# Patient Record
Sex: FEMALE | Race: WHITE | NOT HISPANIC OR LATINO | Employment: OTHER | URBAN - METROPOLITAN AREA
[De-identification: names, ages, dates, MRNs, and addresses within clinical notes are randomized per-mention and may not be internally consistent; named-entity substitution may affect disease eponyms.]

---

## 2020-01-04 ENCOUNTER — HOSPITAL ENCOUNTER (INPATIENT)
Facility: HOSPITAL | Age: 85
LOS: 1 days | Discharge: HOME HEALTH | DRG: 809 | End: 2020-01-06
Attending: INTERNAL MEDICINE | Admitting: FAMILY MEDICINE
Payer: MEDICARE

## 2020-01-04 DIAGNOSIS — D60.8 OTHER ACQUIRED PURE RED CELL APLASIAS: ICD-10-CM

## 2020-01-04 DIAGNOSIS — D64.9 ACUTE ON CHRONIC ANEMIA: ICD-10-CM

## 2020-01-04 DIAGNOSIS — R06.02 SOB (SHORTNESS OF BREATH): ICD-10-CM

## 2020-01-04 DIAGNOSIS — R53.83 FATIGUE, UNSPECIFIED TYPE: ICD-10-CM

## 2020-01-04 DIAGNOSIS — J45.909 ASTHMA, UNSPECIFIED ASTHMA SEVERITY, UNSPECIFIED WHETHER COMPLICATED, UNSPECIFIED WHETHER PERSISTENT: Primary | ICD-10-CM

## 2020-01-04 DIAGNOSIS — N17.9 AKI (ACUTE KIDNEY INJURY): ICD-10-CM

## 2020-01-04 PROBLEM — N18.30 ACUTE RENAL FAILURE SUPERIMPOSED ON STAGE 3 CHRONIC KIDNEY DISEASE: Chronic | Status: ACTIVE | Noted: 2020-01-04

## 2020-01-04 PROBLEM — I10 HTN (HYPERTENSION): Status: ACTIVE | Noted: 2020-01-04

## 2020-01-04 PROBLEM — I50.9 CHF (CONGESTIVE HEART FAILURE): Chronic | Status: ACTIVE | Noted: 2020-01-04

## 2020-01-04 PROBLEM — F03.90 DEMENTIA: Chronic | Status: ACTIVE | Noted: 2020-01-04

## 2020-01-04 PROBLEM — N18.30 ACUTE RENAL FAILURE SUPERIMPOSED ON STAGE 3 CHRONIC KIDNEY DISEASE: Status: ACTIVE | Noted: 2020-01-04

## 2020-01-04 LAB
ABO + RH BLD: NORMAL
ANION GAP SERPL CALC-SCNC: 11 MMOL/L (ref 8–16)
BASOPHILS # BLD AUTO: 0.01 K/UL (ref 0–0.2)
BASOPHILS NFR BLD: 0.2 % (ref 0–1.9)
BILIRUB UR QL STRIP: NEGATIVE
BLD GP AB SCN CELLS X3 SERPL QL: NORMAL
BLD PROD TYP BPU: NORMAL
BLOOD UNIT EXPIRATION DATE: NORMAL
BLOOD UNIT TYPE CODE: 5100
BLOOD UNIT TYPE: NORMAL
BNP SERPL-MCNC: 762 PG/ML (ref 0–99)
BUN SERPL-MCNC: 26 MG/DL (ref 8–23)
CALCIUM SERPL-MCNC: 9.2 MG/DL (ref 8.7–10.5)
CHLORIDE SERPL-SCNC: 104 MMOL/L (ref 95–110)
CLARITY UR: CLEAR
CO2 SERPL-SCNC: 28 MMOL/L (ref 23–29)
CODING SYSTEM: NORMAL
COLOR UR: YELLOW
CREAT SERPL-MCNC: 1.5 MG/DL (ref 0.5–1.4)
DIFFERENTIAL METHOD: ABNORMAL
DISPENSE STATUS: NORMAL
EOSINOPHIL # BLD AUTO: 0.1 K/UL (ref 0–0.5)
EOSINOPHIL NFR BLD: 1.2 % (ref 0–8)
ERYTHROCYTE [DISTWIDTH] IN BLOOD BY AUTOMATED COUNT: 15 % (ref 11.5–14.5)
EST. GFR  (AFRICAN AMERICAN): 36 ML/MIN/1.73 M^2
EST. GFR  (NON AFRICAN AMERICAN): 31 ML/MIN/1.73 M^2
FOLATE SERPL-MCNC: 14.3 NG/ML (ref 4–24)
GLUCOSE SERPL-MCNC: 108 MG/DL (ref 70–110)
GLUCOSE UR QL STRIP: NEGATIVE
HCT VFR BLD AUTO: 21.1 % (ref 37–48.5)
HCT VFR BLD AUTO: 22.6 % (ref 37–48.5)
HGB BLD-MCNC: 6.6 G/DL (ref 12–16)
HGB UR QL STRIP: NEGATIVE
IMM GRANULOCYTES # BLD AUTO: 0.03 K/UL (ref 0–0.04)
IMM GRANULOCYTES NFR BLD AUTO: 0.7 % (ref 0–0.5)
INFLUENZA A, MOLECULAR: NEGATIVE
INFLUENZA B, MOLECULAR: NEGATIVE
IRON SERPL-MCNC: 139 UG/DL (ref 30–160)
KETONES UR QL STRIP: NEGATIVE
LACTATE SERPL-SCNC: 1.1 MMOL/L (ref 0.5–2.2)
LEUKOCYTE ESTERASE UR QL STRIP: NEGATIVE
LYMPHOCYTES # BLD AUTO: 1.3 K/UL (ref 1–4.8)
LYMPHOCYTES NFR BLD: 32.8 % (ref 18–48)
MCH RBC QN AUTO: 39.1 PG (ref 27–31)
MCHC RBC AUTO-ENTMCNC: 31.3 G/DL (ref 32–36)
MCV RBC AUTO: 125 FL (ref 82–98)
MONOCYTES # BLD AUTO: 0.3 K/UL (ref 0.3–1)
MONOCYTES NFR BLD: 7.1 % (ref 4–15)
NEUTROPHILS # BLD AUTO: 2.4 K/UL (ref 1.8–7.7)
NEUTROPHILS NFR BLD: 58 % (ref 38–73)
NITRITE UR QL STRIP: NEGATIVE
NRBC BLD-RTO: 0 /100 WBC
NUM UNITS TRANS PACKED RBC: NORMAL
PH UR STRIP: 8 [PH] (ref 5–8)
PLATELET # BLD AUTO: 338 K/UL (ref 150–350)
PMV BLD AUTO: 11.6 FL (ref 9.2–12.9)
POTASSIUM SERPL-SCNC: 4 MMOL/L (ref 3.5–5.1)
PROT UR QL STRIP: NEGATIVE
RBC # BLD AUTO: 1.69 M/UL (ref 4–5.4)
RETICS/RBC NFR AUTO: 2.3 % (ref 0.5–2.5)
SATURATED IRON: 50 % (ref 20–50)
SODIUM SERPL-SCNC: 143 MMOL/L (ref 136–145)
SP GR UR STRIP: 1.01 (ref 1–1.03)
SPECIMEN SOURCE: NORMAL
T4 FREE SERPL-MCNC: 0.96 NG/DL (ref 0.71–1.51)
TOTAL IRON BINDING CAPACITY: 278 UG/DL (ref 250–450)
TRANSFERRIN SERPL-MCNC: 188 MG/DL (ref 200–375)
TROPONIN I SERPL DL<=0.01 NG/ML-MCNC: 0.04 NG/ML (ref 0–0.03)
TROPONIN I SERPL DL<=0.01 NG/ML-MCNC: 0.04 NG/ML (ref 0–0.03)
TROPONIN I SERPL DL<=0.01 NG/ML-MCNC: 0.05 NG/ML (ref 0–0.03)
TSH SERPL DL<=0.005 MIU/L-ACNC: 9.58 UIU/ML (ref 0.4–4)
URN SPEC COLLECT METH UR: NORMAL
UROBILINOGEN UR STRIP-ACNC: NEGATIVE EU/DL
VIT B12 SERPL-MCNC: 1224 PG/ML (ref 210–950)
WBC # BLD AUTO: 4.06 K/UL (ref 3.9–12.7)

## 2020-01-04 PROCEDURE — 85045 AUTOMATED RETICULOCYTE COUNT: CPT

## 2020-01-04 PROCEDURE — 81003 URINALYSIS AUTO W/O SCOPE: CPT

## 2020-01-04 PROCEDURE — 83605 ASSAY OF LACTIC ACID: CPT

## 2020-01-04 PROCEDURE — 87502 INFLUENZA DNA AMP PROBE: CPT

## 2020-01-04 PROCEDURE — G0378 HOSPITAL OBSERVATION PER HR: HCPCS

## 2020-01-04 PROCEDURE — P9016 RBC LEUKOCYTES REDUCED: HCPCS

## 2020-01-04 PROCEDURE — 85014 HEMATOCRIT: CPT

## 2020-01-04 PROCEDURE — 85025 COMPLETE CBC W/AUTO DIFF WBC: CPT

## 2020-01-04 PROCEDURE — 99291 CRITICAL CARE FIRST HOUR: CPT | Mod: 25

## 2020-01-04 PROCEDURE — 84484 ASSAY OF TROPONIN QUANT: CPT | Mod: 91

## 2020-01-04 PROCEDURE — 93010 EKG 12-LEAD: ICD-10-PCS | Mod: ,,, | Performed by: INTERNAL MEDICINE

## 2020-01-04 PROCEDURE — 80048 BASIC METABOLIC PNL TOTAL CA: CPT

## 2020-01-04 PROCEDURE — 93010 ELECTROCARDIOGRAM REPORT: CPT | Mod: ,,, | Performed by: INTERNAL MEDICINE

## 2020-01-04 PROCEDURE — 82607 VITAMIN B-12: CPT

## 2020-01-04 PROCEDURE — 86920 COMPATIBILITY TEST SPIN: CPT

## 2020-01-04 PROCEDURE — 36430 TRANSFUSION BLD/BLD COMPNT: CPT

## 2020-01-04 PROCEDURE — 82746 ASSAY OF FOLIC ACID SERUM: CPT

## 2020-01-04 PROCEDURE — 84443 ASSAY THYROID STIM HORMONE: CPT

## 2020-01-04 PROCEDURE — 25000242 PHARM REV CODE 250 ALT 637 W/ HCPCS: Performed by: INTERNAL MEDICINE

## 2020-01-04 PROCEDURE — 86850 RBC ANTIBODY SCREEN: CPT

## 2020-01-04 PROCEDURE — 83880 ASSAY OF NATRIURETIC PEPTIDE: CPT

## 2020-01-04 PROCEDURE — 94640 AIRWAY INHALATION TREATMENT: CPT

## 2020-01-04 PROCEDURE — 93005 ELECTROCARDIOGRAM TRACING: CPT

## 2020-01-04 PROCEDURE — 36415 COLL VENOUS BLD VENIPUNCTURE: CPT

## 2020-01-04 PROCEDURE — 83540 ASSAY OF IRON: CPT

## 2020-01-04 PROCEDURE — 84439 ASSAY OF FREE THYROXINE: CPT

## 2020-01-04 PROCEDURE — 94761 N-INVAS EAR/PLS OXIMETRY MLT: CPT

## 2020-01-04 RX ORDER — HYDROCODONE BITARTRATE AND ACETAMINOPHEN 500; 5 MG/1; MG/1
TABLET ORAL ONCE
Status: DISCONTINUED | OUTPATIENT
Start: 2020-01-04 | End: 2020-01-06 | Stop reason: HOSPADM

## 2020-01-04 RX ORDER — HYDRALAZINE HYDROCHLORIDE 25 MG/1
25 TABLET, FILM COATED ORAL EVERY 8 HOURS PRN
Status: DISCONTINUED | OUTPATIENT
Start: 2020-01-04 | End: 2020-01-06 | Stop reason: HOSPADM

## 2020-01-04 RX ORDER — LEVOTHYROXINE SODIUM 50 UG/1
50 TABLET ORAL
Status: DISCONTINUED | OUTPATIENT
Start: 2020-01-05 | End: 2020-01-06 | Stop reason: HOSPADM

## 2020-01-04 RX ORDER — FUROSEMIDE 20 MG/1
20 TABLET ORAL 2 TIMES DAILY
Status: DISCONTINUED | OUTPATIENT
Start: 2020-01-05 | End: 2020-01-06 | Stop reason: HOSPADM

## 2020-01-04 RX ORDER — HEPARIN SODIUM 5000 [USP'U]/ML
5000 INJECTION, SOLUTION INTRAVENOUS; SUBCUTANEOUS EVERY 8 HOURS
Status: DISCONTINUED | OUTPATIENT
Start: 2020-01-04 | End: 2020-01-04

## 2020-01-04 RX ORDER — PANTOPRAZOLE SODIUM 40 MG/1
40 TABLET, DELAYED RELEASE ORAL DAILY
Status: DISCONTINUED | OUTPATIENT
Start: 2020-01-05 | End: 2020-01-06 | Stop reason: HOSPADM

## 2020-01-04 RX ORDER — ONDANSETRON 2 MG/ML
4 INJECTION INTRAMUSCULAR; INTRAVENOUS EVERY 8 HOURS PRN
Status: DISCONTINUED | OUTPATIENT
Start: 2020-01-04 | End: 2020-01-06 | Stop reason: HOSPADM

## 2020-01-04 RX ORDER — METOPROLOL TARTRATE 1 MG/ML
5 INJECTION, SOLUTION INTRAVENOUS EVERY 4 HOURS PRN
Status: DISCONTINUED | OUTPATIENT
Start: 2020-01-04 | End: 2020-01-06 | Stop reason: HOSPADM

## 2020-01-04 RX ORDER — SODIUM CHLORIDE 0.9 % (FLUSH) 0.9 %
10 SYRINGE (ML) INJECTION
Status: DISCONTINUED | OUTPATIENT
Start: 2020-01-04 | End: 2020-01-06 | Stop reason: HOSPADM

## 2020-01-04 RX ORDER — BISACODYL 10 MG
10 SUPPOSITORY, RECTAL RECTAL DAILY PRN
Status: DISCONTINUED | OUTPATIENT
Start: 2020-01-04 | End: 2020-01-06 | Stop reason: HOSPADM

## 2020-01-04 RX ORDER — HYDROCODONE BITARTRATE AND ACETAMINOPHEN 500; 5 MG/1; MG/1
TABLET ORAL ONCE
Status: DISCONTINUED | OUTPATIENT
Start: 2020-01-04 | End: 2020-01-04

## 2020-01-04 RX ORDER — IPRATROPIUM BROMIDE AND ALBUTEROL SULFATE 2.5; .5 MG/3ML; MG/3ML
3 SOLUTION RESPIRATORY (INHALATION)
Status: COMPLETED | OUTPATIENT
Start: 2020-01-04 | End: 2020-01-04

## 2020-01-04 RX ORDER — IPRATROPIUM BROMIDE AND ALBUTEROL SULFATE 2.5; .5 MG/3ML; MG/3ML
3 SOLUTION RESPIRATORY (INHALATION) EVERY 4 HOURS PRN
Status: DISCONTINUED | OUTPATIENT
Start: 2020-01-04 | End: 2020-01-06 | Stop reason: HOSPADM

## 2020-01-04 RX ORDER — AMLODIPINE BESYLATE 10 MG/1
10 TABLET ORAL DAILY
Status: DISCONTINUED | OUTPATIENT
Start: 2020-01-05 | End: 2020-01-06 | Stop reason: HOSPADM

## 2020-01-04 RX ORDER — ACETAMINOPHEN 325 MG/1
650 TABLET ORAL EVERY 4 HOURS PRN
Status: DISCONTINUED | OUTPATIENT
Start: 2020-01-04 | End: 2020-01-06 | Stop reason: HOSPADM

## 2020-01-04 RX ADMIN — IPRATROPIUM BROMIDE AND ALBUTEROL SULFATE 3 ML: .5; 3 SOLUTION RESPIRATORY (INHALATION) at 12:01

## 2020-01-04 NOTE — ED PROVIDER NOTES
SCRIBE #1 NOTE: I, Kaley Mathew, am scribing for, and in the presence of, Minh Gann MD. I have scribed the entire note.       History     Chief Complaint   Patient presents with    Fatigue     pt complaining of weakness and exertional dyspnea.  [facility]     Review of patient's allergies indicates:   Allergen Reactions    Aspirin     Levaquin [levofloxacin]     Merthiolate (thimerosal)     Oxycodone     Polymyxin [bacitracin-polymyxin b]     Valium [diazepam]     Zanaflex [tizanidine]          History of Present Illness     HPI    1/4/2020, 11:36 AM  History obtained from the patient      History of Present Illness: Love Davey is a 86 y.o. female patient with a PMHx of anemia, arthritis, anxiety, chronic back pain, HTN, and CVA who presents to the Emergency Department for evaluation of fatigue which onset gradually over the last few days. Pt was recently in the hospital for pneumonia. Pt was recently discontinued from benzodiazepines last week at [facility]. Symptoms are constant and moderate in severity. No mitigating or exacerbating factors reported. Associated sxs include SOB, wheezing, and exertional dyspnea. Patient denies any fever/ chills, cough, CP, palpations, numbness, HA, dizziness, rash, wound, abdominal pain, n/v/d, back/ neck pain, sore throat, congestion, dysuria, hematuria, localized weakness, easily bruising, BLE edema, and all other sxs at this time. Prior Tx includes prior tx for pneumonia. No further complaints or concerns at this time.     Arrival mode: [facility] (from [facility])    PCP: Son Guan MD        Past Medical History:  Past Medical History:   Diagnosis Date    Anemia     Anxiety     Arthritis     Back pain     CHF (congestive heart failure)     Dizziness     Hypertension     Insomnia     Stroke     Use of cane as ambulatory aid        Past Surgical History:  Past Surgical History:   Procedure  Laterality Date    APPENDECTOMY      arthritis      BLADDER REPAIR      BUNIONECTOMY      CATARACT EXTRACTION      HYSTERECTOMY      metal implant Bilateral     placed in the bladder.         Family History:  Family History   Problem Relation Age of Onset    Hypertension Unknown     Cancer Mother     Heart disease Father        Social History:  Social History     Tobacco Use    Smoking status: Never Smoker   Substance and Sexual Activity    Alcohol use: No    Drug use: No    Sexual activity: Unknown        Review of Systems     Review of Systems   Constitutional: Positive for fatigue. Negative for chills and fever.   HENT: Negative for congestion and sore throat.    Respiratory: Positive for shortness of breath and wheezing. Negative for cough.         + Exertional dyspnea   Cardiovascular: Negative for chest pain, palpitations and leg swelling (BLE).   Gastrointestinal: Negative for abdominal pain, diarrhea, nausea and vomiting.   Genitourinary: Negative for dysuria and hematuria.   Musculoskeletal: Negative for back pain and neck pain.   Skin: Negative for rash and wound.   Neurological: Negative for dizziness, weakness, numbness and headaches.   Hematological: Does not bruise/bleed easily.   All other systems reviewed and are negative.     Physical Exam     Initial Vitals [01/04/20 1130]   BP Pulse Resp Temp SpO2   (!) 154/60 62 18 98.5 °F (36.9 °C) 96 %      MAP       --          Physical Exam  Nursing Notes and Vital Signs Reviewed.  Constitutional: Patient is in no acute distress. Well-developed and well-nourished.  Head: Atraumatic. Normocephalic.  Eyes: PERRL. EOM intact. Conjunctivae are not pale. No scleral icterus.  ENT: Mucous membranes are moist. Oropharynx is clear and symmetric.    Neck: Supple. Full ROM. No lymphadenopathy.  Cardiovascular: Regular rate. Regular rhythm. No murmurs, rubs, or gallops. Distal pulses are 2+ and symmetric. AICD in left chest wall.   Pulmonary/Chest: No  respiratory distress. No rales. Bilateral wheezes.  Abdominal: Soft and non-distended.  There is no tenderness.  No rebound, guarding, or rigidity. Good bowel sounds.  Genitourinary: No CVA tenderness  Musculoskeletal: Moves all extremities. No obvious deformities. No edema. No calf tenderness.  Skin: Warm and dry.  Neurological:  Alert, awake, and appropriate.  Normal speech.  No acute focal neurological deficits are appreciated.  Psychiatric: Normal affect. Good eye contact. Appropriate in content. Does not appear anxious. No SI. No HI.      ED Course   Critical Care  Date/Time: 1/4/2020 2:19 PM  Performed by: Minh Gann MD  Authorized by: Minh Gann MD   Direct patient critical care time: 15 minutes  Additional history critical care time: 5 minutes  Ordering / reviewing critical care time: 5 minutes  Consulting other physicians critical care time: 5 minutes  Consult with family critical care time: 5 minutes  Total critical care time (exclusive of procedural time) : 35 minutes  Critical care time was exclusive of separately billable procedures and treating other patients and teaching time.  Critical care was necessary to treat or prevent imminent or life-threatening deterioration of the following conditions: Severe anemia requiring blood transfusion.  Critical care was time spent personally by me on the following activities: development of treatment plan with patient or surrogate, discussions with consultants, interpretation of cardiac output measurements, evaluation of patient's response to treatment, examination of patient, obtaining history from patient or surrogate, ordering and review of radiographic studies, ordering and review of laboratory studies, re-evaluation of patient's condition and review of old charts.        ED Vital Signs:  Vitals:    01/04/20 1348 01/04/20 1400 01/04/20 1452 01/04/20 1701   BP: (!) 154/65 (!) 170/70 (!) 181/77    Pulse: 66 (!) 54 (!) 59 62   Resp: (!) 23 14  18    Temp:   98.1 °F (36.7 °C)    TempSrc:   Oral    SpO2: 98% 97% 97%    Weight:       Height:        01/04/20 1759 01/04/20 1930 01/04/20 2018 01/04/20 2028   BP: (!) 140/61 (!) 122/58  139/63   Pulse: (!) 59 64  64   Resp:  18  18   Temp:  98.2 °F (36.8 °C)  99 °F (37.2 °C)   TempSrc:  Oral  Oral   SpO2:  98% 98% 96%   Weight:       Height:        01/04/20 2045 01/04/20 2100 01/04/20 2307 01/04/20 2355   BP: 129/61 132/60 (!) 150/65 (!) 141/65   Pulse: 60 61 67 61   Resp: 18 18 18 18   Temp: 98.4 °F (36.9 °C) 98.4 °F (36.9 °C) 97.7 °F (36.5 °C) 98.4 °F (36.9 °C)   TempSrc: Oral Oral Oral Oral   SpO2: 96% 96% 97% 95%   Weight:       Height:        01/05/20 0045 01/05/20 0359 01/05/20 0432   BP: (!) 150/67 (!) 145/64    Pulse: 60 64    Resp: 16 18    Temp: 98.4 °F (36.9 °C) 98.7 °F (37.1 °C)    TempSrc: Oral Oral    SpO2: 95% 98%    Weight:   73.6 kg (162 lb 4.1 oz)   Height:          Abnormal Lab Results:  Labs Reviewed   CBC W/ AUTO DIFFERENTIAL - Abnormal; Notable for the following components:       Result Value    RBC 1.69 (*)     Hemoglobin 6.6 (*)     Hematocrit 21.1 (*)     Mean Corpuscular Volume 125 (*)     Mean Corpuscular Hemoglobin 39.1 (*)     Mean Corpuscular Hemoglobin Conc 31.3 (*)     RDW 15.0 (*)     Immature Granulocytes 0.7 (*)     All other components within normal limits   B-TYPE NATRIURETIC PEPTIDE - Abnormal; Notable for the following components:     (*)     All other components within normal limits   BASIC METABOLIC PANEL - Abnormal; Notable for the following components:    BUN, Bld 26 (*)     Creatinine 1.5 (*)     eGFR if  36 (*)     eGFR if non  31 (*)     All other components within normal limits   TROPONIN I - Abnormal; Notable for the following components:    Troponin I 0.042 (*)     All other components within normal limits   INFLUENZA A & B BY MOLECULAR   LACTIC ACID, PLASMA   URINALYSIS, REFLEX TO URINE CULTURE    Narrative:     Preferred  Collection Type->Urine, Clean Catch   TYPE & SCREEN        All Lab Results:  Results for orders placed or performed during the hospital encounter of 01/04/20   Influenza A & B by Molecular   Result Value Ref Range    Influenza A, Molecular Negative Negative    Influenza B, Molecular Negative Negative    Flu A & B Source Nasal swab    CBC auto differential   Result Value Ref Range    WBC 4.06 3.90 - 12.70 K/uL    RBC 1.69 (L) 4.00 - 5.40 M/uL    Hemoglobin 6.6 (L) 12.0 - 16.0 g/dL    Hematocrit 21.1 (L) 37.0 - 48.5 %    Mean Corpuscular Volume 125 (H) 82 - 98 fL    Mean Corpuscular Hemoglobin 39.1 (H) 27.0 - 31.0 pg    Mean Corpuscular Hemoglobin Conc 31.3 (L) 32.0 - 36.0 g/dL    RDW 15.0 (H) 11.5 - 14.5 %    Platelets 338 150 - 350 K/uL    MPV 11.6 9.2 - 12.9 fL    Immature Granulocytes 0.7 (H) 0.0 - 0.5 %    Gran # (ANC) 2.4 1.8 - 7.7 K/uL    Immature Grans (Abs) 0.03 0.00 - 0.04 K/uL    Lymph # 1.3 1.0 - 4.8 K/uL    Mono # 0.3 0.3 - 1.0 K/uL    Eos # 0.1 0.0 - 0.5 K/uL    Baso # 0.01 0.00 - 0.20 K/uL    nRBC 0 0 /100 WBC    Gran% 58.0 38.0 - 73.0 %    Lymph% 32.8 18.0 - 48.0 %    Mono% 7.1 4.0 - 15.0 %    Eosinophil% 1.2 0.0 - 8.0 %    Basophil% 0.2 0.0 - 1.9 %    Differential Method Automated    Brain natriuretic peptide   Result Value Ref Range     (H) 0 - 99 pg/mL   Basic metabolic panel   Result Value Ref Range    Sodium 143 136 - 145 mmol/L    Potassium 4.0 3.5 - 5.1 mmol/L    Chloride 104 95 - 110 mmol/L    CO2 28 23 - 29 mmol/L    Glucose 108 70 - 110 mg/dL    BUN, Bld 26 (H) 8 - 23 mg/dL    Creatinine 1.5 (H) 0.5 - 1.4 mg/dL    Calcium 9.2 8.7 - 10.5 mg/dL    Anion Gap 11 8 - 16 mmol/L    eGFR if African American 36 (A) >60 mL/min/1.73 m^2    eGFR if non African American 31 (A) >60 mL/min/1.73 m^2   Lactic acid, plasma   Result Value Ref Range    Lactate (Lactic Acid) 1.1 0.5 - 2.2 mmol/L   Troponin I   Result Value Ref Range    Troponin I 0.042 (H) 0.000 - 0.026 ng/mL   Urinalysis, Reflex to Urine  Culture Urine, Clean Catch   Result Value Ref Range    Specimen UA Urine, Clean Catch     Color, UA Yellow Yellow, Straw, Bridgette    Appearance, UA Clear Clear    pH, UA 8.0 5.0 - 8.0    Specific Gravity, UA 1.010 1.005 - 1.030    Protein, UA Negative Negative    Glucose, UA Negative Negative    Ketones, UA Negative Negative    Bilirubin (UA) Negative Negative    Occult Blood UA Negative Negative    Nitrite, UA Negative Negative    Urobilinogen, UA Negative <2.0 EU/dL    Leukocytes, UA Negative Negative   TSH   Result Value Ref Range    TSH 9.576 (H) 0.400 - 4.000 uIU/mL   Folate   Result Value Ref Range    Folate 14.3 4.0 - 24.0 ng/mL   Vitamin B12   Result Value Ref Range    Vitamin B-12 1224 (H) 210 - 950 pg/mL   Troponin I   Result Value Ref Range    Troponin I 0.049 (H) 0.000 - 0.026 ng/mL   Troponin I   Result Value Ref Range    Troponin I 0.044 (H) 0.000 - 0.026 ng/mL   Hematocrit   Result Value Ref Range    Hematocrit 22.6 (L) 37.0 - 48.5 %   Reticulocytes   Result Value Ref Range    Retic 2.3 0.5 - 2.5 %   Iron and TIBC   Result Value Ref Range    Iron 139 30 - 160 ug/dL    Transferrin 188 (L) 200 - 375 mg/dL    TIBC 278 250 - 450 ug/dL    Saturated Iron 50 20 - 50 %   T4, free   Result Value Ref Range    Free T4 0.96 0.71 - 1.51 ng/dL   Basic metabolic panel   Result Value Ref Range    Sodium 144 136 - 145 mmol/L    Potassium 3.7 3.5 - 5.1 mmol/L    Chloride 107 95 - 110 mmol/L    CO2 25 23 - 29 mmol/L    Glucose 88 70 - 110 mg/dL    BUN, Bld 20 8 - 23 mg/dL    Creatinine 1.2 0.5 - 1.4 mg/dL    Calcium 8.8 8.7 - 10.5 mg/dL    Anion Gap 12 8 - 16 mmol/L    eGFR if African American 47 (A) >60 mL/min/1.73 m^2    eGFR if non African American 41 (A) >60 mL/min/1.73 m^2   Hemoglobin   Result Value Ref Range    Hemoglobin 7.4 (L) 12.0 - 16.0 g/dL   Hematocrit   Result Value Ref Range    Hematocrit 23.3 (L) 37.0 - 48.5 %   Type & Screen   Result Value Ref Range    Group & Rh O POS     Indirect Paula NEG    Prepare  RBC 1 Unit   Result Value Ref Range    UNIT NUMBER [id number]     Product Code F1362X72     DISPENSE STATUS TRANSFUSED     CODING SYSTEM AIRM984     Unit Blood Type Code 5100     Unit Blood Type O POS     Unit Expiration [id number]          Imaging Results:  Imaging Results          X-Ray Chest 1 View (Final result)  Result time 01/04/20 12:20:12    Final result by Ludin Zepeda MD (01/04/20 12:20:12)                 Impression:      No acute findings.      Electronically signed by: Ludin Zepeda MD  Date:    01/04/2020  Time:    12:20             Narrative:    EXAMINATION:  XR CHEST 1 VIEW    CLINICAL HISTORY:  Shortness of breath    TECHNIQUE:  AP view of the chest was performed.    COMPARISON:  10/24/2016    FINDINGS:  The cardiac and mediastinal silhouettes appear within normal limits. Aortic atherosclerosis and tortuosity.  Interval placement of a left-sided AICD.  The lungs are clear bilaterally.  No acute osseous findings demonstrated.                               1:31 PM: Per ED physician, pt's bedside US results: Normal heart function. Middle part of aorta, left ventricle, and left atrium appear normal. No collapse with IVC. No fluid noted in lungs. No CHF. LV at 35-40 %. .    The EKG was ordered, reviewed, and independently interpreted by the ED provider.  Interpretation time: 12:26  Rate: 58 BPM  Rhythm: sinus bradycardia with fusion complexes  Interpretation: LAD. Incomplete LBBB. LVH with repolarization abnormality. Abnormal ECG. No STEMI.           The Emergency Provider reviewed the vital signs and test results, which are outlined above.     ED Discussion     1:50 PM: Re-evaluated pt. I have discussed test results, shared treatment plan, and the need for admission with patient and family at bedside. Pt and family express understanding at this time and agree with all information. All questions answered. Pt and family have no further questions or concerns at this time. Pt is ready for admit.    1:50  PM: Discussed case with Madyson Hardy NP, (Hospital Medicine). Madyson Hardy NP, agrees with current care and management of pt and accepts admission.   Admitting Service: Hospital Medicine  Admitting Physician: Dr. Hopkins  Admit to: Observation/ Tele    2:59 PM: Discussed pt's case with pt's daughter who requests evaluation for nursing home placement.          Medical Decision Making:   Clinical Tests:   Lab Tests: Ordered and Reviewed  Radiological Study: Ordered and Reviewed  Medical Tests: Ordered and Reviewed           ED Medication(s):  Medications   0.9%  NaCl infusion (for blood administration) ( Intravenous Hold 1/4/20 1645)   sodium chloride 0.9% flush 10 mL (has no administration in time range)   amLODIPine tablet 10 mg (has no administration in time range)   pantoprazole EC tablet 40 mg (has no administration in time range)   levothyroxine tablet 50 mcg (50 mcg Oral Given 1/5/20 0504)   acetaminophen tablet 650 mg (has no administration in time range)   bisacodyl suppository 10 mg (has no administration in time range)   ondansetron injection 4 mg (has no administration in time range)   albuterol-ipratropium 2.5 mg-0.5 mg/3 mL nebulizer solution 3 mL (has no administration in time range)   furosemide tablet 20 mg (has no administration in time range)   hydrALAZINE tablet 25 mg (has no administration in time range)   metoprolol injection 5 mg (has no administration in time range)   polyethylene glycol packet 17 g (17 g Oral Given 1/5/20 0504)   albuterol-ipratropium 2.5 mg-0.5 mg/3 mL nebulizer solution 3 mL (3 mLs Nebulization Given 1/4/20 1235)       Current Discharge Medication List                  Scribe Attestation:   Scribe #1: I performed the above scribed service and the documentation accurately describes the services I performed. I attest to the accuracy of the note.     Attending:   Physician Attestation Statement for Scribe #1: I, Minh Gann MD, personally performed the services described in  this documentation, as scribed by Kaley Mathew, in my presence, and it is both accurate and complete.           Clinical Impression       ICD-10-CM ICD-9-CM   1. Asthma, unspecified asthma severity, unspecified whether complicated, unspecified whether persistent J45.909 493.90   2. SOB (shortness of breath) R06.02 786.05   3. Fatigue, unspecified type R53.83 780.79   4. TANIA (acute kidney injury) N17.9 584.9   5. 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 Acute on chronic anemia D64.9 285.9       Disposition:   Disposition: Placed in Observation  Condition: Hever Gann MD  01/05/20 0719

## 2020-01-04 NOTE — ASSESSMENT & PLAN NOTE
- Transfuse 1U RBC then monitor serial H/H   - NPO at midnight in the event H/H does not rebound and would require GI evaluation. If H/H stable we can feed her tomorrow.  - Check FOBT, iron sat, TIBC, and B12  - She denies aspirin and NSAID use however recently suffered a CVA and was rx Plavix. Her home regimen is unclear at this time.  - She is followed by Dr. Hauser for chronic anemia- recommend close outpatient follow up in light of acute on chronic anemia  - Anticipate appropriate response to transfusion and d/c tomorrow. If she does not respond as expected we can ask our hematology service to weigh in.

## 2020-01-04 NOTE — ASSESSMENT & PLAN NOTE
- Aricept is listed as home medication  - She was recently at [facility] for unclear reasons  - She is experiencing paranoid behavior and thoughts however it is unclear whether her claims are substantiated. CM assisting with d/c planning.  - At risk for delirium.

## 2020-01-04 NOTE — ASSESSMENT & PLAN NOTE
- Baseline Cr 1.1  - Mildly elevated to 1.5 today likely prerenal and related to significant anemia  - In light of CHF history I will hold off on IVF and repeat labs in AM following transfusion.  - Hold lasix today but resume tomorrow due to CHF

## 2020-01-04 NOTE — NURSING
Per patient, does not wish to share information about stay with daughter and spouse. Daughter has attempted several times to contact patient and request information. Informed daughter that patient has requested to not share information. Daughter understanding, states they are a nurse and understand. Patient's daughter wishes to have consult with  and have them contact daughter to discuss having the patient placed into nursing home. Will contact provider.    4635 1/4/20  Contacted JOSEPHINE Martini, regarding patient and daughter's request. At this time, no consult will be placed.

## 2020-01-04 NOTE — ED NOTES
Received call from Daughter of patient for information, daughter advised due to privacy information can not be given to anyone other than patient nor over the phone.

## 2020-01-04 NOTE — ASSESSMENT & PLAN NOTE
- ECHO 12/9/19 reveals EF 35%, AICD in place  - Resume lasix starting tomorrow given the volume she will receive today with transfusion

## 2020-01-04 NOTE — SUBJECTIVE & OBJECTIVE
Past Medical History:   Diagnosis Date    Anemia     Anxiety     Arthritis     Back pain     CHF (congestive heart failure)     Dizziness     Hypertension     Insomnia     Stroke     Use of cane as ambulatory aid        Past Surgical History:   Procedure Laterality Date    APPENDECTOMY      arthritis      BLADDER REPAIR      BUNIONECTOMY      CATARACT EXTRACTION      HYSTERECTOMY      metal implant Bilateral     placed in the bladder.       Review of patient's allergies indicates:   Allergen Reactions    Aspirin     Levaquin [levofloxacin]     Merthiolate (thimerosal)     Oxycodone     Polymyxin [bacitracin-polymyxin b]     Valium [diazepam]     Zanaflex [tizanidine]        No current facility-administered medications on file prior to encounter.      Current Outpatient Medications on File Prior to Encounter   Medication Sig    albuterol (PROAIR HFA) 90 mcg/actuation inhaler Inhale 2 puffs into the lungs every 6 (six) hours as needed for Wheezing.    amlodipine (NORVASC) 10 MG tablet Take 10 mg by mouth once daily.    aripiprazole (ABILIFY) 10 MG Tab Take 5 mg by mouth once daily.    atorvastatin (LIPITOR) 20 MG tablet Take 20 mg by mouth once daily.    butalbital-acetaminophen-caffeine -40 mg (FIORICET, ESGIC) -40 mg per tablet Take 1 tablet by mouth every 4 (four) hours as needed for Pain.    carvedilol (COREG) 6.25 MG tablet Take 6.25 mg by mouth 2 (two) times daily with meals.    donepezil (ARICEPT) 5 MG tablet Take 5 mg by mouth every evening.    duloxetine (CYMBALTA) 60 MG capsule Take 60 mg by mouth once daily.    esomeprazole (NEXIUM) 40 MG capsule Take 40 mg by mouth before breakfast.    ferrous fumarate-iron polysaccharide complex (TANDEM) 162-115.2 (106) mg Cap Take 1 capsule by mouth daily with breakfast.    fluticasone (FLONASE) 50 mcg/actuation nasal spray 1 spray by Each Nare route once daily.    furosemide (LASIX) 20 MG tablet Take 20 mg by mouth 2 (two)  times daily.    gabapentin (NEURONTIN) 300 MG capsule Take 300 mg by mouth 3 (three) times daily.    glucosamine-chondroitin 500-400 mg tablet Take 1 tablet by mouth 3 (three) times daily.    hydrOXYzine (ATARAX) 25 MG tablet Take 25 mg by mouth 3 (three) times daily as needed for Itching.    levothyroxine (SYNTHROID) 50 MCG tablet Take 50 mcg by mouth once daily.    lisinopril (PRINIVIL,ZESTRIL) 5 MG tablet Take 5 mg by mouth once daily.    lorazepam (ATIVAN) 0.5 MG tablet Take 0.5 mg by mouth every 6 (six) hours as needed for Anxiety.    mirabegron 50 mg Tb24 Take by mouth.    multivitamin capsule Take 1 capsule by mouth once daily.    polyethylene glycol (GLYCOLAX) 17 gram PwPk Take by mouth.    potassium chloride SA (K-DUR,KLOR-CON) 20 MEQ tablet Take 20 mEq by mouth 2 (two) times daily.    solifenacin (VESICARE) 10 MG tablet Take 5 mg by mouth once daily.    venlafaxine (EFFEXOR-XR) 150 MG Cp24 Take 75 mg by mouth once daily.     Family History     Problem Relation (Age of Onset)    Cancer Mother    Heart disease Father    Hypertension         Tobacco Use    Smoking status: Never Smoker    Smokeless tobacco: Never Used   Substance and Sexual Activity    Alcohol use: No    Drug use: No    Sexual activity: Not on file     Review of Systems   Constitutional: Positive for unexpected weight change (7# in one month). Negative for activity change, appetite change, diaphoresis, fatigue and fever.   HENT: Negative for dental problem, nosebleeds, sore throat and trouble swallowing.    Respiratory: Positive for shortness of breath (with exertion). Negative for cough, chest tightness and wheezing.    Cardiovascular: Negative for chest pain, palpitations and leg swelling.   Gastrointestinal: Positive for blood in stool and diarrhea (laxative induced). Negative for abdominal distention, abdominal pain, anal bleeding, constipation, nausea and vomiting.   Genitourinary: Negative for difficulty urinating,  dysuria and hematuria.   Musculoskeletal: Negative for back pain, gait problem and joint swelling.   Skin: Positive for pallor and wound (distal LLE). Negative for rash.   Neurological: Positive for weakness and light-headedness. Negative for dizziness, syncope, numbness and headaches.   Psychiatric/Behavioral: Positive for dysphoric mood. Negative for agitation, confusion and sleep disturbance. The patient is not nervous/anxious and is not hyperactive.      Objective:     Vital Signs (Most Recent):  Temp: 98.1 °F (36.7 °C) (01/04/20 1452)  Pulse: (!) 59 (01/04/20 1452)  Resp: 18 (01/04/20 1452)  BP: (!) 181/77 (01/04/20 1452)  SpO2: 97 % (01/04/20 1452) Vital Signs (24h Range):  Temp:  [98.1 °F (36.7 °C)-98.5 °F (36.9 °C)] 98.1 °F (36.7 °C)  Pulse:  [54-66] 59  Resp:  [14-24] 18  SpO2:  [96 %-100 %] 97 %  BP: (146-181)/(60-77) 181/77     Weight: 69.3 kg (152 lb 12.8 oz)  Body mass index is 28.87 kg/m².    Physical Exam   Constitutional: She is oriented to person, place, and time. She appears well-developed and well-nourished. No distress.   HENT:   Head: Normocephalic and atraumatic.   Eyes: No scleral icterus.   PERRL, EOMI   Neck: Normal range of motion.   Cardiovascular: Normal rate, regular rhythm and normal heart sounds.   No murmur heard.  Pulmonary/Chest: Effort normal and breath sounds normal. No respiratory distress. She has no wheezes. She has no rales.   Abdominal: Soft. Bowel sounds are normal. She exhibits no distension. There is no tenderness. There is no guarding.   Musculoskeletal: Normal range of motion. She exhibits no edema or deformity.   Neurological: She is alert and oriented to person, place, and time. No cranial nerve deficit or sensory deficit. She exhibits normal muscle tone.   Skin: Skin is warm and dry. Capillary refill takes less than 2 seconds. No rash noted. She is not diaphoretic. There is pallor.   Psychiatric: Her speech is normal and behavior is normal. Her mood appears anxious.  Her affect is not inappropriate. Thought content is paranoid. Cognition and memory are normal. She expresses inappropriate judgment. She exhibits normal recent memory and normal remote memory. She is attentive.           Significant Labs:   BMP:   Recent Labs   Lab 01/04/20  1202         K 4.0      CO2 28   BUN 26*   CREATININE 1.5*   CALCIUM 9.2     CBC:   Recent Labs   Lab 01/04/20  1202   WBC 4.06   HGB 6.6*   HCT 21.1*        Coagulation: No results for input(s): PT, INR, APTT in the last 48 hours.  Troponin:   Recent Labs   Lab 01/04/20  1202   TROPONINI 0.042*     TSH: No results for input(s): TSH in the last 4320 hours.  All pertinent labs within the past 24 hours have been reviewed.    Significant Imaging: I have reviewed all pertinent imaging results/findings within the past 24 hours.

## 2020-01-04 NOTE — ED NOTES
Patient resting comfortably in bed at this time. Patient requesting to have something to eat, advised will converse with physicians and let her know as soon as I get an answer.
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
## 2020-01-04 NOTE — ED NOTES
Received t/c from pt's daughter, Kristy Willson @ [phone].  Physician and primary nurse were notified.

## 2020-01-05 PROBLEM — N17.9 AKI (ACUTE KIDNEY INJURY): Status: ACTIVE | Noted: 2020-01-05

## 2020-01-05 PROBLEM — N17.9 ACUTE RENAL FAILURE SUPERIMPOSED ON STAGE 3 CHRONIC KIDNEY DISEASE: Status: RESOLVED | Noted: 2020-01-04 | Resolved: 2020-01-05

## 2020-01-05 PROBLEM — N18.30 ACUTE RENAL FAILURE SUPERIMPOSED ON STAGE 3 CHRONIC KIDNEY DISEASE: Status: RESOLVED | Noted: 2020-01-04 | Resolved: 2020-01-05

## 2020-01-05 LAB
ANION GAP SERPL CALC-SCNC: 12 MMOL/L (ref 8–16)
BLD PROD TYP BPU: NORMAL
BLOOD UNIT EXPIRATION DATE: NORMAL
BLOOD UNIT TYPE CODE: 5100
BLOOD UNIT TYPE: NORMAL
BUN SERPL-MCNC: 20 MG/DL (ref 8–23)
CALCIUM SERPL-MCNC: 8.8 MG/DL (ref 8.7–10.5)
CHLORIDE SERPL-SCNC: 107 MMOL/L (ref 95–110)
CO2 SERPL-SCNC: 25 MMOL/L (ref 23–29)
CODING SYSTEM: NORMAL
CREAT SERPL-MCNC: 1.2 MG/DL (ref 0.5–1.4)
DISPENSE STATUS: NORMAL
EST. GFR  (AFRICAN AMERICAN): 47 ML/MIN/1.73 M^2
EST. GFR  (NON AFRICAN AMERICAN): 41 ML/MIN/1.73 M^2
GLUCOSE SERPL-MCNC: 88 MG/DL (ref 70–110)
HCT VFR BLD AUTO: 23.3 % (ref 37–48.5)
HCT VFR BLD AUTO: 29.3 % (ref 37–48.5)
HGB BLD-MCNC: 7.4 G/DL (ref 12–16)
HGB BLD-MCNC: 7.9 G/DL (ref 12–16)
HGB BLD-MCNC: 9.6 G/DL (ref 12–16)
NUM UNITS TRANS PACKED RBC: NORMAL
OB PNL STL: NEGATIVE
POTASSIUM SERPL-SCNC: 3.7 MMOL/L (ref 3.5–5.1)
SODIUM SERPL-SCNC: 144 MMOL/L (ref 136–145)

## 2020-01-05 PROCEDURE — P9016 RBC LEUKOCYTES REDUCED: HCPCS

## 2020-01-05 PROCEDURE — 25000003 PHARM REV CODE 250: Performed by: PHYSICIAN ASSISTANT

## 2020-01-05 PROCEDURE — 85014 HEMATOCRIT: CPT

## 2020-01-05 PROCEDURE — 94761 N-INVAS EAR/PLS OXIMETRY MLT: CPT

## 2020-01-05 PROCEDURE — 80048 BASIC METABOLIC PNL TOTAL CA: CPT

## 2020-01-05 PROCEDURE — 85018 HEMOGLOBIN: CPT | Mod: 91

## 2020-01-05 PROCEDURE — 36430 TRANSFUSION BLD/BLD COMPNT: CPT

## 2020-01-05 PROCEDURE — 36415 COLL VENOUS BLD VENIPUNCTURE: CPT

## 2020-01-05 PROCEDURE — 25000003 PHARM REV CODE 250: Performed by: NURSE PRACTITIONER

## 2020-01-05 PROCEDURE — 85018 HEMOGLOBIN: CPT

## 2020-01-05 PROCEDURE — 82272 OCCULT BLD FECES 1-3 TESTS: CPT

## 2020-01-05 PROCEDURE — 21400001 HC TELEMETRY ROOM

## 2020-01-05 RX ORDER — POLYETHYLENE GLYCOL 3350 17 G/17G
17 POWDER, FOR SOLUTION ORAL DAILY
Status: DISCONTINUED | OUTPATIENT
Start: 2020-01-05 | End: 2020-01-06 | Stop reason: HOSPADM

## 2020-01-05 RX ADMIN — LEVOTHYROXINE SODIUM 50 MCG: 50 TABLET ORAL at 05:01

## 2020-01-05 RX ADMIN — FUROSEMIDE 20 MG: 20 TABLET ORAL at 07:01

## 2020-01-05 RX ADMIN — AMLODIPINE BESYLATE 10 MG: 10 TABLET ORAL at 08:01

## 2020-01-05 RX ADMIN — PANTOPRAZOLE SODIUM 40 MG: 40 TABLET, DELAYED RELEASE ORAL at 08:01

## 2020-01-05 RX ADMIN — POLYETHYLENE GLYCOL (3350) 17 G: 17 POWDER, FOR SOLUTION ORAL at 05:01

## 2020-01-05 RX ADMIN — FUROSEMIDE 20 MG: 20 TABLET ORAL at 08:01

## 2020-01-05 NOTE — PLAN OF CARE
Patient arrived to Telemetry this afternoon. Plan of care at this time involves transfusing 1 unit RBC to patient. Patient at this time in NAD and no complaints. Patient dizzy upon standing. Instructed patient to call for assistance. No other signigicant changes at this time.

## 2020-01-05 NOTE — PROGRESS NOTES
Met with pt regarding d/c plan.  The pt is a resident of [facility] assisted living, but is refusing to return to the facility.  The pt instead wants to be picked up by her grandson Terrell Dorsey.  GIDEON called pt's daughter Kristy at [phone] and her spouse Louis at [phone].  The pt's spouse states that pt has exhibited erratic behavior lately, was recently hospitalized at [facility], and is not competent to make her own decisions.  GIDEON explained that medically she is stable and that the attending physician evaluation states she is competent to make her own decisions, and we will have to respect pt wishes to discharge to a place other than [facility].  The pt plans to go to a hotel temporarily when picked up by her grandson.  She reports that credit card she will use to pay for hotel and will stay there until she finds new long term housing.  GIDEON did inform the pt's spouse of her intention.

## 2020-01-05 NOTE — HOSPITAL COURSE
Patient was admitted for symptomatic anemia. She reports being diagnosed with anemia recently and is currently being followed by heme/onc at [facility]. She was transfused 2 units. FOBT was negative and no signs of bleeding noted. On day of discharge there was concern on where patient was being discharged to. At the time, she resided at [facility] however she does not wish to return to that facility. Patient was AAOx3 and capable of making her own decisions. She was not homicidal or suicidal. She reports having a follow up with her PCP coming up and states that she will follow up with her oncologist for her anemia. Case management was consulted for discharge planning who also spoke with patient. She refuses to go back to [facility] and states she will get her grandson to pick her up and take her to a hotel until she can make further arrangements. Again patient was in her right mind and did not appear dangerous to self or others. Patient was discharged home.     Patient's grandson came to pick patient up and argument ensued. Disposition was unclear as patient has no money of her own and she refuses to go back to [facility]. Will keep for monitoring of H/H s/p 2 units prbc. Plan to discharge in am.     Case management and I again spoke with patient. Decision was made to discharge patient back to [facility] with home health.

## 2020-01-05 NOTE — PLAN OF CARE
Patient AAOx4. VSS..   Patient remained afebrile throughout the shift.  Heart rate closely monitored   Patient SB-SR on monitor.  PRBC x1. H&H Q 8 hrs  Patient remained free of falls this shift.  Plan of care reviewed.  Patient verbalized understanding.  Patient moving/turning independently  Frequent weight shifting encouraged.  Bed low, siderails up x2, wheels locked, call light in reach.  Patient instructed to call for assistance.  Hourly rounding completed.  Will continue to monitor.

## 2020-01-05 NOTE — SUBJECTIVE & OBJECTIVE
Interval History: Denies any issues overnight. States she does not want to go back to current assisted living facility. She does not know what meds she is suppose to be taking.     Review of Systems  Objective:     Vital Signs (Most Recent):  Temp: 98.2 °F (36.8 °C) (01/05/20 1523)  Pulse: 66 (01/05/20 1523)  Resp: 20 (01/05/20 1523)  BP: (!) 154/68 (01/05/20 1523)  SpO2: (!) 94 % (01/05/20 1523) Vital Signs (24h Range):  Temp:  [97.7 °F (36.5 °C)-99 °F (37.2 °C)] 98.2 °F (36.8 °C)  Pulse:  [58-67] 66  Resp:  [16-20] 20  SpO2:  [94 %-98 %] 94 %  BP: (122-177)/(58-72) 154/68     Weight: 73.6 kg (162 lb 4.1 oz)  Body mass index is 30.66 kg/m².    Intake/Output Summary (Last 24 hours) at 1/5/2020 1550  Last data filed at 1/5/2020 1215  Gross per 24 hour   Intake 1284.67 ml   Output --   Net 1284.67 ml      Physical Exam    Significant Labs:   Recent Lab Results       01/05/20  1504   01/05/20  0840   01/05/20  0806   01/05/20  0549   01/05/20  0153        Anion Gap       12       BUN, Bld       20       Calcium       8.8       Chloride       107       CO2       25       Creatinine       1.2       eGFR if        47       eGFR if non        41  Comment:  Calculation used to obtain the estimated glomerular filtration  rate (eGFR) is the CKD-EPI equation.          Glucose       88       Hematocrit 29.3       23.3     Hemoglobin 9.6   7.9   7.4     Occult Blood   Negative           Potassium       3.7       Sodium       144       Troponin I                                01/04/20  2100        Anion Gap       BUN, Bld       Calcium       Chloride       CO2       Creatinine       eGFR if        eGFR if non African American       Glucose       Hematocrit       Hemoglobin       Occult Blood       Potassium       Sodium       Troponin I 0.044  Comment:  The reference interval for Troponin I represents the 99th percentile   cutoff   for our facility and is consistent with 3rd  generation assay   performance.           All pertinent labs within the past 24 hours have been reviewed.    Significant Imaging: I have reviewed all pertinent imaging results/findings within the past 24 hours.
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
## 2020-01-06 VITALS
TEMPERATURE: 99 F | WEIGHT: 154.31 LBS | RESPIRATION RATE: 18 BRPM | SYSTOLIC BLOOD PRESSURE: 143 MMHG | DIASTOLIC BLOOD PRESSURE: 65 MMHG | HEIGHT: 61 IN | HEART RATE: 66 BPM | OXYGEN SATURATION: 94 % | BODY MASS INDEX: 29.13 KG/M2

## 2020-01-06 PROBLEM — N17.9 AKI (ACUTE KIDNEY INJURY): Status: RESOLVED | Noted: 2020-01-05 | Resolved: 2020-01-06

## 2020-01-06 LAB
ANION GAP SERPL CALC-SCNC: 7 MMOL/L (ref 8–16)
BUN SERPL-MCNC: 17 MG/DL (ref 8–23)
CALCIUM SERPL-MCNC: 8.9 MG/DL (ref 8.7–10.5)
CHLORIDE SERPL-SCNC: 109 MMOL/L (ref 95–110)
CO2 SERPL-SCNC: 29 MMOL/L (ref 23–29)
CREAT SERPL-MCNC: 1.1 MG/DL (ref 0.5–1.4)
EST. GFR  (AFRICAN AMERICAN): 53 ML/MIN/1.73 M^2
EST. GFR  (NON AFRICAN AMERICAN): 46 ML/MIN/1.73 M^2
GLUCOSE SERPL-MCNC: 109 MG/DL (ref 70–110)
HCT VFR BLD AUTO: 29.7 % (ref 37–48.5)
HCT VFR BLD AUTO: 31.5 % (ref 37–48.5)
HGB BLD-MCNC: 10 G/DL (ref 12–16)
HGB BLD-MCNC: 9.8 G/DL (ref 12–16)
POTASSIUM SERPL-SCNC: 4.2 MMOL/L (ref 3.5–5.1)
SODIUM SERPL-SCNC: 145 MMOL/L (ref 136–145)

## 2020-01-06 PROCEDURE — 85014 HEMATOCRIT: CPT | Mod: 91

## 2020-01-06 PROCEDURE — 80048 BASIC METABOLIC PNL TOTAL CA: CPT

## 2020-01-06 PROCEDURE — 25000003 PHARM REV CODE 250: Performed by: PHYSICIAN ASSISTANT

## 2020-01-06 PROCEDURE — 85014 HEMATOCRIT: CPT

## 2020-01-06 PROCEDURE — 36415 COLL VENOUS BLD VENIPUNCTURE: CPT

## 2020-01-06 PROCEDURE — 85018 HEMOGLOBIN: CPT

## 2020-01-06 PROCEDURE — 25000003 PHARM REV CODE 250: Performed by: NURSE PRACTITIONER

## 2020-01-06 PROCEDURE — 85018 HEMOGLOBIN: CPT | Mod: 91

## 2020-01-06 RX ORDER — CARVEDILOL 6.25 MG/1
6.25 TABLET ORAL 2 TIMES DAILY WITH MEALS
Status: DISCONTINUED | OUTPATIENT
Start: 2020-01-06 | End: 2020-01-06 | Stop reason: HOSPADM

## 2020-01-06 RX ADMIN — ACETAMINOPHEN 650 MG: 325 TABLET ORAL at 09:01

## 2020-01-06 RX ADMIN — HYDRALAZINE HYDROCHLORIDE 25 MG: 25 TABLET, FILM COATED ORAL at 04:01

## 2020-01-06 RX ADMIN — PANTOPRAZOLE SODIUM 40 MG: 40 TABLET, DELAYED RELEASE ORAL at 09:01

## 2020-01-06 RX ADMIN — FUROSEMIDE 20 MG: 20 TABLET ORAL at 09:01

## 2020-01-06 RX ADMIN — LEVOTHYROXINE SODIUM 50 MCG: 50 TABLET ORAL at 05:01

## 2020-01-06 RX ADMIN — AMLODIPINE BESYLATE 10 MG: 10 TABLET ORAL at 09:01

## 2020-01-06 RX ADMIN — POLYETHYLENE GLYCOL (3350) 17 G: 17 POWDER, FOR SOLUTION ORAL at 09:01

## 2020-01-06 NOTE — ASSESSMENT & PLAN NOTE
- Resume amlodipine  - Hold ACEI in light of TANIA and BB in light of bradycardia    1/5:  Controlled
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
## 2020-01-06 NOTE — ASSESSMENT & PLAN NOTE
- Aricept is listed as home medication  - She was recently at [facility] for unclear reasons  - She is experiencing paranoid behavior and thoughts however it is unclear whether her claims are substantiated. CM assisting with d/c planning.  - At risk for delirium.    1/5:  Cont home meds
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
## 2020-01-06 NOTE — ASSESSMENT & PLAN NOTE
- Transfuse 1U RBC then monitor serial H/H   - NPO at midnight in the event H/H does not rebound and would require GI evaluation. If H/H stable we can feed her tomorrow.  - Check FOBT, iron sat, TIBC, and B12  - She denies aspirin and NSAID use however recently suffered a CVA and was rx Plavix. Her home regimen is unclear at this time.  - She is followed by Dr. Hauser for chronic anemia- recommend close outpatient follow up in light of acute on chronic anemia  - Anticipate appropriate response to transfusion and d/c tomorrow. If she does not respond as expected we can ask our hematology service to weigh in.    1/5:  H/h still < 8 after 1 unit pRBC, will transfuse 2nd unit  Repeat h/h good, continue to monitor h/h  Iron studies, folate, and b12 ok  retic count inappropriately normal  She reports being followed by heme/onc at [facility]  States she has been told she will need transfusions every 8 weeks  Tolerate prbc  Attempted to discharge today however issues with current disposition  Patient is in her right mind and able to make decisions  AAOx3, not suicidal or homicidal   She does not want to go back to [facility]   Was ok with being discharged by son however argument happened whenever son arrived  Patient had nowhere to go   Will need to discuss with case management and family in the am

## 2020-01-06 NOTE — PLAN OF CARE
Patient AAOx4. VSS..   Patient remained afebrile throughout the shift.  Heart rate closely monitored   Patient SR on monitor.  CM to assist with discharge today  Patient remained free of falls this shift.  Plan of care reviewed.  Patient verbalized understanding.  Patient moving/turning independently  Frequent weight shifting encouraged.  Bed low, siderails up x2, wheels locked, call light in reach.  Patient instructed to call for assistance.  Hourly rounding completed.  Will continue to monitor.

## 2020-01-06 NOTE — NURSING
"KOLBY Purdy and KOLBY Lawson went to pt room to discuss discharge. Pt stated that she was not going back to her assisted living facility but was going to check into a hotel. Pts grandson was in room stating that pt has no money to check into a hotel and she couldn't stay with him. Pt continued to refuse being transported to assisted living. Grandson became angry, telling pt to "figure it out" and left the hospital. Pt said that she had no where to go and no one to call. Gurwinder and MARBELLA spoke with Dr Wylie who stated to allow pt to stay tonight and Medical Management will assess tomorrow.  "

## 2020-01-06 NOTE — PLAN OF CARE
"Obtained copy of physician order to [facility] requesting the facility dispense her medications. Met with patient . We discussed her returning to [facility] with [facility]. She agreed, we also discussed the facility dispensing her medications. She then refused to leave and return to [facility]. I explained that wherever she goes or whatever facility she would go to would be the same. That her pcp ordered the facility to dispense her medications. She stated that she didn't believe it. I allowed her to read the order from her pcp's office. She said "I'll go back" She indicated that she will discuss this with her pcp at her next appointment. Secure message sent to provider requesting discharge orders if appropriate.         01/06/20 5387   Post-Acute Status   Post-Acute Authorization Home Health/Hospice   Home Health/Hospice Status Set-up Complete   Discharge Delays (!) Other           Contacted Laverne at [facility],@ [phone] informed her that patient is discharged and ready for . She stated they will be on their way.Update to Marcello, primary nurse.  "

## 2020-01-06 NOTE — PLAN OF CARE
Met with patient . Patient is a resident at [facility]. Patient will return to [place] with [facility].   Updated white board with 's name and number. Transitional Care Folder, Discharge Planning Begins on Admission pamphlet, [facility] Bedside Delivery pamphlet, Advance Directive information given to patient along with the contact information given.Instructed patient or family to call with any questions or concerns.          D/C Plan: [facility] with [facility]    PCP: Dr Son Guan    Discharge transportation: [place]    [facility]: pending    Pharmacy Bedside Delivery: no meds to be dispensed by facility.         01/06/20 0950   Discharge Assessment   Assessment Type Discharge Planning Assessment   Confirmed/corrected address and phone number on facesheet? Yes   Assessment information obtained from? Patient;Medical Record   Expected Length of Stay (days)   (tbd)   Communicated expected length of stay with patient/caregiver yes   Prior to hospitilization cognitive status: Unable to Assess   Current cognitive status: Alert/Oriented   Current Functional Status: Assistive Equipment;Needs Assistance   Facility Arrived From: [facility] With facility resident   Able to Return to Prior Arrangements yes   Is patient able to care for self after discharge? Unable to determine at this time (comments)   Who are your caregiver(s) and their phone number(s)? Kristy Willson ( daughter ) [phone]   Patient's perception of discharge disposition assisted living   Readmission Within the Last 30 Days no previous admission in last 30 days   Patient currently being followed by outpatient case management? Yes   If yes, name of outpatient case management following: other (comments)  ([facility] Mgt Team)   Patient currently receives any other outside agency services? No   Equipment Currently Used at Home wheelchair;walker, rolling;shower chair   Do you have any  problems affording any of your prescribed medications? No   Is the patient taking medications as prescribed?   (tbd)   Does the patient have transportation home? Yes   Transportation Anticipated other (see comments)  (facility)   Does the patient receive services at the [facility]? No   Discharge Plan A Assisted Living;Home Health   DME Needed Upon Discharge  none   Patient/Family in Agreement with Plan yes

## 2020-01-06 NOTE — NURSING
Pt reports feeling dizzy and feels like she is going to pass out in bed. VSS. Dr Anna notified. Orthostatic blood pressure checks done. Will monitor.

## 2020-01-06 NOTE — PLAN OF CARE
01/06/20 1025   Medicare Message   Important Message from Medicare regarding Discharge Appeal Rights Given to patient/caregiver;Other (comments);Explained to patient/caregiver;Signed/date by patient/caregiver   Date IMM was signed 01/06/20   Time IMM was signed 1024

## 2020-01-06 NOTE — SUBJECTIVE & OBJECTIVE
Interval History: Denies any issues overnight. States she is adamant she will not go back to [facility]. Reports having been admitted to [facility] recently and she does not like the environment over there.     Review of Systems   Constitutional: Negative for fatigue and fever.   Respiratory: Negative for shortness of breath.    Cardiovascular: Negative for chest pain.   Gastrointestinal: Negative for nausea and vomiting.   Skin: Negative for rash.   Psychiatric/Behavioral: Negative for confusion, sleep disturbance and suicidal ideas. The patient is not nervous/anxious.      Objective:     Vital Signs (Most Recent):  Temp: 98.2 °F (36.8 °C) (01/05/20 1523)  Pulse: 66 (01/05/20 1523)  Resp: 20 (01/05/20 1523)  BP: (!) 154/68 (01/05/20 1523)  SpO2: (!) 94 % (01/05/20 1523) Vital Signs (24h Range):  Temp:  [97.7 °F (36.5 °C)-99 °F (37.2 °C)] 98.2 °F (36.8 °C)  Pulse:  [58-67] 66  Resp:  [16-20] 20  SpO2:  [94 %-98 %] 94 %  BP: (122-177)/(58-72) 154/68     Weight: 73.6 kg (162 lb 4.1 oz)  Body mass index is 30.66 kg/m².    Intake/Output Summary (Last 24 hours) at 1/5/2020 1830  Last data filed at 1/5/2020 1215  Gross per 24 hour   Intake 1284.67 ml   Output --   Net 1284.67 ml      Physical Exam   Constitutional: She is oriented to person, place, and time. She appears well-developed and well-nourished. No distress.   HENT:   Head: Normocephalic and atraumatic.   Cardiovascular: Normal rate, regular rhythm and normal heart sounds. Exam reveals no gallop and no friction rub.   No murmur heard.  Pulmonary/Chest: Effort normal and breath sounds normal. No stridor. No respiratory distress. She has no wheezes. She has no rales.   Abdominal: Soft. Bowel sounds are normal. She exhibits no distension and no mass. There is no tenderness. There is no guarding.   Musculoskeletal: Normal range of motion. She exhibits no edema.   Neurological: She is alert and oriented to person, place, and time.   Skin: She is not  diaphoretic.   Psychiatric: She has a normal mood and affect. Her behavior is normal. Judgment and thought content normal.       Significant Labs:   Recent Lab Results       01/05/20  1504   01/05/20  0840   01/05/20  0806   01/05/20  0549   01/05/20  0153        Anion Gap       12       BUN, Bld       20       Calcium       8.8       Chloride       107       CO2       25       Creatinine       1.2       eGFR if        47       eGFR if non        41  Comment:  Calculation used to obtain the estimated glomerular filtration  rate (eGFR) is the CKD-EPI equation.          Glucose       88       Hematocrit 29.3       23.3     Hemoglobin 9.6   7.9   7.4     Occult Blood   Negative           Potassium       3.7       Sodium       144       Troponin I                                01/04/20  2100        Anion Gap       BUN, Bld       Calcium       Chloride       CO2       Creatinine       eGFR if        eGFR if non African American       Glucose       Hematocrit       Hemoglobin       Occult Blood       Potassium       Sodium       Troponin I 0.044  Comment:  The reference interval for Troponin I represents the 99th percentile   cutoff   for our facility and is consistent with 3rd generation assay   performance.           All pertinent labs within the past 24 hours have been reviewed.    Significant Imaging: I have reviewed all pertinent imaging results/findings within the past 24 hours.
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
## 2020-01-06 NOTE — NURSING
Patient is complaining of anxiety and states that they feel like that are having a panic attack. Dr. Hopkins notified

## 2020-01-06 NOTE — PROGRESS NOTES
During initial assessment patient with complaints of dizziness and headache. Vitals stable, patient on O2 for comfort as patient stated that they felt short of breath. Dr. Hopkins notified. Will give tylenol and follow up.

## 2020-01-06 NOTE — NURSING
Report given to DON and Nurse receiving patient at [place]. Transportation en route. Heart monitor removed from patient and  PIV removed intact.

## 2020-01-07 ENCOUNTER — HOSPITAL ENCOUNTER (EMERGENCY)
Facility: HOSPITAL | Age: 85
Discharge: HOME | End: 2020-01-07
Attending: EMERGENCY MEDICINE
Payer: MEDICARE

## 2020-01-07 VITALS
BODY MASS INDEX: 26.41 KG/M2 | DIASTOLIC BLOOD PRESSURE: 75 MMHG | TEMPERATURE: 98 F | SYSTOLIC BLOOD PRESSURE: 151 MMHG | HEART RATE: 68 BPM | RESPIRATION RATE: 18 BRPM | OXYGEN SATURATION: 98 % | HEIGHT: 61 IN | WEIGHT: 139.88 LBS

## 2020-01-07 DIAGNOSIS — F41.1 ANXIETY REACTION: Primary | ICD-10-CM

## 2020-01-07 DIAGNOSIS — R51.9 NONINTRACTABLE HEADACHE, UNSPECIFIED CHRONICITY PATTERN, UNSPECIFIED HEADACHE TYPE: ICD-10-CM

## 2020-01-07 LAB
ALBUMIN SERPL BCP-MCNC: 3.5 G/DL (ref 3.5–5.2)
ALP SERPL-CCNC: 62 U/L (ref 55–135)
ALT SERPL W/O P-5'-P-CCNC: 14 U/L (ref 10–44)
ANION GAP SERPL CALC-SCNC: 11 MMOL/L (ref 8–16)
AST SERPL-CCNC: 17 U/L (ref 10–40)
BASOPHILS # BLD AUTO: 0.02 K/UL (ref 0–0.2)
BASOPHILS NFR BLD: 0.4 % (ref 0–1.9)
BILIRUB SERPL-MCNC: 0.5 MG/DL (ref 0.1–1)
BUN SERPL-MCNC: 13 MG/DL (ref 8–23)
CALCIUM SERPL-MCNC: 9 MG/DL (ref 8.7–10.5)
CHLORIDE SERPL-SCNC: 108 MMOL/L (ref 95–110)
CO2 SERPL-SCNC: 26 MMOL/L (ref 23–29)
CREAT SERPL-MCNC: 1 MG/DL (ref 0.5–1.4)
DIFFERENTIAL METHOD: ABNORMAL
EOSINOPHIL # BLD AUTO: 0.1 K/UL (ref 0–0.5)
EOSINOPHIL NFR BLD: 2.4 % (ref 0–8)
ERYTHROCYTE [DISTWIDTH] IN BLOOD BY AUTOMATED COUNT: 21.7 % (ref 11.5–14.5)
EST. GFR  (AFRICAN AMERICAN): 59 ML/MIN/1.73 M^2
EST. GFR  (NON AFRICAN AMERICAN): 51 ML/MIN/1.73 M^2
GLUCOSE SERPL-MCNC: 103 MG/DL (ref 70–110)
HCT VFR BLD AUTO: 32.4 % (ref 37–48.5)
HGB BLD-MCNC: 10.4 G/DL (ref 12–16)
IMM GRANULOCYTES # BLD AUTO: 0.02 K/UL (ref 0–0.04)
IMM GRANULOCYTES NFR BLD AUTO: 0.4 % (ref 0–0.5)
INR PPP: 1 (ref 0.8–1.2)
LYMPHOCYTES # BLD AUTO: 1.4 K/UL (ref 1–4.8)
LYMPHOCYTES NFR BLD: 30.2 % (ref 18–48)
MCH RBC QN AUTO: 35.3 PG (ref 27–31)
MCHC RBC AUTO-ENTMCNC: 32.1 G/DL (ref 32–36)
MCV RBC AUTO: 110 FL (ref 82–98)
MONOCYTES # BLD AUTO: 0.3 K/UL (ref 0.3–1)
MONOCYTES NFR BLD: 6.6 % (ref 4–15)
NEUTROPHILS # BLD AUTO: 2.7 K/UL (ref 1.8–7.7)
NEUTROPHILS NFR BLD: 60 % (ref 38–73)
NRBC BLD-RTO: 0 /100 WBC
PLATELET # BLD AUTO: 303 K/UL (ref 150–350)
PMV BLD AUTO: 11.7 FL (ref 9.2–12.9)
POTASSIUM SERPL-SCNC: 3.4 MMOL/L (ref 3.5–5.1)
PROT SERPL-MCNC: 6.2 G/DL (ref 6–8.4)
PROTHROMBIN TIME: 10.4 SEC (ref 9–12.5)
RBC # BLD AUTO: 2.95 M/UL (ref 4–5.4)
SODIUM SERPL-SCNC: 145 MMOL/L (ref 136–145)
WBC # BLD AUTO: 4.53 K/UL (ref 3.9–12.7)

## 2020-01-07 PROCEDURE — 99283 EMERGENCY DEPT VISIT LOW MDM: CPT

## 2020-01-07 PROCEDURE — 25000003 PHARM REV CODE 250: Performed by: EMERGENCY MEDICINE

## 2020-01-07 PROCEDURE — 85610 PROTHROMBIN TIME: CPT

## 2020-01-07 PROCEDURE — 85025 COMPLETE CBC W/AUTO DIFF WBC: CPT

## 2020-01-07 PROCEDURE — 80053 COMPREHEN METABOLIC PANEL: CPT

## 2020-01-07 RX ORDER — ACETAMINOPHEN 500 MG
1000 TABLET ORAL
Status: COMPLETED | OUTPATIENT
Start: 2020-01-07 | End: 2020-01-07

## 2020-01-07 RX ADMIN — ACETAMINOPHEN 1000 MG: 500 TABLET ORAL at 07:01

## 2020-01-07 NOTE — ED PROVIDER NOTES
SCRIBE #1 NOTE: I, Nikolay Angeles, am scribing for, and in the presence of, Lee Arevalo Jr., MD. I have scribed the entire note.      History      Chief Complaint   Patient presents with    Headache     pt brought in by EMS for HA and shortness of breath that started today       Review of patient's allergies indicates:   Allergen Reactions    Aspirin     Levaquin [levofloxacin]     Merthiolate (thimerosal)     Oxycodone     Polymyxin [bacitracin-polymyxin b]     Valium [diazepam]     Zanaflex [tizanidine]         HPI   HPI    1/7/2020, 7:36 AM   History obtained from the patient      History of Present Illness: Love Davey is a 86 y.o. female patient with a PMHx of stroke, CHF, who presents to the Emergency Department for posterior headache, onset just PTA. Pt presented to the ED 2 days ago, was admitted for a blood transfusion, and discharged yesterday. Symptoms are constant and moderate in severity. No mitigating or exacerbating factors reported. No associated sxs reported. Patient denies any fever, chills, n/v/d, SOB, CP, weakness, numbness, dizziness, and all other sxs at this time. No prior Tx reported. Pt also states that she has been having family problems, as well as problems with the assisted living facility she lives in. Pt states that she has not been able to eat or take her home medication while at her assisted living facility. No further complaints or concerns at this time.     Arrival mode: EMS    PCP: Son Guan MD       Past Medical History:  Past Medical History:   Diagnosis Date    Anemia     Anxiety     Arthritis     Back pain     CHF (congestive heart failure)     Dizziness     Hypertension     Insomnia     Stroke     Use of cane as ambulatory aid        Past Surgical History:  Past Surgical History:   Procedure Laterality Date    APPENDECTOMY      arthritis      BLADDER REPAIR      BUNIONECTOMY      CATARACT EXTRACTION      HYSTERECTOMY      metal implant Bilateral      placed in the bladder.         Family History:  Family History   Problem Relation Age of Onset    Hypertension Unknown     Cancer Mother     Heart disease Father        Social History:  Social History     Tobacco Use    Smoking status: Never Smoker    Smokeless tobacco: Never Used   Substance and Sexual Activity    Alcohol use: No    Drug use: No    Sexual activity: Not on file       ROS   Review of Systems   Constitutional: Negative for chills, diaphoresis, fatigue and fever.   HENT: Negative for sore throat.    Respiratory: Negative for shortness of breath.    Cardiovascular: Negative for chest pain.   Gastrointestinal: Negative for diarrhea, nausea and vomiting.   Genitourinary: Negative for dysuria.   Musculoskeletal: Negative for back pain.   Skin: Negative for rash and wound.   Neurological: Positive for headaches (posterior). Negative for dizziness, weakness, light-headedness and numbness.   Hematological: Does not bruise/bleed easily.   All other systems reviewed and are negative.    Physical Exam      Initial Vitals [01/07/20 0714]   BP Pulse Resp Temp SpO2   (!) 163/72 72 20 97.7 °F (36.5 °C) 99 %      MAP       --          Physical Exam  Nursing Notes and Vital Signs Reviewed.  Constitutional: Patient is in no acute distress. Well-developed and well-nourished.  Head: Atraumatic. Normocephalic. Headache to base of skull.  Eyes: PERRL. EOM intact. Conjunctivae are not pale. No scleral icterus.  ENT: Mucous membranes are moist. Oropharynx is clear and symmetric.    Neck: Supple. Full ROM. No lymphadenopathy. Mild cervical paraspinal tenderness.  Cardiovascular: Regular rate. Regular rhythm. No murmurs, rubs, or gallops. Distal pulses are 2+ and symmetric.  Pulmonary/Chest: No respiratory distress. Clear to auscultation bilaterally. No wheezing or rales.  Abdominal: Soft and non-distended.  There is no tenderness.  No rebound, guarding, or rigidity.  Musculoskeletal: Moves all extremities. No  "obvious deformities. No edema.  Skin: Warm and dry.  Neurological:  Alert, awake, and appropriate.  Normal speech.  No new acute focal neurological deficits are appreciated.  Psychiatric: Anxious. Good eye contact. Frustrated with assisted living facility and family, reiterates issues previous mentioned during last ED visit 2 days ago. Pt becomes extremely agitated when speaking about her  or daughter.    ED Course    Procedures  ED Vital Signs:  Vitals:    01/07/20 0714 01/07/20 1000 01/07/20 1045   BP: (!) 163/72 (!) 155/73 (!) 151/75   Pulse: 72 68 68   Resp: 20 18 18   Temp: 97.7 °F (36.5 °C)  98.3 °F (36.8 °C)   TempSrc: Oral  Oral   SpO2: 99% 98% 98%   Weight: 63.5 kg (139 lb 14.4 oz)     Height: 5' 1" (1.549 m)         Abnormal Lab Results:  Labs Reviewed   CBC W/ AUTO DIFFERENTIAL - Abnormal; Notable for the following components:       Result Value    RBC 2.95 (*)     Hemoglobin 10.4 (*)     Hematocrit 32.4 (*)     Mean Corpuscular Volume 110 (*)     Mean Corpuscular Hemoglobin 35.3 (*)     RDW 21.7 (*)     All other components within normal limits   COMPREHENSIVE METABOLIC PANEL - Abnormal; Notable for the following components:    Potassium 3.4 (*)     eGFR if  59 (*)     eGFR if non  51 (*)     All other components within normal limits   PROTIME-INR        All Lab Results:  Results for orders placed or performed during the hospital encounter of 01/07/20   CBC auto differential   Result Value Ref Range    WBC 4.53 3.90 - 12.70 K/uL    RBC 2.95 (L) 4.00 - 5.40 M/uL    Hemoglobin 10.4 (L) 12.0 - 16.0 g/dL    Hematocrit 32.4 (L) 37.0 - 48.5 %    Mean Corpuscular Volume 110 (H) 82 - 98 fL    Mean Corpuscular Hemoglobin 35.3 (H) 27.0 - 31.0 pg    Mean Corpuscular Hemoglobin Conc 32.1 32.0 - 36.0 g/dL    RDW 21.7 (H) 11.5 - 14.5 %    Platelets 303 150 - 350 K/uL    MPV 11.7 9.2 - 12.9 fL    Immature Granulocytes 0.4 0.0 - 0.5 %    Gran # (ANC) 2.7 1.8 - 7.7 K/uL    Immature " Grans (Abs) 0.02 0.00 - 0.04 K/uL    Lymph # 1.4 1.0 - 4.8 K/uL    Mono # 0.3 0.3 - 1.0 K/uL    Eos # 0.1 0.0 - 0.5 K/uL    Baso # 0.02 0.00 - 0.20 K/uL    nRBC 0 0 /100 WBC    Gran% 60.0 38.0 - 73.0 %    Lymph% 30.2 18.0 - 48.0 %    Mono% 6.6 4.0 - 15.0 %    Eosinophil% 2.4 0.0 - 8.0 %    Basophil% 0.4 0.0 - 1.9 %    Differential Method Automated    Comprehensive metabolic panel   Result Value Ref Range    Sodium 145 136 - 145 mmol/L    Potassium 3.4 (L) 3.5 - 5.1 mmol/L    Chloride 108 95 - 110 mmol/L    CO2 26 23 - 29 mmol/L    Glucose 103 70 - 110 mg/dL    BUN, Bld 13 8 - 23 mg/dL    Creatinine 1.0 0.5 - 1.4 mg/dL    Calcium 9.0 8.7 - 10.5 mg/dL    Total Protein 6.2 6.0 - 8.4 g/dL    Albumin 3.5 3.5 - 5.2 g/dL    Total Bilirubin 0.5 0.1 - 1.0 mg/dL    Alkaline Phosphatase 62 55 - 135 U/L    AST 17 10 - 40 U/L    ALT 14 10 - 44 U/L    Anion Gap 11 8 - 16 mmol/L    eGFR if African American 59 (A) >60 mL/min/1.73 m^2    eGFR if non African American 51 (A) >60 mL/min/1.73 m^2   Protime-INR   Result Value Ref Range    Prothrombin Time 10.4 9.0 - 12.5 sec    INR 1.0 0.8 - 1.2     Imaging Results:  Imaging Results    None        The EKG was ordered, reviewed, and independently interpreted by the ED provider.  Interpretation time: 07:10  Rate: 70 BPM  Rhythm: normal sinus rhythm  Interpretation: Left axis deviation. Incomplete RBBB. LVH with repolarization abnormality. No STEMI.           The Emergency Provider reviewed the vital signs and test results, which are outlined above.    ED Discussion     10:02 AM: Reassessed pt at this time. Pt states that her headache has improved at this time after tylenol. Discussed with pt all pertinent ED information and results. Discussed pt dx and plan of tx. Gave pt all f/u and return to the ED instructions. Pt states that she would like to speak to a . All questions and concerns were addressed at this time. Pt expresses understanding of information and instructions, and  is comfortable with plan to discharge. Pt is stable for discharge.    Patient's headache is either consistent with previous headache and/or lacks features concerning for emergent or life threatening condition.  I do not suspect SAH, meningitis, increased IC pressure, infectious, toxic, vascular, CNS, or other EMC.  I have discussed this at length with patient and/or family/caretaker.    I discussed with patient and/or family/caretaker that evaluation in the ED does not suggest any emergent or life threatening medical conditions requiring immediate intervention beyond what was provided in the ED, and I believe patient is safe for discharge.  Regardless, an unremarkable evaluation in the ED does not preclude the development or presence of a serious of life threatening condition. As such, patient was instructed to return immediately for any worsening or change in current symptoms.    Regarding ANXIETY, I discussed signs and symptoms of anxiety with patient including: tachycardia, dysrhythmias, tachypnea, diaphoresis, trembling, dizziness, diarrhea, dry mouth, and difficulty swallowing.  Patient was encouraged to eat a well-balanced, healthy diet; get plenty of rest; exercise daily; limit caffeine and alcohol intake; participate in meditation; talk with family and/or friends about things that may be considered stressful; and keep a diary of feelings and stress triggers.  Patient was instructed to take medications as prescribed and follow up with primary care provider for long term management. I recommended that the patient return to the emergency department if they: feel lightheaded or too dizzy to stand up; develop feelings that they want to hurt themselves or someone else; or develop chest pain, tightness, or heaviness that radiates to the shoulders, arms, jaw, neck, or back.     Patient's headache is consistent with previous headaches and lacks features concerning for emergent or life threatening condition.  I do not  suspect SAH, meningitis, increased IC pressure, infectious, toxic, vascular, CNS, or other EMC.  I have discussed this at length with patient.  Regarding treatment, advised patient to take nonsteroidal antiinflammatory medications, acetaminophen, or any medications prescribed as instructed.  To prevent headaches, patient advised to avoid muscle tension (do not stay in one position for long periods of time), avoid eye strain (make sure there is adequate lighting for reading and routine tasks), eat healthy foods, exercise, and do not smoke or drink excessive alcohol.  Patient also advised to avoid overuse of over-the-counter or prescription medications. Patient was instructed to contact primary healthcare provider if: headaches continue to get worse; occur often enough that they affect daily work or normal activities; frequent medication use is needed to manage headaches; headaches that worsen and cause vomiting; or there are any questions or concerns about the condition or care. Advised patient to return to the emergency department or call 911 if they develop a sudden headache that presents suddenly and much worse than usual headaches; have difficulty seeing, speaking, or moving; become confused or have seizure activity; or develop a fever and stiff neck with the headache.       ED Medication(s):  Medications   acetaminophen tablet 1,000 mg (1,000 mg Oral Given 1/7/20 0754)       Follow-up Information     Sno Guan MD. Schedule an appointment as soon as possible for a visit in 1 week.    Specialty:  Internal Medicine  Contact information:  [address] [facility] [address] [phone]             [facility] - .    Specialty:  Emergency Medicine  Why:  As needed, If symptoms worsen  Contact information:  [address] [facility] [address] [phone]                Discharge Medication List as of 1/7/2020 12:46 PM            Medical Decision Making    Medical  Decision Making:   Clinical Tests:   Lab Tests: Ordered and Reviewed  Medical Tests: Ordered and Reviewed           Scribe Attestation:   Scribe #1: I performed the above scribed service and the documentation accurately describes the services I performed. I attest to the accuracy of the note.    Attending:   Physician Attestation Statement for Scribe #1: I, Lee Arevalo Jr., MD, personally performed the services described in this documentation, as scribed by Nikolay Angeles, in my presence, and it is both accurate and complete.          Clinical Impression       ICD-10-CM ICD-9-CM   1. Anxiety reaction F41.1 300.00   2. Nonintractable headache, unspecified chronicity pattern, unspecified headache type R51 784.0       Disposition:   Disposition: Discharged  Condition: Stable         Lee Arevalo Jr., MD  01/10/20 0616

## 2020-01-07 NOTE — DISCHARGE INSTRUCTIONS
Regarding ANXIETY, I discussed signs and symptoms of anxiety with patient including: tachycardia, dysrhythmias, tachypnea, diaphoresis, trembling, dizziness, diarrhea, dry mouth, and difficulty swallowing.  Patient was encouraged to eat a well-balanced, healthy diet; get plenty of rest; exercise daily; limit caffeine and alcohol intake; participate in meditation; talk with family and/or friends about things that may be considered stressful; and keep a diary of feelings and stress triggers.  Patient was instructed to take medications as prescribed and follow up with primary care provider for long term management. I recommended that the patient return to the emergency department if they: feel lightheaded or too dizzy to stand up; develop feelings that they want to hurt themselves or someone else; or develop chest pain, tightness, or heaviness that radiates to the shoulders, arms, jaw, neck, or back.     Patient's headache is consistent with previous headaches and lacks features concerning for emergent or life threatening condition.  I do not suspect SAH, meningitis, increased IC pressure, infectious, toxic, vascular, CNS, or other EMC.  I have discussed this at length with patient.  Regarding treatment, advised patient to take nonsteroidal antiinflammatory medications, acetaminophen, or any medications prescribed as instructed.  To prevent headaches, patient advised to avoid muscle tension (do not stay in one position for long periods of time), avoid eye strain (make sure there is adequate lighting for reading and routine tasks), eat healthy foods, exercise, and do not smoke or drink excessive alcohol.  Patient also advised to avoid overuse of over-the-counter or prescription medications. Patient was instructed to contact primary healthcare provider if: headaches continue to get worse; occur often enough that they affect daily work or normal activities; frequent medication use is needed to manage headaches; headaches  that worsen and cause vomiting; or there are any questions or concerns about the condition or care. Advised patient to return to the emergency department or call 911 if they develop a sudden headache that presents suddenly and much worse than usual headaches; have difficulty seeing, speaking, or moving; become confused or have seizure activity; or develop a fever and stiff neck with the headache.

## 2020-01-07 NOTE — ED NOTES
"Discussing discharge with patient and transportation home. Pt reports, "I'm not going back to my assisted living facility. I want to speak to a  about a new assisted living facility or a nursing home."   "

## 2020-01-07 NOTE — PLAN OF CARE
01/07/20 1537   Final Note   Assessment Type Final Discharge Note   Anticipated Discharge Disposition Home-Health   Right Care Referral Info   Post Acute Recommendation Home-care   Facility Name [facility]

## 2020-01-09 NOTE — PHYSICIAN QUERY
"PT Name: Love Davey  MR #: [id number]    Physician Query Form - Heart  Condition Clarification     CDS/: Neyda Hardin RN CCDS             Contact information:[email or web address]  This form is a permanent document in the medical record.     Query Date: January 9, 2020    By submitting this query, we are merely seeking further clarification of documentation. Please utilize your independent clinical judgment when addressing the question(s) below.    The medical record contains the following   Indicators     Supporting Clinical Findings Location in Medical Record   x BNP PQO=992 Lab 1/4   x EF EF=35% H&P-DS (per echo 12/19)   x Radiology findings  Interval placement of a left-sided AICD.  The lungs are clear bilaterally.  No acute osseous findings demonstrated CXR 1/4    Echo Results      "Ascites" documented     x "SOB" or "CHEW" documented Pt presented to the emergency department complaining of shortness of breath and weakness.  chronic SOB that requires PRN oxygen therapy.   H&P-DS    "Hypoxia" documented     x Heart Failure documented Ms. Davey is a 85yo with a history of chronic idiopathic anemia, HTN, CHF s/p AICD, dementia, and CKD III   CHF (congestive heart failure)   - ECHO 12/9/19 reveals EF 35%, AICD in place   - Resume lasix starting tomorrow given the volume she will receive today with &transfusion    H&P-DS   x "Edema" documented She exhibits no edema.   H&P, PN 1/5   x Diuretics/Meds Lasix 20 mg PO BID MAR 1/5-1/6  And home medication dose    Treatment:      Other:      Heart failure (HF) can be acute, chronic or both. It is generally further specificed as systolic, diastolic, or combined. Lastly, it is important to identify an underlying etiology if known or suspected.     Common clues to acute exacerbation:  Rapidly progressive symptoms (w/in 2 weeks of presentation), using IV diuretics to treat, using supplemental O2, pulmonary edema on Xray, MI w/in 4 weeks, and/or BNP >500    Systolic " Heart Failure: is defined as chart documentation of a left ventricular ejection fraction (LVEF) less than 40%     Diastolic Heart Failure: is defined as a left ventricular ejection fraction (LVEF) greater than 40%   +      Evidence of diastolic dysfunction on echocardiography OR    Right heart catheterization wedge pressure above 12 mm Hg OR    Left heart catheterization left ventricular end diastolic pressure 18 mm Hg or above.    References: *American Heart Association    The clinical guidelines noted below are only system guidelines, and do not replace the providers clinical judgment.     Please further specify the type and acuity of CHF.  Thank you.  [x   ] Chronic Systolic Heart Failure - Pre-existing systolic HF diagnosis.  EF < 40%  without  acute HF symptoms documented    [   ] Chronic Combined Systolic and Diastolic Heart Failure   [   ] Other (please specify):   [  ] Clinically Undetermined                           Please document in your progress notes daily for the duration of treatment until resolved and include in your discharge summary.

## 2020-01-14 ENCOUNTER — TELEPHONE (OUTPATIENT)
Dept: HOME HEALTH SERVICES | Facility: HOSPITAL | Age: 85
End: 2020-01-14

## 2020-01-15 ENCOUNTER — EXTERNAL HOME HEALTH (OUTPATIENT)
Dept: HOME HEALTH SERVICES | Facility: HOSPITAL | Age: 85
End: 2020-01-15
Payer: MEDICARE

## 2020-02-18 ENCOUNTER — HOSPITAL ENCOUNTER (EMERGENCY)
Facility: HOSPITAL | Age: 85
Discharge: HOME | End: 2020-02-18
Attending: EMERGENCY MEDICINE
Payer: MEDICARE

## 2020-02-18 VITALS
HEART RATE: 57 BPM | TEMPERATURE: 98 F | WEIGHT: 143.69 LBS | DIASTOLIC BLOOD PRESSURE: 74 MMHG | SYSTOLIC BLOOD PRESSURE: 178 MMHG | RESPIRATION RATE: 17 BRPM | OXYGEN SATURATION: 95 % | BODY MASS INDEX: 27.15 KG/M2

## 2020-02-18 DIAGNOSIS — D64.9 ANEMIA, UNSPECIFIED TYPE: ICD-10-CM

## 2020-02-18 DIAGNOSIS — J18.9 PNEUMONIA OF LEFT LOWER LOBE DUE TO INFECTIOUS ORGANISM: Primary | ICD-10-CM

## 2020-02-18 DIAGNOSIS — R05.9 COUGH: ICD-10-CM

## 2020-02-18 DIAGNOSIS — R06.02 SOB (SHORTNESS OF BREATH): ICD-10-CM

## 2020-02-18 LAB
ALBUMIN SERPL BCP-MCNC: 3.6 G/DL (ref 3.5–5.2)
ALP SERPL-CCNC: 69 U/L (ref 55–135)
ALT SERPL W/O P-5'-P-CCNC: 10 U/L (ref 10–44)
ANION GAP SERPL CALC-SCNC: 8 MMOL/L (ref 8–16)
AST SERPL-CCNC: 13 U/L (ref 10–40)
BASOPHILS # BLD AUTO: 0.01 K/UL (ref 0–0.2)
BASOPHILS NFR BLD: 0.2 % (ref 0–1.9)
BILIRUB SERPL-MCNC: 0.3 MG/DL (ref 0.1–1)
BILIRUB UR QL STRIP: NEGATIVE
BNP SERPL-MCNC: 859 PG/ML (ref 0–99)
BUN SERPL-MCNC: 12 MG/DL (ref 8–23)
CALCIUM SERPL-MCNC: 9 MG/DL (ref 8.7–10.5)
CHLORIDE SERPL-SCNC: 106 MMOL/L (ref 95–110)
CK SERPL-CCNC: 34 U/L (ref 20–180)
CLARITY UR: CLEAR
CO2 SERPL-SCNC: 28 MMOL/L (ref 23–29)
COLOR UR: YELLOW
CREAT SERPL-MCNC: 1.1 MG/DL (ref 0.5–1.4)
DIFFERENTIAL METHOD: ABNORMAL
EOSINOPHIL # BLD AUTO: 0 K/UL (ref 0–0.5)
EOSINOPHIL NFR BLD: 0.7 % (ref 0–8)
ERYTHROCYTE [DISTWIDTH] IN BLOOD BY AUTOMATED COUNT: 21.1 % (ref 11.5–14.5)
EST. GFR  (AFRICAN AMERICAN): 53 ML/MIN/1.73 M^2
EST. GFR  (NON AFRICAN AMERICAN): 46 ML/MIN/1.73 M^2
GLUCOSE SERPL-MCNC: 104 MG/DL (ref 70–110)
GLUCOSE UR QL STRIP: NEGATIVE
HCT VFR BLD AUTO: 25 % (ref 37–48.5)
HGB BLD-MCNC: 7.8 G/DL (ref 12–16)
HGB UR QL STRIP: NEGATIVE
IMM GRANULOCYTES # BLD AUTO: 0.03 K/UL (ref 0–0.04)
IMM GRANULOCYTES NFR BLD AUTO: 0.7 % (ref 0–0.5)
KETONES UR QL STRIP: NEGATIVE
LEUKOCYTE ESTERASE UR QL STRIP: NEGATIVE
LYMPHOCYTES # BLD AUTO: 1.5 K/UL (ref 1–4.8)
LYMPHOCYTES NFR BLD: 34 % (ref 18–48)
MCH RBC QN AUTO: 36.6 PG (ref 27–31)
MCHC RBC AUTO-ENTMCNC: 31.2 G/DL (ref 32–36)
MCV RBC AUTO: 117 FL (ref 82–98)
MONOCYTES # BLD AUTO: 0.3 K/UL (ref 0.3–1)
MONOCYTES NFR BLD: 6 % (ref 4–15)
NEUTROPHILS # BLD AUTO: 2.5 K/UL (ref 1.8–7.7)
NEUTROPHILS NFR BLD: 58.4 % (ref 38–73)
NITRITE UR QL STRIP: NEGATIVE
NRBC BLD-RTO: 0 /100 WBC
PH UR STRIP: >=9 [PH] (ref 5–8)
PLATELET # BLD AUTO: 269 K/UL (ref 150–350)
PMV BLD AUTO: 12.1 FL (ref 9.2–12.9)
POTASSIUM SERPL-SCNC: 4 MMOL/L (ref 3.5–5.1)
PROT SERPL-MCNC: 5.9 G/DL (ref 6–8.4)
PROT UR QL STRIP: ABNORMAL
RBC # BLD AUTO: 2.13 M/UL (ref 4–5.4)
SODIUM SERPL-SCNC: 142 MMOL/L (ref 136–145)
SP GR UR STRIP: 1.01 (ref 1–1.03)
TROPONIN I SERPL DL<=0.01 NG/ML-MCNC: 0.03 NG/ML (ref 0–0.03)
URN SPEC COLLECT METH UR: ABNORMAL
UROBILINOGEN UR STRIP-ACNC: NEGATIVE EU/DL
WBC # BLD AUTO: 4.32 K/UL (ref 3.9–12.7)

## 2020-02-18 PROCEDURE — 82550 ASSAY OF CK (CPK): CPT

## 2020-02-18 PROCEDURE — 84484 ASSAY OF TROPONIN QUANT: CPT

## 2020-02-18 PROCEDURE — 93010 EKG 12-LEAD: ICD-10-PCS | Mod: ,,, | Performed by: INTERNAL MEDICINE

## 2020-02-18 PROCEDURE — 93010 ELECTROCARDIOGRAM REPORT: CPT | Mod: ,,, | Performed by: INTERNAL MEDICINE

## 2020-02-18 PROCEDURE — 99285 EMERGENCY DEPT VISIT HI MDM: CPT | Mod: 25

## 2020-02-18 PROCEDURE — 93005 ELECTROCARDIOGRAM TRACING: CPT

## 2020-02-18 PROCEDURE — 80053 COMPREHEN METABOLIC PANEL: CPT

## 2020-02-18 PROCEDURE — 83880 ASSAY OF NATRIURETIC PEPTIDE: CPT

## 2020-02-18 PROCEDURE — 81003 URINALYSIS AUTO W/O SCOPE: CPT

## 2020-02-18 RX ORDER — AMOXICILLIN AND CLAVULANATE POTASSIUM 875; 125 MG/1; MG/1
1 TABLET, FILM COATED ORAL 2 TIMES DAILY
Qty: 14 TABLET | Refills: 0 | Status: SHIPPED | OUTPATIENT
Start: 2020-02-18 | End: 2020-02-25

## 2020-02-18 RX ORDER — AZITHROMYCIN 250 MG/1
250 TABLET, FILM COATED ORAL DAILY
Qty: 6 TABLET | Refills: 0 | Status: SHIPPED | OUTPATIENT
Start: 2020-02-18 | End: 2020-02-23

## 2020-02-18 NOTE — ED PROVIDER NOTES
SCRIBE #1 NOTE: I, Janes Chavez, am scribing for, and in the presence of, Maciej Issa MD. I have scribed the entire note.       History     Chief Complaint   Patient presents with    Shortness of Breath     pt brought in by EMS for shortness of breath, nebulizer given in route     Review of patient's allergies indicates:   Allergen Reactions    Aspirin     Levaquin [levofloxacin]     Merthiolate (thimerosal)     Oxycodone     Polymyxin [bacitracin-polymyxin b]     Tramadol Itching    Valium [diazepam]     Zanaflex [tizanidine]          History of Present Illness     HPI    2/18/2020, 10:03 AM  History obtained from the patient      History of Present Illness: Love Davey is a 86 y.o. female patient with a h/o anemia and CHF who presents to the Emergency Department for evaluation of SOB which onset yesterday. Symptoms are constant and moderate in severity. No mitigating or exacerbating factors reported. Associated sxs include urinary frequency. Patient denies any fever, diaphoresis, CP, n/v, leg swelling, and all other sxs at this time. Prior Tx includes breathing treatment en route. No further complaints or concerns at this time.       Arrival mode:   EMS    PCP: Son Guan MD      Past Medical History:  Past Medical History:   Diagnosis Date    Anemia     Anxiety     Arthritis     Back pain     CHF (congestive heart failure)     Dizziness     Hypertension     Insomnia     Stroke     Use of cane as ambulatory aid        Past Surgical History:  Past Surgical History:   Procedure Laterality Date    APPENDECTOMY      arthritis      BLADDER REPAIR      BUNIONECTOMY      CATARACT EXTRACTION      HYSTERECTOMY      metal implant Bilateral     placed in the bladder.         Family History:  Family History   Problem Relation Age of Onset    Hypertension Unknown     Cancer Mother     Heart disease Father        Social History:   Social History     Tobacco Use    Smoking status:  Never Smoker    Smokeless tobacco: Never Used   Substance and Sexual Activity    Alcohol use: No    Drug use: No    Sexual activity: unknown        Review of Systems     Review of Systems   Constitutional: Negative for diaphoresis and fever.   HENT: Negative for sore throat.    Respiratory: Positive for shortness of breath.    Cardiovascular: Negative for chest pain and leg swelling.   Gastrointestinal: Negative for nausea and vomiting.   Genitourinary: Positive for frequency. Negative for dysuria.   Musculoskeletal: Negative for back pain.   Skin: Negative for rash.   Neurological: Negative for weakness.   Hematological: Does not bruise/bleed easily.   All other systems reviewed and are negative.       Physical Exam     Initial Vitals [02/18/20 1001]   BP Pulse Resp Temp SpO2   (!) 171/78 60 20 98.3 °F (36.8 °C) 100 %      MAP       --          Physical Exam  Nursing Notes and Vital Signs Reviewed.  Constitutional: Well-developed and well-nourished. NAD  Head: Atraumatic. Normocephalic.  Eyes: PERRL. EOM intact. Conjunctivae are not pale. No scleral icterus.  ENT: Mucous membranes are moist. Oropharynx is clear and symmetric.    Neck: Supple. Full ROM. No lymphadenopathy.  Cardiovascular: Regular rate. Regular rhythm. No murmurs, rubs, or gallops. Distal pulses are 2+ and symmetric.  Pulmonary/Chest: No respiratory distress. Decreased breath sounds bilaterally. No wheezing or rales.  Abdominal: Soft and non-distended.  There is no tenderness.  No rebound, guarding, or rigidity. Good bowel sounds.  Genitourinary: No CVA tenderness  Musculoskeletal: Moves all extremities. No obvious deformities. No calf tenderness.  Skin: Warm and dry.  Neurological:  Alert, awake, and appropriate.  Normal speech.  No acute focal neurological deficits are appreciated.  Psychiatric: Normal affect. Good eye contact. Appropriate in content.     ED Course   Procedures  ED Vital Signs:  Vitals:    02/18/20 1001 02/18/20 1032 02/18/20  1047 02/18/20 1154   BP: (!) 171/78 (!) 175/74  (S) (!) 155/70   Pulse: 60 (!) 56  (S) (!) 51   Resp: 20 19 18   Temp: 98.3 °F (36.8 °C)      TempSrc: Oral      SpO2: 100% 98%  97%   Weight:   65.2 kg (143 lb 11.2 oz)     02/18/20 1156 02/18/20 1158   BP: (S) (!) 163/70 (S) (!) 157/70   Pulse: (S) (!) 56 (S) 60   Resp:     Temp:     TempSrc:     SpO2:     Weight:         Abnormal Lab Results:  Labs Reviewed   CBC W/ AUTO DIFFERENTIAL - Abnormal; Notable for the following components:       Result Value    RBC 2.13 (*)     Hemoglobin 7.8 (*)     Hematocrit 25.0 (*)     Mean Corpuscular Volume 117 (*)     Mean Corpuscular Hemoglobin 36.6 (*)     Mean Corpuscular Hemoglobin Conc 31.2 (*)     RDW 21.1 (*)     Immature Granulocytes 0.7 (*)     All other components within normal limits   COMPREHENSIVE METABOLIC PANEL - Abnormal; Notable for the following components:    Total Protein 5.9 (*)     eGFR if  53 (*)     eGFR if non  46 (*)     All other components within normal limits   URINALYSIS, REFLEX TO URINE CULTURE - Abnormal; Notable for the following components:    Protein, UA Trace (*)     All other components within normal limits    Narrative:     Preferred Collection Type->Urine, Clean Catch   B-TYPE NATRIURETIC PEPTIDE - Abnormal; Notable for the following components:     (*)     All other components within normal limits   TROPONIN I - Abnormal; Notable for the following components:    Troponin I 0.031 (*)     All other components within normal limits   CK        All Lab Results:  Results for orders placed or performed during the hospital encounter of 02/18/20   CBC auto differential   Result Value Ref Range    WBC 4.32 3.90 - 12.70 K/uL    RBC 2.13 (L) 4.00 - 5.40 M/uL    Hemoglobin 7.8 (L) 12.0 - 16.0 g/dL    Hematocrit 25.0 (L) 37.0 - 48.5 %    Mean Corpuscular Volume 117 (H) 82 - 98 fL    Mean Corpuscular Hemoglobin 36.6 (H) 27.0 - 31.0 pg    Mean Corpuscular Hemoglobin  Conc 31.2 (L) 32.0 - 36.0 g/dL    RDW 21.1 (H) 11.5 - 14.5 %    Platelets 269 150 - 350 K/uL    MPV 12.1 9.2 - 12.9 fL    Immature Granulocytes 0.7 (H) 0.0 - 0.5 %    Gran # (ANC) 2.5 1.8 - 7.7 K/uL    Immature Grans (Abs) 0.03 0.00 - 0.04 K/uL    Lymph # 1.5 1.0 - 4.8 K/uL    Mono # 0.3 0.3 - 1.0 K/uL    Eos # 0.0 0.0 - 0.5 K/uL    Baso # 0.01 0.00 - 0.20 K/uL    nRBC 0 0 /100 WBC    Gran% 58.4 38.0 - 73.0 %    Lymph% 34.0 18.0 - 48.0 %    Mono% 6.0 4.0 - 15.0 %    Eosinophil% 0.7 0.0 - 8.0 %    Basophil% 0.2 0.0 - 1.9 %    Differential Method AUTOMATED    Comprehensive metabolic panel   Result Value Ref Range    Sodium 142 136 - 145 mmol/L    Potassium 4.0 3.5 - 5.1 mmol/L    Chloride 106 95 - 110 mmol/L    CO2 28 23 - 29 mmol/L    Glucose 104 70 - 110 mg/dL    BUN, Bld 12 8 - 23 mg/dL    Creatinine 1.1 0.5 - 1.4 mg/dL    Calcium 9.0 8.7 - 10.5 mg/dL    Total Protein 5.9 (L) 6.0 - 8.4 g/dL    Albumin 3.6 3.5 - 5.2 g/dL    Total Bilirubin 0.3 0.1 - 1.0 mg/dL    Alkaline Phosphatase 69 55 - 135 U/L    AST 13 10 - 40 U/L    ALT 10 10 - 44 U/L    Anion Gap 8 8 - 16 mmol/L    eGFR if African American 53 (A) >60 mL/min/1.73 m^2    eGFR if non African American 46 (A) >60 mL/min/1.73 m^2   Urinalysis, Reflex to Urine Culture Urine, Clean Catch   Result Value Ref Range    Specimen UA Urine, Clean Catch     Color, UA Yellow Yellow, Straw, Bridgette    Appearance, UA Clear Clear    pH, UA >=9.0 5.0 - 8.0    Specific Gravity, UA 1.010 1.005 - 1.030    Protein, UA Trace (A) Negative    Glucose, UA Negative Negative    Ketones, UA Negative Negative    Bilirubin (UA) Negative Negative    Occult Blood UA Negative Negative    Nitrite, UA Negative Negative    Urobilinogen, UA Negative <2.0 EU/dL    Leukocytes, UA Negative Negative   Brain natriuretic peptide   Result Value Ref Range     (H) 0 - 99 pg/mL   CK   Result Value Ref Range    CPK 34 20 - 180 U/L   Troponin I   Result Value Ref Range    Troponin I 0.031 (H) 0.000 -  0.026 ng/mL         Imaging Results          X-Ray Chest AP Portable (Final result)  Result time 02/18/20 10:25:04    Final result by Xiomara Krause MD (Timothy) (02/18/20 10:25:04)                 Impression:      Increased density along the left heart border suggesting a new focal infiltrate.  Follow-up recommended.      Electronically signed by: Xiomara Krause MD  Date:    02/18/2020  Time:    10:25             Narrative:    EXAMINATION:  XR CHEST AP PORTABLE    CLINICAL HISTORY:  Shortness of breath, sob;    COMPARISON:  Comparison 01/04/2020.    FINDINGS:  Heart size is normal.  AICD lead is present.  Increased density along the left heart border appears new and could represent a focal infiltrate.  The right lung is clear.                                 The EKG was ordered, reviewed, and independently interpreted by the ED provider.  Interpretation time: 1020  Rate: 54 BPM  Rhythm: sinus bradycardia  Interpretation: Left axis deviation. ST and T wave abnormality. No STEMI.      The Emergency Provider reviewed the vital signs and test results, which are outlined above.     ED Discussion   Patient aware of anemia. Patient states she will increase iron supplement from QD to BID and will follow up with PCP this week.    12:23 PM: Reassessed pt at this time.  Pt states her condition has improved at this time. Discussed with pt all pertinent ED information and results. Discussed pt dx and plan of tx. Gave pt all f/u and return to the ED instructions. All questions and concerns were addressed at this time. Pt expresses understanding of information and instructions, and is comfortable with plan to discharge. Pt is stable for discharge.    I discussed with patient and/or family/caretaker that evaluation in the ED does not suggest any emergent or life threatening medical conditions requiring immediate intervention beyond what was provided in the ED, and I believe patient is safe for discharge.  Regardless, an  unremarkable evaluation in the ED does not preclude the development or presence of a serious of life threatening condition. As such, patient was instructed to return immediately for any worsening or change in current symptoms.    I counseled the patient on the risks of taking antibiotics, including taking all doses as prescribed. I explained the risk of antibiotic resistance in the future and susceptibility to C. diff infection, severe allergic reactions, and yeast infections.      [facility]     ED Course as of Feb 18 1232   Tue Feb 18, 2020   1226 Troponin I(!): 0.031 [NF]   1226 Chronically elevated.     Troponin I(!): 0.031 [NF]   1226 Hemoglobin(!): 7.8 [NF]   1226 Sees Dr. Sebastian, and will increase her iron to BID and see them this week.   Hemoglobin(!): 7.8 [NF]      ED Course User Index  [NF] Maciej Issa MD     Medical Decision Making:   Clinical Tests:   Lab Tests: Ordered and Reviewed  Radiological Study: Reviewed and Ordered  Medical Tests: Reviewed and Ordered           ED Medication(s):  Medications - No data to display    New Prescriptions    AMOXICILLIN-CLAVULANATE 875-125MG (AUGMENTIN) 875-125 MG PER TABLET    Take 1 tablet by mouth 2 (two) times daily. for 7 days    AZITHROMYCIN (Z-SORAYA) 250 MG TABLET    Take 1 tablet (250 mg total) by mouth once daily. for 5 days       Follow-up Information     Son Guan MD.    Specialty:  Internal Medicine  Contact information:  [address] [facility] [address] [phone]                       Scribe Attestation:   Scribe #1: I performed the above scribed service and the documentation accurately describes the services I performed. I attest to the accuracy of the note.     Attending:   Physician Attestation Statement for Scribe #1: I, Maciej Issa MD, personally performed the services described in this documentation, as scribed by Janes Chavez, in my presence, and it is both accurate and complete.           Clinical Impression        ICD-10-CM ICD-9-CM   1. Pneumonia of left lower lobe due to infectious organism J18.1 486   2. SOB (shortness of breath) R06.02 786.05   3. Cough R05 786.2   4. Anemia, unspecified type D64.9 285.9       Disposition:   Disposition: Discharged  Condition: Stable         Maciej Issa MD  02/18/20 1231

## 2020-02-28 ENCOUNTER — TELEPHONE (OUTPATIENT)
Dept: HOME HEALTH SERVICES | Facility: HOSPITAL | Age: 85
End: 2020-02-28

## 2020-03-06 ENCOUNTER — HOSPITAL ENCOUNTER (EMERGENCY)
Facility: HOSPITAL | Age: 85
End: 2020-03-06
Attending: EMERGENCY MEDICINE
Payer: MEDICARE

## 2020-03-06 VITALS
TEMPERATURE: 98 F | DIASTOLIC BLOOD PRESSURE: 74 MMHG | HEART RATE: 64 BPM | BODY MASS INDEX: 28.87 KG/M2 | HEIGHT: 62 IN | RESPIRATION RATE: 19 BRPM | WEIGHT: 156.88 LBS | SYSTOLIC BLOOD PRESSURE: 166 MMHG | OXYGEN SATURATION: 100 %

## 2020-03-06 DIAGNOSIS — R06.02 SOB (SHORTNESS OF BREATH): ICD-10-CM

## 2020-03-06 DIAGNOSIS — I50.9 CONGESTIVE HEART FAILURE, UNSPECIFIED HF CHRONICITY, UNSPECIFIED HEART FAILURE TYPE: Primary | ICD-10-CM

## 2020-03-06 DIAGNOSIS — I10 ESSENTIAL HYPERTENSION: ICD-10-CM

## 2020-03-06 LAB
ALBUMIN SERPL BCP-MCNC: 3.9 G/DL (ref 3.5–5.2)
ALP SERPL-CCNC: 80 U/L (ref 55–135)
ALT SERPL W/O P-5'-P-CCNC: 10 U/L (ref 10–44)
ANION GAP SERPL CALC-SCNC: 10 MMOL/L (ref 8–16)
AST SERPL-CCNC: 16 U/L (ref 10–40)
BASOPHILS # BLD AUTO: 0.03 K/UL (ref 0–0.2)
BASOPHILS NFR BLD: 0.6 % (ref 0–1.9)
BILIRUB SERPL-MCNC: 0.3 MG/DL (ref 0.1–1)
BNP SERPL-MCNC: 1077 PG/ML (ref 0–99)
BUN SERPL-MCNC: 15 MG/DL (ref 8–23)
CALCIUM SERPL-MCNC: 8.6 MG/DL (ref 8.7–10.5)
CHLORIDE SERPL-SCNC: 107 MMOL/L (ref 95–110)
CO2 SERPL-SCNC: 25 MMOL/L (ref 23–29)
CREAT SERPL-MCNC: 0.9 MG/DL (ref 0.5–1.4)
DIFFERENTIAL METHOD: ABNORMAL
EOSINOPHIL # BLD AUTO: 0.1 K/UL (ref 0–0.5)
EOSINOPHIL NFR BLD: 1.2 % (ref 0–8)
ERYTHROCYTE [DISTWIDTH] IN BLOOD BY AUTOMATED COUNT: 20.8 % (ref 11.5–14.5)
EST. GFR  (AFRICAN AMERICAN): >60 ML/MIN/1.73 M^2
EST. GFR  (NON AFRICAN AMERICAN): 58 ML/MIN/1.73 M^2
GLUCOSE SERPL-MCNC: 121 MG/DL (ref 70–110)
HCT VFR BLD AUTO: 24.8 % (ref 37–48.5)
HGB BLD-MCNC: 7.8 G/DL (ref 12–16)
IMM GRANULOCYTES # BLD AUTO: 0.02 K/UL (ref 0–0.04)
IMM GRANULOCYTES NFR BLD AUTO: 0.4 % (ref 0–0.5)
INFLUENZA A, MOLECULAR: NEGATIVE
INFLUENZA B, MOLECULAR: NEGATIVE
LYMPHOCYTES # BLD AUTO: 1.5 K/UL (ref 1–4.8)
LYMPHOCYTES NFR BLD: 29.6 % (ref 18–48)
MCH RBC QN AUTO: 37.5 PG (ref 27–31)
MCHC RBC AUTO-ENTMCNC: 31.5 G/DL (ref 32–36)
MCV RBC AUTO: 119 FL (ref 82–98)
MONOCYTES # BLD AUTO: 0.4 K/UL (ref 0.3–1)
MONOCYTES NFR BLD: 7.2 % (ref 4–15)
NEUTROPHILS # BLD AUTO: 3.2 K/UL (ref 1.8–7.7)
NEUTROPHILS NFR BLD: 61 % (ref 38–73)
NRBC BLD-RTO: 0 /100 WBC
PLATELET # BLD AUTO: 323 K/UL (ref 150–350)
PMV BLD AUTO: 11.6 FL (ref 9.2–12.9)
POTASSIUM SERPL-SCNC: 3.9 MMOL/L (ref 3.5–5.1)
PROT SERPL-MCNC: 6.6 G/DL (ref 6–8.4)
RBC # BLD AUTO: 2.08 M/UL (ref 4–5.4)
SODIUM SERPL-SCNC: 142 MMOL/L (ref 136–145)
SPECIMEN SOURCE: NORMAL
TROPONIN I SERPL DL<=0.01 NG/ML-MCNC: 0.03 NG/ML (ref 0–0.03)
WBC # BLD AUTO: 5.17 K/UL (ref 3.9–12.7)

## 2020-03-06 PROCEDURE — 84484 ASSAY OF TROPONIN QUANT: CPT

## 2020-03-06 PROCEDURE — 85025 COMPLETE CBC W/AUTO DIFF WBC: CPT

## 2020-03-06 PROCEDURE — 96374 THER/PROPH/DIAG INJ IV PUSH: CPT

## 2020-03-06 PROCEDURE — 87502 INFLUENZA DNA AMP PROBE: CPT

## 2020-03-06 PROCEDURE — 93005 ELECTROCARDIOGRAM TRACING: CPT

## 2020-03-06 PROCEDURE — 80053 COMPREHEN METABOLIC PANEL: CPT

## 2020-03-06 PROCEDURE — 63600175 PHARM REV CODE 636 W HCPCS: Performed by: EMERGENCY MEDICINE

## 2020-03-06 PROCEDURE — 99284 EMERGENCY DEPT VISIT MOD MDM: CPT | Mod: 25

## 2020-03-06 PROCEDURE — 83880 ASSAY OF NATRIURETIC PEPTIDE: CPT

## 2020-03-06 PROCEDURE — 93010 ELECTROCARDIOGRAM REPORT: CPT | Mod: ,,, | Performed by: INTERNAL MEDICINE

## 2020-03-06 PROCEDURE — 93010 EKG 12-LEAD: ICD-10-PCS | Mod: ,,, | Performed by: INTERNAL MEDICINE

## 2020-03-06 RX ORDER — FUROSEMIDE 40 MG/1
1 TABLET ORAL 2 TIMES DAILY
COMMUNITY
Start: 2020-01-24 | End: 2020-11-20 | Stop reason: SDUPTHER

## 2020-03-06 RX ORDER — BUSPIRONE HYDROCHLORIDE 15 MG/1
1 TABLET ORAL 2 TIMES DAILY
COMMUNITY
Start: 2020-02-06

## 2020-03-06 RX ORDER — DONEPEZIL HYDROCHLORIDE 10 MG/1
1 TABLET, FILM COATED ORAL NIGHTLY
Status: ON HOLD | COMMUNITY
Start: 2020-02-11 | End: 2021-10-15 | Stop reason: CLARIF

## 2020-03-06 RX ORDER — CYCLOSPORINE 0.5 MG/ML
1 EMULSION OPHTHALMIC
Status: ON HOLD | COMMUNITY
Start: 2018-03-08 | End: 2021-10-16 | Stop reason: HOSPADM

## 2020-03-06 RX ORDER — GLUCOSAMINE/CHONDRO SU A 500-400 MG
1 TABLET ORAL 2 TIMES DAILY
Status: ON HOLD | COMMUNITY
End: 2021-10-16 | Stop reason: HOSPADM

## 2020-03-06 RX ORDER — CLOPIDOGREL BISULFATE 75 MG/1
1 TABLET ORAL DAILY
COMMUNITY
Start: 2020-01-05

## 2020-03-06 RX ORDER — FERROUS SULFATE 324(65)MG
65 TABLET, DELAYED RELEASE (ENTERIC COATED) ORAL DAILY
COMMUNITY

## 2020-03-06 RX ORDER — TRAMADOL HYDROCHLORIDE 50 MG/1
1 TABLET ORAL 3 TIMES DAILY PRN
Status: ON HOLD | COMMUNITY
Start: 2020-02-12 | End: 2021-10-16 | Stop reason: HOSPADM

## 2020-03-06 RX ORDER — FUROSEMIDE 10 MG/ML
40 INJECTION INTRAMUSCULAR; INTRAVENOUS
Status: COMPLETED | OUTPATIENT
Start: 2020-03-06 | End: 2020-03-06

## 2020-03-06 RX ORDER — NYSTATIN AND TRIAMCINOLONE ACETONIDE 100000; 1 [USP'U]/G; MG/G
OINTMENT TOPICAL 2 TIMES DAILY PRN
Status: ON HOLD | COMMUNITY
Start: 2020-02-19 | End: 2021-10-16 | Stop reason: HOSPADM

## 2020-03-06 RX ORDER — MIRTAZAPINE 7.5 MG/1
TABLET, FILM COATED ORAL
COMMUNITY
Start: 2020-01-02 | End: 2021-06-26

## 2020-03-06 RX ORDER — CYANOCOBALAMIN (VITAMIN B-12) 500 MCG
1 TABLET ORAL NIGHTLY
COMMUNITY
End: 2021-06-26

## 2020-03-06 RX ORDER — ASPIRIN 81 MG/1
81 TABLET ORAL DAILY
COMMUNITY

## 2020-03-06 RX ORDER — DULOXETIN HYDROCHLORIDE 30 MG/1
1 CAPSULE, DELAYED RELEASE ORAL DAILY
Status: ON HOLD | COMMUNITY
Start: 2020-02-15 | End: 2021-10-16 | Stop reason: SDUPTHER

## 2020-03-06 RX ORDER — LANOLIN ALCOHOL/MO/W.PET/CERES
1 CREAM (GRAM) TOPICAL DAILY
Status: ON HOLD | COMMUNITY
Start: 2019-12-06 | End: 2020-12-18 | Stop reason: HOSPADM

## 2020-03-06 RX ORDER — LOSARTAN POTASSIUM 25 MG/1
1 TABLET ORAL DAILY
Status: ON HOLD | COMMUNITY
Start: 2020-02-19 | End: 2021-03-28 | Stop reason: HOSPADM

## 2020-03-06 RX ORDER — AMIODARONE HYDROCHLORIDE 200 MG/1
1 TABLET ORAL DAILY
Status: ON HOLD | COMMUNITY
Start: 2020-02-05 | End: 2021-08-19 | Stop reason: HOSPADM

## 2020-03-06 RX ADMIN — FUROSEMIDE 40 MG: 10 INJECTION, SOLUTION INTRAMUSCULAR; INTRAVENOUS at 02:03

## 2020-03-06 NOTE — ED NOTES
Received report from ROSA Moreno. Pt in NAD,VSS, RR equal and unlabored. Pt awaiting transportation. Pt's bed is low, locked, and call light in reach. SR up x 2. Will continue to monitor pt.

## 2020-03-06 NOTE — ED NOTES
Pt reports SOB that is exacerbated by lying down. Pt recently dx with pneumonia. Pt wears O2 prn at home.    Patient moved to ED room 8, patient assisted onto stretcher and changed into a gown. Patient placed on cardiac monitor, continuous pulse oximetry and automatic blood pressure cuff. Bed placed in low locked position, side rails up x 2, call light is within reach of patient or family, orientation to room and explanation of wait provided to family and patient, alarms set and turned on for monitor and pulse ox, awaiting MD evaluation and orders, will continue to monitor.    Patient identifies self as Love Davey      LOC: The patient is awake, alert and aware of environment with an appropriate affect, the patient is oriented x 3 and speaking appropriately.  APPEARANCE: Patient resting comfortably and in no acute distress, patient is clean and well groomed, patient's clothing is properly fastened.  SKIN: The skin is warm and dry, color consistent with ethnicity, patient has normal skin turgor and moist mucus membranes, skin intact, no breakdown EX: generalized bruising  MUSCULOSKELETAL: Patient moving all extremities well, no obvious swelling or deformities noted.  RESPIRATORY: Airway is open and patent, respirations are spontaneous, patient has a normal effort and rate, no accessory muscle use noted.  CARDIAC: Patient is bradycardic on monitor, no peripheral edema noted, capillary refill < 3 seconds.  ABDOMEN: Soft and non tender to palpation, no distention noted.  NEUROLOGIC: PERRL, eyes open spontaneously, behavior appropriate to situation, follows commands, facial expression symmetrical, bilateral hand grasp equal and even, purposeful motor response noted, normal sensation in all extremities when touched with a finger.

## 2020-03-06 NOTE — ED NOTES
Spoke with Brigid at [facility] who states they will not have transportation available until 8am.

## 2020-03-06 NOTE — ED PROVIDER NOTES
SCRIBE #1 NOTE: I, Clarisse Paredes, am scribing for, and in the presence of, Mame May MD. I have scribed the entire note.      History      Chief Complaint   Patient presents with    Shortness of Breath     worsening over the past few days; unable to lie flat; using extra pillows; recently dx pneumonia; hx chf       Review of patient's allergies indicates:   Allergen Reactions    Bacitracin-polymyxin b Itching and Swelling    Merthiolate (thimerosal) Rash and Swelling    Aspirin     Cefdinir     Diazepam      Hallucinations    Levofloxacin Itching    Metronidazole     Oxycodone Itching    Tizanidine      Hallucinations    Tramadol Itching        HPI   HPI    3/6/2020, 1:21 AM   History obtained from the patient      History of Present Illness: Love Davey is a 86 y.o. female patient with a PMHx of Cardiomyopathy, CHF, HTN, and Stroke who presents to the Emergency Department for evaluation of Shortness of Breath. Symptoms are constant and moderate in severity. No mitigating or exacerbating factors reported. Associated sxs include chest tightness. Patient notes that she is currently on 2L of O2 at home. Patient denies any fever, N/V, cough, wheezing, leg swelling, palpitations, chest pain, and all other sxs at this time. No further complaints or concerns at this time.         Arrival mode: Personal vehicle     PCP: Son Guan MD       Past Medical History:  Past Medical History:   Diagnosis Date    Anemia     Anxiety     Arthritis     Back pain     Cardiomyopathy     CHF (congestive heart failure)     Depression     Dizziness     Hypertension     Insomnia     Stroke     Use of cane as ambulatory aid        Past Surgical History:  Past Surgical History:   Procedure Laterality Date    APPENDECTOMY      arthritis      BLADDER REPAIR      BUNIONECTOMY      CARDIAC DEFIBRILLATOR PLACEMENT      CATARACT EXTRACTION      HYSTERECTOMY      metal implant Bilateral     placed in the bladder.          Family History:  Family History   Problem Relation Age of Onset    Hypertension Unknown     Cancer Mother     Heart disease Father        Social History:  Social History     Tobacco Use    Smoking status: Never Smoker    Smokeless tobacco: Never Used   Substance and Sexual Activity    Alcohol use: No    Drug use: No    Sexual activity: Not Currently       ROS   Review of Systems   Constitutional: Negative for fever.   HENT: Negative for sore throat.    Respiratory: Positive for chest tightness and shortness of breath. Negative for cough and wheezing.    Cardiovascular: Negative for chest pain, palpitations and leg swelling.   Gastrointestinal: Negative for nausea.   Genitourinary: Negative for dysuria.   Musculoskeletal: Negative for back pain.   Skin: Negative for rash.   Neurological: Negative for weakness.   Hematological: Does not bruise/bleed easily.   All other systems reviewed and are negative.    Physical Exam      Initial Vitals [03/06/20 0108]   BP Pulse Resp Temp SpO2   (!) 178/91 60 18 98.5 °F (36.9 °C) 99 %      MAP       --          Physical Exam  Nursing Notes and Vital Signs Reviewed.  Constitutional: Patient is in no acute distress. Well-developed and well-nourished.  Head: Atraumatic. Normocephalic.  Eyes: PERRL. EOM intact. Conjunctivae are not pale. No scleral icterus.  ENT: Mucous membranes are moist.     Neck: Supple. Full ROM. No lymphadenopathy.  Cardiovascular: Regular rate. Regular rhythm. No murmurs, rubs, or gallops. Distal pulses are 2+ and symmetric.  Pulmonary/Chest: No respiratory distress. Clear to auscultation bilaterally. No wheezing or rales.  Abdominal: Soft and non-distended.  There is no tenderness.  No rebound, guarding, or rigidity. Good bowel sounds.  Genitourinary: No CVA tenderness  Musculoskeletal: Moves all extremities. No obvious deformities. No edema. No calf tenderness.  Skin: Warm and dry.  Neurological:  Alert, awake, and appropriate.  Normal speech.  " No acute focal neurological deficits are appreciated.  Psychiatric: Normal affect. Good eye contact. Appropriate in content.    ED Course    Procedures  ED Vital Signs:  Vitals:    03/06/20 0108 03/06/20 0128 03/06/20 0144 03/06/20 0220   BP: (!) 178/91      Pulse: 60 (!) 58  (!) 56   Resp: 18   20   Temp: 98.5 °F (36.9 °C)      TempSrc: Oral      SpO2: 99%   100%   Weight:   71.2 kg (156 lb 14.4 oz)    Height: 5' 1.75" (1.568 m)       03/06/20 0300 03/06/20 0320 03/06/20 0509   BP: (!) 171/74  (!) 168/72   Pulse: (!) 58 (!) 57 64   Resp: 20 18 18   Temp: 98.3 °F (36.8 °C)  98.2 °F (36.8 °C)   TempSrc: Oral  Oral   SpO2: 100% 100% 100%   Weight:      Height:          Abnormal Lab Results:  Labs Reviewed   CBC W/ AUTO DIFFERENTIAL - Abnormal; Notable for the following components:       Result Value    RBC 2.08 (*)     Hemoglobin 7.8 (*)     Hematocrit 24.8 (*)     Mean Corpuscular Volume 119 (*)     Mean Corpuscular Hemoglobin 37.5 (*)     Mean Corpuscular Hemoglobin Conc 31.5 (*)     RDW 20.8 (*)     All other components within normal limits   COMPREHENSIVE METABOLIC PANEL - Abnormal; Notable for the following components:    Glucose 121 (*)     Calcium 8.6 (*)     eGFR if non  58 (*)     All other components within normal limits   B-TYPE NATRIURETIC PEPTIDE - Abnormal; Notable for the following components:    BNP 1,077 (*)     All other components within normal limits   INFLUENZA A & B BY MOLECULAR   TROPONIN I        All Lab Results:  Results for orders placed or performed during the hospital encounter of 03/06/20   Influenza A & B by Molecular   Result Value Ref Range    Influenza A, Molecular Negative Negative    Influenza B, Molecular Negative Negative    Flu A & B Source Nasal swab    CBC auto differential   Result Value Ref Range    WBC 5.17 3.90 - 12.70 K/uL    RBC 2.08 (L) 4.00 - 5.40 M/uL    Hemoglobin 7.8 (L) 12.0 - 16.0 g/dL    Hematocrit 24.8 (L) 37.0 - 48.5 %    Mean Corpuscular Volume " 119 (H) 82 - 98 fL    Mean Corpuscular Hemoglobin 37.5 (H) 27.0 - 31.0 pg    Mean Corpuscular Hemoglobin Conc 31.5 (L) 32.0 - 36.0 g/dL    RDW 20.8 (H) 11.5 - 14.5 %    Platelets 323 150 - 350 K/uL    MPV 11.6 9.2 - 12.9 fL    Immature Granulocytes 0.4 0.0 - 0.5 %    Gran # (ANC) 3.2 1.8 - 7.7 K/uL    Immature Grans (Abs) 0.02 0.00 - 0.04 K/uL    Lymph # 1.5 1.0 - 4.8 K/uL    Mono # 0.4 0.3 - 1.0 K/uL    Eos # 0.1 0.0 - 0.5 K/uL    Baso # 0.03 0.00 - 0.20 K/uL    nRBC 0 0 /100 WBC    Gran% 61.0 38.0 - 73.0 %    Lymph% 29.6 18.0 - 48.0 %    Mono% 7.2 4.0 - 15.0 %    Eosinophil% 1.2 0.0 - 8.0 %    Basophil% 0.6 0.0 - 1.9 %    Differential Method Automated    Comprehensive metabolic panel   Result Value Ref Range    Sodium 142 136 - 145 mmol/L    Potassium 3.9 3.5 - 5.1 mmol/L    Chloride 107 95 - 110 mmol/L    CO2 25 23 - 29 mmol/L    Glucose 121 (H) 70 - 110 mg/dL    BUN, Bld 15 8 - 23 mg/dL    Creatinine 0.9 0.5 - 1.4 mg/dL    Calcium 8.6 (L) 8.7 - 10.5 mg/dL    Total Protein 6.6 6.0 - 8.4 g/dL    Albumin 3.9 3.5 - 5.2 g/dL    Total Bilirubin 0.3 0.1 - 1.0 mg/dL    Alkaline Phosphatase 80 55 - 135 U/L    AST 16 10 - 40 U/L    ALT 10 10 - 44 U/L    Anion Gap 10 8 - 16 mmol/L    eGFR if African American >60 >60 mL/min/1.73 m^2    eGFR if non African American 58 (A) >60 mL/min/1.73 m^2   Brain natriuretic peptide   Result Value Ref Range    BNP 1,077 (H) 0 - 99 pg/mL   Troponin I   Result Value Ref Range    Troponin I 0.026 0.000 - 0.026 ng/mL         Imaging Results:  Imaging Results          X-Ray Chest AP Portable (In process)                5:12 AM: Per ED physician, pt's CXR results: Pulmonary congestion.         The EKG was ordered, reviewed, and independently interpreted by the ED provider.  Interpretation time: 1:19  Rate: 59 BPM  Rhythm: Sinus bradycardia with 1st degree AV block  Interpretation: Left axis deviation. Left ventricular hypertrophy with QRS widening and repolarization abnormality. No  STEMI.      The Emergency Provider reviewed the vital signs and test results, which are outlined above.    ED Discussion     4:57 AM: Reassessed pt at this time. Discussed with pt all pertinent ED information and results. Discussed pt dx and plan of tx. Gave pt all f/u and return to the ED instructions. All questions and concerns were addressed at this time. Pt expresses understanding of information and instructions, and is comfortable with plan to discharge. Pt is stable for discharge.    I discussed with patient and/or family/caretaker that evaluation in the ED does not suggest any emergent or life threatening medical conditions requiring immediate intervention beyond what was provided in the ED, and I believe patient is safe for discharge.  Regardless, an unremarkable evaluation in the ED does not preclude the development or presence of a serious of life threatening condition. As such, patient was instructed to return immediately for any worsening or change in current symptoms.           ED Medication(s):  Medications   furosemide injection 40 mg (40 mg Intravenous Given 3/6/20 0242)     Current Discharge Medication List          Follow-up Information     Son Guan MD In 1 day.    Specialty:  Internal Medicine  Contact information:  [address] [facility] [address] [phone]             [facility] - .    Specialty:  Emergency Medicine  Why:  As needed, If symptoms worsen  Contact information:  [address] [facility] [address] [phone]                   Medical Decision Making              Scribe Attestation:   Scribe #1: I performed the above scribed service and the documentation accurately describes the services I performed. I attest to the accuracy of the note.    Attending:   Physician Attestation Statement for Scribe #1: I, Mame May MD, personally performed the services described in this documentation, as scribed by Clarisse Paredes, in my presence,  and it is both accurate and complete.          Clinical Impression       ICD-10-CM ICD-9-CM   1. Congestive heart failure, unspecified HF chronicity, unspecified heart failure type I50.9 428.0   2. SOB (shortness of breath) R06.02 786.05   3. Essential hypertension I10 401.9       Disposition:   Disposition: Discharged  Condition: Stable         Mame May MD  03/06/20 0555

## 2020-03-06 NOTE — ED NOTES
Spoke with Brigid at [facility] ([phone]) who states she will call her manager to ask about pt transportation back to facility. Awaiting call back.

## 2020-03-06 NOTE — ED NOTES
Pt report received from Yvonne LEE. Pt care assumed at this time. Pt sleeping in bed, respirations even and unlabored. Will continue to monitor.

## 2020-03-10 ENCOUNTER — EXTERNAL HOME HEALTH (OUTPATIENT)
Dept: HOME HEALTH SERVICES | Facility: HOSPITAL | Age: 85
End: 2020-03-10
Payer: MEDICARE

## 2020-03-11 ENCOUNTER — HOSPITAL ENCOUNTER (OUTPATIENT)
Facility: HOSPITAL | Age: 85
Discharge: HOME | End: 2020-03-12
Attending: EMERGENCY MEDICINE | Admitting: INTERNAL MEDICINE
Payer: MEDICARE

## 2020-03-11 DIAGNOSIS — R06.02 SOB (SHORTNESS OF BREATH): ICD-10-CM

## 2020-03-11 DIAGNOSIS — R07.9 CHEST PAIN: ICD-10-CM

## 2020-03-11 PROBLEM — F33.9 EPISODE OF RECURRENT MAJOR DEPRESSIVE DISORDER: Status: ACTIVE | Noted: 2020-03-11

## 2020-03-11 LAB
ALBUMIN SERPL BCP-MCNC: 4 G/DL (ref 3.5–5.2)
ALP SERPL-CCNC: 78 U/L (ref 55–135)
ALT SERPL W/O P-5'-P-CCNC: 9 U/L (ref 10–44)
ANION GAP SERPL CALC-SCNC: 11 MMOL/L (ref 8–16)
AORTIC ROOT ANNULUS: 3.83 CM
ASCENDING AORTA: 2.87 CM
AST SERPL-CCNC: 17 U/L (ref 10–40)
AV INDEX (PROSTH): 0.37
AV MEAN GRADIENT: 11 MMHG
AV PEAK GRADIENT: 18 MMHG
AV VALVE AREA: 1.19 CM2
AV VELOCITY RATIO: 0.38
BASOPHILS # BLD AUTO: 0.03 K/UL (ref 0–0.2)
BASOPHILS NFR BLD: 0.6 % (ref 0–1.9)
BILIRUB SERPL-MCNC: 0.4 MG/DL (ref 0.1–1)
BNP SERPL-MCNC: 767 PG/ML (ref 0–99)
BSA FOR ECHO PROCEDURE: 1.75 M2
BUN SERPL-MCNC: 16 MG/DL (ref 8–23)
CALCIUM SERPL-MCNC: 9.2 MG/DL (ref 8.7–10.5)
CHLORIDE SERPL-SCNC: 104 MMOL/L (ref 95–110)
CO2 SERPL-SCNC: 26 MMOL/L (ref 23–29)
CREAT SERPL-MCNC: 1.1 MG/DL (ref 0.5–1.4)
CV ECHO LV RWT: 0.47 CM
DIFFERENTIAL METHOD: ABNORMAL
DOP CALC AO PEAK VEL: 2.11 M/S
DOP CALC AO VTI: 53.18 CM
DOP CALC LVOT AREA: 3.2 CM2
DOP CALC LVOT DIAMETER: 2.02 CM
DOP CALC LVOT PEAK VEL: 0.8 M/S
DOP CALC LVOT STROKE VOLUME: 63.52 CM3
DOP CALCLVOT PEAK VEL VTI: 19.83 CM
E WAVE DECELERATION TIME: 247.7 MSEC
E/A RATIO: 1.21
ECHO LV POSTERIOR WALL: 1.26 CM (ref 0.6–1.1)
EOSINOPHIL # BLD AUTO: 0.1 K/UL (ref 0–0.5)
EOSINOPHIL NFR BLD: 1.5 % (ref 0–8)
ERYTHROCYTE [DISTWIDTH] IN BLOOD BY AUTOMATED COUNT: 20.5 % (ref 11.5–14.5)
EST. GFR  (AFRICAN AMERICAN): 53 ML/MIN/1.73 M^2
EST. GFR  (NON AFRICAN AMERICAN): 46 ML/MIN/1.73 M^2
FRACTIONAL SHORTENING: 14 % (ref 28–44)
GLUCOSE SERPL-MCNC: 103 MG/DL (ref 70–110)
HCT VFR BLD AUTO: 24.9 % (ref 37–48.5)
HGB BLD-MCNC: 7.8 G/DL (ref 12–16)
IMM GRANULOCYTES # BLD AUTO: 0.02 K/UL (ref 0–0.04)
IMM GRANULOCYTES NFR BLD AUTO: 0.4 % (ref 0–0.5)
INTERVENTRICULAR SEPTUM: 1.1 CM (ref 0.6–1.1)
IVRT: 93.43 MSEC
LA MAJOR: 4.4 CM
LA MINOR: 3.21 CM
LA WIDTH: 2.33 CM
LACTATE SERPL-SCNC: 0.9 MMOL/L (ref 0.5–2.2)
LEFT ATRIUM SIZE: 3.62 CM
LEFT ATRIUM VOLUME INDEX: 15.6 ML/M2
LEFT ATRIUM VOLUME: 26.61 CM3
LEFT INTERNAL DIMENSION IN SYSTOLE: 4.63 CM (ref 2.1–4)
LEFT VENTRICLE DIASTOLIC VOLUME INDEX: 82.04 ML/M2
LEFT VENTRICLE DIASTOLIC VOLUME: 139.83 ML
LEFT VENTRICLE MASS INDEX: 151 G/M2
LEFT VENTRICLE SYSTOLIC VOLUME INDEX: 58.1 ML/M2
LEFT VENTRICLE SYSTOLIC VOLUME: 99.04 ML
LEFT VENTRICULAR INTERNAL DIMENSION IN DIASTOLE: 5.38 CM (ref 3.5–6)
LEFT VENTRICULAR MASS: 256.83 G
LYMPHOCYTES # BLD AUTO: 1.9 K/UL (ref 1–4.8)
LYMPHOCYTES NFR BLD: 35.6 % (ref 18–48)
MCH RBC QN AUTO: 37.3 PG (ref 27–31)
MCHC RBC AUTO-ENTMCNC: 31.3 G/DL (ref 32–36)
MCV RBC AUTO: 119 FL (ref 82–98)
MONOCYTES # BLD AUTO: 0.4 K/UL (ref 0.3–1)
MONOCYTES NFR BLD: 8 % (ref 4–15)
MV PEAK A VEL: 0.62 M/S
MV PEAK E VEL: 0.75 M/S
NEUTROPHILS # BLD AUTO: 2.8 K/UL (ref 1.8–7.7)
NEUTROPHILS NFR BLD: 53.9 % (ref 38–73)
NRBC BLD-RTO: 0 /100 WBC
PISA TR MAX VEL: 3.08 M/S
PLATELET # BLD AUTO: 354 K/UL (ref 150–350)
PMV BLD AUTO: 11.8 FL (ref 9.2–12.9)
POTASSIUM SERPL-SCNC: 3.8 MMOL/L (ref 3.5–5.1)
PROT SERPL-MCNC: 6.6 G/DL (ref 6–8.4)
RA MAJOR: 3.99 CM
RA PRESSURE: 3 MMHG
RA WIDTH: 2.28 CM
RBC # BLD AUTO: 2.09 M/UL (ref 4–5.4)
SINUS: 3.55 CM
SODIUM SERPL-SCNC: 141 MMOL/L (ref 136–145)
STJ: 3.06 CM
TR MAX PG: 38 MMHG
TRICUSPID ANNULAR PLANE SYSTOLIC EXCURSION: 2.34 CM
TROPONIN I SERPL DL<=0.01 NG/ML-MCNC: 0.02 NG/ML (ref 0–0.03)
TROPONIN I SERPL DL<=0.01 NG/ML-MCNC: 0.03 NG/ML (ref 0–0.03)
TROPONIN I SERPL DL<=0.01 NG/ML-MCNC: 0.03 NG/ML (ref 0–0.03)
TV REST PULMONARY ARTERY PRESSURE: 41 MMHG
WBC # BLD AUTO: 5.25 K/UL (ref 3.9–12.7)

## 2020-03-11 PROCEDURE — 96376 TX/PRO/DX INJ SAME DRUG ADON: CPT

## 2020-03-11 PROCEDURE — 25000003 PHARM REV CODE 250: Performed by: EMERGENCY MEDICINE

## 2020-03-11 PROCEDURE — 63600175 PHARM REV CODE 636 W HCPCS: Performed by: NURSE PRACTITIONER

## 2020-03-11 PROCEDURE — G0378 HOSPITAL OBSERVATION PER HR: HCPCS

## 2020-03-11 PROCEDURE — 84484 ASSAY OF TROPONIN QUANT: CPT

## 2020-03-11 PROCEDURE — 92507 TX SP LANG VOICE COMM INDIV: CPT

## 2020-03-11 PROCEDURE — 83880 ASSAY OF NATRIURETIC PEPTIDE: CPT

## 2020-03-11 PROCEDURE — 96372 THER/PROPH/DIAG INJ SC/IM: CPT | Mod: 59

## 2020-03-11 PROCEDURE — 85025 COMPLETE CBC W/AUTO DIFF WBC: CPT

## 2020-03-11 PROCEDURE — 93010 EKG 12-LEAD: ICD-10-PCS | Mod: ,,, | Performed by: INTERNAL MEDICINE

## 2020-03-11 PROCEDURE — 63600175 PHARM REV CODE 636 W HCPCS: Performed by: EMERGENCY MEDICINE

## 2020-03-11 PROCEDURE — 96374 THER/PROPH/DIAG INJ IV PUSH: CPT | Mod: 59

## 2020-03-11 PROCEDURE — 83605 ASSAY OF LACTIC ACID: CPT

## 2020-03-11 PROCEDURE — 25000242 PHARM REV CODE 250 ALT 637 W/ HCPCS: Performed by: NURSE PRACTITIONER

## 2020-03-11 PROCEDURE — 93010 ELECTROCARDIOGRAM REPORT: CPT | Mod: ,,, | Performed by: INTERNAL MEDICINE

## 2020-03-11 PROCEDURE — 93005 ELECTROCARDIOGRAM TRACING: CPT

## 2020-03-11 PROCEDURE — 80053 COMPREHEN METABOLIC PANEL: CPT

## 2020-03-11 PROCEDURE — 99285 EMERGENCY DEPT VISIT HI MDM: CPT | Mod: 25

## 2020-03-11 PROCEDURE — 25000003 PHARM REV CODE 250: Performed by: NURSE PRACTITIONER

## 2020-03-11 PROCEDURE — 84484 ASSAY OF TROPONIN QUANT: CPT | Mod: 91

## 2020-03-11 PROCEDURE — 36415 COLL VENOUS BLD VENIPUNCTURE: CPT

## 2020-03-11 RX ORDER — LANOLIN ALCOHOL/MO/W.PET/CERES
1000 CREAM (GRAM) TOPICAL DAILY
Status: DISCONTINUED | OUTPATIENT
Start: 2020-03-11 | End: 2020-03-11 | Stop reason: CLARIF

## 2020-03-11 RX ORDER — LOSARTAN POTASSIUM 25 MG/1
25 TABLET ORAL DAILY
Status: DISCONTINUED | OUTPATIENT
Start: 2020-03-11 | End: 2020-03-12 | Stop reason: HOSPADM

## 2020-03-11 RX ORDER — ASPIRIN 81 MG/1
81 TABLET ORAL DAILY
Status: DISCONTINUED | OUTPATIENT
Start: 2020-03-11 | End: 2020-03-12 | Stop reason: HOSPADM

## 2020-03-11 RX ORDER — DONEPEZIL HYDROCHLORIDE 5 MG/1
10 TABLET, FILM COATED ORAL NIGHTLY
Status: DISCONTINUED | OUTPATIENT
Start: 2020-03-11 | End: 2020-03-12 | Stop reason: HOSPADM

## 2020-03-11 RX ORDER — MELATONIN 1 MG/ML
1 LIQUID (ML) ORAL NIGHTLY
Status: DISCONTINUED | OUTPATIENT
Start: 2020-03-11 | End: 2020-03-12 | Stop reason: HOSPADM

## 2020-03-11 RX ORDER — ARIPIPRAZOLE 5 MG/1
5 TABLET ORAL DAILY
Status: DISCONTINUED | OUTPATIENT
Start: 2020-03-11 | End: 2020-03-12 | Stop reason: HOSPADM

## 2020-03-11 RX ORDER — ALBUTEROL SULFATE 0.83 MG/ML
2.5 SOLUTION RESPIRATORY (INHALATION) EVERY 4 HOURS PRN
Status: DISCONTINUED | OUTPATIENT
Start: 2020-03-11 | End: 2020-03-12 | Stop reason: HOSPADM

## 2020-03-11 RX ORDER — AMLODIPINE BESYLATE 10 MG/1
10 TABLET ORAL DAILY
Status: DISCONTINUED | OUTPATIENT
Start: 2020-03-11 | End: 2020-03-12 | Stop reason: HOSPADM

## 2020-03-11 RX ORDER — BUSPIRONE HYDROCHLORIDE 5 MG/1
15 TABLET ORAL 2 TIMES DAILY
Status: DISCONTINUED | OUTPATIENT
Start: 2020-03-11 | End: 2020-03-12 | Stop reason: HOSPADM

## 2020-03-11 RX ORDER — DULOXETIN HYDROCHLORIDE 30 MG/1
30 CAPSULE, DELAYED RELEASE ORAL DAILY
Status: DISCONTINUED | OUTPATIENT
Start: 2020-03-11 | End: 2020-03-12 | Stop reason: HOSPADM

## 2020-03-11 RX ORDER — FUROSEMIDE 10 MG/ML
40 INJECTION INTRAMUSCULAR; INTRAVENOUS
Status: COMPLETED | OUTPATIENT
Start: 2020-03-11 | End: 2020-03-11

## 2020-03-11 RX ORDER — FLUTICASONE PROPIONATE 50 MCG
1 SPRAY, SUSPENSION (ML) NASAL DAILY
Status: DISCONTINUED | OUTPATIENT
Start: 2020-03-11 | End: 2020-03-12 | Stop reason: HOSPADM

## 2020-03-11 RX ORDER — CARVEDILOL 6.25 MG/1
6.25 TABLET ORAL 2 TIMES DAILY WITH MEALS
Status: DISCONTINUED | OUTPATIENT
Start: 2020-03-11 | End: 2020-03-12 | Stop reason: HOSPADM

## 2020-03-11 RX ORDER — ACETAMINOPHEN 500 MG
500 TABLET ORAL
Status: COMPLETED | OUTPATIENT
Start: 2020-03-11 | End: 2020-03-11

## 2020-03-11 RX ORDER — HEPARIN SODIUM 5000 [USP'U]/ML
5000 INJECTION, SOLUTION INTRAVENOUS; SUBCUTANEOUS EVERY 8 HOURS
Status: DISCONTINUED | OUTPATIENT
Start: 2020-03-11 | End: 2020-03-12 | Stop reason: HOSPADM

## 2020-03-11 RX ORDER — NAPROXEN SODIUM 220 MG/1
81 TABLET, FILM COATED ORAL
Status: COMPLETED | OUTPATIENT
Start: 2020-03-11 | End: 2020-03-11

## 2020-03-11 RX ORDER — CLOPIDOGREL BISULFATE 75 MG/1
75 TABLET ORAL DAILY
Status: DISCONTINUED | OUTPATIENT
Start: 2020-03-11 | End: 2020-03-12 | Stop reason: HOSPADM

## 2020-03-11 RX ORDER — ACETAMINOPHEN 325 MG/1
650 TABLET ORAL EVERY 4 HOURS PRN
Status: DISCONTINUED | OUTPATIENT
Start: 2020-03-11 | End: 2020-03-12 | Stop reason: HOSPADM

## 2020-03-11 RX ORDER — ATORVASTATIN CALCIUM 10 MG/1
20 TABLET, FILM COATED ORAL DAILY
Status: DISCONTINUED | OUTPATIENT
Start: 2020-03-11 | End: 2020-03-12 | Stop reason: HOSPADM

## 2020-03-11 RX ORDER — FUROSEMIDE 10 MG/ML
40 INJECTION INTRAMUSCULAR; INTRAVENOUS 2 TIMES DAILY
Status: DISCONTINUED | OUTPATIENT
Start: 2020-03-11 | End: 2020-03-12 | Stop reason: HOSPADM

## 2020-03-11 RX ORDER — HYDRALAZINE HYDROCHLORIDE 20 MG/ML
10 INJECTION INTRAMUSCULAR; INTRAVENOUS EVERY 6 HOURS PRN
Status: DISCONTINUED | OUTPATIENT
Start: 2020-03-11 | End: 2020-03-12 | Stop reason: HOSPADM

## 2020-03-11 RX ORDER — SODIUM CHLORIDE 0.9 % (FLUSH) 0.9 %
10 SYRINGE (ML) INJECTION
Status: DISCONTINUED | OUTPATIENT
Start: 2020-03-11 | End: 2020-03-12 | Stop reason: HOSPADM

## 2020-03-11 RX ORDER — AMIODARONE HYDROCHLORIDE 200 MG/1
200 TABLET ORAL DAILY
Status: DISCONTINUED | OUTPATIENT
Start: 2020-03-11 | End: 2020-03-12 | Stop reason: HOSPADM

## 2020-03-11 RX ADMIN — ASPIRIN 81 MG: 81 TABLET, COATED ORAL at 11:03

## 2020-03-11 RX ADMIN — ALUMINUM HYDROXIDE, MAGNESIUM HYDROXIDE, AND SIMETHICONE: 200; 200; 20 SUSPENSION ORAL at 04:03

## 2020-03-11 RX ADMIN — FUROSEMIDE 40 MG: 10 INJECTION, SOLUTION INTRAMUSCULAR; INTRAVENOUS at 04:03

## 2020-03-11 RX ADMIN — CARVEDILOL 6.25 MG: 6.25 TABLET, FILM COATED ORAL at 06:03

## 2020-03-11 RX ADMIN — ATORVASTATIN CALCIUM 20 MG: 10 TABLET, FILM COATED ORAL at 11:03

## 2020-03-11 RX ADMIN — ACETAMINOPHEN 500 MG: 500 TABLET ORAL at 08:03

## 2020-03-11 RX ADMIN — LOSARTAN POTASSIUM 25 MG: 25 TABLET ORAL at 11:03

## 2020-03-11 RX ADMIN — FLUTICASONE PROPIONATE 50 MCG: 50 SPRAY, METERED NASAL at 11:03

## 2020-03-11 RX ADMIN — AMIODARONE HYDROCHLORIDE 200 MG: 200 TABLET ORAL at 11:03

## 2020-03-11 RX ADMIN — ARIPIPRAZOLE 5 MG: 5 TABLET ORAL at 11:03

## 2020-03-11 RX ADMIN — AMLODIPINE BESYLATE 10 MG: 10 TABLET ORAL at 11:03

## 2020-03-11 RX ADMIN — DONEPEZIL HYDROCHLORIDE 10 MG: 5 TABLET, FILM COATED ORAL at 09:03

## 2020-03-11 RX ADMIN — FUROSEMIDE 40 MG: 10 INJECTION, SOLUTION INTRAMUSCULAR; INTRAVENOUS at 12:03

## 2020-03-11 RX ADMIN — THERA TABS 1 TABLET: TAB at 11:03

## 2020-03-11 RX ADMIN — HEPARIN SODIUM 5000 UNITS: 5000 INJECTION INTRAVENOUS; SUBCUTANEOUS at 01:03

## 2020-03-11 RX ADMIN — FUROSEMIDE 40 MG: 10 INJECTION, SOLUTION INTRAMUSCULAR; INTRAVENOUS at 06:03

## 2020-03-11 RX ADMIN — CLOPIDOGREL BISULFATE 75 MG: 75 TABLET ORAL at 11:03

## 2020-03-11 RX ADMIN — CARVEDILOL 6.25 MG: 6.25 TABLET, FILM COATED ORAL at 11:03

## 2020-03-11 RX ADMIN — NITROGLYCERIN 1 INCH: 20 OINTMENT TOPICAL at 04:03

## 2020-03-11 RX ADMIN — BUSPIRONE HYDROCHLORIDE 15 MG: 5 TABLET ORAL at 11:03

## 2020-03-11 RX ADMIN — DULOXETINE 30 MG: 30 CAPSULE, DELAYED RELEASE ORAL at 11:03

## 2020-03-11 RX ADMIN — BUSPIRONE HYDROCHLORIDE 15 MG: 5 TABLET ORAL at 10:03

## 2020-03-11 RX ADMIN — HEPARIN SODIUM 5000 UNITS: 5000 INJECTION INTRAVENOUS; SUBCUTANEOUS at 09:03

## 2020-03-11 RX ADMIN — ASPIRIN 81 MG: 81 TABLET, CHEWABLE ORAL at 08:03

## 2020-03-11 RX ADMIN — Medication 1 MG: at 10:03

## 2020-03-11 NOTE — ASSESSMENT & PLAN NOTE
--hgb 7.8   --pt denies bleeding  --baseline hgb approx 10.0  --monitor closely and transfuse if drops further

## 2020-03-11 NOTE — HPI
87 y/o female with PMHx of anemia, anxiety, CHF, cardiomyopathy, and HTN who presented to the ED with c/o SOB that onset gradually over the past 2-3 days and worsened last night. Associated symptoms include L sided chest pain\, nausea, L arm pain, and headache. Pain is described as tightness, possible indigestion, that is episodic, that does not radiate, that is currently resolved. Patient uses home oxygen and reports it did not help her SOB. Symptoms are episodic and moderate in severity. No exacerbating or mitigating factors reported.   She is a full code and her SDM is her daughter, Kristy  She will be kept on OBS for chest pain under the care of Hospital Medicine.

## 2020-03-11 NOTE — ASSESSMENT & PLAN NOTE
--hgb 7.8   --pt denies bleeding  --baseline hgb approx 10.0  --monitor closely and transfuse if drops further  --Review of past records from 11/2019 reveals elevated retic count, B12, and ferritin with normal TIBC and iron sat.   --Bone marrow biopsy from 08/2019 was negative for any obvious myelodysplastic syndrome or malignancy.

## 2020-03-11 NOTE — PLAN OF CARE
GI Cocktail given , pt was having episode of spitting up mucus after medications given.  Speech evaluation placed  NP notified of symptoms  She ambulates without difficulty  Remains free from falls  IV lasix given   BP elevated she takes oral medications scheduled   NP aware of readings  Continue on tele monitoring   No other concerns voiced at this time

## 2020-03-11 NOTE — SUBJECTIVE & OBJECTIVE
Past Medical History:   Diagnosis Date    Anemia     Anxiety     Arthritis     Back pain     Cardiomyopathy     CHF (congestive heart failure)     Depression     Dizziness     Hypertension     Insomnia     Stroke     Use of cane as ambulatory aid        Past Surgical History:   Procedure Laterality Date    APPENDECTOMY      arthritis      BLADDER REPAIR      BUNIONECTOMY      CARDIAC DEFIBRILLATOR PLACEMENT      CATARACT EXTRACTION      HYSTERECTOMY      metal implant Bilateral     placed in the bladder.       Review of patient's allergies indicates:   Allergen Reactions    Bacitracin-polymyxin b Itching and Swelling    Merthiolate (thimerosal) Rash and Swelling    Cefdinir     Diazepam      Hallucinations    Levofloxacin Itching    Metronidazole     Oxycodone Itching    Tizanidine      Hallucinations    Tramadol Itching       No current facility-administered medications on file prior to encounter.      Current Outpatient Medications on File Prior to Encounter   Medication Sig    amiodarone (PACERONE) 200 MG Tab Take 1 tablet by mouth once daily.    amlodipine (NORVASC) 10 MG tablet Take 10 mg by mouth once daily.    aripiprazole (ABILIFY) 10 MG Tab Take 5 mg by mouth once daily.    aspirin (ECOTRIN) 81 MG EC tablet Take 81 mg by mouth once daily.    atorvastatin (LIPITOR) 20 MG tablet Take 20 mg by mouth once daily.    busPIRone (BUSPAR) 15 MG tablet Take 1 tablet by mouth 2 (two) times daily.    carvedilol (COREG) 6.25 MG tablet Take 6.25 mg by mouth 2 (two) times daily with meals.    clopidogreL (PLAVIX) 75 mg tablet Take 1 tablet by mouth once daily.    donepezil (ARICEPT) 10 MG tablet Take 1 tablet by mouth every evening.    DULoxetine (CYMBALTA) 30 MG capsule Take 1 capsule by mouth once daily.    ferrous sulfate 324 mg (65 mg iron) TbEC Take 65 mg by mouth once daily.    furosemide (LASIX) 40 MG tablet Take 1 tablet by mouth 2 (two) times daily.    levothyroxine  (SYNTHROID) 50 MCG tablet Take 50 mcg by mouth once daily.    losartan (COZAAR) 25 MG tablet Take 1 tablet by mouth once daily.    venlafaxine (EFFEXOR-XR) 150 MG Cp24 Take 75 mg by mouth once daily.    albuterol (PROAIR HFA) 90 mcg/actuation inhaler Inhale 2 puffs into the lungs every 6 (six) hours as needed for Wheezing.    cyanocobalamin (VITAMIN B-12) 1000 MCG tablet Take 1 tablet by mouth once daily.    cycloSPORINE (RESTASIS) 0.05 % ophthalmic emulsion Apply 1 drop to eye.    esomeprazole (NEXIUM) 40 MG capsule Take 40 mg by mouth before breakfast.    fluticasone (FLONASE) 50 mcg/actuation nasal spray 1 spray by Each Nare route once daily.    glucosamine-chondroitin 500-400 mg tablet Take 1 tablet by mouth 2 (two) times daily.    Lactobacillus rhamnosus GG (CULTURELLE) 10 billion cell capsule Take 1 capsule by mouth once daily.    melatonin 1 mg Tab Take 1 mg by mouth every evening.    mirabegron 50 mg Tb24 Take by mouth.    mirtazapine (REMERON) 7.5 MG Tab     multivitamin capsule Take 1 capsule by mouth once daily.    nystatin-triamcinolone (MYCOLOG) ointment 2 (two) times daily as needed.    polyethylene glycol (GLYCOLAX) 17 gram PwPk Take by mouth.    potassium chloride SA (K-DUR,KLOR-CON) 20 MEQ tablet Take 20 mEq by mouth 2 (two) times daily.    solifenacin (VESICARE) 10 MG tablet Take 5 mg by mouth once daily.    traMADol (ULTRAM) 50 mg tablet Take 1 tablet by mouth 3 (three) times daily as needed.     Family History     Problem Relation (Age of Onset)    Cancer Mother    Heart disease Father    Hypertension         Tobacco Use    Smoking status: Never Smoker    Smokeless tobacco: Never Used   Substance and Sexual Activity    Alcohol use: No    Drug use: No    Sexual activity: Not Currently     Review of Systems   Constitutional: Negative for activity change, appetite change, chills, fatigue and fever.   HENT: Negative for congestion, dental problem, ear pain, hearing loss, mouth  sores, sinus pressure, sore throat, tinnitus and trouble swallowing.    Eyes: Negative for pain, discharge, redness and visual disturbance.   Respiratory: Positive for shortness of breath. Negative for apnea, cough, choking, chest tightness and wheezing.    Cardiovascular: Positive for chest pain. Negative for palpitations and leg swelling.   Gastrointestinal: Positive for nausea. Negative for abdominal distention, abdominal pain, anal bleeding, blood in stool, constipation, diarrhea, rectal pain and vomiting.   Endocrine: Negative for cold intolerance, heat intolerance, polydipsia and polyuria.   Genitourinary: Negative for difficulty urinating, dysuria, flank pain, frequency, hematuria and urgency.   Musculoskeletal: Negative for arthralgias, back pain, gait problem, joint swelling, myalgias, neck pain and neck stiffness.   Skin: Negative for color change, rash and wound.   Allergic/Immunologic: Negative.    Neurological: Positive for headaches. Negative for dizziness, tremors, seizures, syncope, speech difficulty and light-headedness.   Hematological: Negative for adenopathy. Bruises/bleeds easily.   Psychiatric/Behavioral: Negative for agitation, behavioral problems, confusion and sleep disturbance. The patient is not nervous/anxious.    All other systems reviewed and are negative.    Objective:     Vital Signs (Most Recent):  Temp: 98.9 °F (37.2 °C) (03/11/20 0500)  Pulse: (!) 56 (03/11/20 0839)  Resp: 19 (03/11/20 0839)  BP: (!) 166/75 (03/11/20 0833)  SpO2: 97 % (03/11/20 0839) Vital Signs (24h Range):  Temp:  [98.9 °F (37.2 °C)] 98.9 °F (37.2 °C)  Pulse:  [56-70] 56  Resp:  [17-20] 19  SpO2:  [96 %-100 %] 97 %  BP: (147-200)/(65-84) 166/75     Weight: 71.2 kg (157 lb)  Body mass index is 29.66 kg/m².    Physical Exam   Constitutional: She is oriented to person, place, and time. She appears well-developed and well-nourished. No distress.   HENT:   Head: Normocephalic and atraumatic.   Nose: Nose normal.    Mouth/Throat: Oropharynx is clear and moist.   Eyes: Pupils are equal, round, and reactive to light. EOM are normal. Right eye exhibits no discharge. Left eye exhibits no discharge.   Neck: Normal range of motion. Neck supple. No JVD present. No tracheal deviation present. No thyromegaly present.   Cardiovascular: Regular rhythm and intact distal pulses. Bradycardia present. Exam reveals no gallop and no friction rub.   Murmur ( 2/6 heart murmur heard at 2nd intercostal space) heard.  Pulmonary/Chest: Effort normal. No respiratory distress. She has decreased breath sounds. She has no wheezes. She has no rales. She exhibits no tenderness.   Abdominal: Soft. Bowel sounds are normal. She exhibits no distension and no mass. There is no tenderness.   Genitourinary:   Genitourinary Comments: Pur wick to suction   Musculoskeletal: Normal range of motion. She exhibits edema (1+ to BLE). She exhibits no tenderness or deformity.   Neurological: She is alert and oriented to person, place, and time. No cranial nerve deficit. She exhibits normal muscle tone. Coordination normal.   Skin: Skin is warm and dry. Capillary refill takes less than 2 seconds. No rash noted. She is not diaphoretic. No erythema. No pallor.   Psychiatric: She has a normal mood and affect. Her behavior is normal.   Nursing note and vitals reviewed.        CRANIAL NERVES     CN III, IV, VI   Pupils are equal, round, and reactive to light.  Extraocular motions are normal.        Significant Labs:   Recent Lab Results       03/11/20  0853   03/11/20  0459   03/11/20  0449        Albumin     4.0     Alkaline Phosphatase     78     ALT     9     Anion Gap     11     AST     17     Baso #     0.03     Basophil%     0.6     BILIRUBIN TOTAL     0.4  Comment:  For infants and newborns, interpretation of results should be based  on gestational age, weight and in agreement with clinical  observations.  Premature Infant recommended reference ranges:  Up to 24  hours.............<8.0 mg/dL  Up to 48 hours............<12.0 mg/dL  3-5 days..................<15.0 mg/dL  6-29 days.................<15.0 mg/dL       BNP     767  Comment:  Values of less than 100 pg/ml are consistent with non-CHF populations.     BUN, Bld     16     Calcium     9.2     Chloride     104     CO2     26     Creatinine     1.1     Differential Method     Automated     eGFR if      53     eGFR if non      46  Comment:  Calculation used to obtain the estimated glomerular filtration  rate (eGFR) is the CKD-EPI equation.        Eos #     0.1     Eosinophil%     1.5     Glucose     103     Gran # (ANC)     2.8     Gran%     53.9     Hematocrit     24.9     Hemoglobin     7.8     Immature Grans (Abs)     0.02  Comment:  Mild elevation in immature granulocytes is non specific and   can be seen in a variety of conditions including stress response,   acute inflammation, trauma and pregnancy. Correlation with other   laboratory and clinical findings is essential.       Immature Granulocytes     0.4     Lactate, Meliton   0.9  Comment:  Falsely low lactic acid results can be found in samples   containing >=13.0 mg/dL total bilirubin and/or >=3.5 mg/dL   direct bilirubin.         Lymph #     1.9     Lymph%     35.6     MCH     37.3     MCHC     31.3     MCV     119     Mono #     0.4     Mono%     8.0     MPV     11.8     nRBC     0     Platelets     354     Potassium     3.8     PROTEIN TOTAL     6.6     RBC     2.09     RDW     20.5     Sodium     141     Troponin I 0.022  Comment:  The reference interval for Troponin I represents the 99th percentile   cutoff   for our facility and is consistent with 3rd generation assay   performance.     0.026  Comment:  The reference interval for Troponin I represents the 99th percentile   cutoff   for our facility and is consistent with 3rd generation assay   performance.       WBC     5.25         All pertinent labs within the past 24 hours  have been reviewed.    Significant Imaging: I have reviewed all pertinent imaging results/findings within the past 24 hours.

## 2020-03-11 NOTE — ASSESSMENT & PLAN NOTE
--IV furosemide  --strict I&O  --cardiac diet  --2d echo pending  --monitor renal function and potassium

## 2020-03-11 NOTE — NURSING
Patient threw up after taking medications. She has noticed for the past two days she has difficulty swallowing her medications. She denies having symptoms of nausea. NP and MD notified.

## 2020-03-11 NOTE — H&P
[facility]  History & Physical    Patient Name: Love Davey  MRN: [id number]  Admission Date: 3/11/2020  Attending Physician: Isaias Yu MD   Primary Care Provider: Son Guan MD         Patient information was obtained from patient, past medical records and ER records.     Subjective:     Principal Problem:Chest pain    Chief Complaint:   Chief Complaint   Patient presents with    Shortness of Breath     shortness of breath and left chest pain x 30 minutes        HPI: 87 y/o female with PMHx of anemia, anxiety, CHF, cardiomyopathy, and HTN who presented to the ED with c/o SOB that onset gradually over the past 2-3 days and worsened last night. Associated symptoms include L sided chest pain\, nausea, L arm pain, and headache. Pain is described as tightness, possible indigestion, that is episodic, that does not radiate, that is currently resolved. Patient uses home oxygen and reports it did not help her SOB. Symptoms are episodic and moderate in severity. No exacerbating or mitigating factors reported.   She is a full code and her SDM is her daughter, Kristy  She will be kept on OBS for chest pain under the care of [facility].      Past Medical History:   Diagnosis Date    Anemia     Anxiety     Arthritis     Back pain     Cardiomyopathy     CHF (congestive heart failure)     Depression     Dizziness     Hypertension     Insomnia     Stroke     Use of cane as ambulatory aid        Past Surgical History:   Procedure Laterality Date    APPENDECTOMY      arthritis      BLADDER REPAIR      BUNIONECTOMY      CARDIAC DEFIBRILLATOR PLACEMENT      CATARACT EXTRACTION      HYSTERECTOMY      metal implant Bilateral     placed in the bladder.       Review of patient's allergies indicates:   Allergen Reactions    Bacitracin-polymyxin b Itching and Swelling    Merthiolate (thimerosal) Rash and Swelling    Cefdinir     Diazepam      Hallucinations     Levofloxacin Itching    Metronidazole     Oxycodone Itching    Tizanidine      Hallucinations    Tramadol Itching       No current facility-administered medications on file prior to encounter.      Current Outpatient Medications on File Prior to Encounter   Medication Sig    amiodarone (PACERONE) 200 MG Tab Take 1 tablet by mouth once daily.    amlodipine (NORVASC) 10 MG tablet Take 10 mg by mouth once daily.    aripiprazole (ABILIFY) 10 MG Tab Take 5 mg by mouth once daily.    aspirin (ECOTRIN) 81 MG EC tablet Take 81 mg by mouth once daily.    atorvastatin (LIPITOR) 20 MG tablet Take 20 mg by mouth once daily.    busPIRone (BUSPAR) 15 MG tablet Take 1 tablet by mouth 2 (two) times daily.    carvedilol (COREG) 6.25 MG tablet Take 6.25 mg by mouth 2 (two) times daily with meals.    clopidogreL (PLAVIX) 75 mg tablet Take 1 tablet by mouth once daily.    donepezil (ARICEPT) 10 MG tablet Take 1 tablet by mouth every evening.    DULoxetine (CYMBALTA) 30 MG capsule Take 1 capsule by mouth once daily.    ferrous sulfate 324 mg (65 mg iron) TbEC Take 65 mg by mouth once daily.    furosemide (LASIX) 40 MG tablet Take 1 tablet by mouth 2 (two) times daily.    levothyroxine (SYNTHROID) 50 MCG tablet Take 50 mcg by mouth once daily.    losartan (COZAAR) 25 MG tablet Take 1 tablet by mouth once daily.    venlafaxine (EFFEXOR-XR) 150 MG Cp24 Take 75 mg by mouth once daily.    albuterol (PROAIR HFA) 90 mcg/actuation inhaler Inhale 2 puffs into the lungs every 6 (six) hours as needed for Wheezing.    cyanocobalamin (VITAMIN B-12) 1000 MCG tablet Take 1 tablet by mouth once daily.    cycloSPORINE (RESTASIS) 0.05 % ophthalmic emulsion Apply 1 drop to eye.    esomeprazole (NEXIUM) 40 MG capsule Take 40 mg by mouth before breakfast.    fluticasone (FLONASE) 50 mcg/actuation nasal spray 1 spray by Each Nare route once daily.    glucosamine-chondroitin 500-400 mg tablet Take 1 tablet by mouth 2 (two) times  daily.    Lactobacillus rhamnosus GG (CULTURELLE) 10 billion cell capsule Take 1 capsule by mouth once daily.    melatonin 1 mg Tab Take 1 mg by mouth every evening.    mirabegron 50 mg Tb24 Take by mouth.    mirtazapine (REMERON) 7.5 MG Tab     multivitamin capsule Take 1 capsule by mouth once daily.    nystatin-triamcinolone (MYCOLOG) ointment 2 (two) times daily as needed.    polyethylene glycol (GLYCOLAX) 17 gram PwPk Take by mouth.    potassium chloride SA (K-DUR,KLOR-CON) 20 MEQ tablet Take 20 mEq by mouth 2 (two) times daily.    solifenacin (VESICARE) 10 MG tablet Take 5 mg by mouth once daily.    traMADol (ULTRAM) 50 mg tablet Take 1 tablet by mouth 3 (three) times daily as needed.     Family History     Problem Relation (Age of Onset)    Cancer Mother    Heart disease Father    Hypertension         Tobacco Use    Smoking status: Never Smoker    Smokeless tobacco: Never Used   Substance and Sexual Activity    Alcohol use: No    Drug use: No    Sexual activity: Not Currently     Review of Systems   Constitutional: Negative for activity change, appetite change, chills, fatigue and fever.   HENT: Negative for congestion, dental problem, ear pain, hearing loss, mouth sores, sinus pressure, sore throat, tinnitus and trouble swallowing.    Eyes: Negative for pain, discharge, redness and visual disturbance.   Respiratory: Positive for shortness of breath. Negative for apnea, cough, choking, chest tightness and wheezing.    Cardiovascular: Positive for chest pain. Negative for palpitations and leg swelling.   Gastrointestinal: Positive for nausea. Negative for abdominal distention, abdominal pain, anal bleeding, blood in stool, constipation, diarrhea, rectal pain and vomiting.   Endocrine: Negative for cold intolerance, heat intolerance, polydipsia and polyuria.   Genitourinary: Negative for difficulty urinating, dysuria, flank pain, frequency, hematuria and urgency.   Musculoskeletal: Negative for  arthralgias, back pain, gait problem, joint swelling, myalgias, neck pain and neck stiffness.   Skin: Negative for color change, rash and wound.   Allergic/Immunologic: Negative.    Neurological: Positive for headaches. Negative for dizziness, tremors, seizures, syncope, speech difficulty and light-headedness.   Hematological: Negative for adenopathy. Bruises/bleeds easily.   Psychiatric/Behavioral: Negative for agitation, behavioral problems, confusion and sleep disturbance. The patient is not nervous/anxious.    All other systems reviewed and are negative.    Objective:     Vital Signs (Most Recent):  Temp: 98.9 °F (37.2 °C) (03/11/20 0500)  Pulse: (!) 56 (03/11/20 0839)  Resp: 19 (03/11/20 0839)  BP: (!) 166/75 (03/11/20 0833)  SpO2: 97 % (03/11/20 0839) Vital Signs (24h Range):  Temp:  [98.9 °F (37.2 °C)] 98.9 °F (37.2 °C)  Pulse:  [56-70] 56  Resp:  [17-20] 19  SpO2:  [96 %-100 %] 97 %  BP: (147-200)/(65-84) 166/75     Weight: 71.2 kg (157 lb)  Body mass index is 29.66 kg/m².    Physical Exam   Constitutional: She is oriented to person, place, and time. She appears well-developed and well-nourished. No distress.   HENT:   Head: Normocephalic and atraumatic.   Nose: Nose normal.   Mouth/Throat: Oropharynx is clear and moist.   Eyes: Pupils are equal, round, and reactive to light. EOM are normal. Right eye exhibits no discharge. Left eye exhibits no discharge.   Neck: Normal range of motion. Neck supple. No JVD present. No tracheal deviation present. No thyromegaly present.   Cardiovascular: Regular rhythm and intact distal pulses. Bradycardia present. Exam reveals no gallop and no friction rub.   Murmur ( 2/6 heart murmur heard at 2nd intercostal space) heard.  Pulmonary/Chest: Effort normal. No respiratory distress. She has decreased breath sounds. She has no wheezes. She has no rales. She exhibits no tenderness.   Abdominal: Soft. Bowel sounds are normal. She exhibits no distension and no mass. There is no  tenderness.   Genitourinary:   Genitourinary Comments: Pur wick to suction   Musculoskeletal: Normal range of motion. She exhibits edema (1+ to BLE). She exhibits no tenderness or deformity.   Neurological: She is alert and oriented to person, place, and time. No cranial nerve deficit. She exhibits normal muscle tone. Coordination normal.   Skin: Skin is warm and dry. Capillary refill takes less than 2 seconds. No rash noted. She is not diaphoretic. No erythema. No pallor.   Psychiatric: She has a normal mood and affect. Her behavior is normal.   Nursing note and vitals reviewed.        CRANIAL NERVES     CN III, IV, VI   Pupils are equal, round, and reactive to light.  Extraocular motions are normal.        Significant Labs:   Recent Lab Results       03/11/20  0853   03/11/20  0459   03/11/20  0449        Albumin     4.0     Alkaline Phosphatase     78     ALT     9     Anion Gap     11     AST     17     Baso #     0.03     Basophil%     0.6     BILIRUBIN TOTAL     0.4  Comment:  For infants and newborns, interpretation of results should be based  on gestational age, weight and in agreement with clinical  observations.  Premature Infant recommended reference ranges:  Up to 24 hours.............<8.0 mg/dL  Up to 48 hours............<12.0 mg/dL  3-5 days..................<15.0 mg/dL  6-29 days.................<15.0 mg/dL       BNP     767  Comment:  Values of less than 100 pg/ml are consistent with non-CHF populations.     BUN, Bld     16     Calcium     9.2     Chloride     104     CO2     26     Creatinine     1.1     Differential Method     Automated     eGFR if      53     eGFR if non      46  Comment:  Calculation used to obtain the estimated glomerular filtration  rate (eGFR) is the CKD-EPI equation.        Eos #     0.1     Eosinophil%     1.5     Glucose     103     Gran # (ANC)     2.8     Gran%     53.9     Hematocrit     24.9     Hemoglobin     7.8     Immature Grans (Abs)  "    0.02  Comment:  Mild elevation in immature granulocytes is non specific and   can be seen in a variety of conditions including stress response,   acute inflammation, trauma and pregnancy. Correlation with other   laboratory and clinical findings is essential.       Immature Granulocytes     0.4     Lactate, Meliton   0.9  Comment:  Falsely low lactic acid results can be found in samples   containing >=13.0 mg/dL total bilirubin and/or >=3.5 mg/dL   direct bilirubin.         Lymph #     1.9     Lymph%     35.6     MCH     37.3     MCHC     31.3     MCV     119     Mono #     0.4     Mono%     8.0     MPV     11.8     nRBC     0     Platelets     354     Potassium     3.8     PROTEIN TOTAL     6.6     RBC     2.09     RDW     20.5     Sodium     141     Troponin I 0.022  Comment:  The reference interval for Troponin I represents the 99th percentile   cutoff   for our facility and is consistent with 3rd generation assay   performance.     0.026  Comment:  The reference interval for Troponin I represents the 99th percentile   cutoff   for our facility and is consistent with 3rd generation assay   performance.       WBC     5.25         All pertinent labs within the past 24 hours have been reviewed.    Significant Imaging: I have reviewed all pertinent imaging results/findings within the past 24 hours.    Assessment/Plan:     * Chest pain  --resolved at present  --initial troponin 0.022  --trend troponin  --PRN nitroglycerine  --2d echo pending    Episode of recurrent major depressive disorder  --pt reports her PCP tried to take her off medication and her depression returned "full force"  --continue burpirone, duloxetine      Nonintractable headache  --PRN analgesia      Dementia  --continue donepezil      CHF (congestive heart failure)  --IV furosemide  --strict I&O  --cardiac diet  --2d echo pending  --monitor renal function and potassium    HTN (hypertension)  --continue amlodipine, carvedilol, losartan  --cardiac " diet      Acute on chronic anemia  --hgb 7.8   --pt denies bleeding  --baseline hgb approx 10.0  --monitor closely and transfuse if drops further  --Review of past records from 11/2019 reveals elevated retic count, B12, and ferritin with normal TIBC and iron sat.   --Bone marrow biopsy from 08/2019 was negative for any obvious myelodysplastic syndrome or malignancy.       Asthma  --PRN albuterol via nebulizer      VTE Risk Mitigation (From admission, onward)         Ordered     heparin (porcine) injection 5,000 Units  Every 8 hours      03/11/20 1041     IP VTE HIGH RISK PATIENT  Once      03/11/20 1041                   YONATAN Mahan  Department of Hospital Medicine   [facility]

## 2020-03-11 NOTE — ASSESSMENT & PLAN NOTE
"--pt reports her PCP tried to take her off medication and her depression returned "full force"  --continue burpirone, duloxetine    "

## 2020-03-11 NOTE — ED PROVIDER NOTES
SCRIBE #1 NOTE: IYojana, am scribing for, and in the presence of, Mame May MD. I have scribed the HPI, ROS, and PEx.     SCRIBE #2 NOTE: IKaley, am scribing for, and in the presence of,  Karen Velasquez DO. I have scribed the remaining portions of the note not scribed by Scribe #1.      History     Chief Complaint   Patient presents with    Shortness of Breath     shortness of breath and left chest pain x 30 minutes     Review of patient's allergies indicates:   Allergen Reactions    Bacitracin-polymyxin b Itching and Swelling    Merthiolate (thimerosal) Rash and Swelling    Cefdinir     Diazepam      Hallucinations    Levofloxacin Itching    Metronidazole     Oxycodone Itching    Tizanidine      Hallucinations    Tramadol Itching         History of Present Illness     HPI    3/11/2020, 4:46 AM  History obtained from the patient      History of Present Illness: Love Davey is a 86 y.o. female patient with a PMHx of anemia, anxiety, CHF, cardiomyopathy, HTN who presents to the Emergency Department for evaluation of SOB which onset gradually an hour PTA. Symptoms are constant and moderate in severity. No mitigating or exacerbating factors reported. Associated sxs include L sided CP, cough, and HA. Patient denies any fever, chills, diaphoresis, palpitations, extremity weakness/numbness, leg swelling, dizziness, N/V, and all other sxs at this time. No further complaints or concerns at this time.         Arrival mode: Ambulance services    PCP: Son Guan MD        Past Medical History:  Past Medical History:   Diagnosis Date    Anemia     Anxiety     Arthritis     Back pain     Cardiomyopathy     CHF (congestive heart failure)     Depression     Dizziness     Hypertension     Insomnia     Stroke     Use of cane as ambulatory aid        Past Surgical History:  Past Surgical History:   Procedure Laterality Date    APPENDECTOMY      arthritis      BLADDER REPAIR       BUNIONECTOMY      CARDIAC DEFIBRILLATOR PLACEMENT      CATARACT EXTRACTION      HYSTERECTOMY      metal implant Bilateral     placed in the bladder.         Family History:  Family History   Problem Relation Age of Onset    Hypertension Unknown     Cancer Mother     Heart disease Father        Social History:  Social History     Tobacco Use    Smoking status: Never Smoker    Smokeless tobacco: Never Used   Substance and Sexual Activity    Alcohol use: No    Drug use: No    Sexual activity: Not Currently        Review of Systems     Review of Systems   Constitutional: Negative for chills, diaphoresis and fever.   HENT: Negative for sore throat.    Respiratory: Positive for cough and shortness of breath.    Cardiovascular: Positive for chest pain (L sided). Negative for palpitations and leg swelling.   Gastrointestinal: Negative for nausea and vomiting.   Genitourinary: Negative for dysuria.   Musculoskeletal: Negative for back pain.   Skin: Negative for rash.   Neurological: Positive for headaches. Negative for dizziness, weakness and numbness.   Hematological: Does not bruise/bleed easily.   All other systems reviewed and are negative.       Physical Exam     Initial Vitals   BP Pulse Resp Temp SpO2   03/11/20 0430 03/11/20 0430 03/11/20 0430 03/11/20 0500 03/11/20 0430   (!) 170/68 70 20 98.9 °F (37.2 °C) 96 %      MAP       --                 Physical Exam  Nursing Notes and Vital Signs Reviewed.  Constitutional: Patient is in no acute distress. Well-developed and well-nourished.  Head: Atraumatic. Normocephalic.  Eyes: PERRL. EOM intact. Conjunctivae are not pale. No scleral icterus.  ENT: Mucous membranes are moist. Oropharynx is clear and symmetric.    Neck: Supple. Full ROM. No lymphadenopathy.  Cardiovascular: Bradycardic. Regular rhythm. 2/6 heart murmur heard at 2nd intercostal space. No rubs or gallops. Distal pulses are 2+ and symmetric.  Pulmonary/Chest: No respiratory distress. Clear to  "auscultation bilaterally. No wheezing or rales.  Abdominal: Soft and non-distended.  There is no tenderness.  No rebound, guarding, or rigidity. Good bowel sounds.  Genitourinary: No CVA tenderness  Musculoskeletal: Moves all extremities. No obvious deformities. No edema. No calf tenderness.  Skin: Warm and dry.  Neurological:  Alert, awake, and appropriate.  Normal speech.  No acute focal neurological deficits are appreciated.  Psychiatric: Normal affect. Good eye contact. Appropriate in content.     ED Course   Procedures  ED Vital Signs:  Vitals:    03/11/20 0430 03/11/20 0432 03/11/20 0445 03/11/20 0500   BP: (!) 170/68  (!) 200/84 (!) 178/76   Pulse: 70 (!) 57 (!) 57 (!) 57   Resp: 20 18 17   Temp:    98.9 °F (37.2 °C)   TempSrc:    Oral   SpO2: 96%  100% 100%   Weight: 71.2 kg (157 lb)      Height: 5' 1" (1.549 m)       03/11/20 0530 03/11/20 0600 03/11/20 0833 03/11/20 0838   BP: (!) 169/72 (!) 147/65 (!) 166/75    Pulse: (!) 57 (!) 56 (!) 58 (!) 57   Resp: 20 18 17    Temp:       TempSrc:       SpO2: 100% 99%  98%   Weight:       Height:        03/11/20 0839 03/11/20 0946 03/11/20 1040 03/11/20 1150   BP:  (!) 160/69 (!) 163/70    Pulse: (!) 56 (!) 58 (!) 56 (!) 59   Resp: 19 18 18    Temp:  98.7 °F (37.1 °C) 98.4 °F (36.9 °C)    TempSrc:  Oral Oral    SpO2: 97% 99% 97%    Weight:  71.2 kg (157 lb)     Height:  5' 1" (1.549 m)      03/11/20 1345 03/11/20 1557 03/11/20 1612   BP:   (!) 168/73   Pulse: (!) 56 (!) 59 (!) 58   Resp:   18   Temp:   98.5 °F (36.9 °C)   TempSrc:   Oral   SpO2:   (!) 93%   Weight:      Height:          Abnormal Lab Results:  Labs Reviewed   CBC W/ AUTO DIFFERENTIAL - Abnormal; Notable for the following components:       Result Value    RBC 2.09 (*)     Hemoglobin 7.8 (*)     Hematocrit 24.9 (*)     Mean Corpuscular Volume 119 (*)     Mean Corpuscular Hemoglobin 37.3 (*)     Mean Corpuscular Hemoglobin Conc 31.3 (*)     RDW 20.5 (*)     Platelets 354 (*)     All other components " within normal limits   COMPREHENSIVE METABOLIC PANEL - Abnormal; Notable for the following components:    ALT 9 (*)     eGFR if  53 (*)     eGFR if non  46 (*)     All other components within normal limits   B-TYPE NATRIURETIC PEPTIDE - Abnormal; Notable for the following components:     (*)     All other components within normal limits   TROPONIN I   LACTIC ACID, PLASMA   TROPONIN I        All Lab Results:  Results for orders placed or performed during the hospital encounter of 03/11/20   CBC auto differential   Result Value Ref Range    WBC 5.25 3.90 - 12.70 K/uL    RBC 2.09 (L) 4.00 - 5.40 M/uL    Hemoglobin 7.8 (L) 12.0 - 16.0 g/dL    Hematocrit 24.9 (L) 37.0 - 48.5 %    Mean Corpuscular Volume 119 (H) 82 - 98 fL    Mean Corpuscular Hemoglobin 37.3 (H) 27.0 - 31.0 pg    Mean Corpuscular Hemoglobin Conc 31.3 (L) 32.0 - 36.0 g/dL    RDW 20.5 (H) 11.5 - 14.5 %    Platelets 354 (H) 150 - 350 K/uL    MPV 11.8 9.2 - 12.9 fL    Immature Granulocytes 0.4 0.0 - 0.5 %    Gran # (ANC) 2.8 1.8 - 7.7 K/uL    Immature Grans (Abs) 0.02 0.00 - 0.04 K/uL    Lymph # 1.9 1.0 - 4.8 K/uL    Mono # 0.4 0.3 - 1.0 K/uL    Eos # 0.1 0.0 - 0.5 K/uL    Baso # 0.03 0.00 - 0.20 K/uL    nRBC 0 0 /100 WBC    Gran% 53.9 38.0 - 73.0 %    Lymph% 35.6 18.0 - 48.0 %    Mono% 8.0 4.0 - 15.0 %    Eosinophil% 1.5 0.0 - 8.0 %    Basophil% 0.6 0.0 - 1.9 %    Differential Method Automated    Comprehensive metabolic panel   Result Value Ref Range    Sodium 141 136 - 145 mmol/L    Potassium 3.8 3.5 - 5.1 mmol/L    Chloride 104 95 - 110 mmol/L    CO2 26 23 - 29 mmol/L    Glucose 103 70 - 110 mg/dL    BUN, Bld 16 8 - 23 mg/dL    Creatinine 1.1 0.5 - 1.4 mg/dL    Calcium 9.2 8.7 - 10.5 mg/dL    Total Protein 6.6 6.0 - 8.4 g/dL    Albumin 4.0 3.5 - 5.2 g/dL    Total Bilirubin 0.4 0.1 - 1.0 mg/dL    Alkaline Phosphatase 78 55 - 135 U/L    AST 17 10 - 40 U/L    ALT 9 (L) 10 - 44 U/L    Anion Gap 11 8 - 16 mmol/L    eGFR if   53 (A) >60 mL/min/1.73 m^2    eGFR if non African American 46 (A) >60 mL/min/1.73 m^2   Troponin I   Result Value Ref Range    Troponin I 0.026 0.000 - 0.026 ng/mL   Brain natriuretic peptide   Result Value Ref Range     (H) 0 - 99 pg/mL   Lactic acid, plasma   Result Value Ref Range    Lactate (Lactic Acid) 0.9 0.5 - 2.2 mmol/L   Troponin I   Result Value Ref Range    Troponin I 0.022 0.000 - 0.026 ng/mL   Troponin I   Result Value Ref Range    Troponin I 0.030 (H) 0.000 - 0.026 ng/mL   Echo Color Flow Doppler? Yes   Result Value Ref Range    Ascending aorta 2.87 cm    STJ 3.06 cm    AV mean gradient 11 mmHg    Ao peak jason 2.11 m/s    Ao VTI 53.18 cm    IVRT 93.43 msec    IVS 1.10 0.6 - 1.1 cm    LA size 3.62 cm    Left Atrium Major Axis 4.40 cm    Left Atrium Minor Axis 3.21 cm    LVIDD 5.38 3.5 - 6.0 cm    LVIDS 4.63 (A) 2.1 - 4.0 cm    LVOT diameter 2.02 cm    LVOT peak VTI 19.83 cm    PW 1.26 (A) 0.6 - 1.1 cm    MV Peak A Jason 0.62 m/s    E wave decelartion time 247.70 msec    MV Peak E Jason 0.75 m/s    RA Major Axis 3.99 cm    Sinus 3.55 cm    TAPSE 2.34 cm    TR Max Jason 3.08 m/s    Ao root annulus 3.83 cm    LV Diastolic Volume 139.83 mL    LV Systolic Volume 99.04 mL    LVOT peak jason 0.80 m/s    LA WIDTH 2.33 cm    RA Width 2.28 cm    FS 14 %    LA volume 26.61 cm3    LV mass 256.83 g    Left Ventricle Relative Wall Thickness 0.47 cm    AV valve area 1.19 cm2    AV Velocity Ratio 0.38     AV index (prosthetic) 0.37     E/A ratio 1.21     LVOT area 3.2 cm2    LVOT stroke volume 63.52 cm3    AV peak gradient 18 mmHg    LV Systolic Volume Index 58.1 mL/m2    LV Diastolic Volume Index 82.04 mL/m2    LA Volume Index 15.6 mL/m2    LV Mass Index 151 g/m2    Triscuspid Valve Regurgitation Peak Gradient 38 mmHg    BSA 1.75 m2    Right Atrial Pressure (from IVC) 3 mmHg    TV rest pulmonary artery pressure 41 mmHg       Imaging Results:  Imaging Results          X-Ray Chest AP Portable (Final  result)  Result time 03/11/20 06:44:32    Final result by Delgado Akers MD (03/11/20 06:44:32)                 Impression:      No acute process seen.      Electronically signed by: Delgado Akers MD  Date:    03/11/2020  Time:    06:44             Narrative:    EXAMINATION:  XR CHEST AP PORTABLE    CLINICAL HISTORY:  Chest pain, unspecified    FINDINGS:  Single view of the chest.  Comparison 03/06/2020.    Tortuosity of the descending aorta can be seen with chronic hypertension.  Aorta demonstrates atherosclerotic disease.  Left-sided pacing wires demonstrates similar configuration.  Electrical device overlies the right hemithorax.    Cardiac silhouette is enlarged but stable.  The lungs demonstrate no evidence of active disease.  Emphysematous changes are seen within the lung apices.  No evidence of pleural effusion or pneumothorax.  Bones demonstrate mild degenerative changes.                                 The EKG was ordered, reviewed, and independently interpreted by the ED provider.  Interpretation time: 0436  Rate: 58 BPM  Rhythm: sinus bradycardia with 1st degree AV block  Interpretation: LAD. Incomplete RBBB. ST and T wave abnormality, consider lateral ischemia. No STEMI.         The Emergency Provider reviewed the vital signs and test results, which are outlined above.     ED Discussion     6:00 AM: Dr. May transfers care of pt to Dr. Velasquez, pending lab results.    8:05 AM: Re-evaluated pt. Pt is resting comfortably and is in no acute distress.  Pt states that she has been increasingly more SOB. Pt is on 2L home oxygen PRN, but reports she had to increase it to 3L and used it all day yesterday. Additionally, pt reports that has new chest tightness for the last 2-3 days.  D/w pt all pertinent results. D/w pt any concerns expressed at this time. Answered all questions. Pt expresses understanding at this time.    On reevaluation PEx:  Nursing Notes and Vital Signs Reviewed.   Constitutional:  Patient is in no acute distress. Well-developed and well-nourished.  Head: Atraumatic. Normocephalic.  Eyes: PERRL. EOM intact. Conjunctivae are not pale. No scleral icterus.  ENT: Mucous membranes are moist. Oropharynx is clear and symmetric.    Neck: Supple. Full ROM. No lymphadenopathy. No JVD  Cardiovascular: Bradycardic. Regular rhythm. No rubs or gallops. Distal pulses are 2+ and symmetric.  Pulmonary/Chest: No respiratory distress. Clear to auscultation bilaterally. No wheezing or rales.  Abdominal: Soft and non-distended.  There is no tenderness.  No rebound, guarding, or rigidity. Good bowel sounds.  Genitourinary: No CVA tenderness  Musculoskeletal: Moves all extremities. No obvious deformities. No edema. No calf tenderness.  Skin: Warm and dry.  Neurological:  Alert, awake, and appropriate.  Normal speech.  No acute focal neurological deficits are appreciated.  Psychiatric: Normal affect. Good eye contact. Appropriate in content.    8:37 AM: Re-evaluated pt. I have discussed test results, shared treatment plan, and the need for admission with patient and family at bedside. Pt and family express understanding at this time and agree with all information. All questions answered. Pt and family have no further questions or concerns at this time. Pt is ready for admit.    8:37 AM: Discussed case with Yesi Cole NP, (Hospital Medicine). Yesi Cole NP, agrees with current care and management of pt and accepts admission.   Admitting Service: Hospital Medicine  Admitting Physician: Dr. Hopkins  Admit to: Observation/Tele         Medical Decision Making:   Clinical Tests:   Lab Tests: Ordered and Reviewed  Radiological Study: Ordered and Reviewed  Medical Tests: Ordered and Reviewed           ED Medication(s):  Medications   amiodarone tablet 200 mg (200 mg Oral Given 3/11/20 1121)   amLODIPine tablet 10 mg (10 mg Oral Given 3/11/20 1121)   ARIPiprazole tablet 5 mg (5 mg Oral Given 3/11/20 1132)    aspirin EC tablet 81 mg (81 mg Oral Given 3/11/20 1121)   atorvastatin tablet 20 mg (20 mg Oral Given 3/11/20 1121)   carvediloL tablet 6.25 mg (6.25 mg Oral Given 3/11/20 1121)   clopidogreL tablet 75 mg (75 mg Oral Given 3/11/20 1121)   donepeziL tablet 10 mg (has no administration in time range)   DULoxetine DR capsule 30 mg (30 mg Oral Given 3/11/20 1121)   fluticasone propionate 50 mcg/actuation nasal spray 50 mcg (50 mcg Each Nostril Given 3/11/20 1121)   losartan tablet 25 mg (25 mg Oral Given 3/11/20 1120)   melatonin oral solution 1 mg (has no administration in time range)   multivitamin tablet (1 tablet Oral Given 3/11/20 1121)   busPIRone tablet 15 mg (15 mg Oral Given 3/11/20 1132)   sodium chloride 0.9% flush 10 mL (has no administration in time range)   acetaminophen tablet 650 mg (has no administration in time range)   albuterol nebulizer solution 2.5 mg (has no administration in time range)   furosemide injection 40 mg (40 mg Intravenous Given 3/11/20 1203)   heparin (porcine) injection 5,000 Units (5,000 Units Subcutaneous Given 3/11/20 1311)   hydrALAZINE injection 10 mg (has no administration in time range)   nitroGLYCERIN 2% TD oint ointment 1 inch (1 inch Transdermal Given 3/11/20 0456)   furosemide injection 40 mg (40 mg Intravenous Given 3/11/20 0456)   acetaminophen tablet 500 mg (500 mg Oral Given 3/11/20 0805)   aspirin chewable tablet 81 mg (81 mg Oral Given 3/11/20 0849)   GI cocktail (mylanta 30 mL, lidocaine 2 % viscous 10 mL, dicyclomine 10 mL) 50 mL ( Oral Given 3/11/20 1603)       Current Discharge Medication List                  Scribe Attestation:   Scribe #1: I performed the above scribed service and the documentation accurately describes the services I performed. I attest to the accuracy of the note.     Attending:   Physician Attestation Statement for Scribe #1: I, Mame May MD, personally performed the services described in this documentation, as scribed by Yojana Zapien,  in my presence, and it is both accurate and complete.       Scribe Attestation:   Scribe #2: I performed the above scribed service and the documentation accurately describes the services I performed. I attest to the accuracy of the note.    Attending Attestation:           Physician Attestation for Scribe:    Physician Attestation Statement for Scribe #2: I, Karen Velasquez DO, reviewed documentation, as scribed by Kaley Mathew in my presence, and it is both accurate and complete. I also acknowledge and confirm the content of the note done by Scribe #1.           Clinical Impression       ICD-10-CM ICD-9-CM   1. Chest pain R07.9 786.50   2. SOB (shortness of breath) R06.02 786.05       Disposition:   Disposition: Placed in Observation  Condition: Fair         Karen Velasquez DO  03/11/20 1719

## 2020-03-11 NOTE — PLAN OF CARE
SW met with patient at bedside to assess for discharge planning.  Patient was alert and oriented.  Patient reported utilizing an inhaler, a nebulizer, oxygen (3L - as needed), a walker, and  services with [facility] before coming to the hospital.  Patient identified her help at home as her spouse and her support she receives from staff at [facility].  Patient stated that she manages her own health.  Patient reported that on the last occasion when she was hospitalized at [facility], her daughter had her admitted to a behavioral facility because of an accidental overdose.  She stated that she did not realize it at the time, but she must have taken her medication twice, which caused the overdose.  Patient stated that she needed a portable oxygen machine, but stated that she previously spoke with her doctor and told that they would have to order it.   No additional needs were identified upon discharge.  SW provided a transitional care folder, information on advanced directives, information on pharmacy bedside delivery, and discharge planning begins on admission with contact information for any needs/questions.     D/C Plan:  Home  PCP:  Dr. Karen Velasquez  Preferred Pharmacy:    [id number] [place], [place] - [address] AT [facility]  [address] [facility] [address]  Phone: [phone] Fax: [phone]    Discharge transportation:  [facility]:  Pending  Pharmacy Bedside Delivery:  Yes       03/11/20 1006   Discharge Assessment   Assessment Type Discharge Planning Assessment   Confirmed/corrected address and phone number on facesheet? Yes   Assessment information obtained from? Patient   Expected Length of Stay (days)   (TBD)   Communicated expected length of stay with patient/caregiver yes   Prior to hospitilization cognitive status: Alert/Oriented   Prior to hospitalization functional status: Independent;Assistive Equipment   Current  cognitive status: Alert/Oriented   Current Functional Status: Independent;Assistive Equipment   Facility Arrived From: Home   Lives With spouse   Able to Return to Prior Arrangements yes   Is patient able to care for self after discharge? Yes   Who are your caregiver(s) and their phone number(s)? Terrell Funez (grandchild) [phone]   Patient's perception of discharge disposition home or selfcare   Readmission Within the Last 30 Days no previous admission in last 30 days   Patient currently being followed by outpatient case management? No   Patient currently receives any other outside agency services? Yes   Name and contact number of agency or person providing outside services [facility] with [facility]   Is it the patient/care giver preference to resume care with the current outside agency? Yes   Equipment Currently Used at Home walker, rolling   Do you have any problems affording any of your prescribed medications? No   Is the patient taking medications as prescribed? no   Does the patient have transportation home? Yes   Transportation Anticipated family or friend will provide   Dialysis Name and Scheduled days N/A   Does the patient receive services at the [facility]? No   Discharge Plan A Home with family;Home Health   DME Needed Upon Discharge  none   Patient/Family in Agreement with Plan yes

## 2020-03-11 NOTE — ASSESSMENT & PLAN NOTE
--resolved at present  --initial troponin 0.022  --trend troponin  --PRN nitroglycerine  --2d echo pending

## 2020-03-12 VITALS
DIASTOLIC BLOOD PRESSURE: 58 MMHG | OXYGEN SATURATION: 97 % | TEMPERATURE: 98 F | SYSTOLIC BLOOD PRESSURE: 125 MMHG | HEART RATE: 61 BPM | BODY MASS INDEX: 28.56 KG/M2 | WEIGHT: 151.25 LBS | RESPIRATION RATE: 20 BRPM | HEIGHT: 61 IN

## 2020-03-12 PROBLEM — R51.9 NONINTRACTABLE HEADACHE: Status: RESOLVED | Noted: 2020-01-07 | Resolved: 2020-03-12

## 2020-03-12 PROBLEM — I47.29 PVT (PAROXYSMAL VENTRICULAR TACHYCARDIA): Status: RESOLVED | Noted: 2020-03-12 | Resolved: 2020-03-12

## 2020-03-12 PROBLEM — R07.9 CHEST PAIN: Status: RESOLVED | Noted: 2020-03-11 | Resolved: 2020-03-12

## 2020-03-12 PROBLEM — I50.43 ACUTE ON CHRONIC COMBINED SYSTOLIC AND DIASTOLIC CONGESTIVE HEART FAILURE: Status: RESOLVED | Noted: 2020-01-04 | Resolved: 2020-03-12

## 2020-03-12 PROBLEM — E03.9 THYROID ACTIVITY DECREASED: Status: ACTIVE | Noted: 2020-03-12

## 2020-03-12 PROBLEM — I50.43 ACUTE ON CHRONIC COMBINED SYSTOLIC AND DIASTOLIC CONGESTIVE HEART FAILURE: Status: ACTIVE | Noted: 2020-01-04

## 2020-03-12 PROBLEM — I47.29 PVT (PAROXYSMAL VENTRICULAR TACHYCARDIA): Status: ACTIVE | Noted: 2020-03-12

## 2020-03-12 LAB
ANION GAP SERPL CALC-SCNC: 14 MMOL/L (ref 8–16)
BASOPHILS # BLD AUTO: 0.02 K/UL (ref 0–0.2)
BASOPHILS NFR BLD: 0.3 % (ref 0–1.9)
BUN SERPL-MCNC: 16 MG/DL (ref 8–23)
CALCIUM SERPL-MCNC: 9.3 MG/DL (ref 8.7–10.5)
CHLORIDE SERPL-SCNC: 104 MMOL/L (ref 95–110)
CO2 SERPL-SCNC: 28 MMOL/L (ref 23–29)
CREAT SERPL-MCNC: 1.2 MG/DL (ref 0.5–1.4)
DIFFERENTIAL METHOD: ABNORMAL
EOSINOPHIL # BLD AUTO: 0 K/UL (ref 0–0.5)
EOSINOPHIL NFR BLD: 0.4 % (ref 0–8)
ERYTHROCYTE [DISTWIDTH] IN BLOOD BY AUTOMATED COUNT: 20.4 % (ref 11.5–14.5)
EST. GFR  (AFRICAN AMERICAN): 47 ML/MIN/1.73 M^2
EST. GFR  (NON AFRICAN AMERICAN): 41 ML/MIN/1.73 M^2
GLUCOSE SERPL-MCNC: 94 MG/DL (ref 70–110)
HCT VFR BLD AUTO: 24.8 % (ref 37–48.5)
HGB BLD-MCNC: 7.7 G/DL (ref 12–16)
IMM GRANULOCYTES # BLD AUTO: 0.04 K/UL (ref 0–0.04)
IMM GRANULOCYTES NFR BLD AUTO: 0.6 % (ref 0–0.5)
LYMPHOCYTES # BLD AUTO: 1.4 K/UL (ref 1–4.8)
LYMPHOCYTES NFR BLD: 20.3 % (ref 18–48)
MAGNESIUM SERPL-MCNC: 2.6 MG/DL (ref 1.6–2.6)
MCH RBC QN AUTO: 37.2 PG (ref 27–31)
MCHC RBC AUTO-ENTMCNC: 31 G/DL (ref 32–36)
MCV RBC AUTO: 120 FL (ref 82–98)
MONOCYTES # BLD AUTO: 0.4 K/UL (ref 0.3–1)
MONOCYTES NFR BLD: 5.6 % (ref 4–15)
NEUTROPHILS # BLD AUTO: 5.1 K/UL (ref 1.8–7.7)
NEUTROPHILS NFR BLD: 72.8 % (ref 38–73)
NRBC BLD-RTO: 0 /100 WBC
PHOSPHATE SERPL-MCNC: 4.3 MG/DL (ref 2.7–4.5)
PLATELET # BLD AUTO: 342 K/UL (ref 150–350)
PMV BLD AUTO: 11.6 FL (ref 9.2–12.9)
POTASSIUM SERPL-SCNC: 3.2 MMOL/L (ref 3.5–5.1)
RBC # BLD AUTO: 2.07 M/UL (ref 4–5.4)
SODIUM SERPL-SCNC: 146 MMOL/L (ref 136–145)
T4 FREE SERPL-MCNC: 0.7 NG/DL (ref 0.71–1.51)
TROPONIN I SERPL DL<=0.01 NG/ML-MCNC: 0.04 NG/ML (ref 0–0.03)
TSH SERPL DL<=0.005 MIU/L-ACNC: 12.79 UIU/ML (ref 0.4–4)
WBC # BLD AUTO: 6.95 K/UL (ref 3.9–12.7)

## 2020-03-12 PROCEDURE — 80048 BASIC METABOLIC PNL TOTAL CA: CPT

## 2020-03-12 PROCEDURE — 85025 COMPLETE CBC W/AUTO DIFF WBC: CPT

## 2020-03-12 PROCEDURE — 84439 ASSAY OF FREE THYROXINE: CPT

## 2020-03-12 PROCEDURE — 99204 PR OFFICE/OUTPT VISIT, NEW, LEVL IV, 45-59 MIN: ICD-10-PCS | Mod: ,,, | Performed by: INTERNAL MEDICINE

## 2020-03-12 PROCEDURE — 84100 ASSAY OF PHOSPHORUS: CPT

## 2020-03-12 PROCEDURE — 36415 COLL VENOUS BLD VENIPUNCTURE: CPT

## 2020-03-12 PROCEDURE — 25000003 PHARM REV CODE 250: Performed by: FAMILY MEDICINE

## 2020-03-12 PROCEDURE — 96376 TX/PRO/DX INJ SAME DRUG ADON: CPT

## 2020-03-12 PROCEDURE — 84484 ASSAY OF TROPONIN QUANT: CPT

## 2020-03-12 PROCEDURE — 25000003 PHARM REV CODE 250: Performed by: NURSE PRACTITIONER

## 2020-03-12 PROCEDURE — 92507 TX SP LANG VOICE COMM INDIV: CPT

## 2020-03-12 PROCEDURE — 63600175 PHARM REV CODE 636 W HCPCS: Performed by: NURSE PRACTITIONER

## 2020-03-12 PROCEDURE — 83735 ASSAY OF MAGNESIUM: CPT

## 2020-03-12 PROCEDURE — 96372 THER/PROPH/DIAG INJ SC/IM: CPT

## 2020-03-12 PROCEDURE — G0378 HOSPITAL OBSERVATION PER HR: HCPCS

## 2020-03-12 PROCEDURE — 84443 ASSAY THYROID STIM HORMONE: CPT

## 2020-03-12 PROCEDURE — 99204 OFFICE O/P NEW MOD 45 MIN: CPT | Mod: ,,, | Performed by: INTERNAL MEDICINE

## 2020-03-12 RX ORDER — LEVOTHYROXINE SODIUM 50 UG/1
50 TABLET ORAL
Status: DISCONTINUED | OUTPATIENT
Start: 2020-03-12 | End: 2020-03-12 | Stop reason: HOSPADM

## 2020-03-12 RX ORDER — LEVOTHYROXINE SODIUM 75 UG/1
75 TABLET ORAL
Qty: 30 TABLET | Refills: 11 | Status: ON HOLD | OUTPATIENT
Start: 2020-03-12 | End: 2021-07-02 | Stop reason: HOSPADM

## 2020-03-12 RX ORDER — FAMOTIDINE 20 MG/1
20 TABLET, FILM COATED ORAL 2 TIMES DAILY PRN
Qty: 60 TABLET | Refills: 1 | Status: ON HOLD | OUTPATIENT
Start: 2020-03-12 | End: 2020-12-18 | Stop reason: HOSPADM

## 2020-03-12 RX ORDER — FAMOTIDINE 20 MG/1
20 TABLET, FILM COATED ORAL 2 TIMES DAILY PRN
Status: DISCONTINUED | OUTPATIENT
Start: 2020-03-12 | End: 2020-03-12 | Stop reason: HOSPADM

## 2020-03-12 RX ADMIN — DULOXETINE 30 MG: 30 CAPSULE, DELAYED RELEASE ORAL at 09:03

## 2020-03-12 RX ADMIN — CARVEDILOL 6.25 MG: 6.25 TABLET, FILM COATED ORAL at 09:03

## 2020-03-12 RX ADMIN — AMIODARONE HYDROCHLORIDE 200 MG: 200 TABLET ORAL at 09:03

## 2020-03-12 RX ADMIN — ASPIRIN 81 MG: 81 TABLET, COATED ORAL at 09:03

## 2020-03-12 RX ADMIN — AMLODIPINE BESYLATE 10 MG: 10 TABLET ORAL at 09:03

## 2020-03-12 RX ADMIN — ATORVASTATIN CALCIUM 20 MG: 10 TABLET, FILM COATED ORAL at 09:03

## 2020-03-12 RX ADMIN — FUROSEMIDE 40 MG: 10 INJECTION, SOLUTION INTRAMUSCULAR; INTRAVENOUS at 09:03

## 2020-03-12 RX ADMIN — FLUTICASONE PROPIONATE 50 MCG: 50 SPRAY, METERED NASAL at 09:03

## 2020-03-12 RX ADMIN — LOSARTAN POTASSIUM 25 MG: 25 TABLET ORAL at 09:03

## 2020-03-12 RX ADMIN — THERA TABS 1 TABLET: TAB at 09:03

## 2020-03-12 RX ADMIN — ARIPIPRAZOLE 5 MG: 5 TABLET ORAL at 09:03

## 2020-03-12 RX ADMIN — CLOPIDOGREL BISULFATE 75 MG: 75 TABLET ORAL at 09:03

## 2020-03-12 RX ADMIN — HEPARIN SODIUM 5000 UNITS: 5000 INJECTION INTRAVENOUS; SUBCUTANEOUS at 03:03

## 2020-03-12 RX ADMIN — BUSPIRONE HYDROCHLORIDE 15 MG: 5 TABLET ORAL at 09:03

## 2020-03-12 RX ADMIN — HEPARIN SODIUM 5000 UNITS: 5000 INJECTION INTRAVENOUS; SUBCUTANEOUS at 05:03

## 2020-03-12 RX ADMIN — LEVOTHYROXINE SODIUM 50 MCG: 50 TABLET ORAL at 03:03

## 2020-03-12 NOTE — NURSING
Patient awake, alert, and oriented x4.   Patient has no current c/o pain.   Patient voiced no concerns at this time.   Discharge instructions reviewed with patient verbal understanding given at this time.   Follow up instructions and appointments reviewed with patient.   Patients IV discontinued without difficulty, along with Tele monitor.

## 2020-03-12 NOTE — PT/OT/SLP PROGRESS
Speech Language Pathology      Love Davey  MRN: [id number]    Patient was at bedside awake, alert, and oriented.  She stated that the GI cocktail that was given yesterday after our initial visit was very helpful.  She remained NPO last night and this morning until after her cardiology consult.  Pt stated that she took her medications and drank water without difficulty.  Per pt, her swallowing difficulty only occurs when she is having reflux and she describes it as a globus sensation.  She denies any choking or feeling as if things are going the wrong way. ST educated pt on GERD and the possibility of aspiration of gastric content during an attack like she had yesterday.  Pt verbalized understanding and stated that she may need to start taking medication daily for her reflux symptoms.  At this time ST does not believe pt is experiencing an oral or pharyngeal dysphagia and does not require further ST.    Anel Bynum CCC-SLP  9:50-10:10

## 2020-03-12 NOTE — HPI
Love Davey is a 86 year old female who presented to [facility] due to shortness of breath for the past 3-4 days with associated left chest wall discomfort. Her current medical conditions include Chronic systolic CHF, HFrEF of 30%, s/p ICD, CMPY, HTN, HLP, Anemia, Anxiety. She was placed in observation under the care of hospital medicine on 3/11. Cardiology consulted to assist with medical management today on 3/12. Chart reviewed, patient seen and examined. Patient feels much better today after IV diuresis yesterday. NO chest pain or anginal equivalents. Denies shortness of breath, CHEW or palpitations. Denies any signs of active bleeding issues. Echo reviewed in detail, EF 30%, Grade I DD. Further recs to follow.

## 2020-03-12 NOTE — SUBJECTIVE & OBJECTIVE
Past Medical History:   Diagnosis Date    Anemia     Anxiety     Arthritis     Back pain     Cardiomyopathy     CHF (congestive heart failure)     Depression     Dizziness     Hypertension     Insomnia     Stroke     Use of cane as ambulatory aid        Past Surgical History:   Procedure Laterality Date    APPENDECTOMY      arthritis      BLADDER REPAIR      BUNIONECTOMY      CARDIAC DEFIBRILLATOR PLACEMENT      CATARACT EXTRACTION      HYSTERECTOMY      metal implant Bilateral     placed in the bladder.       Review of patient's allergies indicates:   Allergen Reactions    Bacitracin-polymyxin b Itching and Swelling    Merthiolate (thimerosal) Rash and Swelling    Cefdinir     Diazepam      Hallucinations    Levofloxacin Itching    Metronidazole     Oxycodone Itching    Tizanidine      Hallucinations    Tramadol Itching       No current facility-administered medications on file prior to encounter.      Current Outpatient Medications on File Prior to Encounter   Medication Sig    amiodarone (PACERONE) 200 MG Tab Take 1 tablet by mouth once daily.    amlodipine (NORVASC) 10 MG tablet Take 10 mg by mouth once daily.    aripiprazole (ABILIFY) 10 MG Tab Take 5 mg by mouth once daily.    aspirin (ECOTRIN) 81 MG EC tablet Take 81 mg by mouth once daily.    atorvastatin (LIPITOR) 20 MG tablet Take 20 mg by mouth once daily.    busPIRone (BUSPAR) 15 MG tablet Take 1 tablet by mouth 2 (two) times daily.    carvedilol (COREG) 6.25 MG tablet Take 6.25 mg by mouth 2 (two) times daily with meals.    clopidogreL (PLAVIX) 75 mg tablet Take 1 tablet by mouth once daily.    donepezil (ARICEPT) 10 MG tablet Take 1 tablet by mouth every evening.    DULoxetine (CYMBALTA) 30 MG capsule Take 1 capsule by mouth once daily.    ferrous sulfate 324 mg (65 mg iron) TbEC Take 65 mg by mouth once daily.    furosemide (LASIX) 40 MG tablet Take 1 tablet by mouth 2 (two) times daily.    levothyroxine  (SYNTHROID) 50 MCG tablet Take 50 mcg by mouth once daily.    losartan (COZAAR) 25 MG tablet Take 1 tablet by mouth once daily.    venlafaxine (EFFEXOR-XR) 150 MG Cp24 Take 75 mg by mouth once daily.    albuterol (PROAIR HFA) 90 mcg/actuation inhaler Inhale 2 puffs into the lungs every 6 (six) hours as needed for Wheezing.    cyanocobalamin (VITAMIN B-12) 1000 MCG tablet Take 1 tablet by mouth once daily.    cycloSPORINE (RESTASIS) 0.05 % ophthalmic emulsion Apply 1 drop to eye.    esomeprazole (NEXIUM) 40 MG capsule Take 40 mg by mouth before breakfast.    fluticasone (FLONASE) 50 mcg/actuation nasal spray 1 spray by Each Nare route once daily.    glucosamine-chondroitin 500-400 mg tablet Take 1 tablet by mouth 2 (two) times daily.    Lactobacillus rhamnosus GG (CULTURELLE) 10 billion cell capsule Take 1 capsule by mouth once daily.    melatonin 1 mg Tab Take 1 mg by mouth every evening.    mirabegron 50 mg Tb24 Take by mouth.    mirtazapine (REMERON) 7.5 MG Tab     multivitamin capsule Take 1 capsule by mouth once daily.    nystatin-triamcinolone (MYCOLOG) ointment 2 (two) times daily as needed.    polyethylene glycol (GLYCOLAX) 17 gram PwPk Take by mouth.    potassium chloride SA (K-DUR,KLOR-CON) 20 MEQ tablet Take 20 mEq by mouth 2 (two) times daily.    solifenacin (VESICARE) 10 MG tablet Take 5 mg by mouth once daily.    traMADol (ULTRAM) 50 mg tablet Take 1 tablet by mouth 3 (three) times daily as needed.     Family History     Problem Relation (Age of Onset)    Cancer Mother    Heart disease Father    Hypertension         Tobacco Use    Smoking status: Never Smoker    Smokeless tobacco: Never Used   Substance and Sexual Activity    Alcohol use: No    Drug use: No    Sexual activity: Not Currently     Review of Systems   Constitution: Positive for malaise/fatigue.   HENT: Negative for hearing loss and hoarse voice.    Eyes: Negative for blurred vision and visual disturbance.    Cardiovascular: Positive for chest pain and dyspnea on exertion. Negative for claudication, irregular heartbeat, leg swelling, near-syncope, orthopnea, palpitations, paroxysmal nocturnal dyspnea and syncope.   Respiratory: Positive for cough and shortness of breath. Negative for hemoptysis, sleep disturbances due to breathing, snoring and wheezing.    Endocrine: Negative for cold intolerance and heat intolerance.   Hematologic/Lymphatic: Bruises/bleeds easily (on ASA and Plavix).   Skin: Negative for color change, dry skin and nail changes.   Musculoskeletal: Positive for arthritis and back pain. Negative for joint pain and myalgias.   Gastrointestinal: Positive for nausea. Negative for bloating, abdominal pain, constipation and vomiting.   Genitourinary: Negative for dysuria, flank pain, hematuria and hesitancy.   Neurological: Negative for headaches, light-headedness, loss of balance, numbness, paresthesias and weakness.   Psychiatric/Behavioral: Negative for altered mental status.   Allergic/Immunologic: Negative for environmental allergies.     Objective:     Vital Signs (Most Recent):  Temp: 98.9 °F (37.2 °C) (03/12/20 0723)  Pulse: 61 (03/12/20 0723)  Resp: 18 (03/12/20 0723)  BP: 139/60 (03/12/20 0723)  SpO2: (!) 93 % (03/12/20 0723) Vital Signs (24h Range):  Temp:  [97.9 °F (36.6 °C)-98.9 °F (37.2 °C)] 98.9 °F (37.2 °C)  Pulse:  [52-63] 61  Resp:  [17-18] 18  SpO2:  [93 %-94 %] 93 %  BP: (135-168)/(60-73) 139/60     Weight: 68.6 kg (151 lb 3.8 oz)  Body mass index is 28.58 kg/m².    SpO2: (!) 93 %  O2 Device (Oxygen Therapy): room air      Intake/Output Summary (Last 24 hours) at 3/12/2020 1114  Last data filed at 3/12/2020 0400  Gross per 24 hour   Intake 290 ml   Output 550 ml   Net -260 ml       Lines/Drains/Airways     Peripheral Intravenous Line                 Peripheral IV - Single Lumen 03/11/20 0447 22 G Right Antecubital 1 day                Physical Exam   Constitutional: She is oriented to  person, place, and time. She appears well-developed and well-nourished. No distress.   HENT:   Head: Normocephalic and atraumatic.   Eyes: Pupils are equal, round, and reactive to light.   Neck: Normal range of motion and full passive range of motion without pain. Neck supple. No JVD present.   Cardiovascular: Normal rate, regular rhythm, S1 normal, S2 normal and intact distal pulses. PMI is not displaced. Exam reveals no distant heart sounds.   No murmur heard.  Pulses:       Radial pulses are 2+ on the right side, and 2+ on the left side.        Dorsalis pedis pulses are 2+ on the right side, and 2+ on the left side.   CHEST PAIN FREE   Pulmonary/Chest: Effort normal and breath sounds normal. No accessory muscle usage. No respiratory distress. She has no decreased breath sounds. She has no wheezes. She has no rales.   SOME SLIGHT SOB NOTED ON EXAM, IMPROVING   Abdominal: Soft. Bowel sounds are normal. She exhibits no distension. There is no tenderness.   +nausea on exam   Musculoskeletal: Normal range of motion. She exhibits no edema.        Right ankle: She exhibits no swelling.        Left ankle: She exhibits no swelling.   Neurological: She is alert and oriented to person, place, and time.   Skin: Skin is warm and dry. She is not diaphoretic. No cyanosis. Nails show no clubbing.   Psychiatric: She has a normal mood and affect. Her speech is normal and behavior is normal. Judgment and thought content normal. Cognition and memory are normal.   Nursing note and vitals reviewed.      Significant Labs:   CMP   Recent Labs   Lab 03/11/20 0449 03/12/20  0525    146*   K 3.8 3.2*    104   CO2 26 28    94   BUN 16 16   CREATININE 1.1 1.2   CALCIUM 9.2 9.3   PROT 6.6  --    ALBUMIN 4.0  --    BILITOT 0.4  --    ALKPHOS 78  --    AST 17  --    ALT 9*  --    ANIONGAP 11 14   ESTGFRAFRICA 53* 47*   EGFRNONAA 46* 41*   , CBC   Recent Labs   Lab 03/11/20 0449 03/12/20  0525   WBC 5.25 6.95   HGB 7.8* 7.7*    HCT 24.9* 24.8*   * 342   , Troponin   Recent Labs   Lab 03/11/20  0853 03/11/20  1133 03/12/20  0828   TROPONINI 0.022 0.030* 0.040*    and All pertinent lab results from the last 24 hours have been reviewed.    Significant Imaging: Echocardiogram:   2D echo with color flow doppler: No results found for this or any previous visit. and Transthoracic echo (TTE) complete (Cupid Only):   Results for orders placed or performed during the hospital encounter of 03/11/20   Echo Color Flow Doppler? Yes   Result Value Ref Range    Ascending aorta 2.87 cm    STJ 3.06 cm    AV mean gradient 11 mmHg    Ao peak jason 2.11 m/s    Ao VTI 53.18 cm    IVRT 93.43 msec    IVS 1.10 0.6 - 1.1 cm    LA size 3.62 cm    Left Atrium Major Axis 4.40 cm    Left Atrium Minor Axis 3.21 cm    LVIDD 5.38 3.5 - 6.0 cm    LVIDS 4.63 (A) 2.1 - 4.0 cm    LVOT diameter 2.02 cm    LVOT peak VTI 19.83 cm    PW 1.26 (A) 0.6 - 1.1 cm    MV Peak A Jason 0.62 m/s    E wave decelartion time 247.70 msec    MV Peak E Jason 0.75 m/s    RA Major Axis 3.99 cm    Sinus 3.55 cm    TAPSE 2.34 cm    TR Max Jason 3.08 m/s    Ao root annulus 3.83 cm    LV Diastolic Volume 139.83 mL    LV Systolic Volume 99.04 mL    LVOT peak jason 0.80 m/s    LA WIDTH 2.33 cm    RA Width 2.28 cm    FS 14 %    LA volume 26.61 cm3    LV mass 256.83 g    Left Ventricle Relative Wall Thickness 0.47 cm    AV valve area 1.19 cm2    AV Velocity Ratio 0.38     AV index (prosthetic) 0.37     E/A ratio 1.21     LVOT area 3.2 cm2    LVOT stroke volume 63.52 cm3    AV peak gradient 18 mmHg    LV Systolic Volume Index 58.1 mL/m2    LV Diastolic Volume Index 82.04 mL/m2    LA Volume Index 15.6 mL/m2    LV Mass Index 151 g/m2    Triscuspid Valve Regurgitation Peak Gradient 38 mmHg    BSA 1.75 m2    Right Atrial Pressure (from IVC) 3 mmHg    TV rest pulmonary artery pressure 41 mmHg    Narrative    · Concentric left ventricular hypertrophy.  · Mild left ventricular enlargement.  · Moderately decreased  left ventricular systolic function. The estimated   ejection fraction is 30%.  · Grade I (mild) left ventricular diastolic dysfunction consistent with   impaired relaxation.  · Severe global hypokinetic wall motion.  · Normal right ventricular systolic function.  · Mild aortic regurgitation.  · Mild mitral regurgitation.  · Mild tricuspid regurgitation.  · Normal central venous pressure (3 mmHg).  · The estimated PA systolic pressure is 41 mmHg.  · Pulmonary hypertension present.       and X-Ray: CXR: X-Ray Chest 1 View (CXR): No results found for this visit on 03/11/20. and X-Ray Chest PA and Lateral (CXR): No results found for this visit on 03/11/20.

## 2020-03-12 NOTE — DISCHARGE SUMMARY
[facility] Hospital Medicine  Discharge Summary      Patient Name: Love Davey  MRN: [id number]  Admission Date: 3/11/2020  Hospital Length of Stay: 0 days  Discharge Date and Time: 3/12/2020  4:22 PM  Attending Physician: No att. providers found   Discharging Provider: Stevie Hopkins MD  Primary Care Provider: Son Guan MD      HPI:   85 y/o female with PMHx of anemia, anxiety, CHF, cardiomyopathy, and HTN who presented to the ED with c/o SOB that onset gradually over the past 2-3 days and worsened last night. Associated symptoms include L sided chest pain\, nausea, L arm pain, and headache. Pain is described as tightness, possible indigestion, that is episodic, that does not radiate, that is currently resolved. Patient uses home oxygen and reports it did not help her SOB. Symptoms are episodic and moderate in severity. No exacerbating or mitigating factors reported.   She is a full code and her SDM is her daughter, Kristy  She will be kept on OBS for chest pain under the care of Hospital Medicine.      * No surgery found *      Hospital Course:   Patient was admitted for chest pain.  Troponins were mildly elevated.  Patient reports chest pain resolving after receiving medication for reflux.  Cardiology consult on case and did not feel that pain was cardiac in nature. Patient was stable on day of discharge.  She requested to be discharged home with Neurontin and Xanax however neither medications were listed on her med rec.  Per review of , patient had not had xanax since 11/2019.  Informed patient that she will have to follow with PCP for refills of these medications.  Of note patient was anemic during stay with hemoglobin of 7.8.  No active signs of bleeding noted.  Anemia was macrocytic in nature TSH of obtained showed hypothyroidism.  Patient was continued on Synthroid 75 mcg on discharge.  Patient was DC home with instructions to follow-up with PCP.     Consults:   Consults (From admission,  onward)        Status Ordering Provider     Inpatient consult to Cardiology  Once     Provider:  Mark Holt MD    Completed LEILANI LOWE     Inpatient consult to Hospitalist  Once     Provider:  Yesi Cole NP    Acknowledged KELLI CESAR          Acute on chronic anemia  --hgb 7.8   --pt denies bleeding  --baseline hgb approx 10.0  --monitor closely and transfuse if drops further  --Review of past records from 11/2019 reveals elevated retic count, B12, and ferritin with normal TIBC and iron sat.   --Bone marrow biopsy from 08/2019 was negative for any obvious myelodysplastic syndrome or malignancy.       Final Active Diagnoses:    Diagnosis Date Noted POA    Thyroid activity decreased [E03.9] 03/12/2020 Yes    Episode of recurrent major depressive disorder [F33.9] 03/11/2020 Yes    HTN (hypertension) [I10] 01/04/2020 Yes    Dementia [F03.90] 01/04/2020 Yes     Chronic    Asthma [J45.909] 01/04/2020 Yes    Acute on chronic anemia [D64.9] 01/04/2020 Yes      Problems Resolved During this Admission:    Diagnosis Date Noted Date Resolved POA    PRINCIPAL PROBLEM:  Chest pain [R07.9] 03/11/2020 03/12/2020 Yes    PVT (paroxysmal ventricular tachycardia) [I47.2] 03/12/2020 03/12/2020 No    Nonintractable headache [R51] 01/07/2020 03/12/2020 Yes    Acute on chronic combined systolic and diastolic congestive heart failure [I50.43] 01/04/2020 03/12/2020 Yes       Discharged Condition: good    Disposition: Home or Self Care    Follow Up:  Follow-up Information     Son Guan MD In 1 week.    Specialty:  Internal Medicine  Contact information:  [address] [facility] [address] [phone]                 Patient Instructions:   No discharge procedures on file.    Significant Diagnostic Studies:   Results for orders placed or performed during the hospital encounter of 03/11/20   CBC auto differential   Result Value Ref Range    WBC 5.25 3.90 - 12.70 K/uL    RBC 2.09 (L) 4.00 - 5.40 M/uL     Hemoglobin 7.8 (L) 12.0 - 16.0 g/dL    Hematocrit 24.9 (L) 37.0 - 48.5 %    Mean Corpuscular Volume 119 (H) 82 - 98 fL    Mean Corpuscular Hemoglobin 37.3 (H) 27.0 - 31.0 pg    Mean Corpuscular Hemoglobin Conc 31.3 (L) 32.0 - 36.0 g/dL    RDW 20.5 (H) 11.5 - 14.5 %    Platelets 354 (H) 150 - 350 K/uL    MPV 11.8 9.2 - 12.9 fL    Immature Granulocytes 0.4 0.0 - 0.5 %    Gran # (ANC) 2.8 1.8 - 7.7 K/uL    Immature Grans (Abs) 0.02 0.00 - 0.04 K/uL    Lymph # 1.9 1.0 - 4.8 K/uL    Mono # 0.4 0.3 - 1.0 K/uL    Eos # 0.1 0.0 - 0.5 K/uL    Baso # 0.03 0.00 - 0.20 K/uL    nRBC 0 0 /100 WBC    Gran% 53.9 38.0 - 73.0 %    Lymph% 35.6 18.0 - 48.0 %    Mono% 8.0 4.0 - 15.0 %    Eosinophil% 1.5 0.0 - 8.0 %    Basophil% 0.6 0.0 - 1.9 %    Differential Method Automated    Comprehensive metabolic panel   Result Value Ref Range    Sodium 141 136 - 145 mmol/L    Potassium 3.8 3.5 - 5.1 mmol/L    Chloride 104 95 - 110 mmol/L    CO2 26 23 - 29 mmol/L    Glucose 103 70 - 110 mg/dL    BUN, Bld 16 8 - 23 mg/dL    Creatinine 1.1 0.5 - 1.4 mg/dL    Calcium 9.2 8.7 - 10.5 mg/dL    Total Protein 6.6 6.0 - 8.4 g/dL    Albumin 4.0 3.5 - 5.2 g/dL    Total Bilirubin 0.4 0.1 - 1.0 mg/dL    Alkaline Phosphatase 78 55 - 135 U/L    AST 17 10 - 40 U/L    ALT 9 (L) 10 - 44 U/L    Anion Gap 11 8 - 16 mmol/L    eGFR if African American 53 (A) >60 mL/min/1.73 m^2    eGFR if non African American 46 (A) >60 mL/min/1.73 m^2   Troponin I   Result Value Ref Range    Troponin I 0.026 0.000 - 0.026 ng/mL   Brain natriuretic peptide   Result Value Ref Range     (H) 0 - 99 pg/mL   Lactic acid, plasma   Result Value Ref Range    Lactate (Lactic Acid) 0.9 0.5 - 2.2 mmol/L   Troponin I   Result Value Ref Range    Troponin I 0.022 0.000 - 0.026 ng/mL   Troponin I   Result Value Ref Range    Troponin I 0.030 (H) 0.000 - 0.026 ng/mL   CBC auto differential   Result Value Ref Range    WBC 6.95 3.90 - 12.70 K/uL    RBC 2.07 (L) 4.00 - 5.40 M/uL    Hemoglobin 7.7  (L) 12.0 - 16.0 g/dL    Hematocrit 24.8 (L) 37.0 - 48.5 %    Mean Corpuscular Volume 120 (H) 82 - 98 fL    Mean Corpuscular Hemoglobin 37.2 (H) 27.0 - 31.0 pg    Mean Corpuscular Hemoglobin Conc 31.0 (L) 32.0 - 36.0 g/dL    RDW 20.4 (H) 11.5 - 14.5 %    Platelets 342 150 - 350 K/uL    MPV 11.6 9.2 - 12.9 fL    Immature Granulocytes 0.6 (H) 0.0 - 0.5 %    Gran # (ANC) 5.1 1.8 - 7.7 K/uL    Immature Grans (Abs) 0.04 0.00 - 0.04 K/uL    Lymph # 1.4 1.0 - 4.8 K/uL    Mono # 0.4 0.3 - 1.0 K/uL    Eos # 0.0 0.0 - 0.5 K/uL    Baso # 0.02 0.00 - 0.20 K/uL    nRBC 0 0 /100 WBC    Gran% 72.8 38.0 - 73.0 %    Lymph% 20.3 18.0 - 48.0 %    Mono% 5.6 4.0 - 15.0 %    Eosinophil% 0.4 0.0 - 8.0 %    Basophil% 0.3 0.0 - 1.9 %    Differential Method Automated    Basic metabolic panel   Result Value Ref Range    Sodium 146 (H) 136 - 145 mmol/L    Potassium 3.2 (L) 3.5 - 5.1 mmol/L    Chloride 104 95 - 110 mmol/L    CO2 28 23 - 29 mmol/L    Glucose 94 70 - 110 mg/dL    BUN, Bld 16 8 - 23 mg/dL    Creatinine 1.2 0.5 - 1.4 mg/dL    Calcium 9.3 8.7 - 10.5 mg/dL    Anion Gap 14 8 - 16 mmol/L    eGFR if African American 47 (A) >60 mL/min/1.73 m^2    eGFR if non African American 41 (A) >60 mL/min/1.73 m^2   Phosphorus   Result Value Ref Range    Phosphorus 4.3 2.7 - 4.5 mg/dL   Magnesium   Result Value Ref Range    Magnesium 2.6 1.6 - 2.6 mg/dL   Troponin I   Result Value Ref Range    Troponin I 0.040 (H) 0.000 - 0.026 ng/mL   TSH   Result Value Ref Range    TSH 12.793 (H) 0.400 - 4.000 uIU/mL   T4, free   Result Value Ref Range    Free T4 0.70 (L) 0.71 - 1.51 ng/dL   Echo Color Flow Doppler? Yes   Result Value Ref Range    Ascending aorta 2.87 cm    STJ 3.06 cm    AV mean gradient 11 mmHg    Ao peak ulises 2.11 m/s    Ao VTI 53.18 cm    IVRT 93.43 msec    IVS 1.10 0.6 - 1.1 cm    LA size 3.62 cm    Left Atrium Major Axis 4.40 cm    Left Atrium Minor Axis 3.21 cm    LVIDD 5.38 3.5 - 6.0 cm    LVIDS 4.63 (A) 2.1 - 4.0 cm    LVOT diameter 2.02 cm     LVOT peak VTI 19.83 cm    PW 1.26 (A) 0.6 - 1.1 cm    MV Peak A Jason 0.62 m/s    E wave decelartion time 247.70 msec    MV Peak E Jason 0.75 m/s    RA Major Axis 3.99 cm    Sinus 3.55 cm    TAPSE 2.34 cm    TR Max Jason 3.08 m/s    Ao root annulus 3.83 cm    LV Diastolic Volume 139.83 mL    LV Systolic Volume 99.04 mL    LVOT peak jason 0.80 m/s    LA WIDTH 2.33 cm    RA Width 2.28 cm    FS 14 %    LA volume 26.61 cm3    LV mass 256.83 g    Left Ventricle Relative Wall Thickness 0.47 cm    AV valve area 1.19 cm2    AV Velocity Ratio 0.38     AV index (prosthetic) 0.37     E/A ratio 1.21     LVOT area 3.2 cm2    LVOT stroke volume 63.52 cm3    AV peak gradient 18 mmHg    LV Systolic Volume Index 58.1 mL/m2    LV Diastolic Volume Index 82.04 mL/m2    LA Volume Index 15.6 mL/m2    LV Mass Index 151 g/m2    Triscuspid Valve Regurgitation Peak Gradient 38 mmHg    BSA 1.75 m2    Right Atrial Pressure (from IVC) 3 mmHg    TV rest pulmonary artery pressure 41 mmHg    Echo Color Flow Doppler? Yes  · Concentric left ventricular hypertrophy.  · Mild left ventricular enlargement.  · Moderately decreased left ventricular systolic function. The estimated   ejection fraction is 30%.  · Grade I (mild) left ventricular diastolic dysfunction consistent with   impaired relaxation.  · Severe global hypokinetic wall motion.  · Normal right ventricular systolic function.  · Mild aortic regurgitation.  · Mild mitral regurgitation.  · Mild tricuspid regurgitation.  · Normal central venous pressure (3 mmHg).  · The estimated PA systolic pressure is 41 mmHg.  · Pulmonary hypertension present.     X-Ray Chest AP Portable  Narrative: EXAMINATION:  XR CHEST AP PORTABLE    CLINICAL HISTORY:  Chest pain, unspecified    FINDINGS:  Single view of the chest.  Comparison 03/06/2020.    Tortuosity of the descending aorta can be seen with chronic hypertension.  Aorta demonstrates atherosclerotic disease.  Left-sided pacing wires demonstrates similar  configuration.  Electrical device overlies the right hemithorax.    Cardiac silhouette is enlarged but stable.  The lungs demonstrate no evidence of active disease.  Emphysematous changes are seen within the lung apices.  No evidence of pleural effusion or pneumothorax.  Bones demonstrate mild degenerative changes.  Impression: No acute process seen.    Electronically signed by: Delgado Akers MD  Date:    03/11/2020  Time:    06:44        Pending Diagnostic Studies:     None         Medications:  Reconciled Home Medications:      Medication List      START taking these medications    famotidine 20 MG tablet  Commonly known as:  PEPCID  Take 1 tablet (20 mg total) by mouth 2 (two) times daily as needed (heartburn).        CHANGE how you take these medications    levothyroxine 75 MCG tablet  Commonly known as:  SYNTHROID  Take 1 tablet (75 mcg total) by mouth before breakfast.  What changed:    · medication strength  · how much to take  · when to take this        CONTINUE taking these medications    amiodarone 200 MG Tab  Commonly known as:  PACERONE  Take 1 tablet by mouth once daily.     amLODIPine 10 MG tablet  Commonly known as:  NORVASC  Take 10 mg by mouth once daily.     ARIPiprazole 10 MG Tab  Commonly known as:  ABILIFY  Take 5 mg by mouth once daily.     aspirin 81 MG EC tablet  Commonly known as:  ECOTRIN  Take 81 mg by mouth once daily.     atorvastatin 20 MG tablet  Commonly known as:  LIPITOR  Take 20 mg by mouth once daily.     busPIRone 15 MG tablet  Commonly known as:  BUSPAR  Take 1 tablet by mouth 2 (two) times daily.     carvediloL 6.25 MG tablet  Commonly known as:  COREG  Take 6.25 mg by mouth 2 (two) times daily with meals.     clopidogreL 75 mg tablet  Commonly known as:  PLAVIX  Take 1 tablet by mouth once daily.     cyanocobalamin 1000 MCG tablet  Commonly known as:  VITAMIN B-12  Take 1 tablet by mouth once daily.     donepeziL 10 MG tablet  Commonly known as:  ARICEPT  Take 1 tablet by  mouth every evening.     DULoxetine 30 MG capsule  Commonly known as:  CYMBALTA  Take 1 capsule by mouth once daily.     esomeprazole 40 MG capsule  Commonly known as:  NEXIUM  Take 40 mg by mouth before breakfast.     ferrous sulfate 324 mg (65 mg iron) Tbec  Take 65 mg by mouth once daily.     fluticasone propionate 50 mcg/actuation nasal spray  Commonly known as:  FLONASE  1 spray by Each Nare route once daily.     furosemide 40 MG tablet  Commonly known as:  LASIX  Take 1 tablet by mouth 2 (two) times daily.     glucosamine-chondroitin 500-400 mg tablet  Take 1 tablet by mouth 2 (two) times daily.     Lactobacillus rhamnosus GG 10 billion cell capsule  Commonly known as:  CULTURELLE  Take 1 capsule by mouth once daily.     losartan 25 MG tablet  Commonly known as:  COZAAR  Take 1 tablet by mouth once daily.     melatonin 1 mg Tab  Take 1 mg by mouth every evening.     mirabegron 50 mg Tb24  Commonly known as:  MYRBETRIQ  Take by mouth.     mirtazapine 7.5 MG Tab  Commonly known as:  REMERON     multivitamin capsule  Take 1 capsule by mouth once daily.     nystatin-triamcinolone ointment  Commonly known as:  MYCOLOG  2 (two) times daily as needed.     polyethylene glycol 17 gram Pwpk  Commonly known as:  GLYCOLAX  Take by mouth.     potassium chloride SA 20 MEQ tablet  Commonly known as:  K-DUR,KLOR-CON  Take 20 mEq by mouth 2 (two) times daily.     PROAIR HFA 90 mcg/actuation inhaler  Generic drug:  albuterol  Inhale 2 puffs into the lungs every 6 (six) hours as needed for Wheezing.     RESTASIS 0.05 % ophthalmic emulsion  Generic drug:  cycloSPORINE  Apply 1 drop to eye.     solifenacin 10 MG tablet  Commonly known as:  VESICARE  Take 5 mg by mouth once daily.     traMADoL 50 mg tablet  Commonly known as:  ULTRAM  Take 1 tablet by mouth 3 (three) times daily as needed.     venlafaxine 150 MG Cp24  Commonly known as:  EFFEXOR-XR  Take 75 mg by mouth once daily.            Indwelling Lines/Drains at time of  discharge:   Lines/Drains/Airways     None                 Time spent on the discharge of patient: 40 minutes  Patient was seen and examined on the date of discharge and determined to be suitable for discharge.         Stevie Hopkins MD  Department of Hospital Medicine  [facility]

## 2020-03-12 NOTE — HOSPITAL COURSE
Patient was admitted for chest pain.  Troponins were mildly elevated.  Patient reports chest pain resolving after receiving medication for reflux.  Cardiology consult on case and did not feel that pain was cardiac in nature. Patient was stable on day of discharge.  She requested to be discharged home with Neurontin and Xanax however neither medications were listed on her med rec.  Per review of , patient had not had xanax since 11/2019.  Informed patient that she will have to follow with PCP for refills of these medications.  Of note patient was anemic during stay with hemoglobin of 7.8.  No active signs of bleeding noted.  Anemia was macrocytic in nature TSH of obtained showed hypothyroidism.  Patient was continued on Synthroid 75 mcg on discharge.  Patient was DC home with instructions to follow-up with PCP.

## 2020-03-12 NOTE — NURSING
Bedside swallow study performed. Patient tolerates 1 or 2 pills at a time with a few sips of water. No evidence of swallowing difficulty noted at this time. Educated to take her time and tuck her chin when needed.

## 2020-03-12 NOTE — PLAN OF CARE
Patient AAOX 4. VSS  Patient remained afebrile throughout shift.  Patient remained free of falls this shift  Patient NSR of pain this shift  Plan of care reviewed.  Patient verbalized understanding.  Patient moving/turning independently  Frequent weight shifting encouraged.  Patient NSR on monitor  Bed low, side rails up x2, wheels locked, call light in reach.  Bed alarm maintained for safety.  Patient instructed to call for assistance.  Hourly rounding completed.  Will continue to monitor.

## 2020-03-12 NOTE — ASSESSMENT & PLAN NOTE
-ECHO revealed EF 30%, DD  -  -IV lasix 40 mg BID  -Continue Coreg, ARB, IV lasix  -Dash diet, 2 gm sodium restriction  -1500 ml fluid restriction  -NO recent ICD firing per patient  -Followed by Dr Meneses in OP Cardiology clinic

## 2020-03-12 NOTE — CONSULTS
[facility] -   Cardiology  Consult Note    Patient Name: Love Davey  MRN: [id number]  Admission Date: 3/11/2020  Hospital Length of Stay: 0 days  Code Status: Full Code   Attending Provider: Stevie Hopkins MD   Consulting Provider: YONATAN Blanca  Primary Care Physician: Son Guan MD  Principal Problem:Chest pain    Patient information was obtained from patient, past medical records and ER records.     Inpatient consult to Cardiology  Consult performed by: YONATAN Dang  Consult ordered by: Stevie Hopkins MD        Subjective:     Chief Complaint:  Shortness of breath     HPI:   Love Davey is a 86 year old female who presented to [facility] due to shortness of breath for the past 3-4 days with associated left chest wall discomfort. Her current medical conditions include Chronic systolic CHF, HFrEF of 30%, s/p ICD, CMPY, HTN, HLP, Anemia, Anxiety. She was placed in observation under the care of hospital medicine on 3/11. Cardiology consulted to assist with medical management today on 3/12. Chart reviewed, patient seen and examined. Patient feels much better today after IV diuresis yesterday. NO chest pain or anginal equivalents. Denies shortness of breath, CHEW or palpitations. Denies any signs of active bleeding issues. Echo reviewed in detail, EF 30%, Grade I DD. Further recs to follow.     Past Medical History:   Diagnosis Date    Anemia     Anxiety     Arthritis     Back pain     Cardiomyopathy     CHF (congestive heart failure)     Depression     Dizziness     Hypertension     Insomnia     Stroke     Use of cane as ambulatory aid        Past Surgical History:   Procedure Laterality Date    APPENDECTOMY      arthritis      BLADDER REPAIR      BUNIONECTOMY      CARDIAC DEFIBRILLATOR PLACEMENT      CATARACT EXTRACTION      HYSTERECTOMY      metal implant Bilateral     placed in the bladder.       Review of patient's allergies indicates:   Allergen Reactions     Bacitracin-polymyxin b Itching and Swelling    Merthiolate (thimerosal) Rash and Swelling    Cefdinir     Diazepam      Hallucinations    Levofloxacin Itching    Metronidazole     Oxycodone Itching    Tizanidine      Hallucinations    Tramadol Itching       No current facility-administered medications on file prior to encounter.      Current Outpatient Medications on File Prior to Encounter   Medication Sig    amiodarone (PACERONE) 200 MG Tab Take 1 tablet by mouth once daily.    amlodipine (NORVASC) 10 MG tablet Take 10 mg by mouth once daily.    aripiprazole (ABILIFY) 10 MG Tab Take 5 mg by mouth once daily.    aspirin (ECOTRIN) 81 MG EC tablet Take 81 mg by mouth once daily.    atorvastatin (LIPITOR) 20 MG tablet Take 20 mg by mouth once daily.    busPIRone (BUSPAR) 15 MG tablet Take 1 tablet by mouth 2 (two) times daily.    carvedilol (COREG) 6.25 MG tablet Take 6.25 mg by mouth 2 (two) times daily with meals.    clopidogreL (PLAVIX) 75 mg tablet Take 1 tablet by mouth once daily.    donepezil (ARICEPT) 10 MG tablet Take 1 tablet by mouth every evening.    DULoxetine (CYMBALTA) 30 MG capsule Take 1 capsule by mouth once daily.    ferrous sulfate 324 mg (65 mg iron) TbEC Take 65 mg by mouth once daily.    furosemide (LASIX) 40 MG tablet Take 1 tablet by mouth 2 (two) times daily.    levothyroxine (SYNTHROID) 50 MCG tablet Take 50 mcg by mouth once daily.    losartan (COZAAR) 25 MG tablet Take 1 tablet by mouth once daily.    venlafaxine (EFFEXOR-XR) 150 MG Cp24 Take 75 mg by mouth once daily.    albuterol (PROAIR HFA) 90 mcg/actuation inhaler Inhale 2 puffs into the lungs every 6 (six) hours as needed for Wheezing.    cyanocobalamin (VITAMIN B-12) 1000 MCG tablet Take 1 tablet by mouth once daily.    cycloSPORINE (RESTASIS) 0.05 % ophthalmic emulsion Apply 1 drop to eye.    esomeprazole (NEXIUM) 40 MG capsule Take 40 mg by mouth before breakfast.    fluticasone (FLONASE) 50  mcg/actuation nasal spray 1 spray by Each Nare route once daily.    glucosamine-chondroitin 500-400 mg tablet Take 1 tablet by mouth 2 (two) times daily.    Lactobacillus rhamnosus GG (CULTURELLE) 10 billion cell capsule Take 1 capsule by mouth once daily.    melatonin 1 mg Tab Take 1 mg by mouth every evening.    mirabegron 50 mg Tb24 Take by mouth.    mirtazapine (REMERON) 7.5 MG Tab     multivitamin capsule Take 1 capsule by mouth once daily.    nystatin-triamcinolone (MYCOLOG) ointment 2 (two) times daily as needed.    polyethylene glycol (GLYCOLAX) 17 gram PwPk Take by mouth.    potassium chloride SA (K-DUR,KLOR-CON) 20 MEQ tablet Take 20 mEq by mouth 2 (two) times daily.    solifenacin (VESICARE) 10 MG tablet Take 5 mg by mouth once daily.    traMADol (ULTRAM) 50 mg tablet Take 1 tablet by mouth 3 (three) times daily as needed.     Family History     Problem Relation (Age of Onset)    Cancer Mother    Heart disease Father    Hypertension         Tobacco Use    Smoking status: Never Smoker    Smokeless tobacco: Never Used   Substance and Sexual Activity    Alcohol use: No    Drug use: No    Sexual activity: Not Currently     Review of Systems   Constitution: Positive for malaise/fatigue.   HENT: Negative for hearing loss and hoarse voice.    Eyes: Negative for blurred vision and visual disturbance.   Cardiovascular: Positive for chest pain and dyspnea on exertion. Negative for claudication, irregular heartbeat, leg swelling, near-syncope, orthopnea, palpitations, paroxysmal nocturnal dyspnea and syncope.   Respiratory: Positive for cough and shortness of breath. Negative for hemoptysis, sleep disturbances due to breathing, snoring and wheezing.    Endocrine: Negative for cold intolerance and heat intolerance.   Hematologic/Lymphatic: Bruises/bleeds easily (on ASA and Plavix).   Skin: Negative for color change, dry skin and nail changes.   Musculoskeletal: Positive for arthritis and back pain.  Negative for joint pain and myalgias.   Gastrointestinal: Positive for nausea. Negative for bloating, abdominal pain, constipation and vomiting.   Genitourinary: Negative for dysuria, flank pain, hematuria and hesitancy.   Neurological: Negative for headaches, light-headedness, loss of balance, numbness, paresthesias and weakness.   Psychiatric/Behavioral: Negative for altered mental status.   Allergic/Immunologic: Negative for environmental allergies.     Objective:     Vital Signs (Most Recent):  Temp: 98.9 °F (37.2 °C) (03/12/20 0723)  Pulse: 61 (03/12/20 0723)  Resp: 18 (03/12/20 0723)  BP: 139/60 (03/12/20 0723)  SpO2: (!) 93 % (03/12/20 0723) Vital Signs (24h Range):  Temp:  [97.9 °F (36.6 °C)-98.9 °F (37.2 °C)] 98.9 °F (37.2 °C)  Pulse:  [52-63] 61  Resp:  [17-18] 18  SpO2:  [93 %-94 %] 93 %  BP: (135-168)/(60-73) 139/60     Weight: 68.6 kg (151 lb 3.8 oz)  Body mass index is 28.58 kg/m².    SpO2: (!) 93 %  O2 Device (Oxygen Therapy): room air      Intake/Output Summary (Last 24 hours) at 3/12/2020 1114  Last data filed at 3/12/2020 0400  Gross per 24 hour   Intake 290 ml   Output 550 ml   Net -260 ml       Lines/Drains/Airways     Peripheral Intravenous Line                 Peripheral IV - Single Lumen 03/11/20 0447 22 G Right Antecubital 1 day                Physical Exam   Constitutional: She is oriented to person, place, and time. She appears well-developed and well-nourished. No distress.   HENT:   Head: Normocephalic and atraumatic.   Eyes: Pupils are equal, round, and reactive to light.   Neck: Normal range of motion and full passive range of motion without pain. Neck supple. No JVD present.   Cardiovascular: Normal rate, regular rhythm, S1 normal, S2 normal and intact distal pulses. PMI is not displaced. Exam reveals no distant heart sounds.   No murmur heard.  Pulses:       Radial pulses are 2+ on the right side, and 2+ on the left side.        Dorsalis pedis pulses are 2+ on the right side, and 2+  on the left side.   CHEST PAIN FREE   Pulmonary/Chest: Effort normal and breath sounds normal. No accessory muscle usage. No respiratory distress. She has no decreased breath sounds. She has no wheezes. She has no rales.   SOME SLIGHT SOB NOTED ON EXAM, IMPROVING   Abdominal: Soft. Bowel sounds are normal. She exhibits no distension. There is no tenderness.   +nausea on exam   Musculoskeletal: Normal range of motion. She exhibits no edema.        Right ankle: She exhibits no swelling.        Left ankle: She exhibits no swelling.   Neurological: She is alert and oriented to person, place, and time.   Skin: Skin is warm and dry. She is not diaphoretic. No cyanosis. Nails show no clubbing.   Psychiatric: She has a normal mood and affect. Her speech is normal and behavior is normal. Judgment and thought content normal. Cognition and memory are normal.   Nursing note and vitals reviewed.      Significant Labs:   CMP   Recent Labs   Lab 03/11/20  0449 03/12/20  0525    146*   K 3.8 3.2*    104   CO2 26 28    94   BUN 16 16   CREATININE 1.1 1.2   CALCIUM 9.2 9.3   PROT 6.6  --    ALBUMIN 4.0  --    BILITOT 0.4  --    ALKPHOS 78  --    AST 17  --    ALT 9*  --    ANIONGAP 11 14   ESTGFRAFRICA 53* 47*   EGFRNONAA 46* 41*   , CBC   Recent Labs   Lab 03/11/20  0449 03/12/20  0525   WBC 5.25 6.95   HGB 7.8* 7.7*   HCT 24.9* 24.8*   * 342   , Troponin   Recent Labs   Lab 03/11/20  0853 03/11/20  1133 03/12/20  0828   TROPONINI 0.022 0.030* 0.040*    and All pertinent lab results from the last 24 hours have been reviewed.    Significant Imaging: Echocardiogram:   2D echo with color flow doppler: No results found for this or any previous visit. and Transthoracic echo (TTE) complete (Cupid Only):   Results for orders placed or performed during the hospital encounter of 03/11/20   Echo Color Flow Doppler? Yes   Result Value Ref Range    Ascending aorta 2.87 cm    STJ 3.06 cm    AV mean gradient 11 mmHg     Ao peak jason 2.11 m/s    Ao VTI 53.18 cm    IVRT 93.43 msec    IVS 1.10 0.6 - 1.1 cm    LA size 3.62 cm    Left Atrium Major Axis 4.40 cm    Left Atrium Minor Axis 3.21 cm    LVIDD 5.38 3.5 - 6.0 cm    LVIDS 4.63 (A) 2.1 - 4.0 cm    LVOT diameter 2.02 cm    LVOT peak VTI 19.83 cm    PW 1.26 (A) 0.6 - 1.1 cm    MV Peak A Jason 0.62 m/s    E wave decelartion time 247.70 msec    MV Peak E Jason 0.75 m/s    RA Major Axis 3.99 cm    Sinus 3.55 cm    TAPSE 2.34 cm    TR Max Jason 3.08 m/s    Ao root annulus 3.83 cm    LV Diastolic Volume 139.83 mL    LV Systolic Volume 99.04 mL    LVOT peak jason 0.80 m/s    LA WIDTH 2.33 cm    RA Width 2.28 cm    FS 14 %    LA volume 26.61 cm3    LV mass 256.83 g    Left Ventricle Relative Wall Thickness 0.47 cm    AV valve area 1.19 cm2    AV Velocity Ratio 0.38     AV index (prosthetic) 0.37     E/A ratio 1.21     LVOT area 3.2 cm2    LVOT stroke volume 63.52 cm3    AV peak gradient 18 mmHg    LV Systolic Volume Index 58.1 mL/m2    LV Diastolic Volume Index 82.04 mL/m2    LA Volume Index 15.6 mL/m2    LV Mass Index 151 g/m2    Triscuspid Valve Regurgitation Peak Gradient 38 mmHg    BSA 1.75 m2    Right Atrial Pressure (from IVC) 3 mmHg    TV rest pulmonary artery pressure 41 mmHg    Narrative    · Concentric left ventricular hypertrophy.  · Mild left ventricular enlargement.  · Moderately decreased left ventricular systolic function. The estimated   ejection fraction is 30%.  · Grade I (mild) left ventricular diastolic dysfunction consistent with   impaired relaxation.  · Severe global hypokinetic wall motion.  · Normal right ventricular systolic function.  · Mild aortic regurgitation.  · Mild mitral regurgitation.  · Mild tricuspid regurgitation.  · Normal central venous pressure (3 mmHg).  · The estimated PA systolic pressure is 41 mmHg.  · Pulmonary hypertension present.       and X-Ray: CXR: X-Ray Chest 1 View (CXR): No results found for this visit on 03/11/20. and X-Ray Chest PA and  Lateral (CXR): No results found for this visit on 03/11/20.    Assessment and Plan:     * Chest pain  Improved after IV diuresis  Continue current medical mgmt    PVT (paroxysmal ventricular tachycardia)  Continue Amiodarone daily  No recent ICD firing  OP Cards follow up with Dr Meneses    Dementia  MGMT PER PRIMARY TEAM    Acute on chronic combined systolic and diastolic congestive heart failure  -ECHO revealed EF 30%, DD  -  -IV lasix 40 mg BID  -Continue Coreg, ARB, IV lasix  -Dash diet, 2 gm sodium restriction  -1500 ml fluid restriction  -NO recent ICD firing per patient  -Followed by Dr Meneses in OP Cardiology clinic      HTN (hypertension)  On medical therapy  Continue BB, Norvasc, ARB    Acute on chronic anemia  Monitor closely transfuse as indicated  Mgmt per primary team        VTE Risk Mitigation (From admission, onward)         Ordered     heparin (porcine) injection 5,000 Units  Every 8 hours      03/11/20 1041     IP VTE HIGH RISK PATIENT  Once      03/11/20 1041                Thank you for your consult. I will follow-up with patient. Please contact us if you have any additional questions.    RAMAN Blanca-C  Cardiology   [facility] [place]

## 2020-05-14 ENCOUNTER — EXTERNAL HOME HEALTH (OUTPATIENT)
Dept: HOME HEALTH SERVICES | Facility: HOSPITAL | Age: 85
End: 2020-05-14
Payer: MEDICARE

## 2020-06-10 ENCOUNTER — TELEPHONE (OUTPATIENT)
Dept: PSYCHIATRY | Facility: CLINIC | Age: 85
End: 2020-06-10

## 2020-06-10 NOTE — TELEPHONE ENCOUNTER
----- Message from Ole Rodriguez sent at 6/10/2020 10:38 AM CDT -----  Contact: Pt   Pt called in regards to scheduling a NP appointment. Pt can be reached at [phone].

## 2020-07-10 ENCOUNTER — EXTERNAL HOME HEALTH (OUTPATIENT)
Dept: HOME HEALTH SERVICES | Facility: HOSPITAL | Age: 85
End: 2020-07-10
Payer: MEDICARE

## 2020-07-10 NOTE — PROGRESS NOTES
60 Day Recert Order # [id number]  New Cert Period: 07/06 to 09/03/2020 with [facility] violeta Mayo - Dr. Stevie Hopkins

## 2020-07-27 ENCOUNTER — HOSPITAL ENCOUNTER (EMERGENCY)
Facility: HOSPITAL | Age: 85
Discharge: HOME | End: 2020-07-27
Attending: EMERGENCY MEDICINE
Payer: MEDICARE

## 2020-07-27 VITALS
WEIGHT: 136.44 LBS | OXYGEN SATURATION: 99 % | HEIGHT: 61 IN | SYSTOLIC BLOOD PRESSURE: 116 MMHG | HEART RATE: 67 BPM | RESPIRATION RATE: 22 BRPM | BODY MASS INDEX: 25.76 KG/M2 | TEMPERATURE: 98 F | DIASTOLIC BLOOD PRESSURE: 54 MMHG

## 2020-07-27 DIAGNOSIS — R47.81 SLURRED SPEECH: ICD-10-CM

## 2020-07-27 DIAGNOSIS — N30.00 ACUTE CYSTITIS WITHOUT HEMATURIA: ICD-10-CM

## 2020-07-27 DIAGNOSIS — D64.9 ANEMIA, UNSPECIFIED TYPE: Primary | ICD-10-CM

## 2020-07-27 LAB
ALBUMIN SERPL BCP-MCNC: 3.4 G/DL (ref 3.5–5.2)
ALP SERPL-CCNC: 74 U/L (ref 55–135)
ALT SERPL W/O P-5'-P-CCNC: 19 U/L (ref 10–44)
ANION GAP SERPL CALC-SCNC: 10 MMOL/L (ref 8–16)
APTT BLDCRRT: 21.1 SEC (ref 21–32)
AST SERPL-CCNC: 22 U/L (ref 10–40)
BACTERIA #/AREA URNS HPF: ABNORMAL /HPF
BASOPHILS # BLD AUTO: 0.02 K/UL (ref 0–0.2)
BASOPHILS NFR BLD: 0.2 % (ref 0–1.9)
BILIRUB SERPL-MCNC: 0.2 MG/DL (ref 0.1–1)
BILIRUB UR QL STRIP: NEGATIVE
BUN SERPL-MCNC: 13 MG/DL (ref 8–23)
CALCIUM SERPL-MCNC: 9.1 MG/DL (ref 8.7–10.5)
CHLORIDE SERPL-SCNC: 106 MMOL/L (ref 95–110)
CLARITY UR: CLEAR
CO2 SERPL-SCNC: 22 MMOL/L (ref 23–29)
COLOR UR: YELLOW
CREAT SERPL-MCNC: 1.2 MG/DL (ref 0.5–1.4)
DIFFERENTIAL METHOD: ABNORMAL
EOSINOPHIL # BLD AUTO: 0.1 K/UL (ref 0–0.5)
EOSINOPHIL NFR BLD: 1.1 % (ref 0–8)
ERYTHROCYTE [DISTWIDTH] IN BLOOD BY AUTOMATED COUNT: 19.8 % (ref 11.5–14.5)
EST. GFR  (AFRICAN AMERICAN): 47 ML/MIN/1.73 M^2
EST. GFR  (NON AFRICAN AMERICAN): 41 ML/MIN/1.73 M^2
GLUCOSE SERPL-MCNC: 98 MG/DL (ref 70–110)
GLUCOSE UR QL STRIP: NEGATIVE
HCT VFR BLD AUTO: 30.5 % (ref 37–48.5)
HGB BLD-MCNC: 10 G/DL (ref 12–16)
HGB UR QL STRIP: ABNORMAL
HYALINE CASTS #/AREA URNS LPF: 5 /LPF
IMM GRANULOCYTES # BLD AUTO: 0.05 K/UL (ref 0–0.04)
IMM GRANULOCYTES NFR BLD AUTO: 0.6 % (ref 0–0.5)
INR PPP: 1.1 (ref 0.8–1.2)
KETONES UR QL STRIP: ABNORMAL
LEUKOCYTE ESTERASE UR QL STRIP: ABNORMAL
LYMPHOCYTES # BLD AUTO: 1.9 K/UL (ref 1–4.8)
LYMPHOCYTES NFR BLD: 20.4 % (ref 18–48)
MCH RBC QN AUTO: 36.5 PG (ref 27–31)
MCHC RBC AUTO-ENTMCNC: 32.8 G/DL (ref 32–36)
MCV RBC AUTO: 111 FL (ref 82–98)
MICROSCOPIC COMMENT: ABNORMAL
MONOCYTES # BLD AUTO: 0.5 K/UL (ref 0.3–1)
MONOCYTES NFR BLD: 6 % (ref 4–15)
NEUTROPHILS # BLD AUTO: 6.5 K/UL (ref 1.8–7.7)
NEUTROPHILS NFR BLD: 71.7 % (ref 38–73)
NITRITE UR QL STRIP: POSITIVE
NRBC BLD-RTO: 0 /100 WBC
PH UR STRIP: 7 [PH] (ref 5–8)
PLATELET # BLD AUTO: 364 K/UL (ref 150–350)
PMV BLD AUTO: 12.2 FL (ref 9.2–12.9)
POCT GLUCOSE: 120 MG/DL (ref 70–110)
POCT GLUCOSE: 99 MG/DL (ref 70–110)
POTASSIUM SERPL-SCNC: 4.9 MMOL/L (ref 3.5–5.1)
PROT SERPL-MCNC: 6.2 G/DL (ref 6–8.4)
PROT UR QL STRIP: ABNORMAL
PROTHROMBIN TIME: 11.5 SEC (ref 9–12.5)
RBC # BLD AUTO: 2.74 M/UL (ref 4–5.4)
RBC #/AREA URNS HPF: 5 /HPF (ref 0–4)
SARS-COV-2 RDRP RESP QL NAA+PROBE: NEGATIVE
SODIUM SERPL-SCNC: 138 MMOL/L (ref 136–145)
SP GR UR STRIP: 1.01 (ref 1–1.03)
SQUAMOUS #/AREA URNS HPF: 10 /HPF
TROPONIN I SERPL DL<=0.01 NG/ML-MCNC: 0.03 NG/ML (ref 0–0.03)
TROPONIN I SERPL DL<=0.01 NG/ML-MCNC: 0.04 NG/ML (ref 0–0.03)
URN SPEC COLLECT METH UR: ABNORMAL
UROBILINOGEN UR STRIP-ACNC: NEGATIVE EU/DL
WBC # BLD AUTO: 9.05 K/UL (ref 3.9–12.7)
WBC #/AREA URNS HPF: >100 /HPF (ref 0–5)

## 2020-07-27 PROCEDURE — 87088 URINE BACTERIA CULTURE: CPT

## 2020-07-27 PROCEDURE — 82962 GLUCOSE BLOOD TEST: CPT | Mod: 91

## 2020-07-27 PROCEDURE — 84484 ASSAY OF TROPONIN QUANT: CPT

## 2020-07-27 PROCEDURE — 93005 ELECTROCARDIOGRAM TRACING: CPT

## 2020-07-27 PROCEDURE — P9612 CATHETERIZE FOR URINE SPEC: HCPCS

## 2020-07-27 PROCEDURE — 93010 EKG 12-LEAD: ICD-10-PCS | Mod: ,,, | Performed by: INTERNAL MEDICINE

## 2020-07-27 PROCEDURE — 84484 ASSAY OF TROPONIN QUANT: CPT | Mod: 91

## 2020-07-27 PROCEDURE — 85610 PROTHROMBIN TIME: CPT

## 2020-07-27 PROCEDURE — 85730 THROMBOPLASTIN TIME PARTIAL: CPT

## 2020-07-27 PROCEDURE — 99285 EMERGENCY DEPT VISIT HI MDM: CPT | Mod: 25

## 2020-07-27 PROCEDURE — U0002 COVID-19 LAB TEST NON-CDC: HCPCS

## 2020-07-27 PROCEDURE — 25000003 PHARM REV CODE 250: Performed by: EMERGENCY MEDICINE

## 2020-07-27 PROCEDURE — 93010 ELECTROCARDIOGRAM REPORT: CPT | Mod: ,,, | Performed by: INTERNAL MEDICINE

## 2020-07-27 PROCEDURE — 81000 URINALYSIS NONAUTO W/SCOPE: CPT

## 2020-07-27 PROCEDURE — 87186 SC STD MICRODIL/AGAR DIL: CPT

## 2020-07-27 PROCEDURE — 36415 COLL VENOUS BLD VENIPUNCTURE: CPT

## 2020-07-27 PROCEDURE — 87086 URINE CULTURE/COLONY COUNT: CPT

## 2020-07-27 PROCEDURE — 80053 COMPREHEN METABOLIC PANEL: CPT

## 2020-07-27 PROCEDURE — 96360 HYDRATION IV INFUSION INIT: CPT

## 2020-07-27 PROCEDURE — 85025 COMPLETE CBC W/AUTO DIFF WBC: CPT

## 2020-07-27 PROCEDURE — 87077 CULTURE AEROBIC IDENTIFY: CPT

## 2020-07-27 RX ORDER — SULFAMETHOXAZOLE AND TRIMETHOPRIM 400; 80 MG/1; MG/1
1 TABLET ORAL 2 TIMES DAILY
Qty: 14 TABLET | Refills: 0 | Status: SHIPPED | OUTPATIENT
Start: 2020-07-27 | End: 2020-08-03

## 2020-07-27 RX ORDER — SULFAMETHOXAZOLE AND TRIMETHOPRIM 800; 160 MG/1; MG/1
1 TABLET ORAL
Status: COMPLETED | OUTPATIENT
Start: 2020-07-27 | End: 2020-07-27

## 2020-07-27 RX ADMIN — SODIUM CHLORIDE 250 ML: 9 INJECTION, SOLUTION INTRAVENOUS at 05:07

## 2020-07-27 RX ADMIN — SULFAMETHOXAZOLE AND TRIMETHOPRIM 1 TABLET: 800; 160 TABLET ORAL at 08:07

## 2020-07-27 NOTE — ED PROVIDER NOTES
SCRIBE #1 NOTE: I, Deepthi Rene, am scribing for, and in the presence of, David Madrigal Jr., MD. I have scribed the HPI, ROS, and PEx.     SCRIBE #2 NOTE: I, Froylan Pickard, am scribing for, and in the presence of,  Karen Velasquez DO. I have scribed the remaining portions of the note not scribed by Scribe #1.      History     Chief Complaint   Patient presents with    Slurred Speech     x 2 days and weakness x several weeks. no weakness     Review of patient's allergies indicates:   Allergen Reactions    Bacitracin-polymyxin b Itching and Swelling    Merthiolate (thimerosal) Rash and Swelling    Cefdinir     Diazepam      Hallucinations    Levofloxacin Itching    Metronidazole     Oxycodone Itching    Tizanidine      Hallucinations    Tramadol Itching         History of Present Illness     HPI    7/27/2020, 2:23 PM  History obtained from the patient      History of Present Illness: Love Davey is a 86 y.o. female patient with a h/o anemia, anxiety, cardiomyopathy, CHF, depression, HTN, insomnia, stroke, who presents to the Emergency Department for evaluation of slurred speech which onset 2 days ago. Pt states that her sxs have gotten better today. Symptoms are constant and moderate in severity. No mitigating or exacerbating factors reported. Associated sxs include generalized weakness. Patient denies any numbness, HA, n/v, dizziness, and all other sxs at this time. Prior Tx includes Aspirin. No further complaints or concerns at this time.       Arrival mode: EMS    PCP: Son Guan MD      Past Medical History:  Past Medical History:   Diagnosis Date    Anemia     Anxiety     Arthritis     Back pain     Cardiomyopathy     CHF (congestive heart failure)     Depression     Dizziness     Hypertension     Insomnia     Stroke     Use of cane as ambulatory aid        Past Surgical History:  Past Surgical History:   Procedure Laterality Date    APPENDECTOMY      arthritis       BLADDER REPAIR      BUNIONECTOMY      CARDIAC DEFIBRILLATOR PLACEMENT      CATARACT EXTRACTION      HYSTERECTOMY      metal implant Bilateral     placed in the bladder.         Family History:  Family History   Problem Relation Age of Onset    Hypertension Unknown     Cancer Mother     Heart disease Father        Social History:   Social History     Tobacco Use    Smoking status: Never Smoker    Smokeless tobacco: Never Used   Substance and Sexual Activity    Alcohol use: No    Drug use: No    Sexual activity: Not Currently        Review of Systems     Review of Systems   Constitutional: Negative for fever.   HENT: Negative for sore throat.    Respiratory: Negative for shortness of breath.    Cardiovascular: Negative for chest pain.   Gastrointestinal: Negative for nausea and vomiting.   Genitourinary: Negative for dysuria.   Musculoskeletal: Negative for back pain.   Skin: Negative for rash.   Neurological: Positive for weakness (generalized). Negative for dizziness, numbness and headaches.        (+) slurred speech   Hematological: Does not bruise/bleed easily.   All other systems reviewed and are negative.       Physical Exam     Initial Vitals [07/27/20 1414]   BP Pulse Resp Temp SpO2   (!) 111/57 110 18 97.7 °F (36.5 °C) 98 %      MAP       --          Physical Exam  Nursing Notes and Vital Signs Reviewed.  Constitutional: Elderly.   Head: Atraumatic. Normocephalic.  Eyes: EOM intact. No scleral icterus.  ENT: Mucous membranes are moist. Oropharynx is clear and symmetric.    Neck: Supple. Full ROM. No lymphadenopathy.  Cardiovascular: Regular rate. Regular rhythm. No murmurs, rubs, or gallops. Distal pulses are 2+ and symmetric.  Pulmonary/Chest: No respiratory distress. Clear to auscultation bilaterally. No wheezing or rales.  Abdominal: Soft and non-distended.  There is no tenderness.  No rebound, guarding, or rigidity. Good bowel sounds.  Genitourinary: No CVA tenderness  Musculoskeletal: Moves  all extremities. No obvious deformities. No calf tenderness. 4/5  strength on L side.  Skin: Warm and dry.  Neurological:  Alert, awake, and appropriate. Slurred speech. Left tongue deviation. Mild L arm and leg weakness. NIH scale 0  Psychiatric: Normal affect. Good eye contact. Appropriate in content.     ED Course   Procedures  ED Vital Signs:  Vitals:    07/27/20 1518 07/27/20 1546 07/27/20 1617 07/27/20 1620   BP: (!) 116/57 (!) 142/65 115/81    Pulse: 76   67   Resp: 18      Temp:       TempSrc:       SpO2: 99%  99% 100%   Weight:       Height:        07/27/20 1736 07/27/20 1746 07/27/20 1807 07/27/20 1833   BP: (!) 154/69 (!) 154/77  138/63   Pulse:  64 64    Resp:   (!) 22    Temp:       TempSrc:       SpO2: 99% 99% 99% 100%   Weight:       Height:        07/27/20 1839 07/27/20 1853 07/27/20 1932 07/27/20 1946   BP:   127/60 130/67   Pulse: 67 62 63 63   Resp:       Temp:       TempSrc:       SpO2: 99% 100% 100% 100%   Weight:       Height:        07/27/20 2047 07/27/20 2049 07/27/20 2112   BP:  (!) 116/54    Pulse: 65  67   Resp:      Temp:      TempSrc:      SpO2: 100% 100% 99%   Weight:      Height:          Abnormal Lab Results:  Labs Reviewed   CBC W/ AUTO DIFFERENTIAL - Abnormal; Notable for the following components:       Result Value    RBC 2.74 (*)     Hemoglobin 10.0 (*)     Hematocrit 30.5 (*)     Mean Corpuscular Volume 111 (*)     Mean Corpuscular Hemoglobin 36.5 (*)     RDW 19.8 (*)     Platelets 364 (*)     Immature Granulocytes 0.6 (*)     Immature Grans (Abs) 0.05 (*)     All other components within normal limits   COMPREHENSIVE METABOLIC PANEL - Abnormal; Notable for the following components:    CO2 22 (*)     Albumin 3.4 (*)     eGFR if  47 (*)     eGFR if non  41 (*)     All other components within normal limits   TROPONIN I - Abnormal; Notable for the following components:    Troponin I 0.042 (*)     All other components within normal limits    URINALYSIS, REFLEX TO URINE CULTURE - Abnormal; Notable for the following components:    Protein, UA Trace (*)     Ketones, UA Trace (*)     Occult Blood UA Trace (*)     Nitrite, UA Positive (*)     Leukocytes, UA 3+ (*)     All other components within normal limits    Narrative:     Specimen Source->Urine   TROPONIN I - Abnormal; Notable for the following components:    Troponin I 0.033 (*)     All other components within normal limits   URINALYSIS MICROSCOPIC - Abnormal; Notable for the following components:    RBC, UA 5 (*)     WBC, UA >100 (*)     Bacteria Moderate (*)     Hyaline Casts, UA 5 (*)     All other components within normal limits    Narrative:     Specimen Source->Urine   POCT GLUCOSE - Abnormal; Notable for the following components:    POCT Glucose 120 (*)     All other components within normal limits   CULTURE, URINE   APTT   PROTIME-INR   SARS-COV-2 RNA AMPLIFICATION, QUAL   POCT GLUCOSE        All Lab Results:  Results for orders placed or performed during the hospital encounter of 07/27/20   APTT   Result Value Ref Range    aPTT 21.1 21.0 - 32.0 sec   Protime-INR   Result Value Ref Range    Prothrombin Time 11.5 9.0 - 12.5 sec    INR 1.1 0.8 - 1.2   CBC auto differential   Result Value Ref Range    WBC 9.05 3.90 - 12.70 K/uL    RBC 2.74 (L) 4.00 - 5.40 M/uL    Hemoglobin 10.0 (L) 12.0 - 16.0 g/dL    Hematocrit 30.5 (L) 37.0 - 48.5 %    Mean Corpuscular Volume 111 (H) 82 - 98 fL    Mean Corpuscular Hemoglobin 36.5 (H) 27.0 - 31.0 pg    Mean Corpuscular Hemoglobin Conc 32.8 32.0 - 36.0 g/dL    RDW 19.8 (H) 11.5 - 14.5 %    Platelets 364 (H) 150 - 350 K/uL    MPV 12.2 9.2 - 12.9 fL    Immature Granulocytes 0.6 (H) 0.0 - 0.5 %    Gran # (ANC) 6.5 1.8 - 7.7 K/uL    Immature Grans (Abs) 0.05 (H) 0.00 - 0.04 K/uL    Lymph # 1.9 1.0 - 4.8 K/uL    Mono # 0.5 0.3 - 1.0 K/uL    Eos # 0.1 0.0 - 0.5 K/uL    Baso # 0.02 0.00 - 0.20 K/uL    nRBC 0 0 /100 WBC    Gran% 71.7 38.0 - 73.0 %    Lymph% 20.4 18.0 -  48.0 %    Mono% 6.0 4.0 - 15.0 %    Eosinophil% 1.1 0.0 - 8.0 %    Basophil% 0.2 0.0 - 1.9 %    Differential Method Automated    Comprehensive metabolic panel   Result Value Ref Range    Sodium 138 136 - 145 mmol/L    Potassium 4.9 3.5 - 5.1 mmol/L    Chloride 106 95 - 110 mmol/L    CO2 22 (L) 23 - 29 mmol/L    Glucose 98 70 - 110 mg/dL    BUN, Bld 13 8 - 23 mg/dL    Creatinine 1.2 0.5 - 1.4 mg/dL    Calcium 9.1 8.7 - 10.5 mg/dL    Total Protein 6.2 6.0 - 8.4 g/dL    Albumin 3.4 (L) 3.5 - 5.2 g/dL    Total Bilirubin 0.2 0.1 - 1.0 mg/dL    Alkaline Phosphatase 74 55 - 135 U/L    AST 22 10 - 40 U/L    ALT 19 10 - 44 U/L    Anion Gap 10 8 - 16 mmol/L    eGFR if African American 47 (A) >60 mL/min/1.73 m^2    eGFR if non African American 41 (A) >60 mL/min/1.73 m^2   Troponin I   Result Value Ref Range    Troponin I 0.042 (H) 0.000 - 0.026 ng/mL   Urinalysis, Reflex to Urine Culture Urine, Clean Catch    Specimen: Urine   Result Value Ref Range    Specimen UA Urine, Catheterized     Color, UA Yellow Yellow, Straw, Bridgette    Appearance, UA Clear Clear    pH, UA 7.0 5.0 - 8.0    Specific Gravity, UA 1.015 1.005 - 1.030    Protein, UA Trace (A) Negative    Glucose, UA Negative Negative    Ketones, UA Trace (A) Negative    Bilirubin (UA) Negative Negative    Occult Blood UA Trace (A) Negative    Nitrite, UA Positive (A) Negative    Urobilinogen, UA Negative <2.0 EU/dL    Leukocytes, UA 3+ (A) Negative   COVID-19 Rapid Screening   Result Value Ref Range    SARS-CoV-2 RNA, Amplification, Qual Negative Negative   Troponin I   Result Value Ref Range    Troponin I 0.033 (H) 0.000 - 0.026 ng/mL   Urinalysis Microscopic   Result Value Ref Range    RBC, UA 5 (H) 0 - 4 /hpf    WBC, UA >100 (H) 0 - 5 /hpf    Bacteria Moderate (A) None-Occ /hpf    Squam Epithel, UA 10 /hpf    Hyaline Casts, UA 5 (A) 0-1/lpf /lpf    Microscopic Comment SEE COMMENT    POCT glucose   Result Value Ref Range    POCT Glucose 120 (H) 70 - 110 mg/dL   POCT  glucose   Result Value Ref Range    POCT Glucose 99 70 - 110 mg/dL         Imaging Results          X-Ray Chest AP Portable (Final result)  Result time 07/27/20 17:18:41    Final result by Ken Bedoya MD (07/27/20 17:18:41)                 Impression:      No acute findings.      Electronically signed by: Ken Bedoya MD  Date:    07/27/2020  Time:    17:18             Narrative:    EXAMINATION:  XR CHEST AP PORTABLE    CLINICAL HISTORY:  Slurred speech    TECHNIQUE:  Single frontal view of the chest was performed.    COMPARISON:  03/11/2020    FINDINGS:  Mild stable cardiomegaly.  Left chest AICD.  Mild aortic atherosclerosis.    The lungs are clear.  No pleural effusions.    No acute osseous findings.                               CT Head Without Contrast (Final result)  Result time 07/27/20 15:14:12    Final result by Moreno Brewster Jr., MD (07/27/20 15:14:12)                 Impression:      1. No acute findings.  2. Unchanged chronic left occipital lobe infarct.  All CT scans at this facility use dose modulation, iterative reconstruction, and/or weight base dosing when appropriate to reduce radiation dose to as low as reasonably achievable.      Electronically signed by: Moreno Brewster Jr., MD  Date:    07/27/2020  Time:    15:14             Narrative:    EXAMINATION:  CT HEAD WITHOUT CONTRAST    CLINICAL HISTORY:  Neuro deficit, acute, stroke suspected;    TECHNIQUE:  Contiguous axial CT images were obtained from the skull base through the vertex without intravenous contrast.    COMPARISON:  Prior CT of the head from October 24, 2016.    FINDINGS:  No intracranial hemorrhage. No mass effect or midline shift. Again demonstrated is a chronic cortical infarction involving the left occipital lobe.  The ventricles and sulci are normal in size and configuration. The paranasal sinuses and mastoid air cells are clear.  No concerning osseous findings.                                 The EKG was ordered, reviewed,  and independently interpreted by the ED provider.  Interpretation time: 14:12  Rate: 73 BPM  Rhythm: Sinus rhythm with 1st degree AV block  Interpretation: Left axis deviation. Left ventricular hypertrophy with QRS widening and repolarization abnormality. No STEMI.      The Emergency Provider reviewed the vital signs and test results, which are outlined above.     ED Discussion     3:58 PM: Dr. Madrigal transfers care of patient to Dr. Velasquez pending lab results.    6:27 PM: Re-evaluated pt. Pt is resting comfortably and is in no acute distress.  Pt was talking on the phone when she was re-evaluated. Pt stated her sxs were constant for 2 days.  Patient reports that her speech was slurred.  She states that is been constant for 2 days.  States that she has had difficulty writing with her right hand.  She then states that her left leg was we can numb left.  Patient does have prior history of TIA.  She is followed by Dr. Pollard.  Patient had recently undergone a CT of the head and neck on December 8, 2019 which did not show any obstruction.  Patient also did receive an echocardiogram on September 6, 2019 which showed an EF of 30-35% with mild-to-moderate mitral regurgitation.  Patient states that she takes aspirin 81 mg and Plavix 75 mg daily.  She has been compliant.  Pt states she is feeling better.  D/w pt all pertinent results. D/w pt any concerns expressed at this time. Answered all questions. Pt expresses understanding at this time.  Note, when I walked into the room, patient was using her cell phone.  I did not detect any slurred speech.  Cranial nerves 2-12 were intact.  Finger-to-nose was intact.  There is no focal deficits.  NIH Stroke Scale equals 0.    7:09 PM: Discussed pt's case with Crystal Pop NP (Neurology) who recommends f/u with her tomorrow at 8 AM.  She does not recommend any adjustments to patient's medications.    8:10 PM: Reassessed pt at this time.  Once again, patient was talking on her  cell phone.  No slurred speech was appreciated.  No focal deficits were appreciated.  Patient cannot undergo MRI as she has a pacemaker.  Patient has timely follow-up with her neurologist.  Pt was on the phone upon when she was re-evaluated.  Patient denies any chest pains, chest pressure, indigestion.  Review of patient's old troponins demonstrate they are at baseline.  Repeat troponin did not show any trending up.  EKG did not show STEMI.  Pt states her condition has improved at this time. Discussed with pt all pertinent ED information and results. Discussed pt dx and plan of tx. Gave pt all f/u and return to the ED instructions. All questions and concerns were addressed at this time. Pt expresses understanding of information and instructions, and is comfortable with plan to discharge. Pt is stable for discharge.  Results for LILIAN RODRIGUEZ (MRN [id number]) as of 7/28/2020 01:21     Ref. Range 3/11/2020 08:53 3/11/2020 11:33 3/12/2020 08:28 7/27/2020 16:46 7/27/2020 18:39   Troponin I Latest Ref Range: 0.000 - 0.026 ng/mL 0.022 0.030 (H) 0.040 (H) 0.042 (H) 0.033 (H)     Results for LILIAN RODRIGUEZ (MRN [id number]) as of 7/28/2020 01:22   Ref. Range 3/11/2020 04:49 3/12/2020 05:25 7/27/2020 14:39 7/27/2020 16:46   Hemoglobin Latest Ref Range: 12.0 - 16.0 g/dL 7.8 (L) 7.7 (L)  10.0 (L)   Hematocrit Latest Ref Range: 37.0 - 48.5 % 24.9 (L) 24.8 (L)  30.5 (L)       I discussed with patient and/or family/caretaker that evaluation in the ED does not suggest any emergent or life threatening medical conditions requiring immediate intervention beyond what was provided in the ED, and I believe patient is safe for discharge.  Regardless, an unremarkable evaluation in the ED does not preclude the development or presence of a serious of life threatening condition. As such, patient was instructed to return immediately for any worsening or change in current symptoms.      MDM     Medical Decision Making:   History:   Old Medical  Records: I decided to obtain old medical records.  Old Records Summarized: records from previous admission(s).       <> Summary of Records: CTA neck and brain with and without contrast    The clinical history is Stroke, follow up,Stroke.    Spiral axial images were performed with and without intravenous contrast of the neck and brain using our routine CTA protocol. 3-D and multiplanar reconstructed images were performed. Automated exposure control was used for dose reduction. All stenosis was measured based on the NASCET criteria.    The CTA of the neck shows normal configuration of the aortic arch. The common carotid, internal carotid, external carotid arteries and vertebral arteries appear normal in caliber.    The CTA of the brain shows the internal carotid arteries, anterior and middle cerebral arteries, distal vertebral arteries, basilar artery and posterior cerebral arteries are normal in caliber. There is no evidence of a significant stenosis or aneurysm.    CT images of the brain are unremarkable. No enhancing lesions are seen.    Impression: Negative study.  Other Result Information  Interface, Rad Results In - 12/08/2019  6:38 PM CST  CTA neck and brain with and without contrast    The clinical history is Stroke, follow up,Stroke.    Spiral axial images were performed with and without intravenous contrast of the neck and brain using our routine CTA protocol. 3-D and multiplanar reconstructed images were performed. Automated exposure control was used for dose reduction. All stenosis was measured based on the NASCET criteria.    The CTA of the neck shows normal configuration of the aortic arch. The common carotid, internal carotid, external carotid arteries and vertebral arteries appear normal in caliber.    The CTA of the brain shows the internal carotid arteries, anterior and middle cerebral arteries, distal vertebral arteries, basilar artery and posterior cerebral arteries are normal in caliber. There is  no evidence of a significant stenosis or aneurysm.    CT images of the brain are unremarkable. No enhancing lesions are seen.    Impression: Negative study.    Result Narrative  Moderate to severe left ventricular systolic dysfunction  Left ventricular ejection fraction 30 to 35%  Grade 1 diastolic dysfunction  Mild to moderate mitral regurgitation    On 9/6/2019    · Concentric left ventricular hypertrophy.  · Mild left ventricular enlargement.  · Moderately decreased left ventricular systolic function. The estimated ejection fraction is 30%.  · Grade I (mild) left ventricular diastolic dysfunction consistent with impaired relaxation.  · Severe global hypokinetic wall motion.  · Normal right ventricular systolic function.  · Mild aortic regurgitation.  · Mild mitral regurgitation.  · Mild tricuspid regurgitation.  · Normal central venous pressure (3 mmHg).  · The estimated PA systolic pressure is 41 mmHg.  · Pulmonary hypertension present.  Clinical Tests:   Lab Tests: Ordered and Reviewed  Radiological Study: Ordered and Reviewed  Medical Tests: Ordered and Reviewed           ED Medication(s):  Medications   sodium chloride 0.9% bolus 250 mL (0 mLs Intravenous Stopped 7/27/20 1820)   sulfamethoxazole-trimethoprim 800-160mg per tablet 1 tablet (1 tablet Oral Given 7/27/20 2047)       Discharge Medication List as of 7/27/2020  8:09 PM      START taking these medications    Details   sulfamethoxazole-trimethoprim 400-80mg (BACTRIM,SEPTRA) 400-80 mg per tablet Take 1 tablet by mouth 2 (two) times daily. for 7 days, Starting Mon 7/27/2020, Until Mon 8/3/2020, Print             Follow-up Information     Jordon Pollard MD.    Specialty: Neurology  Why: Follow with Crystal Pop NP at 8:00 AM tomorrow.  Return to the ED for:  Fevers, nausea, vomiting, abdominal pain, worsening confusion, new numbness or weakness, or other concerns.  Contact information:  [address] [facility] [address] [phone]                        Scribe Attestation:   Scribe #1: I performed the above scribed service and the documentation accurately describes the services I performed. I attest to the accuracy of the note.     Attending:   Physician Attestation Statement for Scribe #1: I, David Madrigal Jr., MD, personally performed the services described in this documentation, as scribed by Deepthi Rene, in my presence, and it is both accurate and complete.       Scribe Attestation:   Scribe #2: I performed the above scribed service and the documentation accurately describes the services I performed. I attest to the accuracy of the note.    Attending Attestation:           Physician Attestation for Scribe:    Physician Attestation Statement for Scribe #2: I, Karen Velasquez DO, reviewed documentation, as scribed by Froylan Pickard in my presence, and it is both accurate and complete. I also acknowledge and confirm the content of the note done by Scribe #1.           Clinical Impression       ICD-10-CM ICD-9-CM   1. Anemia, unspecified type  D64.9 285.9   2. Slurred speech  R47.81 784.59   3. Acute cystitis without hematuria  N30.00 595.0       Disposition:   Disposition: Discharged  Condition: Stable         Karen Velasquez DO  07/28/20 0123       Karen Velasquez DO  07/28/20 0123

## 2020-07-28 ENCOUNTER — HOSPITAL ENCOUNTER (EMERGENCY)
Facility: HOSPITAL | Age: 85
Discharge: HOME | End: 2020-07-28
Attending: EMERGENCY MEDICINE
Payer: MEDICARE

## 2020-07-28 VITALS
HEART RATE: 63 BPM | TEMPERATURE: 98 F | DIASTOLIC BLOOD PRESSURE: 62 MMHG | BODY MASS INDEX: 24.8 KG/M2 | WEIGHT: 131.38 LBS | RESPIRATION RATE: 20 BRPM | SYSTOLIC BLOOD PRESSURE: 148 MMHG | HEIGHT: 61 IN | OXYGEN SATURATION: 99 %

## 2020-07-28 DIAGNOSIS — R47.89 OTHER SPEECH DISTURBANCE: ICD-10-CM

## 2020-07-28 DIAGNOSIS — R53.1 WEAKNESS: Primary | ICD-10-CM

## 2020-07-28 PROBLEM — R47.9 SPEECH PROBLEM: Status: ACTIVE | Noted: 2020-07-28

## 2020-07-28 PROBLEM — R29.90 MULTIPLE NEUROLOGICAL SYMPTOMS: Status: ACTIVE | Noted: 2020-07-28

## 2020-07-28 PROCEDURE — 99284 EMERGENCY DEPT VISIT MOD MDM: CPT | Mod: ,,, | Performed by: OPHTHALMOLOGY

## 2020-07-28 PROCEDURE — 99284 PR EMERGENCY DEPT VISIT,LEVEL IV: ICD-10-PCS | Mod: ,,, | Performed by: OPHTHALMOLOGY

## 2020-07-28 PROCEDURE — 99284 EMERGENCY DEPT VISIT MOD MDM: CPT | Mod: 25

## 2020-07-28 NOTE — SUBJECTIVE & OBJECTIVE
Past Medical History:   Diagnosis Date    Anemia     Anxiety     Arthritis     Back pain     Cardiomyopathy     CHF (congestive heart failure)     Depression     Dizziness     Hypertension     Insomnia     Stroke     Use of cane as ambulatory aid        Past Surgical History:   Procedure Laterality Date    APPENDECTOMY      arthritis      BLADDER REPAIR      BUNIONECTOMY      CARDIAC DEFIBRILLATOR PLACEMENT      CATARACT EXTRACTION      HYSTERECTOMY      metal implant Bilateral     placed in the bladder.       Review of patient's allergies indicates:   Allergen Reactions    Bacitracin-polymyxin b Itching and Swelling    Merthiolate (thimerosal) Rash and Swelling    Cefdinir     Diazepam      Hallucinations    Levofloxacin Itching    Metronidazole     Oxycodone Itching    Tizanidine      Hallucinations    Tramadol Itching       Current Neurological Medications:     Current Facility-Administered Medications on File Prior to Encounter   Medication    [COMPLETED] sodium chloride 0.9% bolus 250 mL    [COMPLETED] sulfamethoxazole-trimethoprim 800-160mg per tablet 1 tablet     Current Outpatient Medications on File Prior to Encounter   Medication Sig    albuterol (PROAIR HFA) 90 mcg/actuation inhaler Inhale 2 puffs into the lungs every 6 (six) hours as needed for Wheezing.    amiodarone (PACERONE) 200 MG Tab Take 1 tablet by mouth once daily.    amlodipine (NORVASC) 10 MG tablet Take 10 mg by mouth once daily.    aripiprazole (ABILIFY) 10 MG Tab Take 5 mg by mouth once daily.    aspirin (ECOTRIN) 81 MG EC tablet Take 81 mg by mouth once daily.    atorvastatin (LIPITOR) 20 MG tablet Take 20 mg by mouth once daily.    busPIRone (BUSPAR) 15 MG tablet Take 1 tablet by mouth 2 (two) times daily.    carvedilol (COREG) 6.25 MG tablet Take 6.25 mg by mouth 2 (two) times daily with meals.    clopidogreL (PLAVIX) 75 mg tablet Take 1 tablet by mouth once daily.    cyanocobalamin (VITAMIN B-12)  1000 MCG tablet Take 1 tablet by mouth once daily.    cycloSPORINE (RESTASIS) 0.05 % ophthalmic emulsion Apply 1 drop to eye.    donepezil (ARICEPT) 10 MG tablet Take 1 tablet by mouth every evening.    DULoxetine (CYMBALTA) 30 MG capsule Take 1 capsule by mouth once daily.    esomeprazole (NEXIUM) 40 MG capsule Take 40 mg by mouth before breakfast.    famotidine (PEPCID) 20 MG tablet Take 1 tablet (20 mg total) by mouth 2 (two) times daily as needed (heartburn).    ferrous sulfate 324 mg (65 mg iron) TbEC Take 65 mg by mouth once daily.    fluticasone (FLONASE) 50 mcg/actuation nasal spray 1 spray by Each Nare route once daily.    furosemide (LASIX) 40 MG tablet Take 1 tablet by mouth 2 (two) times daily.    glucosamine-chondroitin 500-400 mg tablet Take 1 tablet by mouth 2 (two) times daily.    Lactobacillus rhamnosus GG (CULTURELLE) 10 billion cell capsule Take 1 capsule by mouth once daily.    levothyroxine (SYNTHROID) 75 MCG tablet Take 1 tablet (75 mcg total) by mouth before breakfast.    losartan (COZAAR) 25 MG tablet Take 1 tablet by mouth once daily.    melatonin 1 mg Tab Take 1 mg by mouth every evening.    mirabegron 50 mg Tb24 Take by mouth.    mirtazapine (REMERON) 7.5 MG Tab     multivitamin capsule Take 1 capsule by mouth once daily.    nystatin-triamcinolone (MYCOLOG) ointment 2 (two) times daily as needed.    polyethylene glycol (GLYCOLAX) 17 gram PwPk Take by mouth.    potassium chloride SA (K-DUR,KLOR-CON) 20 MEQ tablet Take 20 mEq by mouth 2 (two) times daily.    solifenacin (VESICARE) 10 MG tablet Take 5 mg by mouth once daily.    sulfamethoxazole-trimethoprim 400-80mg (BACTRIM,SEPTRA) 400-80 mg per tablet Take 1 tablet by mouth 2 (two) times daily. for 7 days    traMADol (ULTRAM) 50 mg tablet Take 1 tablet by mouth 3 (three) times daily as needed.    venlafaxine (EFFEXOR-XR) 150 MG Cp24 Take 75 mg by mouth once daily.     Family History     Problem Relation (Age of Onset)  "   Cancer Mother    Heart disease Father    Hypertension         Tobacco Use    Smoking status: Never Smoker    Smokeless tobacco: Never Used   Substance and Sexual Activity    Alcohol use: No    Drug use: No    Sexual activity: Not Currently     Review of Systems   Constitutional: Positive for appetite change, fatigue and unexpected weight change.   Neurological: Positive for dizziness, tremors and weakness.   All other systems reviewed and are negative.    Objective:     Vital Signs (Most Recent):  Temp: 99.4 °F (37.4 °C) (07/28/20 1030)  Pulse: (!) 58 (07/28/20 1247)  Resp: 20 (07/28/20 1030)  BP: (!) 105/58 (07/28/20 1247)  SpO2: 97 % (07/28/20 1247) Vital Signs (24h Range):  Temp:  [99.4 °F (37.4 °C)] 99.4 °F (37.4 °C)  Pulse:  [58-76] 58  Resp:  [18-22] 20  SpO2:  [96 %-100 %] 97 %  BP: ()/(48-81) 105/58     Weight: 59.6 kg (131 lb 6.4 oz)  Body mass index is 24.83 kg/m².    PHYSICAL EXAMINATION  VITALS:  BP (!) 105/58   Pulse (!) 58   Temp 99.4 °F (37.4 °C) (Oral)   Resp 20   Ht 5' 1" (1.549 m)   Wt 59.6 kg (131 lb 6.4 oz)   LMP  (LMP Unknown)   SpO2 97%   BMI 24.83 kg/m²        - HENT: Normal Temporal artery pulses. No skull or sinus tenderness.  - Eyes: Conjunctivae and lids are normal. Fundus exam: Sharp margins of optic discs with positive venous pulsation.  - Neck: Normal range of motion and full passive range of motion without pain. Neck supple. Carotid bruit is not present. Negative Lhermitte's sign.  - Cardiac: Heart rate regular.  Intact pulses.  No edema.  - Pulmonary:  No wheezing.  - Extremities: ROM within functional limits.  No joint effusions.  No edema, pain.  - Skin:  Multiple subcutaneous purpura likely related to dual antiplatelet therapy.      - Mental status: Alert and Oriented x 3, Appropriate mood and affect  - Cranial nerves:    I: not tested    II: Pupils equal, round and reactive to light and accommodation.    III, IV, VI: Extraocular movements are intact.    V: " Sensation intact in V1, V2 and V3 distribution bilaterally    VII: Face symmetric, facial strength intact bilaterally    VIII: Hearing intact to finger rub bilaterally    IX and X: Palate elevates symmetrically    XI: Shoulder shrug and head turn intact bilaterally    XII: Tongue protrudes to the left (patient reported that it is chronic)    - Motor: Full strength in the extremities, proximally and distally.  Normal tone.  No pathologic movements.    - Somatosensory: Intact in all extremities to light touch, pain, temperature and vibration.    - Reflexes: 1+ in biceps, triceps, brachioradialis, quadriceps, and ankles.  Toes downgoing bilaterally.    - Coordination: Normal finger-to-nose, fine finger movements, heel-to-shin, and rapid alternating movements.    - Gait: Deferred due to pending physical therapy and activity level.      Significant Labs:   BMP:   Recent Labs   Lab 07/27/20  1646   GLU 98      K 4.9      CO2 22*   BUN 13   CREATININE 1.2   CALCIUM 9.1     CBC:   Recent Labs   Lab 07/27/20  1646   WBC 9.05   HGB 10.0*   HCT 30.5*   *     CMP:   Recent Labs   Lab 07/27/20  1646   GLU 98      K 4.9      CO2 22*   BUN 13   CREATININE 1.2   CALCIUM 9.1   PROT 6.2   ALBUMIN 3.4*   BILITOT 0.2   ALKPHOS 74   AST 22   ALT 19   ANIONGAP 10   EGFRNONAA 41*       Significant Imaging: CT: I have reviewed all pertinent results/findings within the past 24 hours and my personal findings are:  No acute intracranial hemorrhage or pathology identified.  Signs of old/chronic stroke in the left occipital area.

## 2020-07-28 NOTE — ED PROVIDER NOTES
SCRIBE #1 NOTE: I, Lexii Mckinney, am scribing for, and in the presence of, Jose Carlos Ardon Do, MD. I have scribed the entire note.       History     Chief Complaint   Patient presents with    Slurred Speech     seen yesterday for same complaint, attempted to follow up with Neurology per EMS, but neurologist had no record of appointment and instructed patient to return to ED     Review of patient's allergies indicates:   Allergen Reactions    Bacitracin-polymyxin b Itching and Swelling    Merthiolate (thimerosal) Rash and Swelling    Cefdinir     Diazepam      Hallucinations    Levofloxacin Itching    Metronidazole     Oxycodone Itching    Tizanidine      Hallucinations    Tramadol Itching         History of Present Illness     HPI    7/28/2020, 11:34 AM  History obtained from the patient      History of Present Illness: Love Davey is a 86 y.o. female patient with a PMHx of cardiomyopathy, CHF, HTN, and stroke (x2) who presents to the Emergency Department for evaluation of slurred speech which onset gradually 3 days ago. She was seen in ED yesterday for same symptoms and was sent home with Bactrim prescription for UTI. States today her symptoms worsened. Speech is much slower than yesterday and still slurred. Symptoms are moderate in severity. No mitigating or exacerbating factors reported. Associated sxs include weakness, dizziness, and nausea. This morning, patient was unable to feed herself breakfast because she couldn't find her mouth with her spoon, unable to open her medicine bottles, and fell backwards into seat of walker when she tried to stand up. She uses a walker at baseline. Nausea is exacerbated with movement. Patient denies any head injury/trauma, LOC, fever, chills, CP, SOB, cough, leg swelling, vomiting, diarrhea, HA, and all other sxs at this time. After first stroke patient had no residual effects, and after second stroke patient had generalized weakness and difficulty talking fast.  Patient on aspirin and Plavix. No further complaints or concerns at this time.       Arrival mode: EMS    PCP: Son Guan MD         Past Medical History:  Past Medical History:   Diagnosis Date    Anemia     Anxiety     Arthritis     Back pain     Cardiomyopathy     CHF (congestive heart failure)     Depression     Dizziness     Hypertension     Insomnia     Stroke     Use of cane as ambulatory aid        Past Surgical History:  Past Surgical History:   Procedure Laterality Date    APPENDECTOMY      arthritis      BLADDER REPAIR      BUNIONECTOMY      CARDIAC DEFIBRILLATOR PLACEMENT      CATARACT EXTRACTION      HYSTERECTOMY      metal implant Bilateral     placed in the bladder.         Family History:  Family History   Problem Relation Age of Onset    Hypertension Unknown     Cancer Mother     Heart disease Father        Social History:  Social History     Tobacco Use    Smoking status: Never Smoker    Smokeless tobacco: Never Used   Substance and Sexual Activity    Alcohol use: No    Drug use: No    Sexual activity: Not Currently        Review of Systems     Review of Systems   Constitutional: Negative for activity change, appetite change, chills, diaphoresis, fatigue and fever.        (+) generalized weakness   HENT: Negative for congestion, ear pain, nosebleeds, rhinorrhea, sinus pain, sore throat and trouble swallowing.    Eyes: Negative for pain and discharge.   Respiratory: Negative for cough, chest tightness, shortness of breath, wheezing and stridor.    Cardiovascular: Negative for chest pain, palpitations and leg swelling.   Gastrointestinal: Positive for nausea. Negative for abdominal distention, abdominal pain, blood in stool, constipation, diarrhea and vomiting.   Genitourinary: Negative for difficulty urinating, dysuria, flank pain, frequency, hematuria and urgency.   Musculoskeletal: Negative for arthralgias, back pain, myalgias and neck pain.   Skin: Negative for  "pallor, rash and wound.   Neurological: Positive for dizziness and speech difficulty. Negative for syncope, weakness, light-headedness, numbness and headaches.        (-) head injury/trauma   Hematological: Does not bruise/bleed easily.   Psychiatric/Behavioral: Negative for confusion and self-injury.   All other systems reviewed and are negative.     Physical Exam     Initial Vitals   BP Pulse Resp Temp SpO2   07/28/20 1029 07/28/20 1029 07/28/20 1029 07/28/20 1030 07/28/20 1029   118/72 68 20 99.4 °F (37.4 °C) 96 %      MAP       --                 Physical Exam  Nursing Notes and Vital Signs Reviewed.  Constitutional: Patient is in no acute distress. Well-developed and well-nourished.  Head: Atraumatic. Normocephalic.  Eyes: PERRL. EOM intact. Conjunctivae are not pale. No scleral icterus.  ENT: Mucous membranes are moist. Oropharynx is clear and symmetric.    Neck: Supple. Full ROM. No lymphadenopathy.  Cardiovascular: Regular rate. Regular rhythm. No murmurs, rubs, or gallops. Distal pulses are 2+ and symmetric.  Pulmonary/Chest: No respiratory distress. Clear to auscultation bilaterally. No wheezing or rales.  Abdominal: Soft and non-distended.  There is no tenderness.  No rebound, guarding, or rigidity. Good bowel sounds.  Genitourinary: No CVA tenderness  Musculoskeletal: Moves all extremities. No obvious deformities. No edema. No calf tenderness.  Skin: Warm and dry.  Neurological:  Alert, awake, and appropriate.  Normal speech.  No acute focal neurological deficits are appreciated.  Psychiatric: Normal affect. Good eye contact. Appropriate in content.         ED Course   Procedures  ED Vital Signs:  Vitals:    07/28/20 1029 07/28/20 1030 07/28/20 1147 07/28/20 1201   BP: 118/72 (!) 112/56 107/65    Pulse: 68 64 60 61   Resp: 20 20     Temp:  99.4 °F (37.4 °C)     TempSrc:  Oral     SpO2: 96% 97% 97% 98%   Weight:  59.6 kg (131 lb 6.4 oz)     Height: 5' 1" (1.549 m) 5' 1" (1.549 m)      07/28/20 1202 " 07/28/20 1216 07/28/20 1232 07/28/20 1247   BP: (!) 98/48 (!) 98/49 (!) 128/58 (!) 105/58   Pulse:  63 60 (!) 58   Resp:       Temp:       TempSrc:       SpO2:  100% 100% 97%   Weight:       Height:        07/28/20 1302 07/28/20 1316 07/28/20 1331 07/28/20 1357   BP: 132/60 (!) 126/58 (!) 113/56 (!) 151/54   Pulse:  (!) 56 (!) 59 61   Resp:       Temp:       TempSrc:       SpO2:  96% 99% 100%   Weight:       Height:           Abnormal Lab Results:  Labs Reviewed - No data to display     All Lab Results:  none    Imaging Results:  Imaging Results          CT Head Without Contrast (Final result)  Result time 07/28/20 14:08:23    Final result by Ememtt Mi MD (07/28/20 14:08:23)                 Impression:      No acute findings.  Atrophy.  Old left occipital infarct.      Electronically signed by: Emmett Mi MD  Date:    07/28/2020  Time:    14:08             Narrative:    EXAMINATION:  CT HEAD WITHOUT CONTRAST    CLINICAL HISTORY:  Slurred speech.  TIA.    TECHNIQUE:  Standard noncontrast CT of the brain.    All CT scans at this facility are performed  using dose modulation techniques as appropriate to performed exam including the following:  automated exposure control; adjustment of mA and/or kV according to the patients size (this includes techniques or standardized protocols for targeted exams where dose is matched to indication/reason for exam: i.e. extremities or head);  iterative reconstruction technique.    COMPARISON:  07/27/2020    FINDINGS:  No definite change or new finding is present.  Atrophy with white matter degeneration.    Chronic left occipital pole encephalomalacia consistent with old left posterior cerebral artery territory infarct.    Calcification of the carotid arteries.    The skull is grossly normal.                                        The Emergency Provider reviewed the vital signs and test results, which are outlined above.     ED Discussion     2:34 PM: Dr. Mckeon  (neurology) evaluated pt. Note:     Assessment and Plan:      * Multiple neurological symptoms  # Multiple neurological symptoms - speech problem, weakness in lower extremities, right hand tremors     - Risk Factors: Cardiomyopathy with dilated congestive heart failure (s/p pacemaker, on aspirin, Plavix and ?amiodarone), hypertension and multiple ischemic stroke (x 2, without any residual neuro deficit) , Polypharmacy. (patient is poor historian and they try to identify her medication but she did not know some of the names.)     - patient's neurological exam is nonfocal.  There is no concern for aphasia with intact comprehension, fluency and repetition.  Patient reported still abnormal speech and on neurological exam her speech is slower without any neurological pattern.  Patient has normal motor strength, sensory exam as well as cerebellar functions.       - This slow speech with reported articulation problem can also be seen with dry mouth.      - her symptoms have been going on for about 3 days.  It is appropriate to get CT brain without contrast.  We are limited to get MRI brain because of pacemaker but CT brain without contrast will also tell if there is any subacute hypodensity and rule out stroke in last few days.  Likelihood of stroke is almost negative based on non-localizing neurological symptoms and normal neurological exam.      - patient is already on secondary prevention medication for stroke that include aspirin, Plavix and statin.  Patient reported that she is taking aspirin and Plavix as per recommendation by her cardiologist.  Patient is apparently also taking ? amiodarone.  I do not see if she has been diagnosed with atrial fibrillation.  Patient needs to follow-up with cardiology if she is a candidate for anticoagulant rather than dual antiplatelet therapy. Dilated cardiomyopathy with low ejection fraction around 30% is also a risk factor for cardioembolism.  She had previous stroke.  She needs  to discuss with her cardiologist for optimization of her medication.  I would not change any medication because patient is poor historian and there is no new neuro deficit and it is appropriate that her medication should be adjusted with monitoring her medications.     - Physical therapy recommended to help with improved ambulation and it can also be done as outpatient.     Patient is recommended to follow up in Neurology Clinic in 2-3 months for monitoring.  There is no immediate concern needed to be addressed at this point.      Neurology will sign off.  Please call me if there is any question.           Sheikh SHIRLENE Mckeon MD  General Neurology, Neuro-Ophthalmology & Stroke   [facility]     2:48 PM: Reassessed pt at this time. Discussed with pt all pertinent ED information and results. Discussed pt dx and plan of tx. Gave pt all f/u and return to the ED instructions. All questions and concerns were addressed at this time. Pt expresses understanding of information and instructions, and is comfortable with plan to discharge. Pt is stable for discharge.    I discussed with patient that evaluation in the ED does not suggest any emergent or life threatening medical conditions requiring immediate intervention beyond what was provided in the ED, and I believe patient is safe for discharge.  Regardless, an unremarkable evaluation in the ED does not preclude the development or presence of a serious of life threatening condition. As such, patient was instructed to return immediately for any worsening or change in current symptoms.       Medical Decision Making:   Clinical Tests:   Radiological Study: Ordered and Reviewed           ED Medication(s):  Medications - No data to display    New Prescriptions    No medications on file       Follow-up Information     Son Guan MD In 2 days.    Specialty: Internal Medicine  Contact information:  [address] [facility] [address] [phone]                        Scribe Attestation:   Scribe #1: I performed the above scribed service and the documentation accurately describes the services I performed. I attest to the accuracy of the note.     Attending:   Physician Attestation Statement for Scribe #1: I, Jose Carlos Ardon Do, MD, personally performed the services described in this documentation, as scribed by Lexii Mckinney, in my presence, and it is both accurate and complete.           Clinical Impression       ICD-10-CM ICD-9-CM   1. Weakness  R53.1 780.79   2. Other speech disturbance  R47.89 784.59       Disposition:   Disposition: Discharged  Condition: Stable         Jose Carlos Ardon Do, MD  07/28/20 1548

## 2020-07-28 NOTE — CONSULTS
[facility] -   Neurology  Consult Note    Patient Name: Love Davey  MRN: [id number]  Admission Date: 7/28/2020  Hospital Length of Stay: 0 days  Code Status: Prior   Attending Provider: Jose Carlos Ardon Do, MD   Consulting Provider: Sheikh SIA Mckeon MD  Primary Care Physician: Son Guan MD  Principal Problem:Multiple neurological symptoms    Inpatient consult to neurology  Consult performed by: Sheikh SHIRLENE Mckeon MD  Consult ordered by: Jose Carlos Ardon Do, MD  Reason for consult: Speech problem         Subjective:     Chief Complaint:  Speech problem     HPI:   86-year-old female with a history of cardiomyopathy with dilated congestive heart failure (s/p pacemaker, on aspirin, Plavix and ?amiodarone), hypertension and multiple ischemic stroke (x 2, without any residual neuro deficit) presented to ER for evaluation of slurred speech and bilateral lower extremity weakness for about 3 days.    Patient initially presented on 07/27/2020 through ER.  Her exam was nonlocalizing and she was sent to Neurology Clinic for urgent evaluation but she was sent back from Neurology Clinic to ER because there were no records available for the neurologist to review and there was no appointment scheduled for the patient with neurology clinic.  Patient was sent to ER through an ambulance because of her consistent problem with speech and bilateral lower extremity weakness.    Speech problem:  Patient reported that her speech is slower and she is finding hard to do articulation.  Although during evaluation her fluency was okay except her speech was slow.  There was no problem with repetition or comprehension.  Bilateral lower extremity weakness:  Patient reported that she has been using a walker to ambulate but he is finding a little weaker in her both lower extremities.  Right upper extremity tremors:  Patient reported that she experiences some positional tremors when she wakes up in the morning but these tremor improved  with time.  Patient denies any resting tremor.    Balance problem:  Patient reported that her balance is off for at least 4-5 years.  There is no acute change but she is feeling hard to walk lately.  She reported a fall yesterday but did hit her head.  Patient had CT brain yesterday that was negative for any intracranial bleed or acute pathology.     Patient denies any other neurological symptoms except above -  like sensory changes, facial asymmetry,vision changes /double vision or dizziness/ balance problem.        Past Medical History:   Diagnosis Date    Anemia     Anxiety     Arthritis     Back pain     Cardiomyopathy     CHF (congestive heart failure)     Depression     Dizziness     Hypertension     Insomnia     Stroke     Use of cane as ambulatory aid        Past Surgical History:   Procedure Laterality Date    APPENDECTOMY      arthritis      BLADDER REPAIR      BUNIONECTOMY      CARDIAC DEFIBRILLATOR PLACEMENT      CATARACT EXTRACTION      HYSTERECTOMY      metal implant Bilateral     placed in the bladder.       Review of patient's allergies indicates:   Allergen Reactions    Bacitracin-polymyxin b Itching and Swelling    Merthiolate (thimerosal) Rash and Swelling    Cefdinir     Diazepam      Hallucinations    Levofloxacin Itching    Metronidazole     Oxycodone Itching    Tizanidine      Hallucinations    Tramadol Itching       Current Neurological Medications:     Current Facility-Administered Medications on File Prior to Encounter   Medication    [COMPLETED] sodium chloride 0.9% bolus 250 mL    [COMPLETED] sulfamethoxazole-trimethoprim 800-160mg per tablet 1 tablet     Current Outpatient Medications on File Prior to Encounter   Medication Sig    albuterol (PROAIR HFA) 90 mcg/actuation inhaler Inhale 2 puffs into the lungs every 6 (six) hours as needed for Wheezing.    amiodarone (PACERONE) 200 MG Tab Take 1 tablet by mouth once daily.    amlodipine (NORVASC) 10 MG  tablet Take 10 mg by mouth once daily.    aripiprazole (ABILIFY) 10 MG Tab Take 5 mg by mouth once daily.    aspirin (ECOTRIN) 81 MG EC tablet Take 81 mg by mouth once daily.    atorvastatin (LIPITOR) 20 MG tablet Take 20 mg by mouth once daily.    busPIRone (BUSPAR) 15 MG tablet Take 1 tablet by mouth 2 (two) times daily.    carvedilol (COREG) 6.25 MG tablet Take 6.25 mg by mouth 2 (two) times daily with meals.    clopidogreL (PLAVIX) 75 mg tablet Take 1 tablet by mouth once daily.    cyanocobalamin (VITAMIN B-12) 1000 MCG tablet Take 1 tablet by mouth once daily.    cycloSPORINE (RESTASIS) 0.05 % ophthalmic emulsion Apply 1 drop to eye.    donepezil (ARICEPT) 10 MG tablet Take 1 tablet by mouth every evening.    DULoxetine (CYMBALTA) 30 MG capsule Take 1 capsule by mouth once daily.    esomeprazole (NEXIUM) 40 MG capsule Take 40 mg by mouth before breakfast.    famotidine (PEPCID) 20 MG tablet Take 1 tablet (20 mg total) by mouth 2 (two) times daily as needed (heartburn).    ferrous sulfate 324 mg (65 mg iron) TbEC Take 65 mg by mouth once daily.    fluticasone (FLONASE) 50 mcg/actuation nasal spray 1 spray by Each Nare route once daily.    furosemide (LASIX) 40 MG tablet Take 1 tablet by mouth 2 (two) times daily.    glucosamine-chondroitin 500-400 mg tablet Take 1 tablet by mouth 2 (two) times daily.    Lactobacillus rhamnosus GG (CULTURELLE) 10 billion cell capsule Take 1 capsule by mouth once daily.    levothyroxine (SYNTHROID) 75 MCG tablet Take 1 tablet (75 mcg total) by mouth before breakfast.    losartan (COZAAR) 25 MG tablet Take 1 tablet by mouth once daily.    melatonin 1 mg Tab Take 1 mg by mouth every evening.    mirabegron 50 mg Tb24 Take by mouth.    mirtazapine (REMERON) 7.5 MG Tab     multivitamin capsule Take 1 capsule by mouth once daily.    nystatin-triamcinolone (MYCOLOG) ointment 2 (two) times daily as needed.    polyethylene glycol (GLYCOLAX) 17 gram PwPk Take by  "mouth.    potassium chloride SA (K-DUR,KLOR-CON) 20 MEQ tablet Take 20 mEq by mouth 2 (two) times daily.    solifenacin (VESICARE) 10 MG tablet Take 5 mg by mouth once daily.    sulfamethoxazole-trimethoprim 400-80mg (BACTRIM,SEPTRA) 400-80 mg per tablet Take 1 tablet by mouth 2 (two) times daily. for 7 days    traMADol (ULTRAM) 50 mg tablet Take 1 tablet by mouth 3 (three) times daily as needed.    venlafaxine (EFFEXOR-XR) 150 MG Cp24 Take 75 mg by mouth once daily.     Family History     Problem Relation (Age of Onset)    Cancer Mother    Heart disease Father    Hypertension         Tobacco Use    Smoking status: Never Smoker    Smokeless tobacco: Never Used   Substance and Sexual Activity    Alcohol use: No    Drug use: No    Sexual activity: Not Currently     Review of Systems   Constitutional: Positive for appetite change, fatigue and unexpected weight change.   Neurological: Positive for dizziness, tremors and weakness.   All other systems reviewed and are negative.    Objective:     Vital Signs (Most Recent):  Temp: 99.4 °F (37.4 °C) (07/28/20 1030)  Pulse: (!) 58 (07/28/20 1247)  Resp: 20 (07/28/20 1030)  BP: (!) 105/58 (07/28/20 1247)  SpO2: 97 % (07/28/20 1247) Vital Signs (24h Range):  Temp:  [99.4 °F (37.4 °C)] 99.4 °F (37.4 °C)  Pulse:  [58-76] 58  Resp:  [18-22] 20  SpO2:  [96 %-100 %] 97 %  BP: ()/(48-81) 105/58     Weight: 59.6 kg (131 lb 6.4 oz)  Body mass index is 24.83 kg/m².    PHYSICAL EXAMINATION  VITALS:  BP (!) 105/58   Pulse (!) 58   Temp 99.4 °F (37.4 °C) (Oral)   Resp 20   Ht 5' 1" (1.549 m)   Wt 59.6 kg (131 lb 6.4 oz)   LMP  (LMP Unknown)   SpO2 97%   BMI 24.83 kg/m²        - HENT: Normal Temporal artery pulses. No skull or sinus tenderness.  - Eyes: Conjunctivae and lids are normal. Fundus exam: Sharp margins of optic discs with positive venous pulsation.  - Neck: Normal range of motion and full passive range of motion without pain. Neck supple. Carotid bruit is " not present. Negative Lhermitte's sign.  - Cardiac: Heart rate regular.  Intact pulses.  No edema.  - Pulmonary:  No wheezing.  - Extremities: ROM within functional limits.  No joint effusions.  No edema, pain.  - Skin:  Multiple subcutaneous purpura likely related to dual antiplatelet therapy.      - Mental status: Alert and Oriented x 3, Appropriate mood and affect  - Cranial nerves:    I: not tested    II: Pupils equal, round and reactive to light and accommodation.    III, IV, VI: Extraocular movements are intact.    V: Sensation intact in V1, V2 and V3 distribution bilaterally    VII: Face symmetric, facial strength intact bilaterally    VIII: Hearing intact to finger rub bilaterally    IX and X: Palate elevates symmetrically    XI: Shoulder shrug and head turn intact bilaterally    XII: Tongue protrudes to the left (patient reported that it is chronic)    - Motor: Full strength in the extremities, proximally and distally.  Normal tone.  No pathologic movements.    - Somatosensory: Intact in all extremities to light touch, pain, temperature and vibration.    - Reflexes: 1+ in biceps, triceps, brachioradialis, quadriceps, and ankles.  Toes downgoing bilaterally.    - Coordination: Normal finger-to-nose, fine finger movements, heel-to-shin, and rapid alternating movements.    - Gait: Deferred due to pending physical therapy and activity level.      Significant Labs:   BMP:   Recent Labs   Lab 07/27/20  1646   GLU 98      K 4.9      CO2 22*   BUN 13   CREATININE 1.2   CALCIUM 9.1     CBC:   Recent Labs   Lab 07/27/20  1646   WBC 9.05   HGB 10.0*   HCT 30.5*   *     CMP:   Recent Labs   Lab 07/27/20  1646   GLU 98      K 4.9      CO2 22*   BUN 13   CREATININE 1.2   CALCIUM 9.1   PROT 6.2   ALBUMIN 3.4*   BILITOT 0.2   ALKPHOS 74   AST 22   ALT 19   ANIONGAP 10   EGFRNONAA 41*       Significant Imaging: CT: I have reviewed all pertinent results/findings within the past 24 hours and my  personal findings are:  No acute intracranial hemorrhage or pathology identified.  Signs of old/chronic stroke in the left occipital area.    Assessment and Plan:     * Multiple neurological symptoms  # Multiple neurological symptoms - speech problem, weakness in lower extremities, right hand tremors    - Risk Factors: Cardiomyopathy with dilated congestive heart failure (s/p pacemaker, on aspirin, Plavix and ?amiodarone), hypertension and multiple ischemic stroke (x 2, without any residual neuro deficit) , Polypharmacy. (patient is poor historian and they try to identify her medication but she did not know some of the names.)    - patient's neurological exam is nonfocal.  There is no concern for aphasia with intact comprehension, fluency and repetition.  Patient reported still abnormal speech and on neurological exam her speech is slower without any neurological pattern.  Patient has normal motor strength, sensory exam as well as cerebellar functions.      - This slow speech with reported articulation problem can also be seen with dry mouth.     - her symptoms have been going on for about 3 days.  It is appropriate to get CT brain without contrast.  We are limited to get MRI brain because of pacemaker but CT brain without contrast will also tell if there is any subacute hypodensity and rule out stroke in last few days.  Likelihood of stroke is almost negative based on non-localizing neurological symptoms and normal neurological exam.     - patient is already on secondary prevention medication for stroke that include aspirin, Plavix and statin.  Patient reported that she is taking aspirin and Plavix as per recommendation by her cardiologist.  Patient is apparently also taking ? amiodarone.  I do not see if she has been diagnosed with atrial fibrillation.  Patient needs to follow-up with cardiology if she is a candidate for anticoagulant rather than dual antiplatelet therapy. Dilated cardiomyopathy with low ejection  fraction around 30% is also a risk factor for cardioembolism.  She had previous stroke.  She needs to discuss with her cardiologist for optimization of her medication.  I would not change any medication because patient is poor historian and there is no new neuro deficit and it is appropriate that her medication should be adjusted with monitoring her medications.    - Physical therapy recommended to help with improved ambulation and it can also be done as outpatient.    Patient is recommended to follow up in Neurology Clinic in 2-3 months for monitoring.  There is no immediate concern needed to be addressed at this point.     Neurology will sign off.  Please call me if there is any question.        Sheikh SHIRLENE Mckeon MD  General Neurology, Neuro-Ophthalmology & Stroke   [facility]              STROKE DOCUMENTATION          NIH Scale:        Modified Prowers    Branden Coma Scale:    ABCD2 Score:    [id number] Score:   HAS -BLED Score:   ICH Score:   Hunt & Mercer Classification:      VTE Risk Mitigation (From admission, onward)    None          Thank you for your consult. I will sign off. Please contact us if you have any additional questions.        Counseling & Coordination of Care:     Total time spent was 70 minutes Face to Face with greater than 50% dedicated to clinical decision making, direct counseling, and coordination of care of patient.     I have reviewed and verified the accuracy of the documentation data in the HPI, ROS, Ophth/Neuro Exam, Medical History, Surgical History, Family History, and Social History and updated as needed.    I also spent additional time reviewing medical information, outside records and recording the visit note.       Sheikh SIA Mckeon MD  Neurology  [facility] -

## 2020-07-28 NOTE — DISCHARGE INSTRUCTIONS
Recommendation from Dr. Mckeon.  Patient needs to follow-up with cardiology if she is a candidate for anticoagulant rather than dual antiplatelet therapy. Dilated cardiomyopathy with low ejection fraction around 30% is also a risk factor for cardioembolism.  She had previous stroke.  She needs to discuss with her cardiologist for optimization of her medication.  I would not change any medication because patient is poor historian and there is no new neuro deficit and it is appropriate that her medication should be adjusted with monitoring her medications.     - Physical therapy recommended to help with improved ambulation and it can also be done as outpatient.     Patient is recommended to follow up in Neurology Clinic in 2-3 months for monitoring.  There is no immediate concern needed to be addressed at this point.

## 2020-07-28 NOTE — HPI
86-year-old female with a history of cardiomyopathy with dilated congestive heart failure (s/p pacemaker, on aspirin, Plavix and ?amiodarone), hypertension and multiple ischemic stroke (x 2, without any residual neuro deficit) presented to ER for evaluation of slurred speech and bilateral lower extremity weakness for about 3 days.    Patient initially presented on 07/27/2020 through ER.  Her exam was nonlocalizing and she was sent to Neurology Clinic for urgent evaluation but she was sent back from Neurology Clinic to ER because there were no records available for the neurologist to review and there was no appointment scheduled for the patient with neurology clinic.  Patient was sent to ER through an ambulance because of her consistent problem with speech and bilateral lower extremity weakness.    Speech problem:  Patient reported that her speech is slower and she is finding hard to do articulation.  Although during evaluation her fluency was okay except her speech was slow.  There was no problem with repetition or comprehension.  Bilateral lower extremity weakness:  Patient reported that she has been using a walker to ambulate but he is finding a little weaker in her both lower extremities.  Right upper extremity tremors:  Patient reported that she experiences some positional tremors when she wakes up in the morning but these tremor improved with time.  Patient denies any resting tremor.    Balance problem:  Patient reported that her balance is off for at least 4-5 years.  There is no acute change but she is feeling hard to walk lately.  She reported a fall yesterday but did hit her head.  Patient had CT brain yesterday that was negative for any intracranial bleed or acute pathology.     Patient denies any other neurological symptoms except above -  like sensory changes, facial asymmetry,vision changes /double vision or dizziness/ balance problem.

## 2020-07-28 NOTE — ASSESSMENT & PLAN NOTE
# Multiple neurological symptoms - speech problem, weakness in lower extremities, right hand tremors    - Risk Factors: Cardiomyopathy with dilated congestive heart failure (s/p pacemaker, on aspirin, Plavix and ?amiodarone), hypertension and multiple ischemic stroke (x 2, without any residual neuro deficit) , Polypharmacy. (patient is poor historian and they try to identify her medication but she did not know some of the names.)    - patient's neurological exam is nonfocal.  There is no concern for aphasia with intact comprehension, fluency and repetition.  Patient reported still abnormal speech and on neurological exam her speech is slower without any neurological pattern.  Patient has normal motor strength, sensory exam as well as cerebellar functions.      - This slow speech with reported articulation problem can also be seen with dry mouth.     - her symptoms have been going on for about 3 days.  It is appropriate to get CT brain without contrast.  We are limited to get MRI brain because of pacemaker but CT brain without contrast will also tell if there is any subacute hypodensity and rule out stroke in last few days.  Likelihood of stroke is almost negative based on non-localizing neurological symptoms and normal neurological exam.     - patient is already on secondary prevention medication for stroke that include aspirin, Plavix and statin.  Patient reported that she is taking aspirin and Plavix as per recommendation by her cardiologist.  Patient is apparently also taking ? amiodarone.  I do not see if she has been diagnosed with atrial fibrillation.  Patient needs to follow-up with cardiology if she is a candidate for anticoagulant rather than dual antiplatelet therapy. Dilated cardiomyopathy with low ejection fraction around 30% is also a risk factor for cardioembolism.  She had previous stroke.  She needs to discuss with her cardiologist for optimization of her medication.  I would not change any  medication because patient is poor historian and there is no new neuro deficit and it is appropriate that her medication should be adjusted with monitoring her medications.    - Physical therapy recommended to help with improved ambulation and it can also be done as outpatient.    Patient is recommended to follow up in Neurology Clinic in 2-3 months for monitoring.  There is no immediate concern needed to be addressed at this point.     Neurology will sign off.  Please call me if there is any question.        Sheikh SHIRLENE Mckeon MD  General Neurology, Neuro-Ophthalmology & Stroke   [facility]

## 2020-07-30 LAB
BACTERIA UR CULT: ABNORMAL
BACTERIA UR CULT: ABNORMAL

## 2020-07-31 ENCOUNTER — TELEPHONE (OUTPATIENT)
Dept: EMERGENCY MEDICINE | Facility: HOSPITAL | Age: 85
End: 2020-07-31

## 2020-07-31 NOTE — TELEPHONE ENCOUNTER
Patient returned call,explained the need for additional antibiotic therapy. Omnicef phoned in to [facility] on [facility] at [phone] per patients request.  Instructed to take Omnicef in addition to the Bactrim.  Voices understanding

## 2020-08-03 ENCOUNTER — DOCUMENT SCAN (OUTPATIENT)
Dept: HOME HEALTH SERVICES | Facility: HOSPITAL | Age: 85
End: 2020-08-03

## 2020-08-07 ENCOUNTER — NURSE TRIAGE (OUTPATIENT)
Dept: ADMINISTRATIVE | Facility: CLINIC | Age: 85
End: 2020-08-07

## 2020-08-07 NOTE — LETTER
[facility]  [address]  [facility] [address]  Phone: [phone]  Fax: [phone]   Love Davey  11/23/1933    Pt called nurse line:   Spoke with pt: asking if she finished the bactrim does she continue to cefdinir-- instructed yes to complete the cefdinir as well. verbalizes understanding. (used epic charting to answer question)    Panfilo is  PCP    Please call pt with follow up as needed. Thank you. Pt#: [phone]

## 2020-08-07 NOTE — TELEPHONE ENCOUNTER
"Spoke with pt: asking if she finished the bactrim does she continue to cefdinir-- instructed yes to complete the cefdinir as well. verbalizes understanding. (used epic charting to answer question)    Panfilo is  PCP      Reason for Disposition   Caller has medication question only, adult not sick, and triager answers question    Additional Information   Negative: MORE THAN A DOUBLE DOSE of a prescription or over-the-counter (OTC) drug   Negative: [1] DOUBLE DOSE (an extra dose or lesser amount) of over-the-counter (OTC) drug AND [2] any symptoms (e.g., dizziness, nausea, pain, sleepiness)   Negative: [1] DOUBLE DOSE (an extra dose or lesser amount) of prescription drug AND [2] any symptoms (e.g., dizziness, nausea, pain, sleepiness)   Negative: Took another person's prescription drug   Negative: [1] DOUBLE DOSE (an extra dose or lesser amount) of prescription drug AND [2] NO symptoms (Exception: a double dose of antibiotics)   Negative: Diabetes drug error or overdose (e.g., took wrong type of insulin or took extra dose)   Negative: [1] Request for URGENT new prescription or refill of "essential" medication (i.e., likelihood of harm to patient if not taken) AND [2] triager unable to fill per unit policy   Negative: [1] Prescription not at pharmacy AND [2] was prescribed by PCP recently   Negative: [1] Pharmacy calling with prescription questions AND [2] triager unable to answer question   Negative: [1] Caller has URGENT medication question about med that PCP or specialist prescribed AND [2] triager unable to answer question   Negative: [1] Caller has NON-URGENT medication question about med that PCP prescribed AND [2] triager unable to answer question   Negative: [1] Caller requesting a NON-URGENT new prescription or refill AND [2] triager unable to refill per unit policy   Negative: [1] Caller has medication question about med not prescribed by PCP AND [2] triager unable to answer question (e.g., " compatibility with other med, storage)   Negative: Caller requesting a CONTROLLED substance prescription refill (e.g., narcotics, ADHD medicines)   Negative: Caller wants to use a complementary or alternative medicine   Negative: [1] Prescription prescribed recently is not at pharmacy AND [2] triager has access to patient's EMR AND [3] prescription is recorded in the EMR   Negative: [1] DOUBLE DOSE (an extra dose or lesser amount) of over-the-counter (OTC) drug AND [2] NO symptoms   Negative: [1] DOUBLE DOSE (an extra dose or lesser amount) of antibiotic drug AND [2] NO symptoms    Protocols used: MEDICATION QUESTION CALL-A-AH

## 2020-08-28 ENCOUNTER — HOSPITAL ENCOUNTER (OUTPATIENT)
Facility: HOSPITAL | Age: 85
Discharge: HOME | End: 2020-08-30
Attending: EMERGENCY MEDICINE | Admitting: EMERGENCY MEDICINE
Payer: MEDICARE

## 2020-08-28 DIAGNOSIS — R50.9 FEVER: ICD-10-CM

## 2020-08-28 DIAGNOSIS — I50.9 CONGESTIVE HEART FAILURE, UNSPECIFIED HF CHRONICITY, UNSPECIFIED HEART FAILURE TYPE: ICD-10-CM

## 2020-08-28 DIAGNOSIS — R09.02 HYPOXIA: ICD-10-CM

## 2020-08-28 DIAGNOSIS — N30.01 ACUTE CYSTITIS WITH HEMATURIA: Primary | ICD-10-CM

## 2020-08-28 PROBLEM — R79.89 ELEVATED TROPONIN: Status: ACTIVE | Noted: 2020-08-28

## 2020-08-28 PROBLEM — Z95.810 AICD (AUTOMATIC CARDIOVERTER/DEFIBRILLATOR) PRESENT: Status: ACTIVE | Noted: 2020-08-28

## 2020-08-28 PROBLEM — Z86.73 HISTORY OF STROKE: Status: ACTIVE | Noted: 2020-08-28

## 2020-08-28 LAB
ALBUMIN SERPL BCP-MCNC: 3.2 G/DL (ref 3.5–5.2)
ALLENS TEST: ABNORMAL
ALP SERPL-CCNC: 70 U/L (ref 55–135)
ALT SERPL W/O P-5'-P-CCNC: 18 U/L (ref 10–44)
ANION GAP SERPL CALC-SCNC: 8 MMOL/L (ref 8–16)
AST SERPL-CCNC: 20 U/L (ref 10–40)
BACTERIA #/AREA URNS HPF: ABNORMAL /HPF
BASOPHILS # BLD AUTO: 0.02 K/UL (ref 0–0.2)
BASOPHILS NFR BLD: 0.2 % (ref 0–1.9)
BILIRUB SERPL-MCNC: 0.6 MG/DL (ref 0.1–1)
BILIRUB UR QL STRIP: NEGATIVE
BNP SERPL-MCNC: 1036 PG/ML (ref 0–99)
BUN SERPL-MCNC: 16 MG/DL (ref 8–23)
CALCIUM SERPL-MCNC: 8.9 MG/DL (ref 8.7–10.5)
CHLORIDE SERPL-SCNC: 103 MMOL/L (ref 95–110)
CLARITY UR: ABNORMAL
CO2 SERPL-SCNC: 28 MMOL/L (ref 23–29)
COLOR UR: YELLOW
CREAT SERPL-MCNC: 1 MG/DL (ref 0.5–1.4)
DELSYS: ABNORMAL
DIFFERENTIAL METHOD: ABNORMAL
EOSINOPHIL # BLD AUTO: 0 K/UL (ref 0–0.5)
EOSINOPHIL NFR BLD: 0.4 % (ref 0–8)
ERYTHROCYTE [DISTWIDTH] IN BLOOD BY AUTOMATED COUNT: 22.7 % (ref 11.5–14.5)
EST. GFR  (AFRICAN AMERICAN): 59 ML/MIN/1.73 M^2
EST. GFR  (NON AFRICAN AMERICAN): 51 ML/MIN/1.73 M^2
FIO2: 21
GLUCOSE SERPL-MCNC: 184 MG/DL (ref 70–110)
GLUCOSE UR QL STRIP: NEGATIVE
HCO3 UR-SCNC: 26.5 MMOL/L (ref 24–28)
HCT VFR BLD AUTO: 24.8 % (ref 37–48.5)
HGB BLD-MCNC: 7.6 G/DL (ref 12–16)
HGB UR QL STRIP: ABNORMAL
HYALINE CASTS #/AREA URNS LPF: 0 /LPF
IMM GRANULOCYTES # BLD AUTO: 0.06 K/UL (ref 0–0.04)
IMM GRANULOCYTES NFR BLD AUTO: 0.6 % (ref 0–0.5)
KETONES UR QL STRIP: NEGATIVE
LACTATE SERPL-SCNC: 1 MMOL/L (ref 0.5–2.2)
LACTATE SERPL-SCNC: 1.5 MMOL/L (ref 0.5–2.2)
LEUKOCYTE ESTERASE UR QL STRIP: ABNORMAL
LYMPHOCYTES # BLD AUTO: 0.7 K/UL (ref 1–4.8)
LYMPHOCYTES NFR BLD: 7 % (ref 18–48)
MAGNESIUM SERPL-MCNC: 2.2 MG/DL (ref 1.6–2.6)
MCH RBC QN AUTO: 35.3 PG (ref 27–31)
MCHC RBC AUTO-ENTMCNC: 30.6 G/DL (ref 32–36)
MCV RBC AUTO: 115 FL (ref 82–98)
MICROSCOPIC COMMENT: ABNORMAL
MODE: ABNORMAL
MONOCYTES # BLD AUTO: 1 K/UL (ref 0.3–1)
MONOCYTES NFR BLD: 9.5 % (ref 4–15)
NEUTROPHILS # BLD AUTO: 8.7 K/UL (ref 1.8–7.7)
NEUTROPHILS NFR BLD: 82.3 % (ref 38–73)
NITRITE UR QL STRIP: POSITIVE
NRBC BLD-RTO: 0 /100 WBC
PCO2 BLDA: 47.2 MMHG (ref 35–45)
PH SMN: 7.36 [PH] (ref 7.35–7.45)
PH UR STRIP: 7 [PH] (ref 5–8)
PLATELET # BLD AUTO: 399 K/UL (ref 150–350)
PMV BLD AUTO: 11.2 FL (ref 9.2–12.9)
PO2 BLDA: 62 MMHG (ref 80–100)
POC BE: 1 MMOL/L
POC SATURATED O2: 90 % (ref 95–100)
POTASSIUM SERPL-SCNC: 4.6 MMOL/L (ref 3.5–5.1)
PROT SERPL-MCNC: 6.5 G/DL (ref 6–8.4)
PROT UR QL STRIP: ABNORMAL
RBC # BLD AUTO: 2.15 M/UL (ref 4–5.4)
RBC #/AREA URNS HPF: 10 /HPF (ref 0–4)
SAMPLE: ABNORMAL
SARS-COV-2 RDRP RESP QL NAA+PROBE: NEGATIVE
SITE: ABNORMAL
SODIUM SERPL-SCNC: 139 MMOL/L (ref 136–145)
SP GR UR STRIP: 1.02 (ref 1–1.03)
TROPONIN I SERPL DL<=0.01 NG/ML-MCNC: 0.04 NG/ML (ref 0–0.03)
TROPONIN I SERPL DL<=0.01 NG/ML-MCNC: 0.04 NG/ML (ref 0–0.03)
TROPONIN I SERPL DL<=0.01 NG/ML-MCNC: 0.07 NG/ML (ref 0–0.03)
URN SPEC COLLECT METH UR: ABNORMAL
UROBILINOGEN UR STRIP-ACNC: NEGATIVE EU/DL
WBC # BLD AUTO: 10.56 K/UL (ref 3.9–12.7)
WBC #/AREA URNS HPF: >100 /HPF (ref 0–5)

## 2020-08-28 PROCEDURE — 81000 URINALYSIS NONAUTO W/SCOPE: CPT

## 2020-08-28 PROCEDURE — 84484 ASSAY OF TROPONIN QUANT: CPT | Mod: 91

## 2020-08-28 PROCEDURE — G0378 HOSPITAL OBSERVATION PER HR: HCPCS

## 2020-08-28 PROCEDURE — 25000003 PHARM REV CODE 250: Performed by: NURSE PRACTITIONER

## 2020-08-28 PROCEDURE — 87088 URINE BACTERIA CULTURE: CPT

## 2020-08-28 PROCEDURE — 93010 EKG 12-LEAD: ICD-10-PCS | Mod: ,,, | Performed by: INTERNAL MEDICINE

## 2020-08-28 PROCEDURE — 93005 ELECTROCARDIOGRAM TRACING: CPT

## 2020-08-28 PROCEDURE — 82803 BLOOD GASES ANY COMBINATION: CPT

## 2020-08-28 PROCEDURE — 25000003 PHARM REV CODE 250: Performed by: EMERGENCY MEDICINE

## 2020-08-28 PROCEDURE — 99900035 HC TECH TIME PER 15 MIN (STAT)

## 2020-08-28 PROCEDURE — 87186 SC STD MICRODIL/AGAR DIL: CPT

## 2020-08-28 PROCEDURE — 83735 ASSAY OF MAGNESIUM: CPT

## 2020-08-28 PROCEDURE — 99291 CRITICAL CARE FIRST HOUR: CPT

## 2020-08-28 PROCEDURE — 87077 CULTURE AEROBIC IDENTIFY: CPT

## 2020-08-28 PROCEDURE — 99214 PR OFFICE/OUTPT VISIT, EST, LEVL IV, 30-39 MIN: ICD-10-PCS | Mod: ,,, | Performed by: INTERNAL MEDICINE

## 2020-08-28 PROCEDURE — 84484 ASSAY OF TROPONIN QUANT: CPT

## 2020-08-28 PROCEDURE — 27000221 HC OXYGEN, UP TO 24 HOURS

## 2020-08-28 PROCEDURE — 80053 COMPREHEN METABOLIC PANEL: CPT

## 2020-08-28 PROCEDURE — 36600 WITHDRAWAL OF ARTERIAL BLOOD: CPT

## 2020-08-28 PROCEDURE — 87086 URINE CULTURE/COLONY COUNT: CPT

## 2020-08-28 PROCEDURE — 83880 ASSAY OF NATRIURETIC PEPTIDE: CPT

## 2020-08-28 PROCEDURE — 83605 ASSAY OF LACTIC ACID: CPT | Mod: 91

## 2020-08-28 PROCEDURE — 63600175 PHARM REV CODE 636 W HCPCS: Performed by: EMERGENCY MEDICINE

## 2020-08-28 PROCEDURE — 96374 THER/PROPH/DIAG INJ IV PUSH: CPT | Mod: 59 | Performed by: EMERGENCY MEDICINE

## 2020-08-28 PROCEDURE — U0002 COVID-19 LAB TEST NON-CDC: HCPCS

## 2020-08-28 PROCEDURE — 85025 COMPLETE CBC W/AUTO DIFF WBC: CPT

## 2020-08-28 PROCEDURE — 87040 BLOOD CULTURE FOR BACTERIA: CPT | Mod: 59

## 2020-08-28 PROCEDURE — 36415 COLL VENOUS BLD VENIPUNCTURE: CPT

## 2020-08-28 PROCEDURE — 93010 ELECTROCARDIOGRAM REPORT: CPT | Mod: ,,, | Performed by: INTERNAL MEDICINE

## 2020-08-28 PROCEDURE — 99214 OFFICE O/P EST MOD 30 MIN: CPT | Mod: ,,, | Performed by: INTERNAL MEDICINE

## 2020-08-28 PROCEDURE — 83036 HEMOGLOBIN GLYCOSYLATED A1C: CPT

## 2020-08-28 PROCEDURE — 96361 HYDRATE IV INFUSION ADD-ON: CPT

## 2020-08-28 RX ORDER — MECLIZINE HCL 12.5 MG 12.5 MG/1
12.5 TABLET ORAL 3 TIMES DAILY PRN
Status: ON HOLD | COMMUNITY
End: 2021-10-16 | Stop reason: HOSPADM

## 2020-08-28 RX ORDER — SODIUM CHLORIDE 0.9 % (FLUSH) 0.9 %
10 SYRINGE (ML) INJECTION
Status: DISCONTINUED | OUTPATIENT
Start: 2020-08-28 | End: 2020-08-30 | Stop reason: HOSPADM

## 2020-08-28 RX ORDER — FUROSEMIDE 40 MG/1
40 TABLET ORAL 2 TIMES DAILY
Status: DISCONTINUED | OUTPATIENT
Start: 2020-08-28 | End: 2020-08-30 | Stop reason: HOSPADM

## 2020-08-28 RX ORDER — ALBUTEROL SULFATE 0.83 MG/ML
2.5 SOLUTION RESPIRATORY (INHALATION) EVERY 6 HOURS PRN
Status: DISCONTINUED | OUTPATIENT
Start: 2020-08-28 | End: 2020-08-30 | Stop reason: HOSPADM

## 2020-08-28 RX ORDER — AMIODARONE HYDROCHLORIDE 200 MG/1
200 TABLET ORAL DAILY
Status: DISCONTINUED | OUTPATIENT
Start: 2020-08-28 | End: 2020-08-30 | Stop reason: HOSPADM

## 2020-08-28 RX ORDER — ASPIRIN 81 MG/1
81 TABLET ORAL DAILY
Status: DISCONTINUED | OUTPATIENT
Start: 2020-08-28 | End: 2020-08-30 | Stop reason: HOSPADM

## 2020-08-28 RX ORDER — ACETAMINOPHEN 325 MG/1
650 TABLET ORAL
Status: COMPLETED | OUTPATIENT
Start: 2020-08-28 | End: 2020-08-28

## 2020-08-28 RX ORDER — DONEPEZIL HYDROCHLORIDE 5 MG/1
10 TABLET, FILM COATED ORAL NIGHTLY
Status: DISCONTINUED | OUTPATIENT
Start: 2020-08-28 | End: 2020-08-30 | Stop reason: HOSPADM

## 2020-08-28 RX ORDER — ARIPIPRAZOLE 5 MG/1
5 TABLET ORAL DAILY
Status: DISCONTINUED | OUTPATIENT
Start: 2020-08-28 | End: 2020-08-30 | Stop reason: HOSPADM

## 2020-08-28 RX ORDER — FUROSEMIDE 10 MG/ML
40 INJECTION INTRAMUSCULAR; INTRAVENOUS
Status: DISCONTINUED | OUTPATIENT
Start: 2020-08-28 | End: 2020-08-28

## 2020-08-28 RX ORDER — NITROFURANTOIN 25; 75 MG/1; MG/1
100 CAPSULE ORAL
Status: DISCONTINUED | OUTPATIENT
Start: 2020-08-28 | End: 2020-08-28

## 2020-08-28 RX ORDER — LOSARTAN POTASSIUM 25 MG/1
25 TABLET ORAL DAILY
Status: DISCONTINUED | OUTPATIENT
Start: 2020-08-28 | End: 2020-08-30 | Stop reason: HOSPADM

## 2020-08-28 RX ORDER — PANTOPRAZOLE SODIUM 40 MG/1
40 TABLET, DELAYED RELEASE ORAL DAILY
Status: DISCONTINUED | OUTPATIENT
Start: 2020-08-28 | End: 2020-08-30 | Stop reason: HOSPADM

## 2020-08-28 RX ORDER — CARVEDILOL 6.25 MG/1
6.25 TABLET ORAL 2 TIMES DAILY WITH MEALS
Status: DISCONTINUED | OUTPATIENT
Start: 2020-08-28 | End: 2020-08-30 | Stop reason: HOSPADM

## 2020-08-28 RX ORDER — SULFAMETHOXAZOLE AND TRIMETHOPRIM 800; 160 MG/1; MG/1
1 TABLET ORAL 2 TIMES DAILY
Status: ON HOLD | COMMUNITY
End: 2020-08-30 | Stop reason: HOSPADM

## 2020-08-28 RX ORDER — ATORVASTATIN CALCIUM 10 MG/1
20 TABLET, FILM COATED ORAL DAILY
Status: DISCONTINUED | OUTPATIENT
Start: 2020-08-28 | End: 2020-08-30 | Stop reason: HOSPADM

## 2020-08-28 RX ORDER — FUROSEMIDE 10 MG/ML
40 INJECTION INTRAMUSCULAR; INTRAVENOUS
Status: COMPLETED | OUTPATIENT
Start: 2020-08-28 | End: 2020-08-28

## 2020-08-28 RX ORDER — CLOPIDOGREL BISULFATE 75 MG/1
75 TABLET ORAL DAILY
Status: DISCONTINUED | OUTPATIENT
Start: 2020-08-28 | End: 2020-08-30 | Stop reason: HOSPADM

## 2020-08-28 RX ADMIN — CARVEDILOL 6.25 MG: 6.25 TABLET, FILM COATED ORAL at 04:08

## 2020-08-28 RX ADMIN — FUROSEMIDE 40 MG: 10 INJECTION, SOLUTION INTRAMUSCULAR; INTRAVENOUS at 11:08

## 2020-08-28 RX ADMIN — BUSPIRONE HYDROCHLORIDE 15 MG: 10 TABLET ORAL at 01:08

## 2020-08-28 RX ADMIN — ARIPIPRAZOLE 5 MG: 5 TABLET ORAL at 01:08

## 2020-08-28 RX ADMIN — DONEPEZIL HYDROCHLORIDE 10 MG: 5 TABLET, FILM COATED ORAL at 08:08

## 2020-08-28 RX ADMIN — CLOPIDOGREL 75 MG: 75 TABLET, FILM COATED ORAL at 01:08

## 2020-08-28 RX ADMIN — ASPIRIN 81 MG: 81 TABLET, COATED ORAL at 01:08

## 2020-08-28 RX ADMIN — SODIUM CHLORIDE 1000 ML: 0.9 INJECTION, SOLUTION INTRAVENOUS at 09:08

## 2020-08-28 RX ADMIN — PANTOPRAZOLE SODIUM 40 MG: 40 TABLET, DELAYED RELEASE ORAL at 01:08

## 2020-08-28 RX ADMIN — LOSARTAN POTASSIUM 25 MG: 25 TABLET, FILM COATED ORAL at 01:08

## 2020-08-28 RX ADMIN — BUSPIRONE HYDROCHLORIDE 15 MG: 10 TABLET ORAL at 08:08

## 2020-08-28 RX ADMIN — ACETAMINOPHEN 650 MG: 325 TABLET ORAL at 09:08

## 2020-08-28 RX ADMIN — ATORVASTATIN CALCIUM 20 MG: 10 TABLET, FILM COATED ORAL at 01:08

## 2020-08-28 RX ADMIN — FUROSEMIDE 40 MG: 40 TABLET ORAL at 08:08

## 2020-08-28 RX ADMIN — CEFTRIAXONE 1 G: 1 INJECTION, SOLUTION INTRAVENOUS at 11:08

## 2020-08-28 RX ADMIN — AMIODARONE HYDROCHLORIDE 200 MG: 200 TABLET ORAL at 01:08

## 2020-08-28 NOTE — SUBJECTIVE & OBJECTIVE
Past Medical History:   Diagnosis Date    Anemia     Anxiety     Arthritis     Back pain     Cardiomyopathy     CHF (congestive heart failure)     Depression     Dizziness     Hypertension     Insomnia     Stroke     Use of cane as ambulatory aid        Past Surgical History:   Procedure Laterality Date    APPENDECTOMY      arthritis      BLADDER REPAIR      BUNIONECTOMY      CARDIAC DEFIBRILLATOR PLACEMENT      CATARACT EXTRACTION      HYSTERECTOMY      metal implant Bilateral     placed in the bladder.       Review of patient's allergies indicates:   Allergen Reactions    Bacitracin-polymyxin b Itching and Swelling    Merthiolate (thimerosal) Rash and Swelling    Diazepam      Hallucinations    Levofloxacin Itching    Metronidazole     Oxycodone Itching    Tizanidine      Hallucinations    Tramadol Itching    Cefdinir Itching     Pt not positive about allergy       No current facility-administered medications on file prior to encounter.      Current Outpatient Medications on File Prior to Encounter   Medication Sig    amiodarone (PACERONE) 200 MG Tab Take 1 tablet by mouth once daily.    amlodipine (NORVASC) 10 MG tablet Take 10 mg by mouth once daily.    aripiprazole (ABILIFY) 10 MG Tab Take 5 mg by mouth once daily.    aspirin (ECOTRIN) 81 MG EC tablet Take 81 mg by mouth once daily.    atorvastatin (LIPITOR) 20 MG tablet Take 20 mg by mouth once daily.    busPIRone (BUSPAR) 15 MG tablet Take 1 tablet by mouth 2 (two) times daily.    carvedilol (COREG) 6.25 MG tablet Take 6.25 mg by mouth 2 (two) times daily with meals.    clopidogreL (PLAVIX) 75 mg tablet Take 1 tablet by mouth once daily.    donepezil (ARICEPT) 10 MG tablet Take 1 tablet by mouth every evening.    DULoxetine (CYMBALTA) 30 MG capsule Take 1 capsule by mouth once daily.    esomeprazole (NEXIUM) 40 MG capsule Take 40 mg by mouth before breakfast.    ferrous sulfate 324 mg (65 mg iron) TbEC Take 65 mg by  mouth once daily.    fluticasone (FLONASE) 50 mcg/actuation nasal spray 1 spray by Each Nare route once daily.    furosemide (LASIX) 40 MG tablet Take 1 tablet by mouth 2 (two) times daily.    glucosamine-chondroitin 500-400 mg tablet Take 1 tablet by mouth 2 (two) times daily.    Lactobacillus rhamnosus GG (CULTURELLE) 10 billion cell capsule Take 1 capsule by mouth once daily.    levothyroxine (SYNTHROID) 75 MCG tablet Take 1 tablet (75 mcg total) by mouth before breakfast. (Patient taking differently: Take 50 mcg by mouth before breakfast. )    losartan (COZAAR) 25 MG tablet Take 1 tablet by mouth once daily.    meclizine (ANTIVERT) 12.5 mg tablet Take 12.5 mg by mouth 3 (three) times daily as needed.    multivitamin capsule Take 1 capsule by mouth once daily.    nystatin-triamcinolone (MYCOLOG) ointment 2 (two) times daily as needed.    potassium chloride SA (K-DUR,KLOR-CON) 20 MEQ tablet Take 20 mEq by mouth 2 (two) times daily.    sulfamethoxazole-trimethoprim 800-160mg (BACTRIM DS) 800-160 mg Tab Take 1 tablet by mouth 2 (two) times daily.    albuterol (PROAIR HFA) 90 mcg/actuation inhaler Inhale 2 puffs into the lungs every 6 (six) hours as needed for Wheezing.    cyanocobalamin (VITAMIN B-12) 1000 MCG tablet Take 1 tablet by mouth once daily.    cycloSPORINE (RESTASIS) 0.05 % ophthalmic emulsion Apply 1 drop to eye.    famotidine (PEPCID) 20 MG tablet Take 1 tablet (20 mg total) by mouth 2 (two) times daily as needed (heartburn).    melatonin 1 mg Tab Take 1 mg by mouth every evening.    mirabegron 50 mg Tb24 Take by mouth.    mirtazapine (REMERON) 7.5 MG Tab     polyethylene glycol (GLYCOLAX) 17 gram PwPk Take by mouth.    solifenacin (VESICARE) 10 MG tablet Take 5 mg by mouth once daily.    traMADol (ULTRAM) 50 mg tablet Take 1 tablet by mouth 3 (three) times daily as needed.    venlafaxine (EFFEXOR-XR) 150 MG Cp24 Take 75 mg by mouth once daily.     Family History     Problem  Relation (Age of Onset)    Cancer Mother    Heart disease Father    Hypertension         Tobacco Use    Smoking status: Never Smoker    Smokeless tobacco: Never Used   Substance and Sexual Activity    Alcohol use: No    Drug use: No    Sexual activity: Not Currently     Review of Systems   Constitution: Positive for malaise/fatigue.   HENT: Negative.    Eyes: Negative.    Cardiovascular: Negative.    Respiratory: Negative.    Endocrine: Negative.    Hematologic/Lymphatic: Bruises/bleeds easily.   Skin: Negative.    Musculoskeletal: Positive for arthritis, back pain (positional) and joint pain.   Gastrointestinal: Negative.    Genitourinary: Negative.    Neurological: Positive for weakness.     Objective:     Vital Signs (Most Recent):  Temp: 98.6 °F (37 °C) (08/28/20 1223)  Pulse: 73 (08/28/20 1223)  Resp: 18 (08/28/20 1223)  BP: (!) 124/58 (08/28/20 1223)  SpO2: 99 % (08/28/20 1223) Vital Signs (24h Range):  Temp:  [98.6 °F (37 °C)-100.4 °F (38 °C)] 98.6 °F (37 °C)  Pulse:  [71-88] 73  Resp:  [16-22] 18  SpO2:  [90 %-100 %] 99 %  BP: (121-153)/(58-69) 124/58     Weight: 65.4 kg (144 lb 1.6 oz)  Body mass index is 27.23 kg/m².    SpO2: 99 %  O2 Device (Oxygen Therapy): (P) nasal cannula    No intake or output data in the 24 hours ending 08/28/20 1243    Lines/Drains/Airways     Drain            Female External Urinary Catheter 08/28/20 0942 less than 1 day          Peripheral Intravenous Line                 Peripheral IV - Single Lumen 08/28/20 0915 22 G Right Hand less than 1 day         Peripheral IV - Single Lumen 08/28/20 0927 20 G Left Antecubital less than 1 day                Physical Exam   Constitutional: She is oriented to person, place, and time. She appears well-developed and well-nourished. No distress.   HENT:   Head: Normocephalic and atraumatic.   Eyes: Pupils are equal, round, and reactive to light. Right eye exhibits no discharge. Left eye exhibits no discharge.   Neck: Neck supple. No JVD  present.   Cardiovascular: Normal rate, regular rhythm, S1 normal, S2 normal and normal heart sounds.   No murmur heard.  Pulmonary/Chest: Effort normal and breath sounds normal. No respiratory distress. She has no wheezes. She has no rales.   AICD site well-healed   Abdominal: Soft. She exhibits no distension.   Musculoskeletal:         General: Edema (Trace BLE) present.   Neurological: She is alert and oriented to person, place, and time.   Skin: Skin is warm and dry. She is not diaphoretic. No erythema.   Psychiatric: She has a normal mood and affect. Her behavior is normal. Thought content normal.   Nursing note and vitals reviewed.      Significant Labs:   CMP   Recent Labs   Lab 08/28/20  0918      K 4.6      CO2 28   *   BUN 16   CREATININE 1.0   CALCIUM 8.9   PROT 6.5   ALBUMIN 3.2*   BILITOT 0.6   ALKPHOS 70   AST 20   ALT 18   ANIONGAP 8   ESTGFRAFRICA 59*   EGFRNONAA 51*   , CBC   Recent Labs   Lab 08/28/20 0918   WBC 10.56   HGB 7.6*   HCT 24.8*   *   , Troponin   Recent Labs   Lab 08/28/20 0918   TROPONINI 0.036*    and All pertinent lab results from the last 24 hours have been reviewed.    Significant Imaging: Echocardiogram:   Transthoracic echo (TTE) complete (Cupid Only):   Results for orders placed or performed during the hospital encounter of 03/11/20   Echo Color Flow Doppler? Yes   Result Value Ref Range    Ascending aorta 2.87 cm    STJ 3.06 cm    AV mean gradient 11 mmHg    Ao peak jason 2.11 m/s    Ao VTI 53.18 cm    IVRT 93.43 msec    IVS 1.10 0.6 - 1.1 cm    LA size 3.62 cm    Left Atrium Major Axis 4.40 cm    Left Atrium Minor Axis 3.21 cm    LVIDD 5.38 3.5 - 6.0 cm    LVIDS 4.63 (A) 2.1 - 4.0 cm    LVOT diameter 2.02 cm    LVOT peak VTI 19.83 cm    PW 1.26 (A) 0.6 - 1.1 cm    MV Peak A Jason 0.62 m/s    E wave decelartion time 247.70 msec    MV Peak E Jason 0.75 m/s    RA Major Axis 3.99 cm    Sinus 3.55 cm    TAPSE 2.34 cm    TR Max Jason 3.08 m/s    Ao root annulus  3.83 cm    LV Diastolic Volume 139.83 mL    LV Systolic Volume 99.04 mL    LVOT peak ulises 0.80 m/s    LA WIDTH 2.33 cm    RA Width 2.28 cm    FS 14 %    LA volume 26.61 cm3    LV mass 256.83 g    Left Ventricle Relative Wall Thickness 0.47 cm    AV valve area 1.19 cm2    AV Velocity Ratio 0.38     AV index (prosthetic) 0.37     E/A ratio 1.21     LVOT area 3.2 cm2    LVOT stroke volume 63.52 cm3    AV peak gradient 18 mmHg    LV Systolic Volume Index 58.1 mL/m2    LV Diastolic Volume Index 82.04 mL/m2    LA Volume Index 15.6 mL/m2    LV Mass Index 151 g/m2    Triscuspid Valve Regurgitation Peak Gradient 38 mmHg    BSA 1.75 m2    Right Atrial Pressure (from IVC) 3 mmHg    TV rest pulmonary artery pressure 41 mmHg    Narrative    · Concentric left ventricular hypertrophy.  · Mild left ventricular enlargement.  · Moderately decreased left ventricular systolic function. The estimated   ejection fraction is 30%.  · Grade I (mild) left ventricular diastolic dysfunction consistent with   impaired relaxation.  · Severe global hypokinetic wall motion.  · Normal right ventricular systolic function.  · Mild aortic regurgitation.  · Mild mitral regurgitation.  · Mild tricuspid regurgitation.  · Normal central venous pressure (3 mmHg).  · The estimated PA systolic pressure is 41 mmHg.  · Pulmonary hypertension present.      , EKG: Reviewed and X-Ray: CXR: X-Ray Chest 1 View (CXR): No results found for this visit on 08/28/20. and X-Ray Chest PA and Lateral (CXR): No results found for this visit on 08/28/20.

## 2020-08-28 NOTE — NURSING
Patient arrived to unit from ER via strecther.   Patient in room 255.  Transferred into bed with x 2 assist.   Telephone report given by ROSA Summers.  Charge nurse advised of patient arrival.   VS currently stable.   Tele monitor applied.   Patient oriented to room, unit, and call bell.   Bed in lowest position, call light in reach.  Encouraged to notify of all needs.   Will continue to monitor.

## 2020-08-28 NOTE — ED PROVIDER NOTES
SCRIBE #1 NOTE: INikolay, bridget scribing for, and in the presence of, Maciej Issa MD. I have scribed the entire note.      History      Chief Complaint   Patient presents with    Back Pain     pt brought in by EMS for UTI symptoms and back pain, pt EKG read STEMI in route       Review of patient's allergies indicates:   Allergen Reactions    Bacitracin-polymyxin b Itching and Swelling    Merthiolate (thimerosal) Rash and Swelling    Diazepam      Hallucinations    Levofloxacin Itching    Metronidazole     Oxycodone Itching    Tizanidine      Hallucinations    Tramadol Itching    Cefdinir Itching     Pt not positive about allergy        HPI   HPI    8/28/2020, 9:12 AM   History obtained from the patient and EMS      History of Present Illness: Love Davey is a 86 y.o. female patient who presents to the Emergency Department for generalized weakness, onset 2 days PTA. EMS reports that EKG done at the scene showed acute MI, and that her initial O2 saturation was 88% on room air. Symptoms are constant and moderate in severity. No mitigating or exacerbating factors reported. Associated sxs include back pain x 3 days and fever (Tnow 100.4). Patient denies any fever, chills, n/v/d, SOB, CP, numbness, dizziness, headache, and all other sxs at this time. Pt was recently dx with a UTI, and was scheduled to follow with her PCP later today. Pt was given 325 mg ASA en route to the ED. No further complaints or concerns at this time.     Arrival mode: EMS    PCP: Kaley Law MD       Past Medical History:  Past Medical History:   Diagnosis Date    Anemia     Anxiety     Arthritis     Back pain     Cardiomyopathy     CHF (congestive heart failure)     Depression     Dizziness     Hypertension     Insomnia     Stroke     Use of cane as ambulatory aid        Past Surgical History:  Past Surgical History:   Procedure Laterality Date    APPENDECTOMY      arthritis      BLADDER REPAIR       BUNIONECTOMY      CARDIAC DEFIBRILLATOR PLACEMENT      CATARACT EXTRACTION      HYSTERECTOMY      metal implant Bilateral     placed in the bladder.         Family History:  Family History   Problem Relation Age of Onset    Hypertension Unknown     Cancer Mother     Heart disease Father        Social History:  Social History     Tobacco Use    Smoking status: Never Smoker    Smokeless tobacco: Never Used   Substance and Sexual Activity    Alcohol use: No    Drug use: No    Sexual activity: Not Currently       ROS   Review of Systems   Constitutional: Positive for fever (Tnow 100.4). Negative for chills, diaphoresis and fatigue.   HENT: Negative for sore throat.    Respiratory: Negative for shortness of breath.    Cardiovascular: Negative for chest pain.   Gastrointestinal: Negative for diarrhea, nausea and vomiting.   Genitourinary: Negative for dysuria.   Musculoskeletal: Positive for back pain.   Skin: Negative for rash.   Neurological: Positive for weakness (generalized). Negative for dizziness, seizures, light-headedness, numbness and headaches.   Hematological: Does not bruise/bleed easily.   All other systems reviewed and are negative.    Physical Exam      Initial Vitals   BP Pulse Resp Temp SpO2   08/28/20 0918 08/28/20 0917 08/28/20 0917 08/28/20 0913 08/28/20 0917   (!) 153/69 85 (!) 22 (!) 100.4 °F (38 °C) 100 %      MAP       --                 Physical Exam  Nursing Notes and Vital Signs Reviewed.  Constitutional: Patient is in no acute distress. Well-developed and well-nourished.  Head: Atraumatic. Normocephalic.  Eyes: PERRL. EOM intact. Conjunctivae are not pale. No scleral icterus.  ENT: Mucous membranes are moist. Oropharynx is clear and symmetric.    Neck: Supple. Full ROM. No lymphadenopathy.  Cardiovascular: Regular rate. Regular rhythm. No murmurs, rubs, or gallops. Distal pulses are 2+ and symmetric.  Pulmonary/Chest: No respiratory distress. Clear to auscultation bilaterally.  No wheezing or rales.  Abdominal: Soft and non-distended.  There is no tenderness.  No rebound, guarding, or rigidity.   Musculoskeletal: Moves all extremities. No obvious deformities. No edema.  Skin: Warm and dry.  Neurological:  Alert, awake, and appropriate.  Normal speech.  No acute focal neurological deficits are appreciated.  Psychiatric: Normal affect. Good eye contact. Appropriate in content.    ED Course    Critical Care    Date/Time: 8/28/2020 11:01 AM  Performed by: Maciej Issa MD  Authorized by: Maciej Issa MD   Direct patient critical care time: 15 minutes  Additional history critical care time: 5 minutes  Ordering / reviewing critical care time: 10 minutes  Documentation critical care time: 5 minutes  Consulting other physicians critical care time: 10 minutes  Total critical care time (exclusive of procedural time) : 45 minutes  Critical care time was exclusive of separately billable procedures and treating other patients and teaching time.  Critical care was necessary to treat or prevent imminent or life-threatening deterioration of the following conditions: Hypoxia, CHF, acute cystitis.  Critical care was time spent personally by me on the following activities: blood draw for specimens, development of treatment plan with patient or surrogate, discussions with consultants, interpretation of cardiac output measurements, evaluation of patient's response to treatment, examination of patient, obtaining history from patient or surrogate, ordering and performing treatments and interventions, ordering and review of laboratory studies, ordering and review of radiographic studies, pulse oximetry, re-evaluation of patient's condition and review of old charts.        ED Vital Signs:  Vitals:    08/28/20 0913 08/28/20 0917 08/28/20 0918 08/28/20 0930   BP:   (!) 153/69 132/61   Pulse:  85 88 82   Resp:  (!) 22 19 (!) 21   Temp: (!) 100.4 °F (38 °C)      TempSrc: Oral      SpO2:  100% 100% 100%    Weight:   65.4 kg (144 lb 1.6 oz)     08/28/20 1023 08/28/20 1036 08/28/20 1037   BP: (!) 121/59     Pulse: 76 77 77   Resp: 17 19 17   Temp:      TempSrc:      SpO2: (!) 90% (!) 90% 100%   Weight:          Abnormal Lab Results:  Labs Reviewed   CBC W/ AUTO DIFFERENTIAL - Abnormal; Notable for the following components:       Result Value    RBC 2.15 (*)     Hemoglobin 7.6 (*)     Hematocrit 24.8 (*)     Mean Corpuscular Volume 115 (*)     Mean Corpuscular Hemoglobin 35.3 (*)     Mean Corpuscular Hemoglobin Conc 30.6 (*)     RDW 22.7 (*)     Platelets 399 (*)     Immature Granulocytes 0.6 (*)     Gran # (ANC) 8.7 (*)     Immature Grans (Abs) 0.06 (*)     Lymph # 0.7 (*)     Gran% 82.3 (*)     Lymph% 7.0 (*)     All other components within normal limits   COMPREHENSIVE METABOLIC PANEL - Abnormal; Notable for the following components:    Glucose 184 (*)     Albumin 3.2 (*)     eGFR if  59 (*)     eGFR if non  51 (*)     All other components within normal limits   URINALYSIS, REFLEX TO URINE CULTURE - Abnormal; Notable for the following components:    Appearance, UA Cloudy (*)     Protein, UA 2+ (*)     Occult Blood UA 1+ (*)     Nitrite, UA Positive (*)     Leukocytes, UA 3+ (*)     All other components within normal limits    Narrative:     Specimen Source->Urine   B-TYPE NATRIURETIC PEPTIDE - Abnormal; Notable for the following components:    BNP 1,036 (*)     All other components within normal limits   TROPONIN I - Abnormal; Notable for the following components:    Troponin I 0.036 (*)     All other components within normal limits   URINALYSIS MICROSCOPIC - Abnormal; Notable for the following components:    RBC, UA 10 (*)     WBC, UA >100 (*)     Bacteria Moderate (*)     All other components within normal limits    Narrative:     Specimen Source->Urine   ISTAT PROCEDURE - Abnormal; Notable for the following components:    POC PCO2 47.2 (*)     POC PO2 62 (*)     POC SATURATED O2 90  (*)     All other components within normal limits   CULTURE, BLOOD   CULTURE, BLOOD   CULTURE, URINE   LACTIC ACID, PLASMA   SARS-COV-2 RNA AMPLIFICATION, QUAL   LACTIC ACID, PLASMA        All Lab Results:  Results for orders placed or performed during the hospital encounter of 08/28/20   CBC auto differential   Result Value Ref Range    WBC 10.56 3.90 - 12.70 K/uL    RBC 2.15 (L) 4.00 - 5.40 M/uL    Hemoglobin 7.6 (L) 12.0 - 16.0 g/dL    Hematocrit 24.8 (L) 37.0 - 48.5 %    Mean Corpuscular Volume 115 (H) 82 - 98 fL    Mean Corpuscular Hemoglobin 35.3 (H) 27.0 - 31.0 pg    Mean Corpuscular Hemoglobin Conc 30.6 (L) 32.0 - 36.0 g/dL    RDW 22.7 (H) 11.5 - 14.5 %    Platelets 399 (H) 150 - 350 K/uL    MPV 11.2 9.2 - 12.9 fL    Immature Granulocytes 0.6 (H) 0.0 - 0.5 %    Gran # (ANC) 8.7 (H) 1.8 - 7.7 K/uL    Immature Grans (Abs) 0.06 (H) 0.00 - 0.04 K/uL    Lymph # 0.7 (L) 1.0 - 4.8 K/uL    Mono # 1.0 0.3 - 1.0 K/uL    Eos # 0.0 0.0 - 0.5 K/uL    Baso # 0.02 0.00 - 0.20 K/uL    nRBC 0 0 /100 WBC    Gran% 82.3 (H) 38.0 - 73.0 %    Lymph% 7.0 (L) 18.0 - 48.0 %    Mono% 9.5 4.0 - 15.0 %    Eosinophil% 0.4 0.0 - 8.0 %    Basophil% 0.2 0.0 - 1.9 %    Differential Method Automated    Comprehensive metabolic panel   Result Value Ref Range    Sodium 139 136 - 145 mmol/L    Potassium 4.6 3.5 - 5.1 mmol/L    Chloride 103 95 - 110 mmol/L    CO2 28 23 - 29 mmol/L    Glucose 184 (H) 70 - 110 mg/dL    BUN, Bld 16 8 - 23 mg/dL    Creatinine 1.0 0.5 - 1.4 mg/dL    Calcium 8.9 8.7 - 10.5 mg/dL    Total Protein 6.5 6.0 - 8.4 g/dL    Albumin 3.2 (L) 3.5 - 5.2 g/dL    Total Bilirubin 0.6 0.1 - 1.0 mg/dL    Alkaline Phosphatase 70 55 - 135 U/L    AST 20 10 - 40 U/L    ALT 18 10 - 44 U/L    Anion Gap 8 8 - 16 mmol/L    eGFR if African American 59 (A) >60 mL/min/1.73 m^2    eGFR if non African American 51 (A) >60 mL/min/1.73 m^2   Lactic acid, plasma #1   Result Value Ref Range    Lactate (Lactic Acid) 1.5 0.5 - 2.2 mmol/L   Urinalysis,  Reflex to Urine Culture Urine, Clean Catch    Specimen: Urine   Result Value Ref Range    Specimen UA Urine, Catheterized     Color, UA Yellow Yellow, Straw, Bridgette    Appearance, UA Cloudy (A) Clear    pH, UA 7.0 5.0 - 8.0    Specific Gravity, UA 1.020 1.005 - 1.030    Protein, UA 2+ (A) Negative    Glucose, UA Negative Negative    Ketones, UA Negative Negative    Bilirubin (UA) Negative Negative    Occult Blood UA 1+ (A) Negative    Nitrite, UA Positive (A) Negative    Urobilinogen, UA Negative <2.0 EU/dL    Leukocytes, UA 3+ (A) Negative   Brain natriuretic peptide   Result Value Ref Range    BNP 1,036 (H) 0 - 99 pg/mL   Troponin I   Result Value Ref Range    Troponin I 0.036 (H) 0.000 - 0.026 ng/mL   COVID-19 Rapid Screening   Result Value Ref Range    SARS-CoV-2 RNA, Amplification, Qual Negative Negative   Urinalysis Microscopic   Result Value Ref Range    RBC, UA 10 (H) 0 - 4 /hpf    WBC, UA >100 (H) 0 - 5 /hpf    Bacteria Moderate (A) None-Occ /hpf    Hyaline Casts, UA 0 0-1/lpf /lpf    Microscopic Comment SEE COMMENT    ISTAT PROCEDURE   Result Value Ref Range    POC PH 7.358 7.35 - 7.45    POC PCO2 47.2 (H) 35 - 45 mmHg    POC PO2 62 (L) 80 - 100 mmHg    POC HCO3 26.5 24 - 28 mmol/L    POC BE 1 -2 to 2 mmol/L    POC SATURATED O2 90 (L) 95 - 100 %    Sample ARTERIAL     Site LR     Allens Test Pass     DelSys Room Air     Mode SPONT     FiO2 21      Imaging Results:  Imaging Results          X-Ray Chest AP Portable (Final result)  Result time 08/28/20 09:38:21    Final result by Moreno Brewster Jr., MD (08/28/20 09:38:21)                 Impression:      Small left pleural fluid.  Areas of fine linear scarring within the right upper lung.      Electronically signed by: Moreno Brewster Jr., MD  Date:    08/28/2020  Time:    09:38             Narrative:    EXAMINATION:  XR CHEST AP PORTABLE    CLINICAL HISTORY:  Sepsis;    COMPARISON:  Prior radiograph from July 27, 2020.    FINDINGS:  Left-sided AICD in place.   There is likely small left pleural fluid.  Fine reticular bandlike scarring within the periphery of the right upper lobe is unchanged.  No confluent airspace disease.  No pneumothorax.  Heart size within normal limits.  No significant bony findings.                               The EKG was ordered, reviewed, and independently interpreted by the ED provider.  Interpretation time: 09:17  Rate: 84 BPM  Rhythm: normal sinus rhythm  Interpretation: Left axis deviation. Incomplete LBBB. Marked ST abnormality, possible inferolateral subendocardial injury. No STEMI.           The Emergency Provider reviewed the vital signs and test results, which are outlined above.    ED Discussion     11:00 AM: Discussed case with Booker Conde NP ([facility]). Dr. Becerra agrees with current care and management of pt and accepts admission.   Admitting Service: [facility]  Admitting Physician: Dr. Becerra  Admit to: Obs Tele    11:01 AM: Re-evaluated pt. I have discussed test results, shared treatment plan, and the need for admission with patient at bedside. Pt expresses understanding at this time and agree with all information. All questions answered. Pt has no further questions or concerns at this time. Pt is ready for admit.         ED Medication(s):  Medications   furosemide injection 40 mg (has no administration in time range)   cefTRIAXone (ROCEPHIN) 1 g/50 mL D5W IVPB (has no administration in time range)   sodium chloride 0.9% bolus 30 mL/kg (0 mL/kg Intravenous Stopped 8/28/20 1021)   acetaminophen tablet 650 mg (650 mg Oral Given 8/28/20 0926)          New Prescriptions    No medications on file         Medical Decision Making    Medical Decision Making:   Clinical Tests:   Lab Tests: Ordered and Reviewed  Radiological Study: Ordered and Reviewed  Medical Tests: Ordered and Reviewed           Scribe Attestation:   Scribe #1: I performed the above scribed service and the documentation accurately describes the  services I performed. I attest to the accuracy of the note.    Attending:   Physician Attestation Statement for Scribe #1: I, Maciej Issa MD, personally performed the services described in this documentation, as scribed by Nikolay Angeles, in my presence, and it is both accurate and complete.          Clinical Impression       ICD-10-CM ICD-9-CM   1. Acute cystitis with hematuria  N30.01 595.0   2. Fever  R50.9 780.60   3. Congestive heart failure, unspecified HF chronicity, unspecified heart failure type  I50.9 428.0   4. Hypoxia  R09.02 799.02       Disposition:   Disposition: Placed in Observation  Condition: Fair         Maciej Issa MD  08/28/20 7748

## 2020-08-28 NOTE — ED NOTES
Straight cath procedure performed on pt for urine sample collection, sterile technique used, betadine used to clense the site, performed by Jes LEE with Melina LEE in room.

## 2020-08-28 NOTE — SUBJECTIVE & OBJECTIVE
Past Medical History:   Diagnosis Date    Anemia     Anxiety     Arthritis     Back pain     Cardiomyopathy     CHF (congestive heart failure)     Depression     Dizziness     Hypertension     Insomnia     Stroke     Use of cane as ambulatory aid        Past Surgical History:   Procedure Laterality Date    APPENDECTOMY      arthritis      BLADDER REPAIR      BUNIONECTOMY      CARDIAC DEFIBRILLATOR PLACEMENT      CATARACT EXTRACTION      HYSTERECTOMY      metal implant Bilateral     placed in the bladder.       Review of patient's allergies indicates:   Allergen Reactions    Bacitracin-polymyxin b Itching and Swelling    Merthiolate (thimerosal) Rash and Swelling    Diazepam      Hallucinations    Levofloxacin Itching    Metronidazole     Oxycodone Itching    Tizanidine      Hallucinations    Tramadol Itching    Cefdinir Itching     Pt not positive about allergy       No current facility-administered medications on file prior to encounter.      Current Outpatient Medications on File Prior to Encounter   Medication Sig    amiodarone (PACERONE) 200 MG Tab Take 1 tablet by mouth once daily.    amlodipine (NORVASC) 10 MG tablet Take 10 mg by mouth once daily.    aripiprazole (ABILIFY) 10 MG Tab Take 5 mg by mouth once daily.    aspirin (ECOTRIN) 81 MG EC tablet Take 81 mg by mouth once daily.    atorvastatin (LIPITOR) 20 MG tablet Take 20 mg by mouth once daily.    busPIRone (BUSPAR) 15 MG tablet Take 1 tablet by mouth 2 (two) times daily.    carvedilol (COREG) 6.25 MG tablet Take 6.25 mg by mouth 2 (two) times daily with meals.    clopidogreL (PLAVIX) 75 mg tablet Take 1 tablet by mouth once daily.    donepezil (ARICEPT) 10 MG tablet Take 1 tablet by mouth every evening.    DULoxetine (CYMBALTA) 30 MG capsule Take 1 capsule by mouth once daily.    esomeprazole (NEXIUM) 40 MG capsule Take 40 mg by mouth before breakfast.    ferrous sulfate 324 mg (65 mg iron) TbEC Take 65 mg by  mouth once daily.    fluticasone (FLONASE) 50 mcg/actuation nasal spray 1 spray by Each Nare route once daily.    furosemide (LASIX) 40 MG tablet Take 1 tablet by mouth 2 (two) times daily.    glucosamine-chondroitin 500-400 mg tablet Take 1 tablet by mouth 2 (two) times daily.    Lactobacillus rhamnosus GG (CULTURELLE) 10 billion cell capsule Take 1 capsule by mouth once daily.    levothyroxine (SYNTHROID) 75 MCG tablet Take 1 tablet (75 mcg total) by mouth before breakfast. (Patient taking differently: Take 50 mcg by mouth before breakfast. )    losartan (COZAAR) 25 MG tablet Take 1 tablet by mouth once daily.    meclizine (ANTIVERT) 12.5 mg tablet Take 12.5 mg by mouth 3 (three) times daily as needed.    multivitamin capsule Take 1 capsule by mouth once daily.    nystatin-triamcinolone (MYCOLOG) ointment 2 (two) times daily as needed.    potassium chloride SA (K-DUR,KLOR-CON) 20 MEQ tablet Take 20 mEq by mouth 2 (two) times daily.    sulfamethoxazole-trimethoprim 800-160mg (BACTRIM DS) 800-160 mg Tab Take 1 tablet by mouth 2 (two) times daily.    albuterol (PROAIR HFA) 90 mcg/actuation inhaler Inhale 2 puffs into the lungs every 6 (six) hours as needed for Wheezing.    cyanocobalamin (VITAMIN B-12) 1000 MCG tablet Take 1 tablet by mouth once daily.    cycloSPORINE (RESTASIS) 0.05 % ophthalmic emulsion Apply 1 drop to eye.    famotidine (PEPCID) 20 MG tablet Take 1 tablet (20 mg total) by mouth 2 (two) times daily as needed (heartburn).    melatonin 1 mg Tab Take 1 mg by mouth every evening.    mirabegron 50 mg Tb24 Take by mouth.    mirtazapine (REMERON) 7.5 MG Tab     polyethylene glycol (GLYCOLAX) 17 gram PwPk Take by mouth.    solifenacin (VESICARE) 10 MG tablet Take 5 mg by mouth once daily.    traMADol (ULTRAM) 50 mg tablet Take 1 tablet by mouth 3 (three) times daily as needed.    venlafaxine (EFFEXOR-XR) 150 MG Cp24 Take 75 mg by mouth once daily.     Family History     Problem  Relation (Age of Onset)    Cancer Mother    Heart disease Father    Hypertension         Tobacco Use    Smoking status: Never Smoker    Smokeless tobacco: Never Used   Substance and Sexual Activity    Alcohol use: No    Drug use: No    Sexual activity: Not Currently     Review of Systems   Constitutional: Positive for activity change, chills and fatigue. Negative for fever.   HENT: Positive for hearing loss. Negative for congestion, rhinorrhea and sinus pressure.    Respiratory: Positive for shortness of breath. Negative for apnea, cough, choking, chest tightness, wheezing and stridor.    Cardiovascular: Positive for chest pain (mild chest disconfort ). Negative for palpitations and leg swelling.   Gastrointestinal: Negative for abdominal distention, abdominal pain, diarrhea, nausea and vomiting.   Endocrine: Negative for cold intolerance and heat intolerance.   Genitourinary: Positive for dysuria. Negative for difficulty urinating and hematuria.   Musculoskeletal: Negative for arthralgias and joint swelling.   Skin: Negative for color change, pallor and rash.   Neurological: Positive for weakness. Negative for dizziness, seizures, numbness and headaches.   Psychiatric/Behavioral: Negative for agitation. The patient is not nervous/anxious.      Objective:     Vital Signs (Most Recent):  Temp: 99.2 °F (37.3 °C) (08/28/20 1140)  Pulse: 71 (08/28/20 1201)  Resp: 16 (08/28/20 1201)  BP: 124/62 (08/28/20 1201)  SpO2: 100 % (08/28/20 1201) Vital Signs (24h Range):  Temp:  [99.2 °F (37.3 °C)-100.4 °F (38 °C)] 99.2 °F (37.3 °C)  Pulse:  [71-88] 71  Resp:  [16-22] 16  SpO2:  [90 %-100 %] 100 %  BP: (121-153)/(59-69) 124/62     Weight: 65.4 kg (144 lb 1.6 oz)  Body mass index is 27.23 kg/m².    Physical Exam  Vitals signs and nursing note reviewed.   Constitutional:       General: She is not in acute distress.     Appearance: She is not ill-appearing, toxic-appearing or diaphoretic.   HENT:      Right Ear: There is no  impacted cerumen.      Left Ear: There is no impacted cerumen.      Nose: No congestion or rhinorrhea.      Mouth/Throat:      Pharynx: No oropharyngeal exudate or posterior oropharyngeal erythema.   Cardiovascular:      Heart sounds: Murmur present. Systolic murmur present.   Pulmonary:      Effort: No respiratory distress.      Breath sounds: No stridor. Examination of the right-lower field reveals decreased breath sounds. Examination of the left-lower field reveals decreased breath sounds. Decreased breath sounds present. No wheezing, rhonchi or rales.   Chest:      Chest wall: No tenderness.   Abdominal:      General: There is no distension.      Palpations: There is no mass.      Tenderness: There is no abdominal tenderness. There is no right CVA tenderness, left CVA tenderness, guarding or rebound.      Hernia: No hernia is present.   Musculoskeletal:         General: No swelling, tenderness, deformity or signs of injury.      Right lower leg: No edema.      Left lower leg: No edema.   Skin:     Capillary Refill: Capillary refill takes less than 2 seconds.      Findings: Bruising present.   Neurological:      General: No focal deficit present.             Significant Labs:   CBC:   Recent Labs   Lab 08/28/20  0918   WBC 10.56   HGB 7.6*   HCT 24.8*   *     CMP:   Recent Labs   Lab 08/28/20  0918      K 4.6      CO2 28   *   BUN 16   CREATININE 1.0   CALCIUM 8.9   PROT 6.5   ALBUMIN 3.2*   BILITOT 0.6   ALKPHOS 70   AST 20   ALT 18   ANIONGAP 8   EGFRNONAA 51*     Cardiac Markers:   Recent Labs   Lab 08/28/20  0918   BNP 1,036*     Lactic Acid:   Recent Labs   Lab 08/28/20  0927   LACTATE 1.5     Urine Studies:   Recent Labs   Lab 08/28/20  0940   COLORU Yellow   APPEARANCEUA Cloudy*   PHUR 7.0   SPECGRAV 1.020   PROTEINUA 2+*   GLUCUA Negative   KETONESU Negative   BILIRUBINUA Negative   OCCULTUA 1+*   NITRITE Positive*   UROBILINOGEN Negative   LEUKOCYTESUR 3+*   RBCUA 10*   WBCUA  >100*   BACTERIA Moderate*   HYALINECASTS 0       Significant Imaging:     Imaging Results          X-Ray Chest AP Portable (Final result)  Result time 08/28/20 09:38:21    Final result by Moreno Brewster Jr., MD (08/28/20 09:38:21)                 Impression:      Small left pleural fluid.  Areas of fine linear scarring within the right upper lung.      Electronically signed by: Moreno Brewster Jr., MD  Date:    08/28/2020  Time:    09:38             Narrative:    EXAMINATION:  XR CHEST AP PORTABLE    CLINICAL HISTORY:  Sepsis;    COMPARISON:  Prior radiograph from July 27, 2020.    FINDINGS:  Left-sided AICD in place.  There is likely small left pleural fluid.  Fine reticular bandlike scarring within the periphery of the right upper lobe is unchanged.  No confluent airspace disease.  No pneumothorax.  Heart size within normal limits.  No significant bony findings.

## 2020-08-28 NOTE — HPI
Ms. Davey is an 86 year old female patient whose current medical conditions include hypertensive heart disease, DCM (EF 30%), chronic systolic CHF s/p AICD, chronic anemia, HTN, history of CVA, and chronic hypoxemic respiratory failure who presented to [facility] ED today via EMS with a chief complaint of increased weakness and fatigue over the past week. Patient reportedly had been diagnosed with a UTI and was scheduled to f/u with her PCP today, but symptoms worsened acutely, prompting ED visit. Associated symptoms included fatigue and back pain. Patient denied any associated simeon chest pain, SOB, palpitations, near syncope, or syncope. Initial EKG en route showed possible STEMI and cardiology was consulted to assist with management. Patient seen and examined today, resting in bed. Feels ok. Main complaint is fatigue. Remains chest pain free. No cardiac complaints. She reports compliance with her medications. Followed routinely on OP basis by Dr. Nassar. Chart reviewed. Troponin 0.036, in similar range as prior admissions. H/H 7.6/24.8. BNP at baseline, 1036. UA + for UTI. Echo 3/20 showed EF of 30%, DD.     EKGs reviewed, no STEMI. No acute ischemic changes.

## 2020-08-28 NOTE — HPI
Ms Davey is a 86 year old female with PMHx of HTN, systolic CHF, HLD, AICD placement, anemia, stroke and Depression. Patient reports she presented to [facility] Emergency Room for increase weakness and fatigue. She reports symptoms started about 2 weeks ago and have continued to worsen. Patient reports unable to get up out of bed due to increase weakness. Associated symptoms include mild chest pain and SOB. EMS reported EKG at the scene showed acute MI with O 2 sat of 88 %. UA positive for UTI, patient reports recently being treated for UTI. BNP elevated at 1036, Troponin 0.036. 2 D Echo on 3/2020 showed moderately decreased left ventricular systolic function with estimated ejection fraction is 30%. Primary cardiologist is Dr Nassar.  Patient is a full code, spouse and daughter are surrogate decision makers. She is being placed in Observation under the care of Hospital Medicine.

## 2020-08-28 NOTE — H&P
[facility] Hospital Medicine  History & Physical    Patient Name: Love Davey  MRN: [id number]  Admission Date: 8/28/2020  Attending Physician: Allyn Becerra MD   Primary Care Provider: Kaley Law MD         Patient information was obtained from patient and ER records.     Subjective:     Principal Problem:Acute cystitis with hematuria    Chief Complaint:   Chief Complaint   Patient presents with    Back Pain     pt brought in by EMS for UTI symptoms and back pain, pt EKG read STEMI in route        HPI: Ms Davey is a 86 year old female with PMHx of HTN, systolic CHF, HLD, AICD placement, anemia, stroke and Depression. Patient reports she presented to [facility] Emergency Room for increase weakness and fatigue. She reports symptoms started about 2 weeks ago and have continued to worsen. Patient reports unable to get up out of bed due to increase weakness. Associated symptoms include mild chest pain and SOB. EMS reported EKG at the scene showed acute MI with O 2 sat of 88 %. UA positive for UTI, patient reports recently being treated for UTI. BNP elevated at 1036, Troponin 0.036. 2 D Echo on 3/2020 showed moderately decreased left ventricular systolic function with estimated ejection fraction is 30%. Primary cardiologist is Dr Nassar.  Patient is a full code, spouse and daughter are surrogate decision makers. She is being placed in Observation under the care of Hospital Medicine.     Past Medical History:   Diagnosis Date    Anemia     Anxiety     Arthritis     Back pain     Cardiomyopathy     CHF (congestive heart failure)     Depression     Dizziness     Hypertension     Insomnia     Stroke     Use of cane as ambulatory aid        Past Surgical History:   Procedure Laterality Date    APPENDECTOMY      arthritis      BLADDER REPAIR      BUNIONECTOMY      CARDIAC DEFIBRILLATOR PLACEMENT      CATARACT EXTRACTION      HYSTERECTOMY      metal implant Bilateral      placed in the bladder.       Review of patient's allergies indicates:   Allergen Reactions    Bacitracin-polymyxin b Itching and Swelling    Merthiolate (thimerosal) Rash and Swelling    Diazepam      Hallucinations    Levofloxacin Itching    Metronidazole     Oxycodone Itching    Tizanidine      Hallucinations    Tramadol Itching    Cefdinir Itching     Pt not positive about allergy       No current facility-administered medications on file prior to encounter.      Current Outpatient Medications on File Prior to Encounter   Medication Sig    amiodarone (PACERONE) 200 MG Tab Take 1 tablet by mouth once daily.    amlodipine (NORVASC) 10 MG tablet Take 10 mg by mouth once daily.    aripiprazole (ABILIFY) 10 MG Tab Take 5 mg by mouth once daily.    aspirin (ECOTRIN) 81 MG EC tablet Take 81 mg by mouth once daily.    atorvastatin (LIPITOR) 20 MG tablet Take 20 mg by mouth once daily.    busPIRone (BUSPAR) 15 MG tablet Take 1 tablet by mouth 2 (two) times daily.    carvedilol (COREG) 6.25 MG tablet Take 6.25 mg by mouth 2 (two) times daily with meals.    clopidogreL (PLAVIX) 75 mg tablet Take 1 tablet by mouth once daily.    donepezil (ARICEPT) 10 MG tablet Take 1 tablet by mouth every evening.    DULoxetine (CYMBALTA) 30 MG capsule Take 1 capsule by mouth once daily.    esomeprazole (NEXIUM) 40 MG capsule Take 40 mg by mouth before breakfast.    ferrous sulfate 324 mg (65 mg iron) TbEC Take 65 mg by mouth once daily.    fluticasone (FLONASE) 50 mcg/actuation nasal spray 1 spray by Each Nare route once daily.    furosemide (LASIX) 40 MG tablet Take 1 tablet by mouth 2 (two) times daily.    glucosamine-chondroitin 500-400 mg tablet Take 1 tablet by mouth 2 (two) times daily.    Lactobacillus rhamnosus GG (CULTURELLE) 10 billion cell capsule Take 1 capsule by mouth once daily.    levothyroxine (SYNTHROID) 75 MCG tablet Take 1 tablet (75 mcg total) by mouth before breakfast. (Patient taking  differently: Take 50 mcg by mouth before breakfast. )    losartan (COZAAR) 25 MG tablet Take 1 tablet by mouth once daily.    meclizine (ANTIVERT) 12.5 mg tablet Take 12.5 mg by mouth 3 (three) times daily as needed.    multivitamin capsule Take 1 capsule by mouth once daily.    nystatin-triamcinolone (MYCOLOG) ointment 2 (two) times daily as needed.    potassium chloride SA (K-DUR,KLOR-CON) 20 MEQ tablet Take 20 mEq by mouth 2 (two) times daily.    sulfamethoxazole-trimethoprim 800-160mg (BACTRIM DS) 800-160 mg Tab Take 1 tablet by mouth 2 (two) times daily.    albuterol (PROAIR HFA) 90 mcg/actuation inhaler Inhale 2 puffs into the lungs every 6 (six) hours as needed for Wheezing.    cyanocobalamin (VITAMIN B-12) 1000 MCG tablet Take 1 tablet by mouth once daily.    cycloSPORINE (RESTASIS) 0.05 % ophthalmic emulsion Apply 1 drop to eye.    famotidine (PEPCID) 20 MG tablet Take 1 tablet (20 mg total) by mouth 2 (two) times daily as needed (heartburn).    melatonin 1 mg Tab Take 1 mg by mouth every evening.    mirabegron 50 mg Tb24 Take by mouth.    mirtazapine (REMERON) 7.5 MG Tab     polyethylene glycol (GLYCOLAX) 17 gram PwPk Take by mouth.    solifenacin (VESICARE) 10 MG tablet Take 5 mg by mouth once daily.    traMADol (ULTRAM) 50 mg tablet Take 1 tablet by mouth 3 (three) times daily as needed.    venlafaxine (EFFEXOR-XR) 150 MG Cp24 Take 75 mg by mouth once daily.     Family History     Problem Relation (Age of Onset)    Cancer Mother    Heart disease Father    Hypertension         Tobacco Use    Smoking status: Never Smoker    Smokeless tobacco: Never Used   Substance and Sexual Activity    Alcohol use: No    Drug use: No    Sexual activity: Not Currently     Review of Systems   Constitutional: Positive for activity change, chills and fatigue. Negative for fever.   HENT: Positive for hearing loss. Negative for congestion, rhinorrhea and sinus pressure.    Respiratory: Positive for  shortness of breath. Negative for apnea, cough, choking, chest tightness, wheezing and stridor.    Cardiovascular: Positive for chest pain (mild chest disconfort ). Negative for palpitations and leg swelling.   Gastrointestinal: Negative for abdominal distention, abdominal pain, diarrhea, nausea and vomiting.   Endocrine: Negative for cold intolerance and heat intolerance.   Genitourinary: Positive for dysuria. Negative for difficulty urinating and hematuria.   Musculoskeletal: Negative for arthralgias and joint swelling.   Skin: Negative for color change, pallor and rash.   Neurological: Positive for weakness. Negative for dizziness, seizures, numbness and headaches.   Psychiatric/Behavioral: Negative for agitation. The patient is not nervous/anxious.      Objective:     Vital Signs (Most Recent):  Temp: 99.2 °F (37.3 °C) (08/28/20 1140)  Pulse: 71 (08/28/20 1201)  Resp: 16 (08/28/20 1201)  BP: 124/62 (08/28/20 1201)  SpO2: 100 % (08/28/20 1201) Vital Signs (24h Range):  Temp:  [99.2 °F (37.3 °C)-100.4 °F (38 °C)] 99.2 °F (37.3 °C)  Pulse:  [71-88] 71  Resp:  [16-22] 16  SpO2:  [90 %-100 %] 100 %  BP: (121-153)/(59-69) 124/62     Weight: 65.4 kg (144 lb 1.6 oz)  Body mass index is 27.23 kg/m².    Physical Exam  Vitals signs and nursing note reviewed.   Constitutional:       General: She is not in acute distress.     Appearance: She is not ill-appearing, toxic-appearing or diaphoretic.   HENT:      Right Ear: There is no impacted cerumen.      Left Ear: There is no impacted cerumen.      Nose: No congestion or rhinorrhea.      Mouth/Throat:      Pharynx: No oropharyngeal exudate or posterior oropharyngeal erythema.   Cardiovascular:      Heart sounds: Murmur present. Systolic murmur present.   Pulmonary:      Effort: No respiratory distress.      Breath sounds: No stridor. Examination of the right-lower field reveals decreased breath sounds. Examination of the left-lower field reveals decreased breath sounds.  Decreased breath sounds present. No wheezing, rhonchi or rales.   Chest:      Chest wall: No tenderness.   Abdominal:      General: There is no distension.      Palpations: There is no mass.      Tenderness: There is no abdominal tenderness. There is no right CVA tenderness, left CVA tenderness, guarding or rebound.      Hernia: No hernia is present.   Musculoskeletal:         General: No swelling, tenderness, deformity or signs of injury.      Right lower leg: No edema.      Left lower leg: No edema.   Skin:     Capillary Refill: Capillary refill takes less than 2 seconds.      Findings: Bruising present.   Neurological:      General: No focal deficit present.             Significant Labs:   CBC:   Recent Labs   Lab 08/28/20  0918   WBC 10.56   HGB 7.6*   HCT 24.8*   *     CMP:   Recent Labs   Lab 08/28/20 0918      K 4.6      CO2 28   *   BUN 16   CREATININE 1.0   CALCIUM 8.9   PROT 6.5   ALBUMIN 3.2*   BILITOT 0.6   ALKPHOS 70   AST 20   ALT 18   ANIONGAP 8   EGFRNONAA 51*     Cardiac Markers:   Recent Labs   Lab 08/28/20  0918   BNP 1,036*     Lactic Acid:   Recent Labs   Lab 08/28/20  0927   LACTATE 1.5     Urine Studies:   Recent Labs   Lab 08/28/20  0940   COLORU Yellow   APPEARANCEUA Cloudy*   PHUR 7.0   SPECGRAV 1.020   PROTEINUA 2+*   GLUCUA Negative   KETONESU Negative   BILIRUBINUA Negative   OCCULTUA 1+*   NITRITE Positive*   UROBILINOGEN Negative   LEUKOCYTESUR 3+*   RBCUA 10*   WBCUA >100*   BACTERIA Moderate*   HYALINECASTS 0       Significant Imaging:     Imaging Results          X-Ray Chest AP Portable (Final result)  Result time 08/28/20 09:38:21    Final result by Moreno Brewster Jr., MD (08/28/20 09:38:21)                 Impression:      Small left pleural fluid.  Areas of fine linear scarring within the right upper lung.      Electronically signed by: Moreno Brewster Jr., MD  Date:    08/28/2020  Time:    09:38             Narrative:    EXAMINATION:  XR CHEST AP  PORTABLE    CLINICAL HISTORY:  Sepsis;    COMPARISON:  Prior radiograph from July 27, 2020.    FINDINGS:  Left-sided AICD in place.  There is likely small left pleural fluid.  Fine reticular bandlike scarring within the periphery of the right upper lobe is unchanged.  No confluent airspace disease.  No pneumothorax.  Heart size within normal limits.  No significant bony findings.                              Assessment/Plan:     * Acute cystitis with hematuria  Place on Observation   BC X 2   Urine culture   Rocephin             Acute on chronic combined systolic and diastolic heart failure  BNP elevated 1036  Cardiology consulted in the Emergency Room   Strict I & O   Daily weights   Low sodium diet   Continue B blocker, ARB, statin, ASA and Plavix   Lasix IV BID   Serial troponin     AICD (automatic cardioverter/defibrillator) present  Cardiology consulted in ED  Telemetry monitor  Continue Amiodarone and B blocker        History of stroke  Continue ASA, Plavix and Statin       Thyroid activity decreased  Continue Synthroid       Episode of recurrent major depressive disorder  Continue home meds       Dementia  Continue home donepezil       HTN (hypertension)  Continue B blocker and ARB   Restart home amlodipine as needed       Acute on chronic anemia  HGB 7.6, around baseline   Monitor labs daily           VTE Risk Mitigation (From admission, onward)         Ordered     IP VTE HIGH RISK PATIENT  Once      08/28/20 1219     Place sequential compression device  Until discontinued      08/28/20 1219                   Booker Conde NP  Department of Hospital Medicine   [facility]

## 2020-08-28 NOTE — ASSESSMENT & PLAN NOTE
-Clinically compensated, no complaints of SOB  -Continue Coreg, Losartan, po Lasix  -Echo 3/20 showed EF of 20%, DD

## 2020-08-28 NOTE — ASSESSMENT & PLAN NOTE
-Troponin 0.036, chronically elevated, in similar range as prior admissions  -No cardiac complaints/chest pain or SOB  -Elevation secondary to demand ischemia from UTI, anemia  -Continue home meds as tolerated-ASA, Plavix, BB, statin, ARB  -EKGs reviewed, no evidence of acute STEMI

## 2020-08-28 NOTE — ASSESSMENT & PLAN NOTE
BNP elevated 1036  Cardiology consulted in the Emergency Room   Strict I & O   Daily weights   Low sodium diet   Continue B blocker, ARB, statin, ASA and Plavix   Lasix IV BID   Serial troponin

## 2020-08-28 NOTE — PLAN OF CARE
Pt AAO x4.  VSS.  Pt remained afebrile throughout this shift.   IV ABX3 administered per order.   Pt remained free of falls this shift.   Pt c/o of no pain this shift.  Plan of care reviewed. Patient verbalizes understanding.   Pt moving/turing q 2 hours with assist. Frequent weight shifting encouraged.  Patient SR on monitor.   Pt maintained on 2L O2 via NC  Bed low, side rails up x 2, wheels locked, call light in reach.   Bed alarm maintained for safety.   Patient instructed to call for assistance.   Hourly rounding completed.   24 hour chart check completed.  Will continue to monitor.

## 2020-08-28 NOTE — CONSULTS
[facility]  Cardiology  Consult Note    Patient Name: Love Davey  MRN: [id number]  Admission Date: 8/28/2020  Hospital Length of Stay: 0 days  Code Status: Full Code   Attending Provider: Allyn Becerra MD   Consulting Provider: Leti Reyes PA-C  Primary Care Physician: Kaley Law MD  Principal Problem:Acute cystitis with hematuria    Patient information was obtained from patient, past medical records and ER records.     Inpatient consult to Cardiology  Consult performed by: Leti Reyes PA-C  Consult ordered by: Maciej Issa MD        Subjective:     Chief Complaint:  Weakness    HPI:   Ms. Davey is an 86 year old female patient whose current medical conditions include hypertensive heart disease, DCM (EF 30%), chronic systolic CHF s/p AICD, chronic anemia, HTN, history of CVA, and chronic hypoxemic respiratory failure who presented to [facility] ED today via EMS with a chief complaint of increased weakness and fatigue over the past week. Patient reportedly had been diagnosed with a UTI and was scheduled to f/u with her PCP today, but symptoms worsened acutely, prompting ED visit. Associated symptoms included fatigue and back pain. Patient denied any associated simeon chest pain, SOB, palpitations, near syncope, or syncope. Initial EKG en route showed possible STEMI and cardiology was consulted to assist with management. Patient seen and examined today, resting in bed. Feels ok. Main complaint is fatigue. Remains chest pain free. No cardiac complaints. She reports compliance with her medications. Followed routinely on OP basis by Dr. Nassar. Chart reviewed. Troponin 0.036, in similar range as prior admissions. H/H 7.6/24.8. BNP at baseline, 1036. UA + for UTI. Echo 3/20 showed EF of 30%, DD.     EKGs reviewed, no STEMI. No acute ischemic changes.     Past Medical History:   Diagnosis Date    Anemia     Anxiety     Arthritis     Back pain     Cardiomyopathy      CHF (congestive heart failure)     Depression     Dizziness     Hypertension     Insomnia     Stroke     Use of cane as ambulatory aid        Past Surgical History:   Procedure Laterality Date    APPENDECTOMY      arthritis      BLADDER REPAIR      BUNIONECTOMY      CARDIAC DEFIBRILLATOR PLACEMENT      CATARACT EXTRACTION      HYSTERECTOMY      metal implant Bilateral     placed in the bladder.       Review of patient's allergies indicates:   Allergen Reactions    Bacitracin-polymyxin b Itching and Swelling    Merthiolate (thimerosal) Rash and Swelling    Diazepam      Hallucinations    Levofloxacin Itching    Metronidazole     Oxycodone Itching    Tizanidine      Hallucinations    Tramadol Itching    Cefdinir Itching     Pt not positive about allergy       No current facility-administered medications on file prior to encounter.      Current Outpatient Medications on File Prior to Encounter   Medication Sig    amiodarone (PACERONE) 200 MG Tab Take 1 tablet by mouth once daily.    amlodipine (NORVASC) 10 MG tablet Take 10 mg by mouth once daily.    aripiprazole (ABILIFY) 10 MG Tab Take 5 mg by mouth once daily.    aspirin (ECOTRIN) 81 MG EC tablet Take 81 mg by mouth once daily.    atorvastatin (LIPITOR) 20 MG tablet Take 20 mg by mouth once daily.    busPIRone (BUSPAR) 15 MG tablet Take 1 tablet by mouth 2 (two) times daily.    carvedilol (COREG) 6.25 MG tablet Take 6.25 mg by mouth 2 (two) times daily with meals.    clopidogreL (PLAVIX) 75 mg tablet Take 1 tablet by mouth once daily.    donepezil (ARICEPT) 10 MG tablet Take 1 tablet by mouth every evening.    DULoxetine (CYMBALTA) 30 MG capsule Take 1 capsule by mouth once daily.    esomeprazole (NEXIUM) 40 MG capsule Take 40 mg by mouth before breakfast.    ferrous sulfate 324 mg (65 mg iron) TbEC Take 65 mg by mouth once daily.    fluticasone (FLONASE) 50 mcg/actuation nasal spray 1 spray by Each Nare route once daily.     furosemide (LASIX) 40 MG tablet Take 1 tablet by mouth 2 (two) times daily.    glucosamine-chondroitin 500-400 mg tablet Take 1 tablet by mouth 2 (two) times daily.    Lactobacillus rhamnosus GG (CULTURELLE) 10 billion cell capsule Take 1 capsule by mouth once daily.    levothyroxine (SYNTHROID) 75 MCG tablet Take 1 tablet (75 mcg total) by mouth before breakfast. (Patient taking differently: Take 50 mcg by mouth before breakfast. )    losartan (COZAAR) 25 MG tablet Take 1 tablet by mouth once daily.    meclizine (ANTIVERT) 12.5 mg tablet Take 12.5 mg by mouth 3 (three) times daily as needed.    multivitamin capsule Take 1 capsule by mouth once daily.    nystatin-triamcinolone (MYCOLOG) ointment 2 (two) times daily as needed.    potassium chloride SA (K-DUR,KLOR-CON) 20 MEQ tablet Take 20 mEq by mouth 2 (two) times daily.    sulfamethoxazole-trimethoprim 800-160mg (BACTRIM DS) 800-160 mg Tab Take 1 tablet by mouth 2 (two) times daily.    albuterol (PROAIR HFA) 90 mcg/actuation inhaler Inhale 2 puffs into the lungs every 6 (six) hours as needed for Wheezing.    cyanocobalamin (VITAMIN B-12) 1000 MCG tablet Take 1 tablet by mouth once daily.    cycloSPORINE (RESTASIS) 0.05 % ophthalmic emulsion Apply 1 drop to eye.    famotidine (PEPCID) 20 MG tablet Take 1 tablet (20 mg total) by mouth 2 (two) times daily as needed (heartburn).    melatonin 1 mg Tab Take 1 mg by mouth every evening.    mirabegron 50 mg Tb24 Take by mouth.    mirtazapine (REMERON) 7.5 MG Tab     polyethylene glycol (GLYCOLAX) 17 gram PwPk Take by mouth.    solifenacin (VESICARE) 10 MG tablet Take 5 mg by mouth once daily.    traMADol (ULTRAM) 50 mg tablet Take 1 tablet by mouth 3 (three) times daily as needed.    venlafaxine (EFFEXOR-XR) 150 MG Cp24 Take 75 mg by mouth once daily.     Family History     Problem Relation (Age of Onset)    Cancer Mother    Heart disease Father    Hypertension         Tobacco Use    Smoking status:  Never Smoker    Smokeless tobacco: Never Used   Substance and Sexual Activity    Alcohol use: No    Drug use: No    Sexual activity: Not Currently     Review of Systems   Constitution: Positive for malaise/fatigue.   HENT: Negative.    Eyes: Negative.    Cardiovascular: Negative.    Respiratory: Negative.    Endocrine: Negative.    Hematologic/Lymphatic: Bruises/bleeds easily.   Skin: Negative.    Musculoskeletal: Positive for arthritis, back pain (positional) and joint pain.   Gastrointestinal: Negative.    Genitourinary: Negative.    Neurological: Positive for weakness.     Objective:     Vital Signs (Most Recent):  Temp: 98.6 °F (37 °C) (08/28/20 1223)  Pulse: 73 (08/28/20 1223)  Resp: 18 (08/28/20 1223)  BP: (!) 124/58 (08/28/20 1223)  SpO2: 99 % (08/28/20 1223) Vital Signs (24h Range):  Temp:  [98.6 °F (37 °C)-100.4 °F (38 °C)] 98.6 °F (37 °C)  Pulse:  [71-88] 73  Resp:  [16-22] 18  SpO2:  [90 %-100 %] 99 %  BP: (121-153)/(58-69) 124/58     Weight: 65.4 kg (144 lb 1.6 oz)  Body mass index is 27.23 kg/m².    SpO2: 99 %  O2 Device (Oxygen Therapy): (P) nasal cannula    No intake or output data in the 24 hours ending 08/28/20 1243    Lines/Drains/Airways     Drain            Female External Urinary Catheter 08/28/20 0942 less than 1 day          Peripheral Intravenous Line                 Peripheral IV - Single Lumen 08/28/20 0915 22 G Right Hand less than 1 day         Peripheral IV - Single Lumen 08/28/20 0927 20 G Left Antecubital less than 1 day                Physical Exam   Constitutional: She is oriented to person, place, and time. She appears well-developed and well-nourished. No distress.   HENT:   Head: Normocephalic and atraumatic.   Eyes: Pupils are equal, round, and reactive to light. Right eye exhibits no discharge. Left eye exhibits no discharge.   Neck: Neck supple. No JVD present.   Cardiovascular: Normal rate, regular rhythm, S1 normal, S2 normal and normal heart sounds.   No murmur  heard.  Pulmonary/Chest: Effort normal and breath sounds normal. No respiratory distress. She has no wheezes. She has no rales.   AICD site well-healed   Abdominal: Soft. She exhibits no distension.   Musculoskeletal:         General: Edema (Trace BLE) present.   Neurological: She is alert and oriented to person, place, and time.   Skin: Skin is warm and dry. She is not diaphoretic. No erythema.   Psychiatric: She has a normal mood and affect. Her behavior is normal. Thought content normal.   Nursing note and vitals reviewed.      Significant Labs:   CMP   Recent Labs   Lab 08/28/20  0918      K 4.6      CO2 28   *   BUN 16   CREATININE 1.0   CALCIUM 8.9   PROT 6.5   ALBUMIN 3.2*   BILITOT 0.6   ALKPHOS 70   AST 20   ALT 18   ANIONGAP 8   ESTGFRAFRICA 59*   EGFRNONAA 51*   , CBC   Recent Labs   Lab 08/28/20 0918   WBC 10.56   HGB 7.6*   HCT 24.8*   *   , Troponin   Recent Labs   Lab 08/28/20 0918   TROPONINI 0.036*    and All pertinent lab results from the last 24 hours have been reviewed.    Significant Imaging: Echocardiogram:   Transthoracic echo (TTE) complete (Cupid Only):   Results for orders placed or performed during the hospital encounter of 03/11/20   Echo Color Flow Doppler? Yes   Result Value Ref Range    Ascending aorta 2.87 cm    STJ 3.06 cm    AV mean gradient 11 mmHg    Ao peak jason 2.11 m/s    Ao VTI 53.18 cm    IVRT 93.43 msec    IVS 1.10 0.6 - 1.1 cm    LA size 3.62 cm    Left Atrium Major Axis 4.40 cm    Left Atrium Minor Axis 3.21 cm    LVIDD 5.38 3.5 - 6.0 cm    LVIDS 4.63 (A) 2.1 - 4.0 cm    LVOT diameter 2.02 cm    LVOT peak VTI 19.83 cm    PW 1.26 (A) 0.6 - 1.1 cm    MV Peak A Jason 0.62 m/s    E wave decelartion time 247.70 msec    MV Peak E Jason 0.75 m/s    RA Major Axis 3.99 cm    Sinus 3.55 cm    TAPSE 2.34 cm    TR Max Jason 3.08 m/s    Ao root annulus 3.83 cm    LV Diastolic Volume 139.83 mL    LV Systolic Volume 99.04 mL    LVOT peak jason 0.80 m/s    LA WIDTH 2.33  cm    RA Width 2.28 cm    FS 14 %    LA volume 26.61 cm3    LV mass 256.83 g    Left Ventricle Relative Wall Thickness 0.47 cm    AV valve area 1.19 cm2    AV Velocity Ratio 0.38     AV index (prosthetic) 0.37     E/A ratio 1.21     LVOT area 3.2 cm2    LVOT stroke volume 63.52 cm3    AV peak gradient 18 mmHg    LV Systolic Volume Index 58.1 mL/m2    LV Diastolic Volume Index 82.04 mL/m2    LA Volume Index 15.6 mL/m2    LV Mass Index 151 g/m2    Triscuspid Valve Regurgitation Peak Gradient 38 mmHg    BSA 1.75 m2    Right Atrial Pressure (from IVC) 3 mmHg    TV rest pulmonary artery pressure 41 mmHg    Narrative    · Concentric left ventricular hypertrophy.  · Mild left ventricular enlargement.  · Moderately decreased left ventricular systolic function. The estimated   ejection fraction is 30%.  · Grade I (mild) left ventricular diastolic dysfunction consistent with   impaired relaxation.  · Severe global hypokinetic wall motion.  · Normal right ventricular systolic function.  · Mild aortic regurgitation.  · Mild mitral regurgitation.  · Mild tricuspid regurgitation.  · Normal central venous pressure (3 mmHg).  · The estimated PA systolic pressure is 41 mmHg.  · Pulmonary hypertension present.      , EKG: Reviewed and X-Ray: CXR: X-Ray Chest 1 View (CXR): No results found for this visit on 08/28/20. and X-Ray Chest PA and Lateral (CXR): No results found for this visit on 08/28/20.    Assessment and Plan:   Patient who presents with weakness and fatigue in setting of UTI/anemia. Elevated troponin secondary to demand ischemia. Continue OMT as tolerated. EKGs reviewed, no evidence of STEMI.    * Acute cystitis with hematuria  -Mgmt as per primary team, on abx    Elevated troponin  -Troponin 0.036, chronically elevated, in similar range as prior admissions  -No cardiac complaints/chest pain or SOB  -Elevation secondary to demand ischemia from UTI, anemia  -Continue home meds as tolerated-ASA, Plavix, BB, statin,  ARB  -EKGs reviewed, no evidence of acute STEMI    AICD (automatic cardioverter/defibrillator) present  -No AICD shocks  -Continue Coreg, amiodarone    History of stroke  -Continue ASA, Plavix, statin    Dementia  -Mgmt as per primary team    Acute on chronic combined systolic and diastolic heart failure  -Clinically compensated, no complaints of SOB  -Continue Coreg, Losartan, po Lasix  -Echo 3/20 showed EF of 20%, DD    HTN (hypertension)  -BP controlled  -Continue home meds-Coreg, Losartan    Acute on chronic anemia  -H/H 7.6/24.8  -Mgmt as per primary team        VTE Risk Mitigation (From admission, onward)         Ordered     IP VTE HIGH RISK PATIENT  Once      08/28/20 1219     Place sequential compression device  Until discontinued      08/28/20 1219                Thank you for your consult. I will follow-up with patient. Please contact us if you have any additional questions.    Leti Reyes PA-C  Cardiology   [facility]

## 2020-08-29 LAB
ABO + RH BLD: NORMAL
ANION GAP SERPL CALC-SCNC: 7 MMOL/L (ref 8–16)
BASOPHILS # BLD AUTO: 0.02 K/UL (ref 0–0.2)
BASOPHILS NFR BLD: 0.3 % (ref 0–1.9)
BLD GP AB SCN CELLS X3 SERPL QL: NORMAL
BLD PROD TYP BPU: NORMAL
BLD PROD TYP BPU: NORMAL
BLOOD UNIT EXPIRATION DATE: NORMAL
BLOOD UNIT EXPIRATION DATE: NORMAL
BLOOD UNIT TYPE CODE: 5100
BLOOD UNIT TYPE CODE: 5100
BLOOD UNIT TYPE: NORMAL
BLOOD UNIT TYPE: NORMAL
BUN SERPL-MCNC: 19 MG/DL (ref 8–23)
CALCIUM SERPL-MCNC: 8.6 MG/DL (ref 8.7–10.5)
CHLORIDE SERPL-SCNC: 106 MMOL/L (ref 95–110)
CO2 SERPL-SCNC: 29 MMOL/L (ref 23–29)
CODING SYSTEM: NORMAL
CODING SYSTEM: NORMAL
CREAT SERPL-MCNC: 0.9 MG/DL (ref 0.5–1.4)
DIFFERENTIAL METHOD: ABNORMAL
DISPENSE STATUS: NORMAL
DISPENSE STATUS: NORMAL
EOSINOPHIL # BLD AUTO: 0.1 K/UL (ref 0–0.5)
EOSINOPHIL NFR BLD: 1.5 % (ref 0–8)
ERYTHROCYTE [DISTWIDTH] IN BLOOD BY AUTOMATED COUNT: 22.2 % (ref 11.5–14.5)
EST. GFR  (AFRICAN AMERICAN): >60 ML/MIN/1.73 M^2
EST. GFR  (NON AFRICAN AMERICAN): 58 ML/MIN/1.73 M^2
ESTIMATED AVG GLUCOSE: 120 MG/DL (ref 68–131)
GLUCOSE SERPL-MCNC: 110 MG/DL (ref 70–110)
HBA1C MFR BLD HPLC: 5.8 % (ref 4–5.6)
HCT VFR BLD AUTO: 21.2 % (ref 37–48.5)
HGB BLD-MCNC: 6.7 G/DL (ref 12–16)
IMM GRANULOCYTES # BLD AUTO: 0.09 K/UL (ref 0–0.04)
IMM GRANULOCYTES NFR BLD AUTO: 1.2 % (ref 0–0.5)
LYMPHOCYTES # BLD AUTO: 1.1 K/UL (ref 1–4.8)
LYMPHOCYTES NFR BLD: 14.6 % (ref 18–48)
MCH RBC QN AUTO: 35.8 PG (ref 27–31)
MCHC RBC AUTO-ENTMCNC: 31.6 G/DL (ref 32–36)
MCV RBC AUTO: 113 FL (ref 82–98)
MONOCYTES # BLD AUTO: 0.7 K/UL (ref 0.3–1)
MONOCYTES NFR BLD: 9.2 % (ref 4–15)
NEUTROPHILS # BLD AUTO: 5.5 K/UL (ref 1.8–7.7)
NEUTROPHILS NFR BLD: 73.2 % (ref 38–73)
NRBC BLD-RTO: 0 /100 WBC
NUM UNITS TRANS PACKED RBC: NORMAL
NUM UNITS TRANS PACKED RBC: NORMAL
PLATELET # BLD AUTO: 320 K/UL (ref 150–350)
PMV BLD AUTO: 12 FL (ref 9.2–12.9)
POTASSIUM SERPL-SCNC: 4.8 MMOL/L (ref 3.5–5.1)
RBC # BLD AUTO: 1.87 M/UL (ref 4–5.4)
SODIUM SERPL-SCNC: 142 MMOL/L (ref 136–145)
WBC # BLD AUTO: 7.48 K/UL (ref 3.9–12.7)

## 2020-08-29 PROCEDURE — G0378 HOSPITAL OBSERVATION PER HR: HCPCS

## 2020-08-29 PROCEDURE — 86850 RBC ANTIBODY SCREEN: CPT

## 2020-08-29 PROCEDURE — 63600175 PHARM REV CODE 636 W HCPCS: Performed by: NURSE PRACTITIONER

## 2020-08-29 PROCEDURE — 80048 BASIC METABOLIC PNL TOTAL CA: CPT

## 2020-08-29 PROCEDURE — 36415 COLL VENOUS BLD VENIPUNCTURE: CPT

## 2020-08-29 PROCEDURE — 25000003 PHARM REV CODE 250: Performed by: NURSE PRACTITIONER

## 2020-08-29 PROCEDURE — 25000003 PHARM REV CODE 250: Performed by: EMERGENCY MEDICINE

## 2020-08-29 PROCEDURE — 94761 N-INVAS EAR/PLS OXIMETRY MLT: CPT

## 2020-08-29 PROCEDURE — 36430 TRANSFUSION BLD/BLD COMPNT: CPT

## 2020-08-29 PROCEDURE — 85025 COMPLETE CBC W/AUTO DIFF WBC: CPT

## 2020-08-29 PROCEDURE — P9016 RBC LEUKOCYTES REDUCED: HCPCS

## 2020-08-29 PROCEDURE — 96375 TX/PRO/DX INJ NEW DRUG ADDON: CPT | Performed by: EMERGENCY MEDICINE

## 2020-08-29 PROCEDURE — 27000221 HC OXYGEN, UP TO 24 HOURS

## 2020-08-29 PROCEDURE — 86920 COMPATIBILITY TEST SPIN: CPT

## 2020-08-29 RX ORDER — MIRTAZAPINE 7.5 MG/1
7.5 TABLET, FILM COATED ORAL NIGHTLY
Status: DISCONTINUED | OUTPATIENT
Start: 2020-08-29 | End: 2020-08-30 | Stop reason: HOSPADM

## 2020-08-29 RX ORDER — HYDROCODONE BITARTRATE AND ACETAMINOPHEN 7.5; 325 MG/1; MG/1
1 TABLET ORAL EVERY 6 HOURS PRN
Status: DISCONTINUED | OUTPATIENT
Start: 2020-08-29 | End: 2020-08-30 | Stop reason: HOSPADM

## 2020-08-29 RX ORDER — HYDROCODONE BITARTRATE AND ACETAMINOPHEN 500; 5 MG/1; MG/1
TABLET ORAL
Status: DISCONTINUED | OUTPATIENT
Start: 2020-08-29 | End: 2020-08-30 | Stop reason: HOSPADM

## 2020-08-29 RX ORDER — ACETAMINOPHEN 325 MG/1
650 TABLET ORAL EVERY 6 HOURS PRN
Status: DISCONTINUED | OUTPATIENT
Start: 2020-08-29 | End: 2020-08-30 | Stop reason: HOSPADM

## 2020-08-29 RX ORDER — ALPRAZOLAM 0.25 MG/1
0.25 TABLET ORAL 2 TIMES DAILY PRN
Status: DISCONTINUED | OUTPATIENT
Start: 2020-08-29 | End: 2020-08-30 | Stop reason: HOSPADM

## 2020-08-29 RX ADMIN — ARIPIPRAZOLE 5 MG: 5 TABLET ORAL at 08:08

## 2020-08-29 RX ADMIN — CEFTRIAXONE 1 G: 1 INJECTION, SOLUTION INTRAVENOUS at 10:08

## 2020-08-29 RX ADMIN — BUSPIRONE HYDROCHLORIDE 15 MG: 10 TABLET ORAL at 08:08

## 2020-08-29 RX ADMIN — ACETAMINOPHEN 650 MG: 325 TABLET ORAL at 07:08

## 2020-08-29 RX ADMIN — LOSARTAN POTASSIUM 25 MG: 25 TABLET, FILM COATED ORAL at 08:08

## 2020-08-29 RX ADMIN — FUROSEMIDE 40 MG: 40 TABLET ORAL at 08:08

## 2020-08-29 RX ADMIN — ATORVASTATIN CALCIUM 20 MG: 10 TABLET, FILM COATED ORAL at 08:08

## 2020-08-29 RX ADMIN — AMIODARONE HYDROCHLORIDE 200 MG: 200 TABLET ORAL at 08:08

## 2020-08-29 RX ADMIN — FUROSEMIDE 40 MG: 40 TABLET ORAL at 09:08

## 2020-08-29 RX ADMIN — CARVEDILOL 6.25 MG: 6.25 TABLET, FILM COATED ORAL at 06:08

## 2020-08-29 RX ADMIN — CLOPIDOGREL 75 MG: 75 TABLET, FILM COATED ORAL at 08:08

## 2020-08-29 RX ADMIN — MIRTAZAPINE 7.5 MG: 7.5 TABLET, FILM COATED ORAL at 08:08

## 2020-08-29 RX ADMIN — ALPRAZOLAM 0.25 MG: 0.25 TABLET ORAL at 01:08

## 2020-08-29 RX ADMIN — DONEPEZIL HYDROCHLORIDE 10 MG: 5 TABLET, FILM COATED ORAL at 08:08

## 2020-08-29 RX ADMIN — ASPIRIN 81 MG: 81 TABLET, COATED ORAL at 08:08

## 2020-08-29 RX ADMIN — PANTOPRAZOLE SODIUM 40 MG: 40 TABLET, DELAYED RELEASE ORAL at 08:08

## 2020-08-29 NOTE — PLAN OF CARE
Pt AAO x4.  VSS.  Pt remained afebrile throughout this shift.   IV blood administered per order.   Pt remained free of falls this shift.   Pt c/o of no pain this shift.  Plan of care reviewed. Patient verbalizes understanding.   Pt moving/turing with assistance. Frequent weight shifting encouraged.  Patient SR/SB on monitor.   Bed low, side rails up x 2, wheels locked, call light in reach.   Bed alarm maintained for safety.   Patient instructed to call for assistance.   Hourly rounding completed.   24 hour chart check completed.  Will continue to monitor.

## 2020-08-29 NOTE — PROGRESS NOTES
[facility] Hospital Medicine  Progress Note    Patient Name: Love Davey  MRN: [id number]  Patient Class: OP- Observation   Admission Date: 8/28/2020  Length of Stay: 0 days  Attending Physician: Allyn Becerra MD  Primary Care Provider: Kaley Law MD        Subjective:     Principal Problem:Acute cystitis with hematuria        HPI:  Ms Davey is a 86 year old female with PMHx of HTN, systolic CHF, HLD, AICD placement, anemia, stroke and Depression. Patient reports she presented to [facility] Emergency Room for increase weakness and fatigue. She reports symptoms started about 2 weeks ago and have continued to worsen. Patient reports unable to get up out of bed due to increase weakness. Associated symptoms include mild chest pain and SOB. EMS reported EKG at the scene showed acute MI with O 2 sat of 88 %. UA positive for UTI, patient reports recently being treated for UTI. BNP elevated at 1036, Troponin 0.036. 2 D Echo on 3/2020 showed moderately decreased left ventricular systolic function with estimated ejection fraction is 30%. Primary cardiologist is Dr Nassar.  Patient is a full code, spouse and daughter are surrogate decision makers. She is being placed in Observation under the care of Hospital Medicine.     Overview/Hospital Course:  8/29/20 The patient reports improvement in symptoms overnight. The patients H/H decreased from 7.6/24.8 to 6.7/21.2 overnight, will transfuse 2 units PRBC. Continue IV rocephin for UTI. Possible discharge home tomorrow.     Interval History: The patient reports improvement in symptoms overnight. The patients H/H decreased from 7.6/24.8 to 6.7/21.2 overnight, will transfuse 2 units PRBC. Continue IV rocephin for UTI. Possible discharge home tomorrow.     Review of Systems   Constitutional: Positive for activity change, chills and fatigue. Negative for appetite change, diaphoresis, fever and unexpected weight change.   HENT: Positive for hearing loss.  Negative for congestion, drooling, facial swelling, rhinorrhea, sinus pressure, sneezing and sore throat.    Eyes: Negative for discharge, redness, itching and visual disturbance.   Respiratory: Positive for shortness of breath. Negative for apnea, cough, choking, chest tightness, wheezing and stridor.    Cardiovascular: Positive for chest pain (mild chest disconfort ). Negative for palpitations and leg swelling.   Gastrointestinal: Negative for abdominal distention, abdominal pain, anal bleeding, blood in stool, constipation, diarrhea, nausea and vomiting.   Endocrine: Negative for cold intolerance and heat intolerance.   Genitourinary: Positive for dysuria. Negative for decreased urine volume, difficulty urinating, frequency, hematuria, pelvic pain, urgency, vaginal bleeding and vaginal discharge.   Musculoskeletal: Negative for arthralgias, back pain, gait problem, joint swelling, myalgias, neck pain and neck stiffness.   Skin: Negative for color change, pallor, rash and wound.   Neurological: Positive for weakness. Negative for dizziness, seizures, facial asymmetry, speech difficulty, light-headedness, numbness and headaches.   Psychiatric/Behavioral: Negative for agitation, confusion, hallucinations and suicidal ideas. The patient is not nervous/anxious.    All other systems reviewed and are negative.    Objective:     Vital Signs (Most Recent):  Temp: 97.9 °F (36.6 °C) (08/29/20 1151)  Pulse: (!) 57 (08/29/20 1151)  Resp: 18 (08/29/20 1151)  BP: 137/61 (08/29/20 1151)  SpO2: 100 % (08/29/20 1151) Vital Signs (24h Range):  Temp:  [96.5 °F (35.8 °C)-98.4 °F (36.9 °C)] 97.9 °F (36.6 °C)  Pulse:  [53-76] 57  Resp:  [18-20] 18  SpO2:  [94 %-100 %] 100 %  BP: (126-138)/(58-62) 137/61     Weight: 62.7 kg (138 lb 3.7 oz)  Body mass index is 26.12 kg/m².    Intake/Output Summary (Last 24 hours) at 8/29/2020 1241  Last data filed at 8/29/2020 0700  Gross per 24 hour   Intake 238 ml   Output 500 ml   Net -262 ml       Physical Exam  Vitals signs and nursing note reviewed.   Constitutional:       General: She is not in acute distress.     Appearance: She is well-developed. She is not ill-appearing, toxic-appearing or diaphoretic.   HENT:      Head: Normocephalic and atraumatic.      Right Ear: There is no impacted cerumen.      Left Ear: There is no impacted cerumen.      Nose: No congestion or rhinorrhea.      Mouth/Throat:      Pharynx: No oropharyngeal exudate or posterior oropharyngeal erythema.   Eyes:      Conjunctiva/sclera: Conjunctivae normal.      Pupils: Pupils are equal, round, and reactive to light.   Neck:      Musculoskeletal: Normal range of motion and neck supple.   Cardiovascular:      Rate and Rhythm: Normal rate and regular rhythm.      Heart sounds: Murmur present. Systolic murmur present.   Pulmonary:      Effort: Pulmonary effort is normal. No respiratory distress.      Breath sounds: No stridor. Examination of the right-lower field reveals decreased breath sounds. Examination of the left-lower field reveals decreased breath sounds. Decreased breath sounds present. No wheezing, rhonchi or rales.   Chest:      Chest wall: No tenderness.   Abdominal:      General: Bowel sounds are normal. There is no distension.      Palpations: Abdomen is soft. There is no mass.      Tenderness: There is no abdominal tenderness. There is no right CVA tenderness, left CVA tenderness, guarding or rebound.      Hernia: No hernia is present.   Musculoskeletal: Normal range of motion.         General: No swelling, tenderness, deformity or signs of injury.      Right lower leg: No edema.      Left lower leg: No edema.   Skin:     General: Skin is warm and dry.      Capillary Refill: Capillary refill takes less than 2 seconds.      Findings: Bruising present. No erythema.   Neurological:      General: No focal deficit present.      Mental Status: She is alert and oriented to person, place, and time.      Deep Tendon Reflexes:  Reflexes are normal and symmetric.   Psychiatric:         Behavior: Behavior normal.         Significant Labs: All pertinent labs within the past 24 hours have been reviewed.    Significant Imaging:   Imaging Results          X-Ray Chest AP Portable (Final result)  Result time 08/28/20 09:38:21    Final result by Moreno Brewster Jr., MD (08/28/20 09:38:21)                 Impression:      Small left pleural fluid.  Areas of fine linear scarring within the right upper lung.      Electronically signed by: Moreno Brewster Jr., MD  Date:    08/28/2020  Time:    09:38             Narrative:    EXAMINATION:  XR CHEST AP PORTABLE    CLINICAL HISTORY:  Sepsis;    COMPARISON:  Prior radiograph from July 27, 2020.    FINDINGS:  Left-sided AICD in place.  There is likely small left pleural fluid.  Fine reticular bandlike scarring within the periphery of the right upper lobe is unchanged.  No confluent airspace disease.  No pneumothorax.  Heart size within normal limits.  No significant bony findings.                                    Assessment/Plan:      * Acute cystitis with hematuria  Place on Observation   BC X 2   Urine culture   Rocephin   8/29/20 Continue IV rocephin for UTI. Possible discharge home tomorrow.           Elevated troponin  - Secondary to demand ischemia      AICD (automatic cardioverter/defibrillator) present  Cardiology consulted in ED  Telemetry monitor  Continue Amiodarone and B blocker        History of stroke  Continue ASA, Plavix and Statin       Thyroid activity decreased  Continue Synthroid       Episode of recurrent major depressive disorder  Continue home meds       Dementia  Continue home donepezil       Acute on chronic combined systolic and diastolic heart failure  BNP elevated 1036  Cardiology consulted in the Emergency Room   Strict I & O   Daily weights   Low sodium diet   Continue B blocker, ARB, statin, ASA and Plavix   Lasix IV BID   Serial troponin     HTN (hypertension)  Continue B  blocker and ARB   Restart home amlodipine as needed       Acute on chronic anemia  HGB 7.6, around baseline   Monitor labs daily   8/29/20 The patient reports improvement in symptoms overnight. The patients H/H decreased from 7.6/24.8 to 6.7/21.2 overnight, will transfuse 2 units PRBC.       VTE Risk Mitigation (From admission, onward)         Ordered     IP VTE HIGH RISK PATIENT  Once      08/28/20 1219     Place sequential compression device  Until discontinued      08/28/20 1219                Discharge Planning   ELISSA:      Code Status: Full Code   Is the patient medically ready for discharge?: No    Reason for patient still in hospital (select all that apply): Patient trending condition, Laboratory test and Treatment                     Corbin Lamb NP  Department of Hospital Medicine   [facility]

## 2020-08-29 NOTE — ASSESSMENT & PLAN NOTE
HGB 7.6, around baseline   Monitor labs daily   8/29/20 The patient reports improvement in symptoms overnight. The patients H/H decreased from 7.6/24.8 to 6.7/21.2 overnight, will transfuse 2 units PRBC.

## 2020-08-29 NOTE — SUBJECTIVE & OBJECTIVE
Interval History: The patient reports improvement in symptoms overnight. The patients H/H decreased from 7.6/24.8 to 6.7/21.2 overnight, will transfuse 2 units PRBC. Continue IV rocephin for UTI. Possible discharge home tomorrow.     Review of Systems   Constitutional: Positive for activity change, chills and fatigue. Negative for appetite change, diaphoresis, fever and unexpected weight change.   HENT: Positive for hearing loss. Negative for congestion, drooling, facial swelling, rhinorrhea, sinus pressure, sneezing and sore throat.    Eyes: Negative for discharge, redness, itching and visual disturbance.   Respiratory: Positive for shortness of breath. Negative for apnea, cough, choking, chest tightness, wheezing and stridor.    Cardiovascular: Positive for chest pain (mild chest disconfort ). Negative for palpitations and leg swelling.   Gastrointestinal: Negative for abdominal distention, abdominal pain, anal bleeding, blood in stool, constipation, diarrhea, nausea and vomiting.   Endocrine: Negative for cold intolerance and heat intolerance.   Genitourinary: Positive for dysuria. Negative for decreased urine volume, difficulty urinating, frequency, hematuria, pelvic pain, urgency, vaginal bleeding and vaginal discharge.   Musculoskeletal: Negative for arthralgias, back pain, gait problem, joint swelling, myalgias, neck pain and neck stiffness.   Skin: Negative for color change, pallor, rash and wound.   Neurological: Positive for weakness. Negative for dizziness, seizures, facial asymmetry, speech difficulty, light-headedness, numbness and headaches.   Psychiatric/Behavioral: Negative for agitation, confusion, hallucinations and suicidal ideas. The patient is not nervous/anxious.    All other systems reviewed and are negative.    Objective:     Vital Signs (Most Recent):  Temp: 97.9 °F (36.6 °C) (08/29/20 1151)  Pulse: (!) 57 (08/29/20 1151)  Resp: 18 (08/29/20 1151)  BP: 137/61 (08/29/20 1151)  SpO2: 100 %  (08/29/20 1151) Vital Signs (24h Range):  Temp:  [96.5 °F (35.8 °C)-98.4 °F (36.9 °C)] 97.9 °F (36.6 °C)  Pulse:  [53-76] 57  Resp:  [18-20] 18  SpO2:  [94 %-100 %] 100 %  BP: (126-138)/(58-62) 137/61     Weight: 62.7 kg (138 lb 3.7 oz)  Body mass index is 26.12 kg/m².    Intake/Output Summary (Last 24 hours) at 8/29/2020 1241  Last data filed at 8/29/2020 0700  Gross per 24 hour   Intake 238 ml   Output 500 ml   Net -262 ml      Physical Exam  Vitals signs and nursing note reviewed.   Constitutional:       General: She is not in acute distress.     Appearance: She is well-developed. She is not ill-appearing, toxic-appearing or diaphoretic.   HENT:      Head: Normocephalic and atraumatic.      Right Ear: There is no impacted cerumen.      Left Ear: There is no impacted cerumen.      Nose: No congestion or rhinorrhea.      Mouth/Throat:      Pharynx: No oropharyngeal exudate or posterior oropharyngeal erythema.   Eyes:      Conjunctiva/sclera: Conjunctivae normal.      Pupils: Pupils are equal, round, and reactive to light.   Neck:      Musculoskeletal: Normal range of motion and neck supple.   Cardiovascular:      Rate and Rhythm: Normal rate and regular rhythm.      Heart sounds: Murmur present. Systolic murmur present.   Pulmonary:      Effort: Pulmonary effort is normal. No respiratory distress.      Breath sounds: No stridor. Examination of the right-lower field reveals decreased breath sounds. Examination of the left-lower field reveals decreased breath sounds. Decreased breath sounds present. No wheezing, rhonchi or rales.   Chest:      Chest wall: No tenderness.   Abdominal:      General: Bowel sounds are normal. There is no distension.      Palpations: Abdomen is soft. There is no mass.      Tenderness: There is no abdominal tenderness. There is no right CVA tenderness, left CVA tenderness, guarding or rebound.      Hernia: No hernia is present.   Musculoskeletal: Normal range of motion.         General: No  swelling, tenderness, deformity or signs of injury.      Right lower leg: No edema.      Left lower leg: No edema.   Skin:     General: Skin is warm and dry.      Capillary Refill: Capillary refill takes less than 2 seconds.      Findings: Bruising present. No erythema.   Neurological:      General: No focal deficit present.      Mental Status: She is alert and oriented to person, place, and time.      Deep Tendon Reflexes: Reflexes are normal and symmetric.   Psychiatric:         Behavior: Behavior normal.         Significant Labs: All pertinent labs within the past 24 hours have been reviewed.    Significant Imaging:   Imaging Results          X-Ray Chest AP Portable (Final result)  Result time 08/28/20 09:38:21    Final result by Moreno Brewster Jr., MD (08/28/20 09:38:21)                 Impression:      Small left pleural fluid.  Areas of fine linear scarring within the right upper lung.      Electronically signed by: Moreno Brewster Jr., MD  Date:    08/28/2020  Time:    09:38             Narrative:    EXAMINATION:  XR CHEST AP PORTABLE    CLINICAL HISTORY:  Sepsis;    COMPARISON:  Prior radiograph from July 27, 2020.    FINDINGS:  Left-sided AICD in place.  There is likely small left pleural fluid.  Fine reticular bandlike scarring within the periphery of the right upper lobe is unchanged.  No confluent airspace disease.  No pneumothorax.  Heart size within normal limits.  No significant bony findings.

## 2020-08-29 NOTE — ASSESSMENT & PLAN NOTE
Place on Observation   BC X 2   Urine culture   Rocephin   8/29/20 Continue IV rocephin for UTI. Possible discharge home tomorrow.

## 2020-08-29 NOTE — PLAN OF CARE
POC reviewed, verbalized understanding. AAOx4, hard of hearing w/ history of stroke. No neuro deficits. Pt remained free from falls, fall precautions in place. Pt is SR-SB on monitor. Purewick in place due to incontinence of the bladder, no blood visible in urine. VSS. No other c/o at this time. PIV intact. Call bell and personal belongings within reach. Hourly rounding, Q2 turns complete. Reminded to call for assistance. Will continue to monitor.

## 2020-08-29 NOTE — HOSPITAL COURSE
"8/29/20 The patient reports improvement in symptoms overnight. The patients H/H decreased from 7.6/24.8 to 6.7/21.2 overnight, will transfuse 2 units PRBC. Continue IV rocephin for UTI. Possible discharge home tomorrow. 8/30/20 No acute issues overnight. The patients H/H improved to 10.5/31.5 after transfusion. The patient reports improvement in symptoms and states she is "ready to go home". The patient was seen and examined today and deemed stable for discharge. The patient will complete a course of augmentin to complete treatment of UTI. She will follow up with her PCP.   "

## 2020-08-30 VITALS
SYSTOLIC BLOOD PRESSURE: 137 MMHG | TEMPERATURE: 98 F | HEIGHT: 61 IN | DIASTOLIC BLOOD PRESSURE: 63 MMHG | HEART RATE: 86 BPM | RESPIRATION RATE: 18 BRPM | OXYGEN SATURATION: 97 % | WEIGHT: 140 LBS | BODY MASS INDEX: 26.43 KG/M2

## 2020-08-30 PROBLEM — D64.9 ACUTE ON CHRONIC ANEMIA: Status: RESOLVED | Noted: 2020-01-04 | Resolved: 2020-08-30

## 2020-08-30 PROBLEM — I50.43 ACUTE ON CHRONIC COMBINED SYSTOLIC AND DIASTOLIC HEART FAILURE: Status: RESOLVED | Noted: 2020-01-04 | Resolved: 2020-08-30

## 2020-08-30 LAB
ANION GAP SERPL CALC-SCNC: 12 MMOL/L (ref 8–16)
BASOPHILS # BLD AUTO: 0.02 K/UL (ref 0–0.2)
BASOPHILS NFR BLD: 0.3 % (ref 0–1.9)
BUN SERPL-MCNC: 17 MG/DL (ref 8–23)
CALCIUM SERPL-MCNC: 8.9 MG/DL (ref 8.7–10.5)
CHLORIDE SERPL-SCNC: 99 MMOL/L (ref 95–110)
CO2 SERPL-SCNC: 31 MMOL/L (ref 23–29)
CREAT SERPL-MCNC: 0.8 MG/DL (ref 0.5–1.4)
DIFFERENTIAL METHOD: ABNORMAL
EOSINOPHIL # BLD AUTO: 0.1 K/UL (ref 0–0.5)
EOSINOPHIL NFR BLD: 0.8 % (ref 0–8)
ERYTHROCYTE [DISTWIDTH] IN BLOOD BY AUTOMATED COUNT: 23.6 % (ref 11.5–14.5)
EST. GFR  (AFRICAN AMERICAN): >60 ML/MIN/1.73 M^2
EST. GFR  (NON AFRICAN AMERICAN): >60 ML/MIN/1.73 M^2
GLUCOSE SERPL-MCNC: 88 MG/DL (ref 70–110)
HCT VFR BLD AUTO: 31.5 % (ref 37–48.5)
HGB BLD-MCNC: 10.5 G/DL (ref 12–16)
IMM GRANULOCYTES # BLD AUTO: 0.06 K/UL (ref 0–0.04)
IMM GRANULOCYTES NFR BLD AUTO: 0.9 % (ref 0–0.5)
LYMPHOCYTES # BLD AUTO: 1 K/UL (ref 1–4.8)
LYMPHOCYTES NFR BLD: 15 % (ref 18–48)
MCH RBC QN AUTO: 34.1 PG (ref 27–31)
MCHC RBC AUTO-ENTMCNC: 33.3 G/DL (ref 32–36)
MCV RBC AUTO: 102 FL (ref 82–98)
MONOCYTES # BLD AUTO: 0.5 K/UL (ref 0.3–1)
MONOCYTES NFR BLD: 7.8 % (ref 4–15)
NEUTROPHILS # BLD AUTO: 5 K/UL (ref 1.8–7.7)
NEUTROPHILS NFR BLD: 75.2 % (ref 38–73)
NRBC BLD-RTO: 0 /100 WBC
PLATELET # BLD AUTO: 375 K/UL (ref 150–350)
PMV BLD AUTO: 11.2 FL (ref 9.2–12.9)
POTASSIUM SERPL-SCNC: 3.5 MMOL/L (ref 3.5–5.1)
RBC # BLD AUTO: 3.08 M/UL (ref 4–5.4)
SODIUM SERPL-SCNC: 142 MMOL/L (ref 136–145)
WBC # BLD AUTO: 6.65 K/UL (ref 3.9–12.7)

## 2020-08-30 PROCEDURE — 27000221 HC OXYGEN, UP TO 24 HOURS

## 2020-08-30 PROCEDURE — 99900035 HC TECH TIME PER 15 MIN (STAT)

## 2020-08-30 PROCEDURE — 25000003 PHARM REV CODE 250: Performed by: NURSE PRACTITIONER

## 2020-08-30 PROCEDURE — 63600175 PHARM REV CODE 636 W HCPCS: Performed by: NURSE PRACTITIONER

## 2020-08-30 PROCEDURE — 80048 BASIC METABOLIC PNL TOTAL CA: CPT

## 2020-08-30 PROCEDURE — 94761 N-INVAS EAR/PLS OXIMETRY MLT: CPT

## 2020-08-30 PROCEDURE — 85025 COMPLETE CBC W/AUTO DIFF WBC: CPT

## 2020-08-30 PROCEDURE — 36415 COLL VENOUS BLD VENIPUNCTURE: CPT

## 2020-08-30 PROCEDURE — G0378 HOSPITAL OBSERVATION PER HR: HCPCS

## 2020-08-30 PROCEDURE — 96374 THER/PROPH/DIAG INJ IV PUSH: CPT | Mod: 59 | Performed by: EMERGENCY MEDICINE

## 2020-08-30 RX ORDER — HYDRALAZINE HYDROCHLORIDE 20 MG/ML
10 INJECTION INTRAMUSCULAR; INTRAVENOUS EVERY 8 HOURS PRN
Status: DISCONTINUED | OUTPATIENT
Start: 2020-08-30 | End: 2020-08-30 | Stop reason: HOSPADM

## 2020-08-30 RX ORDER — AMOXICILLIN AND CLAVULANATE POTASSIUM 875; 125 MG/1; MG/1
1 TABLET, FILM COATED ORAL 2 TIMES DAILY
Qty: 10 TABLET | Refills: 0 | Status: SHIPPED | OUTPATIENT
Start: 2020-08-30 | End: 2020-09-04

## 2020-08-30 RX ADMIN — ARIPIPRAZOLE 5 MG: 5 TABLET ORAL at 08:08

## 2020-08-30 RX ADMIN — ATORVASTATIN CALCIUM 20 MG: 10 TABLET, FILM COATED ORAL at 08:08

## 2020-08-30 RX ADMIN — FUROSEMIDE 40 MG: 40 TABLET ORAL at 08:08

## 2020-08-30 RX ADMIN — LOSARTAN POTASSIUM 25 MG: 25 TABLET, FILM COATED ORAL at 08:08

## 2020-08-30 RX ADMIN — AMIODARONE HYDROCHLORIDE 200 MG: 200 TABLET ORAL at 08:08

## 2020-08-30 RX ADMIN — ALPRAZOLAM 0.25 MG: 0.25 TABLET ORAL at 05:08

## 2020-08-30 RX ADMIN — BUSPIRONE HYDROCHLORIDE 15 MG: 10 TABLET ORAL at 08:08

## 2020-08-30 RX ADMIN — CARVEDILOL 6.25 MG: 6.25 TABLET, FILM COATED ORAL at 08:08

## 2020-08-30 RX ADMIN — HYDRALAZINE HYDROCHLORIDE 10 MG: 20 INJECTION INTRAMUSCULAR; INTRAVENOUS at 01:08

## 2020-08-30 RX ADMIN — PANTOPRAZOLE SODIUM 40 MG: 40 TABLET, DELAYED RELEASE ORAL at 08:08

## 2020-08-30 RX ADMIN — CLOPIDOGREL 75 MG: 75 TABLET, FILM COATED ORAL at 08:08

## 2020-08-30 RX ADMIN — ASPIRIN 81 MG: 81 TABLET, COATED ORAL at 08:08

## 2020-08-30 NOTE — PLAN OF CARE
Discharge instructions received and reviewed with pt.  Pt voiced understanding and all questions answered to satisfaction.  Stressed importance to making and keeping all follow up appointments.  Medications sent to pt pharmacy and reviewed with pt.  Tele monitor removed and brought to monitor tech.  IV d/c'd with tip intact, pressure dressing applied.  Pt transported to front of hospital via w/c by PCT to be discharged home.

## 2020-08-30 NOTE — PLAN OF CARE
POC discussed with patient, verbalized understanding.   NSR on monitor.   VSS.   AAO X 4.   Voids per pure wick.   Completed blood transfusion and tolerated well.  Turns independently in bed.   Fall precautions in place.   Side rails up X2.    Call bell on, working and within reach.   Bed locked in low position.   Remains free from falls/injuries.   Denies needs at this time.   Will continue to monitor.

## 2020-08-30 NOTE — DISCHARGE SUMMARY
"[facility] Hospital Medicine  Discharge Summary      Patient Name: Love Davey  MRN: [id number]  Admission Date: 8/28/2020  Hospital Length of Stay: 0 days  Discharge Date and Time:  08/30/2020 11:24 AM  Attending Physician: No att. providers found   Discharging Provider: Corbin Lamb NP  Primary Care Provider: Kaley Law MD      HPI:   Ms Davey is a 86 year old female with PMHx of HTN, systolic CHF, HLD, AICD placement, anemia, stroke and Depression. Patient reports she presented to [facility] Emergency Room for increase weakness and fatigue. She reports symptoms started about 2 weeks ago and have continued to worsen. Patient reports unable to get up out of bed due to increase weakness. Associated symptoms include mild chest pain and SOB. EMS reported EKG at the scene showed acute MI with O 2 sat of 88 %. UA positive for UTI, patient reports recently being treated for UTI. BNP elevated at 1036, Troponin 0.036. 2 D Echo on 3/2020 showed moderately decreased left ventricular systolic function with estimated ejection fraction is 30%. Primary cardiologist is Dr Nassar.  Patient is a full code, spouse and daughter are surrogate decision makers. She is being placed in Observation under the care of Hospital Medicine.     * No surgery found *      Hospital Course:   8/29/20 The patient reports improvement in symptoms overnight. The patients H/H decreased from 7.6/24.8 to 6.7/21.2 overnight, will transfuse 2 units PRBC. Continue IV rocephin for UTI. Possible discharge home tomorrow. 8/30/20 No acute issues overnight. The patients H/H improved to 10.5/31.5 after transfusion. The patient reports improvement in symptoms and states she is "ready to go home". The patient was seen and examined today and deemed stable for discharge. The patient will complete a course of augmentin to complete treatment of UTI. She will follow up with her PCP.      Consults:   Consults (From admission, onward)     "    Status Ordering Provider     Inpatient consult to Cardiology  Once     Provider:  Gurwinder Green MD    Completed THALIA THORNE     Inpatient consult to Registered Dietitian/Nutritionist  Once     Provider:  (Not yet assigned)    Completed JUNI ALSTON     Inpatient consult to Social Work/Case Management  Once     Provider:  (Not yet assigned)    Completed CONNOR COPPOLA          No new Assessment & Plan notes have been filed under this hospital service since the last note was generated.  Service: Hospital Medicine    Final Active Diagnoses:    Diagnosis Date Noted POA    PRINCIPAL PROBLEM:  Acute cystitis with hematuria [N30.01] 08/28/2020 Yes    History of stroke [Z86.73] 08/28/2020 Not Applicable    AICD (automatic cardioverter/defibrillator) present [Z95.810] 08/28/2020 Yes    Elevated troponin [R79.89] 08/28/2020 Yes    Thyroid activity decreased [E03.9] 03/12/2020 Yes    Episode of recurrent major depressive disorder [F33.9] 03/11/2020 Yes    Dementia [F03.90] 01/04/2020 Yes     Chronic    HTN (hypertension) [I10] 01/04/2020 Yes      Problems Resolved During this Admission:    Diagnosis Date Noted Date Resolved POA    Acute on chronic anemia [D64.9] 01/04/2020 08/30/2020 Yes    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 Schedule an appointment as soon as possible for a visit in 3 days.    Specialty: Internal Medicine  Contact information:  [address]  [id number]  [place] [address] [phone]                 Patient Instructions:   No discharge procedures on file.    Significant Diagnostic Studies:   Imaging Results          X-Ray Chest AP Portable (Final result)  Result time 08/28/20 09:38:21    Final result by Moreno Brewster Jr., MD (08/28/20 09:38:21)                  Impression:      Small left pleural fluid.  Areas of fine linear scarring within the right upper lung.      Electronically signed by: Moreno Brewster Jr., MD  Date:    08/28/2020  Time:    09:38             Narrative:    EXAMINATION:  XR CHEST AP PORTABLE    CLINICAL HISTORY:  Sepsis;    COMPARISON:  Prior radiograph from July 27, 2020.    FINDINGS:  Left-sided AICD in place.  There is likely small left pleural fluid.  Fine reticular bandlike scarring within the periphery of the right upper lobe is unchanged.  No confluent airspace disease.  No pneumothorax.  Heart size within normal limits.  No significant bony findings.                                  Pending Diagnostic Studies:     None         Medications:  Reconciled Home Medications:      Medication List      START taking these medications    amoxicillin-clavulanate 875-125mg 875-125 mg per tablet  Commonly known as: AUGMENTIN  Take 1 tablet by mouth 2 (two) times daily. for 5 days        CHANGE how you take these medications    levothyroxine 75 MCG tablet  Commonly known as: SYNTHROID  Take 1 tablet (75 mcg total) by mouth before breakfast.  What changed: how much to take        CONTINUE taking these medications    amiodarone 200 MG Tab  Commonly known as: PACERONE  Take 1 tablet by mouth once daily.     amLODIPine 10 MG tablet  Commonly known as: NORVASC  Take 10 mg by mouth once daily.     ARIPiprazole 10 MG Tab  Commonly known as: ABILIFY  Take 5 mg by mouth once daily.     aspirin 81 MG EC tablet  Commonly known as: ECOTRIN  Take 81 mg by mouth once daily.     atorvastatin 20 MG tablet  Commonly known as: LIPITOR  Take 20 mg by mouth once daily.     busPIRone 15 MG tablet  Commonly known as: BUSPAR  Take 1 tablet by mouth 2 (two) times daily.     carvediloL 6.25 MG tablet  Commonly known as: COREG  Take 6.25 mg by mouth 2 (two) times daily with meals.     clopidogreL 75 mg tablet  Commonly known as: PLAVIX  Take 1 tablet by mouth once daily.      cyanocobalamin 1000 MCG tablet  Commonly known as: VITAMIN B-12  Take 1 tablet by mouth once daily.     donepeziL 10 MG tablet  Commonly known as: ARICEPT  Take 1 tablet by mouth every evening.     DULoxetine 30 MG capsule  Commonly known as: CYMBALTA  Take 1 capsule by mouth once daily.     esomeprazole 40 MG capsule  Commonly known as: NEXIUM  Take 40 mg by mouth before breakfast.     famotidine 20 MG tablet  Commonly known as: PEPCID  Take 1 tablet (20 mg total) by mouth 2 (two) times daily as needed (heartburn).     ferrous sulfate 324 mg (65 mg iron) Tbec  Take 65 mg by mouth once daily.     fluticasone propionate 50 mcg/actuation nasal spray  Commonly known as: FLONASE  1 spray by Each Nare route once daily.     furosemide 40 MG tablet  Commonly known as: LASIX  Take 1 tablet by mouth 2 (two) times daily.     glucosamine-chondroitin 500-400 mg tablet  Take 1 tablet by mouth 2 (two) times daily.     Lactobacillus rhamnosus GG 10 billion cell capsule  Commonly known as: CULTURELLE  Take 1 capsule by mouth once daily.     losartan 25 MG tablet  Commonly known as: COZAAR  Take 1 tablet by mouth once daily.     meclizine 12.5 mg tablet  Commonly known as: ANTIVERT  Take 12.5 mg by mouth 3 (three) times daily as needed.     melatonin 1 mg Tab  Take 1 mg by mouth every evening.     mirabegron 50 mg Tb24  Commonly known as: MYRBETRIQ  Take by mouth.     mirtazapine 7.5 MG Tab  Commonly known as: REMERON     multivitamin capsule  Take 1 capsule by mouth once daily.     nystatin-triamcinolone ointment  Commonly known as: MYCOLOG  2 (two) times daily as needed.     polyethylene glycol 17 gram Pwpk  Commonly known as: GLYCOLAX  Take by mouth.     potassium chloride SA 20 MEQ tablet  Commonly known as: K-DUR,KLOR-CON  Take 20 mEq by mouth 2 (two) times daily.     PROAIR HFA 90 mcg/actuation inhaler  Generic drug: albuterol  Inhale 2 puffs into the lungs every 6 (six) hours as needed for Wheezing.     RESTASIS 0.05 %  ophthalmic emulsion  Generic drug: cycloSPORINE  Apply 1 drop to eye.     solifenacin 10 MG tablet  Commonly known as: VESICARE  Take 5 mg by mouth once daily.     traMADoL 50 mg tablet  Commonly known as: ULTRAM  Take 1 tablet by mouth 3 (three) times daily as needed.     venlafaxine 150 MG Cp24  Commonly known as: EFFEXOR-XR  Take 75 mg by mouth once daily.        STOP taking these medications    sulfamethoxazole-trimethoprim 800-160mg 800-160 mg Tab  Commonly known as: BACTRIM DS            Indwelling Lines/Drains at time of discharge:   Lines/Drains/Airways     None                 Time spent on the discharge of patient: > 35 minutes  Patient was seen and examined on the date of discharge and determined to be suitable for discharge.         Corbin Lamb NP  Department of Hospital Medicine  [facility]

## 2020-08-31 LAB — BACTERIA UR CULT: ABNORMAL

## 2020-09-02 LAB
BACTERIA BLD CULT: NORMAL
BACTERIA BLD CULT: NORMAL

## 2020-11-01 ENCOUNTER — HOSPITAL ENCOUNTER (OUTPATIENT)
Facility: HOSPITAL | Age: 85
Discharge: HOME | End: 2020-11-02
Attending: EMERGENCY MEDICINE | Admitting: INTERNAL MEDICINE
Payer: MEDICARE

## 2020-11-01 DIAGNOSIS — J90 PLEURAL EFFUSION: ICD-10-CM

## 2020-11-01 DIAGNOSIS — D64.9 SYMPTOMATIC ANEMIA: Primary | ICD-10-CM

## 2020-11-01 DIAGNOSIS — R53.1 GENERALIZED WEAKNESS: ICD-10-CM

## 2020-11-01 DIAGNOSIS — I50.9 CONGESTIVE HEART FAILURE, UNSPECIFIED HF CHRONICITY, UNSPECIFIED HEART FAILURE TYPE: ICD-10-CM

## 2020-11-01 DIAGNOSIS — E87.6 HYPOKALEMIA: ICD-10-CM

## 2020-11-01 PROBLEM — I47.29 PAROXYSMAL VENTRICULAR TACHYCARDIA: Status: ACTIVE | Noted: 2020-11-01

## 2020-11-01 PROBLEM — I42.0 DILATED CARDIOMYOPATHY: Status: ACTIVE | Noted: 2020-11-01

## 2020-11-01 LAB
ABO + RH BLD: NORMAL
ALBUMIN SERPL BCP-MCNC: 3 G/DL (ref 3.5–5.2)
ALP SERPL-CCNC: 62 U/L (ref 55–135)
ALT SERPL W/O P-5'-P-CCNC: 10 U/L (ref 10–44)
ANION GAP SERPL CALC-SCNC: 9 MMOL/L (ref 8–16)
ANISOCYTOSIS BLD QL SMEAR: ABNORMAL
AST SERPL-CCNC: 16 U/L (ref 10–40)
BASOPHILS # BLD AUTO: 0.02 K/UL (ref 0–0.2)
BASOPHILS NFR BLD: 0.5 % (ref 0–1.9)
BILIRUB SERPL-MCNC: 0.2 MG/DL (ref 0.1–1)
BILIRUB UR QL STRIP: NEGATIVE
BLD GP AB SCN CELLS X3 SERPL QL: NORMAL
BNP SERPL-MCNC: 927 PG/ML (ref 0–99)
BUN SERPL-MCNC: 14 MG/DL (ref 8–23)
CALCIUM SERPL-MCNC: 8.4 MG/DL (ref 8.7–10.5)
CHLORIDE SERPL-SCNC: 105 MMOL/L (ref 95–110)
CLARITY UR: CLEAR
CO2 SERPL-SCNC: 30 MMOL/L (ref 23–29)
COLOR UR: YELLOW
CREAT SERPL-MCNC: 0.9 MG/DL (ref 0.5–1.4)
DACRYOCYTES BLD QL SMEAR: ABNORMAL
DIFFERENTIAL METHOD: ABNORMAL
EOSINOPHIL # BLD AUTO: 0.1 K/UL (ref 0–0.5)
EOSINOPHIL NFR BLD: 1.9 % (ref 0–8)
ERYTHROCYTE [DISTWIDTH] IN BLOOD BY AUTOMATED COUNT: ABNORMAL % (ref 11.5–14.5)
EST. GFR  (AFRICAN AMERICAN): >60 ML/MIN/1.73 M^2
EST. GFR  (NON AFRICAN AMERICAN): 58 ML/MIN/1.73 M^2
GLUCOSE SERPL-MCNC: 153 MG/DL (ref 70–110)
GLUCOSE UR QL STRIP: NEGATIVE
HCT VFR BLD AUTO: 23.2 % (ref 37–48.5)
HGB BLD-MCNC: 7.3 G/DL (ref 12–16)
HGB UR QL STRIP: NEGATIVE
IMM GRANULOCYTES # BLD AUTO: 0.02 K/UL (ref 0–0.04)
IMM GRANULOCYTES NFR BLD AUTO: 0.5 % (ref 0–0.5)
KETONES UR QL STRIP: NEGATIVE
LEUKOCYTE ESTERASE UR QL STRIP: NEGATIVE
LYMPHOCYTES # BLD AUTO: 1.3 K/UL (ref 1–4.8)
LYMPHOCYTES NFR BLD: 31.1 % (ref 18–48)
MAGNESIUM SERPL-MCNC: 1.9 MG/DL (ref 1.6–2.6)
MCH RBC QN AUTO: 35.4 PG (ref 27–31)
MCHC RBC AUTO-ENTMCNC: 31.5 G/DL (ref 32–36)
MCV RBC AUTO: 113 FL (ref 82–98)
MONOCYTES # BLD AUTO: 0.3 K/UL (ref 0.3–1)
MONOCYTES NFR BLD: 7.4 % (ref 4–15)
NEUTROPHILS # BLD AUTO: 2.5 K/UL (ref 1.8–7.7)
NEUTROPHILS NFR BLD: 58.6 % (ref 38–73)
NITRITE UR QL STRIP: NEGATIVE
NRBC BLD-RTO: 0 /100 WBC
OVALOCYTES BLD QL SMEAR: ABNORMAL
PH UR STRIP: 8 [PH] (ref 5–8)
PLATELET # BLD AUTO: 333 K/UL (ref 150–350)
PLATELET BLD QL SMEAR: ABNORMAL
PMV BLD AUTO: 11.6 FL (ref 9.2–12.9)
POIKILOCYTOSIS BLD QL SMEAR: SLIGHT
POLYCHROMASIA BLD QL SMEAR: ABNORMAL
POTASSIUM SERPL-SCNC: 3.3 MMOL/L (ref 3.5–5.1)
PROT SERPL-MCNC: 5.7 G/DL (ref 6–8.4)
PROT UR QL STRIP: NEGATIVE
RBC # BLD AUTO: 2.06 M/UL (ref 4–5.4)
SARS-COV-2 RDRP RESP QL NAA+PROBE: NEGATIVE
SODIUM SERPL-SCNC: 144 MMOL/L (ref 136–145)
SP GR UR STRIP: 1.02 (ref 1–1.03)
TROPONIN I SERPL DL<=0.01 NG/ML-MCNC: 0.03 NG/ML (ref 0–0.03)
TSH SERPL DL<=0.005 MIU/L-ACNC: 3.45 UIU/ML (ref 0.4–4)
URN SPEC COLLECT METH UR: NORMAL
UROBILINOGEN UR STRIP-ACNC: NEGATIVE EU/DL
WBC # BLD AUTO: 4.18 K/UL (ref 3.9–12.7)

## 2020-11-01 PROCEDURE — 93010 EKG 12-LEAD: ICD-10-PCS | Mod: ,,, | Performed by: INTERNAL MEDICINE

## 2020-11-01 PROCEDURE — 25000003 PHARM REV CODE 250: Performed by: INTERNAL MEDICINE

## 2020-11-01 PROCEDURE — 86901 BLOOD TYPING SEROLOGIC RH(D): CPT

## 2020-11-01 PROCEDURE — 86920 COMPATIBILITY TEST SPIN: CPT

## 2020-11-01 PROCEDURE — 99291 CRITICAL CARE FIRST HOUR: CPT | Mod: 25

## 2020-11-01 PROCEDURE — 83880 ASSAY OF NATRIURETIC PEPTIDE: CPT

## 2020-11-01 PROCEDURE — 93005 ELECTROCARDIOGRAM TRACING: CPT

## 2020-11-01 PROCEDURE — 93010 ELECTROCARDIOGRAM REPORT: CPT | Mod: ,,, | Performed by: INTERNAL MEDICINE

## 2020-11-01 PROCEDURE — 81003 URINALYSIS AUTO W/O SCOPE: CPT

## 2020-11-01 PROCEDURE — 83735 ASSAY OF MAGNESIUM: CPT

## 2020-11-01 PROCEDURE — 25000003 PHARM REV CODE 250: Performed by: EMERGENCY MEDICINE

## 2020-11-01 PROCEDURE — 84484 ASSAY OF TROPONIN QUANT: CPT

## 2020-11-01 PROCEDURE — 80053 COMPREHEN METABOLIC PANEL: CPT

## 2020-11-01 PROCEDURE — 84443 ASSAY THYROID STIM HORMONE: CPT

## 2020-11-01 PROCEDURE — 96374 THER/PROPH/DIAG INJ IV PUSH: CPT

## 2020-11-01 PROCEDURE — 63600175 PHARM REV CODE 636 W HCPCS: Performed by: EMERGENCY MEDICINE

## 2020-11-01 PROCEDURE — 85025 COMPLETE CBC W/AUTO DIFF WBC: CPT

## 2020-11-01 PROCEDURE — G0378 HOSPITAL OBSERVATION PER HR: HCPCS | Mod: CS

## 2020-11-01 PROCEDURE — U0002 COVID-19 LAB TEST NON-CDC: HCPCS

## 2020-11-01 PROCEDURE — G0378 HOSPITAL OBSERVATION PER HR: HCPCS

## 2020-11-01 RX ORDER — ATORVASTATIN CALCIUM 10 MG/1
20 TABLET, FILM COATED ORAL DAILY
Status: DISCONTINUED | OUTPATIENT
Start: 2020-11-02 | End: 2020-11-02 | Stop reason: HOSPADM

## 2020-11-01 RX ORDER — TRAMADOL HYDROCHLORIDE 50 MG/1
50 TABLET ORAL 3 TIMES DAILY PRN
Status: DISCONTINUED | OUTPATIENT
Start: 2020-11-02 | End: 2020-11-02 | Stop reason: HOSPADM

## 2020-11-01 RX ORDER — GABAPENTIN 300 MG/1
600 CAPSULE ORAL NIGHTLY
Status: DISCONTINUED | OUTPATIENT
Start: 2020-11-02 | End: 2020-11-01

## 2020-11-01 RX ORDER — CARVEDILOL 6.25 MG/1
6.25 TABLET ORAL 2 TIMES DAILY WITH MEALS
Status: DISCONTINUED | OUTPATIENT
Start: 2020-11-02 | End: 2020-11-02 | Stop reason: HOSPADM

## 2020-11-01 RX ORDER — FUROSEMIDE 10 MG/ML
40 INJECTION INTRAMUSCULAR; INTRAVENOUS
Status: COMPLETED | OUTPATIENT
Start: 2020-11-01 | End: 2020-11-01

## 2020-11-01 RX ORDER — DONEPEZIL HYDROCHLORIDE 5 MG/1
10 TABLET, FILM COATED ORAL NIGHTLY
Status: DISCONTINUED | OUTPATIENT
Start: 2020-11-02 | End: 2020-11-02 | Stop reason: HOSPADM

## 2020-11-01 RX ORDER — FUROSEMIDE 40 MG/1
40 TABLET ORAL 2 TIMES DAILY
Status: DISCONTINUED | OUTPATIENT
Start: 2020-11-02 | End: 2020-11-02 | Stop reason: HOSPADM

## 2020-11-01 RX ORDER — FAMOTIDINE 20 MG/1
20 TABLET, FILM COATED ORAL DAILY
Status: DISCONTINUED | OUTPATIENT
Start: 2020-11-02 | End: 2020-11-02 | Stop reason: HOSPADM

## 2020-11-01 RX ORDER — CLOPIDOGREL BISULFATE 75 MG/1
75 TABLET ORAL DAILY
Status: DISCONTINUED | OUTPATIENT
Start: 2020-11-02 | End: 2020-11-02 | Stop reason: HOSPADM

## 2020-11-01 RX ORDER — L. ACIDOPHILUS/L.BULGARICUS 100MM CELL
1 GRANULES IN PACKET (EA) ORAL DAILY
Status: DISCONTINUED | OUTPATIENT
Start: 2020-11-02 | End: 2020-11-02 | Stop reason: HOSPADM

## 2020-11-01 RX ORDER — GABAPENTIN 300 MG/1
600 CAPSULE ORAL NIGHTLY
Status: DISCONTINUED | OUTPATIENT
Start: 2020-11-01 | End: 2020-11-02 | Stop reason: HOSPADM

## 2020-11-01 RX ORDER — HYDROCODONE BITARTRATE AND ACETAMINOPHEN 500; 5 MG/1; MG/1
TABLET ORAL
Status: DISCONTINUED | OUTPATIENT
Start: 2020-11-01 | End: 2020-11-02 | Stop reason: HOSPADM

## 2020-11-01 RX ORDER — FLUTICASONE PROPIONATE 50 MCG
1 SPRAY, SUSPENSION (ML) NASAL DAILY
Status: DISCONTINUED | OUTPATIENT
Start: 2020-11-02 | End: 2020-11-02 | Stop reason: HOSPADM

## 2020-11-01 RX ORDER — FERROUS SULFATE 325(65) MG
324 TABLET, DELAYED RELEASE (ENTERIC COATED) ORAL DAILY
Status: DISCONTINUED | OUTPATIENT
Start: 2020-11-02 | End: 2020-11-02

## 2020-11-01 RX ORDER — TALC
9 POWDER (GRAM) TOPICAL NIGHTLY
Status: DISCONTINUED | OUTPATIENT
Start: 2020-11-02 | End: 2020-11-02 | Stop reason: HOSPADM

## 2020-11-01 RX ORDER — LEVOTHYROXINE SODIUM 50 UG/1
50 TABLET ORAL
Status: DISCONTINUED | OUTPATIENT
Start: 2020-11-02 | End: 2020-11-02 | Stop reason: HOSPADM

## 2020-11-01 RX ORDER — LOSARTAN POTASSIUM 25 MG/1
25 TABLET ORAL DAILY
Status: DISCONTINUED | OUTPATIENT
Start: 2020-11-02 | End: 2020-11-02 | Stop reason: HOSPADM

## 2020-11-01 RX ORDER — ASPIRIN 81 MG/1
81 TABLET ORAL DAILY
Status: DISCONTINUED | OUTPATIENT
Start: 2020-11-02 | End: 2020-11-02 | Stop reason: HOSPADM

## 2020-11-01 RX ORDER — POTASSIUM CHLORIDE 20 MEQ/1
20 TABLET, EXTENDED RELEASE ORAL 2 TIMES DAILY
Status: DISCONTINUED | OUTPATIENT
Start: 2020-11-02 | End: 2020-11-02 | Stop reason: HOSPADM

## 2020-11-01 RX ORDER — POTASSIUM CHLORIDE 20 MEQ/1
40 TABLET, EXTENDED RELEASE ORAL
Status: COMPLETED | OUTPATIENT
Start: 2020-11-01 | End: 2020-11-01

## 2020-11-01 RX ADMIN — POTASSIUM CHLORIDE 40 MEQ: 1500 TABLET, EXTENDED RELEASE ORAL at 05:11

## 2020-11-01 RX ADMIN — GABAPENTIN 600 MG: 300 CAPSULE ORAL at 11:11

## 2020-11-01 RX ADMIN — TRAMADOL HYDROCHLORIDE 50 MG: 50 TABLET, FILM COATED ORAL at 11:11

## 2020-11-01 RX ADMIN — FUROSEMIDE 40 MG: 10 INJECTION, SOLUTION INTRAMUSCULAR; INTRAVENOUS at 05:11

## 2020-11-01 NOTE — ED PROVIDER NOTES
SCRIBE #1 NOTE: I, Sadia Salgado, am scribing for, and in the presence of, Delgado Selby MD. I have scribed the entire note.       History     Chief Complaint   Patient presents with    Fatigue     weakness for 4 months     Review of patient's allergies indicates:   Allergen Reactions    Bacitracin-polymyxin b Itching and Swelling    Merthiolate (thimerosal) Rash and Swelling    Diazepam      Hallucinations    Levofloxacin Itching    Metronidazole     Oxycodone Itching    Tizanidine      Hallucinations    Tramadol Itching    Cefdinir Itching     Pt not positive about allergy         History of Present Illness     HPI    11/1/2020, 1:51 PM  History obtained from the patient      History of Present Illness: Love Davey is a 86 y.o. female patient with a PMHx of arthritis, HTN, stroke, CHF, cardiomyopathy, tremors and depression who presents to the Emergency Department for evaluation of generalized weakness which onset gradually 4 months ago. Reports she has been too weak to get out of bed for 4 months. Symptoms are constant and moderate in severity. No mitigating or exacerbating factors reported. Associated sxs include SOB, worse with exertion, and unexpected weight loss (-20 lbs). Patient denies any fever, CP, cough, dysuria, abd pain, n/v/d, back pain, leg pain/edema, palpitations, HA, dizziness, extremity numbness, and all other sxs at this time. No prior Tx included. Pt lives with  and uses walker at baseline. Reports recent amputation of L second toe. Reports just finished taking abx for recent UTI. No further complaints or concerns at this time.       Arrival mode: EMS    PCP: Kaley Law MD        Past Medical History:  Past Medical History:   Diagnosis Date    Anemia     Anxiety     Arthritis     Back pain     Cardiomyopathy     CHF (congestive heart failure)     Depression     Dizziness     Hypertension     Insomnia     Stroke     Use of cane as ambulatory aid         Past Surgical History:  Past Surgical History:   Procedure Laterality Date    APPENDECTOMY      arthritis      BLADDER REPAIR      BUNIONECTOMY      CARDIAC DEFIBRILLATOR PLACEMENT      CATARACT EXTRACTION      HYSTERECTOMY      metal implant Bilateral     placed in the bladder.         Family History:  Family History   Problem Relation Age of Onset    Hypertension Unknown     Cancer Mother     Heart disease Father        Social History:  Social History     Tobacco Use    Smoking status: Never Smoker    Smokeless tobacco: Never Used   Substance and Sexual Activity    Alcohol use: No    Drug use: No    Sexual activity: Not Currently        Review of Systems     Review of Systems   Constitutional: Positive for unexpected weight change (-20 lbs). Negative for fever.   HENT: Negative for sore throat.    Respiratory: Positive for shortness of breath (worse with exertion). Negative for cough.    Cardiovascular: Negative for chest pain, palpitations and leg swelling.   Gastrointestinal: Negative for abdominal pain, diarrhea, nausea and vomiting.   Genitourinary: Negative for dysuria.   Musculoskeletal: Negative for back pain.   Skin: Negative for rash.   Neurological: Positive for weakness (generalized). Negative for dizziness, numbness and headaches.   Hematological: Does not bruise/bleed easily.   All other systems reviewed and are negative.       Physical Exam     Initial Vitals   BP Pulse Resp Temp SpO2   11/01/20 1347 11/01/20 1347 11/01/20 1347 11/01/20 1359 11/01/20 1347   (!) 132/55 70 18 97.9 °F (36.6 °C) 96 %      MAP       --                 Physical Exam  Nursing Notes and Vital Signs Reviewed.  Constitutional: Patient is in no acute distress.   Head: Atraumatic. Normocephalic.  Eyes: PERRL. EOM intact. Conjunctivae are not pale. No scleral icterus.  ENT: Mucous membranes are moist.   Neck: Supple. Full ROM.   Cardiovascular: Regular rate. Regular rhythm. No murmurs, rubs, or gallops.  Distal pulses are 2+ and symmetric.  Pulmonary/Chest: No respiratory distress. Diminished bs in left lung base.  Abdominal: Soft and non-distended.  There is no tenderness.  No rebound, guarding, or rigidity. Good bowel sounds.  Genitourinary: No CVA tenderness  Musculoskeletal: Moves all extremities. No edema. No calf tenderness. Surgically amputated L second toe with sutures, no signs of infection.   Skin: Warm and dry.  Neurological:  Alert, awake, and appropriate.  Normal speech.  No acute focal neurological deficits are appreciated. 4/5 strength of BLE.  Psychiatric: Normal affect. Good eye contact. Appropriate in content.     ED Course   Critical Care    Date/Time: 11/1/2020 4:14 PM  Performed by: Delgado Selby MD  Authorized by: Delgado Selby MD   Direct patient critical care time: 15 minutes  Additional history critical care time: 13 minutes  Ordering / reviewing critical care time: 9 minutes  Documentation critical care time: 9 minutes  Consulting other physicians critical care time: 5 minutes  Total critical care time (exclusive of procedural time) : 51 minutes  Critical care time was exclusive of separately billable procedures and treating other patients and teaching time.  Critical care was necessary to treat or prevent imminent or life-threatening deterioration of the following conditions: anemia, encounter for blood transfusion.  Critical care was time spent personally by me on the following activities: blood draw for specimens, development of treatment plan with patient or surrogate, discussions with consultants, interpretation of cardiac output measurements, evaluation of patient's response to treatment, examination of patient, obtaining history from patient or surrogate, ordering and performing treatments and interventions, ordering and review of laboratory studies, ordering and review of radiographic studies, pulse oximetry, re-evaluation of patient's condition and review of old  charts.        ED Vital Signs:  Vitals:    11/01/20 1347 11/01/20 1351 11/01/20 1359 11/01/20 1406   BP: (!) 132/55      Pulse: 70   (!) 59   Resp: 18      Temp:   97.9 °F (36.6 °C)    TempSrc:   Oral    SpO2: 96%      Weight:  63.3 kg (139 lb 8 oz)      11/01/20 1638 11/01/20 1640   BP: (!) 150/67    Pulse: (!) 58    Resp: 13    Temp:  97.8 °F (36.6 °C)   TempSrc:  Oral   SpO2: 97%    Weight:         Abnormal Lab Results:  Labs Reviewed   COMPREHENSIVE METABOLIC PANEL - Abnormal; Notable for the following components:       Result Value    Potassium 3.3 (*)     CO2 30 (*)     Glucose 153 (*)     Calcium 8.4 (*)     Total Protein 5.7 (*)     Albumin 3.0 (*)     eGFR if non  58 (*)     All other components within normal limits   CBC W/ AUTO DIFFERENTIAL - Abnormal; Notable for the following components:    RBC 2.06 (*)     Hemoglobin 7.3 (*)     Hematocrit 23.2 (*)      (*)     MCH 35.4 (*)     MCHC 31.5 (*)     All other components within normal limits   TROPONIN I - Abnormal; Notable for the following components:    Troponin I 0.031 (*)     All other components within normal limits   B-TYPE NATRIURETIC PEPTIDE - Abnormal; Notable for the following components:     (*)     All other components within normal limits   TSH   URINALYSIS, REFLEX TO URINE CULTURE    Narrative:     Specimen Source->Urine   MAGNESIUM   SARS-COV-2 RNA AMPLIFICATION, QUAL   OCCULT BLOOD X 1, STOOL   TYPE & SCREEN   PREPARE RBC SOFT        All Lab Results:  Results for orders placed or performed during the hospital encounter of 11/01/20   Comprehensive metabolic panel   Result Value Ref Range    Sodium 144 136 - 145 mmol/L    Potassium 3.3 (L) 3.5 - 5.1 mmol/L    Chloride 105 95 - 110 mmol/L    CO2 30 (H) 23 - 29 mmol/L    Glucose 153 (H) 70 - 110 mg/dL    BUN 14 8 - 23 mg/dL    Creatinine 0.9 0.5 - 1.4 mg/dL    Calcium 8.4 (L) 8.7 - 10.5 mg/dL    Total Protein 5.7 (L) 6.0 - 8.4 g/dL    Albumin 3.0 (L) 3.5 - 5.2 g/dL     Total Bilirubin 0.2 0.1 - 1.0 mg/dL    Alkaline Phosphatase 62 55 - 135 U/L    AST 16 10 - 40 U/L    ALT 10 10 - 44 U/L    Anion Gap 9 8 - 16 mmol/L    eGFR if African American >60 >60 mL/min/1.73 m^2    eGFR if non African American 58 (A) >60 mL/min/1.73 m^2   CBC auto differential   Result Value Ref Range    WBC 4.18 3.90 - 12.70 K/uL    RBC 2.06 (L) 4.00 - 5.40 M/uL    Hemoglobin 7.3 (L) 12.0 - 16.0 g/dL    Hematocrit 23.2 (L) 37.0 - 48.5 %     (H) 82 - 98 fL    MCH 35.4 (H) 27.0 - 31.0 pg    MCHC 31.5 (L) 32.0 - 36.0 g/dL    RDW SEE COMMENT 11.5 - 14.5 %    Platelets 333 150 - 350 K/uL    MPV 11.6 9.2 - 12.9 fL    Immature Granulocytes 0.5 0.0 - 0.5 %    Gran # (ANC) 2.5 1.8 - 7.7 K/uL    Immature Grans (Abs) 0.02 0.00 - 0.04 K/uL    Lymph # 1.3 1.0 - 4.8 K/uL    Mono # 0.3 0.3 - 1.0 K/uL    Eos # 0.1 0.0 - 0.5 K/uL    Baso # 0.02 0.00 - 0.20 K/uL    nRBC 0 0 /100 WBC    Gran % 58.6 38.0 - 73.0 %    Lymph % 31.1 18.0 - 48.0 %    Mono % 7.4 4.0 - 15.0 %    Eosinophil % 1.9 0.0 - 8.0 %    Basophil % 0.5 0.0 - 1.9 %    Platelet Estimate Appears normal     Aniso Moderate     Poik Slight     Poly Occasional     Ovalocytes Occasional     Tear Drop Cells Occasional     Differential Method Automated    Troponin I   Result Value Ref Range    Troponin I 0.031 (H) 0.000 - 0.026 ng/mL   TSH   Result Value Ref Range    TSH 3.454 0.400 - 4.000 uIU/mL   Urinalysis, Reflex to Urine Culture Urine, Clean Catch    Specimen: Urine   Result Value Ref Range    Specimen UA Urine, Clean Catch     Color, UA Yellow Yellow, Straw, Bridgette    Appearance, UA Clear Clear    pH, UA 8.0 5.0 - 8.0    Specific Gravity, UA 1.025 1.005 - 1.030    Protein, UA Negative Negative    Glucose, UA Negative Negative    Ketones, UA Negative Negative    Bilirubin (UA) Negative Negative    Occult Blood UA Negative Negative    Nitrite, UA Negative Negative    Urobilinogen, UA Negative <2.0 EU/dL    Leukocytes, UA Negative Negative   Magnesium   Result  Value Ref Range    Magnesium 1.9 1.6 - 2.6 mg/dL   Brain natriuretic peptide   Result Value Ref Range     (H) 0 - 99 pg/mL   COVID-19 Rapid Screening   Result Value Ref Range    SARS-CoV-2 RNA, Amplification, Qual Negative Negative         Imaging Results:  Imaging Results          X-Ray Chest AP Portable (Final result)  Result time 11/01/20 14:49:49    Final result by Moreno Brewster Jr., MD (11/01/20 14:49:49)                 Impression:      Development of a moderate large left pleural effusion with overlying atelectasis or consolidation.  Unchanged patchy airspace disease within the periphery of the right upper lobe.      Electronically signed by: Moreno Brewster Jr., MD  Date:    11/01/2020  Time:    14:49             Narrative:    EXAMINATION:  XR CHEST AP PORTABLE    CLINICAL HISTORY:  generalized weakness;    COMPARISON:  Prior radiograph from August 28, 2020.    FINDINGS:  Left-sided pacer device in place.  The of element of moderate to large left pleural effusion with overlying atelectasis or consolidation.  Small right pleural effusion.  Persistent patchy ground-glass and irregular linear opacity within the periphery of the right upper lobe.  No pneumothorax.  The heart is mildly enlarged.  Left atrial enlargement.  No significant bony findings.                                 The EKG was ordered, reviewed, and independently interpreted by the ED provider.  Interpretation time: 1423  Rate: 61 BPM  Rhythm: Sinus rhythm with possible premature atrial complexes with aberrant conduction  Interpretation: Left axis deviation. LVH with QRS widening and repolarization abnormality. Cannot rule out septal infarct, age undetermined. No STEMI.           The Emergency Provider reviewed the vital signs and test results, which are outlined above.     ED Discussion     4:42 PM: Discussed case with Joseline Cohen NP (Hospital Medicine). Dr. Montague agrees with current care and management of pt and accepts  admission.   Admitting Service: Hospital Medicine  Admitting Physician: Dr. Montague  Admit to: Obs, tele    Re-evaluated pt. I have discussed test results, shared treatment plan, and the need for admission with patient at bedside. Pt expresses understanding at this time and agree with all information. All questions answered. Pt has no further questions or concerns at this time. Pt is ready for admit.       Medical Decision Making:   Clinical Tests:   Lab Tests: Ordered and Reviewed  Radiological Study: Ordered and Reviewed  Medical Tests: Ordered and Reviewed           ED Medication(s):  Medications   furosemide injection 40 mg (has no administration in time range)   potassium chloride SA CR tablet 40 mEq (has no administration in time range)   0.9%  NaCl infusion (for blood administration) (has no administration in time range)   1 unit of pRBC      New Prescriptions    No medications on file               Scribe Attestation:   Scribe #1: I performed the above scribed service and the documentation accurately describes the services I performed. I attest to the accuracy of the note.     Attending:   Physician Attestation Statement for Scribe #1: I, Delgado Selby MD, personally performed the services described in this documentation, as scribed by Sadia Salgado, in my presence, and it is both accurate and complete.           Clinical Impression       ICD-10-CM ICD-9-CM   1. Symptomatic anemia  D64.9 285.9   2. Generalized weakness  R53.1 780.79   3. Pleural effusion  J90 511.9   4. Congestive heart failure, unspecified HF chronicity, unspecified heart failure type  I50.9 428.0   5. Hypokalemia  E87.6 276.8       Disposition:   Disposition: Placed in Observation  Condition: Fair         Delgado Selby MD  11/08/20 9789

## 2020-11-01 NOTE — ED NOTES
Patient sitting up in bed. Purewick external catheter in place. Patient updated on POC and bed status. Verbalizes understanding. States she would call family members on her cell phone. Denies needs at this time. Call light in reach. Bed lowest position, locked.

## 2020-11-01 NOTE — ASSESSMENT & PLAN NOTE
--histroy of macrocytic anemia Her previous work-up with bone marrow, B12 and folate level was negative  --Followed by Dr. Hauser, Hematology  --transfuse one unit PRBC today

## 2020-11-01 NOTE — SUBJECTIVE & OBJECTIVE
Past Medical History:   Diagnosis Date    Anemia     Anxiety     Arthritis     Back pain     Cardiomyopathy     CHF (congestive heart failure)     Depression     Dizziness     Hypertension     Insomnia     Stroke     Use of cane as ambulatory aid        Past Surgical History:   Procedure Laterality Date    APPENDECTOMY      arthritis      BLADDER REPAIR      BUNIONECTOMY      CARDIAC DEFIBRILLATOR PLACEMENT      CATARACT EXTRACTION      HYSTERECTOMY      metal implant Bilateral     placed in the bladder.       Review of patient's allergies indicates:   Allergen Reactions    Bacitracin-polymyxin b Itching and Swelling    Merthiolate (thimerosal) Rash and Swelling    Diazepam      Hallucinations    Levofloxacin Itching    Metronidazole     Oxycodone Itching    Tizanidine      Hallucinations    Tramadol Itching    Cefdinir Itching     Pt not positive about allergy       No current facility-administered medications on file prior to encounter.      Current Outpatient Medications on File Prior to Encounter   Medication Sig    aspirin (ECOTRIN) 81 MG EC tablet Take 81 mg by mouth once daily.    atorvastatin (LIPITOR) 20 MG tablet Take 20 mg by mouth once daily.    busPIRone (BUSPAR) 15 MG tablet Take 1 tablet by mouth 2 (two) times daily.    carvedilol (COREG) 6.25 MG tablet Take 6.25 mg by mouth 2 (two) times daily with meals.    clopidogreL (PLAVIX) 75 mg tablet Take 1 tablet by mouth once daily.    donepezil (ARICEPT) 10 MG tablet Take 1 tablet by mouth every evening.    ferrous sulfate 324 mg (65 mg iron) TbEC Take 65 mg by mouth once daily.    fluticasone (FLONASE) 50 mcg/actuation nasal spray 1 spray by Each Nare route once daily.    furosemide (LASIX) 40 MG tablet Take 1 tablet by mouth 2 (two) times daily.    Lactobacillus rhamnosus GG (CULTURELLE) 10 billion cell capsule Take 1 capsule by mouth once daily.    levothyroxine (SYNTHROID) 75 MCG tablet Take 1 tablet (75 mcg  total) by mouth before breakfast. (Patient taking differently: Take 50 mcg by mouth before breakfast. )    losartan (COZAAR) 25 MG tablet Take 1 tablet by mouth once daily.    potassium chloride SA (K-DUR,KLOR-CON) 20 MEQ tablet Take 20 mEq by mouth 2 (two) times daily.    albuterol (PROAIR HFA) 90 mcg/actuation inhaler Inhale 2 puffs into the lungs every 6 (six) hours as needed for Wheezing.    amiodarone (PACERONE) 200 MG Tab Take 1 tablet by mouth once daily.    amlodipine (NORVASC) 10 MG tablet Take 10 mg by mouth once daily.    aripiprazole (ABILIFY) 10 MG Tab Take 5 mg by mouth once daily.    cyanocobalamin (VITAMIN B-12) 1000 MCG tablet Take 1 tablet by mouth once daily.    cycloSPORINE (RESTASIS) 0.05 % ophthalmic emulsion Apply 1 drop to eye.    DULoxetine (CYMBALTA) 30 MG capsule Take 1 capsule by mouth once daily.    esomeprazole (NEXIUM) 40 MG capsule Take 40 mg by mouth before breakfast.    famotidine (PEPCID) 20 MG tablet Take 1 tablet (20 mg total) by mouth 2 (two) times daily as needed (heartburn).    glucosamine-chondroitin 500-400 mg tablet Take 1 tablet by mouth 2 (two) times daily.    meclizine (ANTIVERT) 12.5 mg tablet Take 12.5 mg by mouth 3 (three) times daily as needed.    melatonin 1 mg Tab Take 1 mg by mouth every evening.    mirabegron 50 mg Tb24 Take by mouth.    mirtazapine (REMERON) 7.5 MG Tab     multivitamin capsule Take 1 capsule by mouth once daily.    nystatin-triamcinolone (MYCOLOG) ointment 2 (two) times daily as needed.    polyethylene glycol (GLYCOLAX) 17 gram PwPk Take by mouth.    solifenacin (VESICARE) 10 MG tablet Take 5 mg by mouth once daily.    traMADol (ULTRAM) 50 mg tablet Take 1 tablet by mouth 3 (three) times daily as needed.    venlafaxine (EFFEXOR-XR) 150 MG Cp24 Take 75 mg by mouth once daily.     Family History     Problem Relation (Age of Onset)    Cancer Mother    Heart disease Father    Hypertension         Tobacco Use    Smoking status:  Never Smoker    Smokeless tobacco: Never Used   Substance and Sexual Activity    Alcohol use: No    Drug use: No    Sexual activity: Not Currently     Review of Systems   Constitutional: Positive for fatigue. Negative for activity change, diaphoresis and unexpected weight change.   HENT: Negative for congestion, ear pain and sore throat.    Eyes: Negative.    Respiratory: Negative for shortness of breath and wheezing.    Cardiovascular: Negative for chest pain and palpitations.   Gastrointestinal: Negative for abdominal pain, constipation, diarrhea and vomiting.   Endocrine: Negative.    Genitourinary: Negative for flank pain, hematuria and urgency.   Musculoskeletal: Negative for joint swelling and neck pain.   Skin: Negative for pallor.   Neurological: Positive for weakness. Negative for seizures, syncope and light-headedness.   Hematological: Negative.    Psychiatric/Behavioral: Negative.      Objective:     Vital Signs (Most Recent):  Temp: 97.8 °F (36.6 °C) (11/01/20 1640)  Pulse: (!) 58 (11/01/20 1700)  Resp: 15 (11/01/20 1700)  BP: (!) 150/67 (11/01/20 1638)  SpO2: 95 % (11/01/20 1700) Vital Signs (24h Range):  Temp:  [97.8 °F (36.6 °C)-97.9 °F (36.6 °C)] 97.8 °F (36.6 °C)  Pulse:  [58-70] 58  Resp:  [13-18] 15  SpO2:  [95 %-97 %] 95 %  BP: (132-150)/(55-67) 150/67     Weight: 63.3 kg (139 lb 8 oz)  Body mass index is 26.36 kg/m².    Physical Exam  Constitutional:       Appearance: She is well-developed. She is ill-appearing (chroncialyl).      Comments: Elderly     HENT:      Head: Normocephalic and atraumatic.   Neck:      Musculoskeletal: Normal range of motion and neck supple.   Cardiovascular:      Rate and Rhythm: Normal rate and regular rhythm.      Heart sounds: Normal heart sounds. No murmur.      Comments: ICD    Pulmonary:      Effort: Pulmonary effort is normal. No respiratory distress.      Breath sounds: Normal breath sounds.   Abdominal:      General: Bowel sounds are normal. There is no  distension.      Palpations: Abdomen is soft.      Tenderness: There is no abdominal tenderness.   Musculoskeletal: Normal range of motion.   Skin:     General: Skin is warm and dry.   Neurological:      Mental Status: She is alert and oriented to person, place, and time.             Significant Labs:   CBC:   Recent Labs   Lab 11/01/20  1437   WBC 4.18   HGB 7.3*   HCT 23.2*        CMP:   Recent Labs   Lab 11/01/20  1437      K 3.3*      CO2 30*   *   BUN 14   CREATININE 0.9   CALCIUM 8.4*   PROT 5.7*   ALBUMIN 3.0*   BILITOT 0.2   ALKPHOS 62   AST 16   ALT 10   ANIONGAP 9   EGFRNONAA 58*       Significant Imaging:  Imaging Results          X-Ray Chest AP Portable (Final result)  Result time 11/01/20 14:49:49    Final result by Moreno Brewster Jr., MD (11/01/20 14:49:49)                 Impression:      Development of a moderate large left pleural effusion with overlying atelectasis or consolidation.  Unchanged patchy airspace disease within the periphery of the right upper lobe.      Electronically signed by: Moreno Brewster Jr., MD  Date:    11/01/2020  Time:    14:49             Narrative:    EXAMINATION:  XR CHEST AP PORTABLE    CLINICAL HISTORY:  generalized weakness;    COMPARISON:  Prior radiograph from August 28, 2020.    FINDINGS:  Left-sided pacer device in place.  The of element of moderate to large left pleural effusion with overlying atelectasis or consolidation.  Small right pleural effusion.  Persistent patchy ground-glass and irregular linear opacity within the periphery of the right upper lobe.  No pneumothorax.  The heart is mildly enlarged.  Left atrial enlargement.  No significant bony findings.

## 2020-11-01 NOTE — HPI
Love Davey is an 86 year old female with history of macrocytic anemia, thrombocytosis, TIA, CHF, and hypertension who presents to ED for further evaluation of progressive weakness over the last 4 months. Patient states she has not been able to get out of bed. She feels symptoms could also be related to a new cardiac medication that was started around this time. She is uncertain of the name. She denies obvious bleeding, fever, chills, or LOL. She is being followed by Dr. Hauser for anemia.     In ED, H/H 7.3/23.2, . Orders placed to transfuse one unit.

## 2020-11-01 NOTE — H&P
[facility] Hospital Medicine  History & Physical    Patient Name: Love Davey  MRN: [id number]  Admission Date: 11/1/2020  Attending Physician: Ayush Lord, *   Primary Care Provider: Kaley Law MD      Patient information was obtained from patient and ER records.     Subjective:     Principal Problem:Symptomatic anemia    Chief Complaint:   Chief Complaint   Patient presents with    Fatigue     weakness for 4 months        HPI: Love Davey is an 86 year old female with history of macrocytic anemia, thrombocytosis, TIA, CHF, and hypertension who presents to ED for further evaluation of progressive weakness over the last 4 months. Patient states she has not been able to get out of bed. She feels symptoms could also be related to a new cardiac medication that was started around this time. She is uncertain of the name. She denies obvious bleeding, fever, chills, or LOL. She is being followed by Dr. Hauser for anemia.     In ED, H/H 7.3/23.2, . Orders placed to transfuse one unit.         Past Medical History:   Diagnosis Date    Anemia     Anxiety     Arthritis     Back pain     Cardiomyopathy     CHF (congestive heart failure)     Depression     Dizziness     Hypertension     Insomnia     Stroke     Use of cane as ambulatory aid        Past Surgical History:   Procedure Laterality Date    APPENDECTOMY      arthritis      BLADDER REPAIR      BUNIONECTOMY      CARDIAC DEFIBRILLATOR PLACEMENT      CATARACT EXTRACTION      HYSTERECTOMY      metal implant Bilateral     placed in the bladder.       Review of patient's allergies indicates:   Allergen Reactions    Bacitracin-polymyxin b Itching and Swelling    Merthiolate (thimerosal) Rash and Swelling    Diazepam      Hallucinations    Levofloxacin Itching    Metronidazole     Oxycodone Itching    Tizanidine      Hallucinations    Tramadol Itching    Cefdinir Itching     Pt not positive  about allergy       No current facility-administered medications on file prior to encounter.      Current Outpatient Medications on File Prior to Encounter   Medication Sig    aspirin (ECOTRIN) 81 MG EC tablet Take 81 mg by mouth once daily.    atorvastatin (LIPITOR) 20 MG tablet Take 20 mg by mouth once daily.    busPIRone (BUSPAR) 15 MG tablet Take 1 tablet by mouth 2 (two) times daily.    carvedilol (COREG) 6.25 MG tablet Take 6.25 mg by mouth 2 (two) times daily with meals.    clopidogreL (PLAVIX) 75 mg tablet Take 1 tablet by mouth once daily.    donepezil (ARICEPT) 10 MG tablet Take 1 tablet by mouth every evening.    ferrous sulfate 324 mg (65 mg iron) TbEC Take 65 mg by mouth once daily.    fluticasone (FLONASE) 50 mcg/actuation nasal spray 1 spray by Each Nare route once daily.    furosemide (LASIX) 40 MG tablet Take 1 tablet by mouth 2 (two) times daily.    Lactobacillus rhamnosus GG (CULTURELLE) 10 billion cell capsule Take 1 capsule by mouth once daily.    levothyroxine (SYNTHROID) 75 MCG tablet Take 1 tablet (75 mcg total) by mouth before breakfast. (Patient taking differently: Take 50 mcg by mouth before breakfast. )    losartan (COZAAR) 25 MG tablet Take 1 tablet by mouth once daily.    potassium chloride SA (K-DUR,KLOR-CON) 20 MEQ tablet Take 20 mEq by mouth 2 (two) times daily.    albuterol (PROAIR HFA) 90 mcg/actuation inhaler Inhale 2 puffs into the lungs every 6 (six) hours as needed for Wheezing.    amiodarone (PACERONE) 200 MG Tab Take 1 tablet by mouth once daily.    amlodipine (NORVASC) 10 MG tablet Take 10 mg by mouth once daily.    aripiprazole (ABILIFY) 10 MG Tab Take 5 mg by mouth once daily.    cyanocobalamin (VITAMIN B-12) 1000 MCG tablet Take 1 tablet by mouth once daily.    cycloSPORINE (RESTASIS) 0.05 % ophthalmic emulsion Apply 1 drop to eye.    DULoxetine (CYMBALTA) 30 MG capsule Take 1 capsule by mouth once daily.    esomeprazole (NEXIUM) 40 MG capsule Take 40  mg by mouth before breakfast.    famotidine (PEPCID) 20 MG tablet Take 1 tablet (20 mg total) by mouth 2 (two) times daily as needed (heartburn).    glucosamine-chondroitin 500-400 mg tablet Take 1 tablet by mouth 2 (two) times daily.    meclizine (ANTIVERT) 12.5 mg tablet Take 12.5 mg by mouth 3 (three) times daily as needed.    melatonin 1 mg Tab Take 1 mg by mouth every evening.    mirabegron 50 mg Tb24 Take by mouth.    mirtazapine (REMERON) 7.5 MG Tab     multivitamin capsule Take 1 capsule by mouth once daily.    nystatin-triamcinolone (MYCOLOG) ointment 2 (two) times daily as needed.    polyethylene glycol (GLYCOLAX) 17 gram PwPk Take by mouth.    solifenacin (VESICARE) 10 MG tablet Take 5 mg by mouth once daily.    traMADol (ULTRAM) 50 mg tablet Take 1 tablet by mouth 3 (three) times daily as needed.    venlafaxine (EFFEXOR-XR) 150 MG Cp24 Take 75 mg by mouth once daily.     Family History     Problem Relation (Age of Onset)    Cancer Mother    Heart disease Father    Hypertension         Tobacco Use    Smoking status: Never Smoker    Smokeless tobacco: Never Used   Substance and Sexual Activity    Alcohol use: No    Drug use: No    Sexual activity: Not Currently     Review of Systems   Constitutional: Positive for fatigue. Negative for activity change, diaphoresis and unexpected weight change.   HENT: Negative for congestion, ear pain and sore throat.    Eyes: Negative.    Respiratory: Negative for shortness of breath and wheezing.    Cardiovascular: Negative for chest pain and palpitations.   Gastrointestinal: Negative for abdominal pain, constipation, diarrhea and vomiting.   Endocrine: Negative.    Genitourinary: Negative for flank pain, hematuria and urgency.   Musculoskeletal: Negative for joint swelling and neck pain.   Skin: Negative for pallor.   Neurological: Positive for weakness. Negative for seizures, syncope and light-headedness.   Hematological: Negative.     Psychiatric/Behavioral: Negative.      Objective:     Vital Signs (Most Recent):  Temp: 97.8 °F (36.6 °C) (11/01/20 1640)  Pulse: (!) 58 (11/01/20 1700)  Resp: 15 (11/01/20 1700)  BP: (!) 150/67 (11/01/20 1638)  SpO2: 95 % (11/01/20 1700) Vital Signs (24h Range):  Temp:  [97.8 °F (36.6 °C)-97.9 °F (36.6 °C)] 97.8 °F (36.6 °C)  Pulse:  [58-70] 58  Resp:  [13-18] 15  SpO2:  [95 %-97 %] 95 %  BP: (132-150)/(55-67) 150/67     Weight: 63.3 kg (139 lb 8 oz)  Body mass index is 26.36 kg/m².    Physical Exam  Constitutional:       Appearance: She is well-developed. She is ill-appearing (chroncialyl).      Comments: Elderly     HENT:      Head: Normocephalic and atraumatic.   Neck:      Musculoskeletal: Normal range of motion and neck supple.   Cardiovascular:      Rate and Rhythm: Normal rate and regular rhythm.      Heart sounds: Normal heart sounds. No murmur.      Comments: ICD    Pulmonary:      Effort: Pulmonary effort is normal. No respiratory distress.      Breath sounds: Normal breath sounds.   Abdominal:      General: Bowel sounds are normal. There is no distension.      Palpations: Abdomen is soft.      Tenderness: There is no abdominal tenderness.   Musculoskeletal: Normal range of motion.   Skin:     General: Skin is warm and dry.   Neurological:      Mental Status: She is alert and oriented to person, place, and time.             Significant Labs:   CBC:   Recent Labs   Lab 11/01/20  1437   WBC 4.18   HGB 7.3*   HCT 23.2*        CMP:   Recent Labs   Lab 11/01/20  1437      K 3.3*      CO2 30*   *   BUN 14   CREATININE 0.9   CALCIUM 8.4*   PROT 5.7*   ALBUMIN 3.0*   BILITOT 0.2   ALKPHOS 62   AST 16   ALT 10   ANIONGAP 9   EGFRNONAA 58*       Significant Imaging:  Imaging Results          X-Ray Chest AP Portable (Final result)  Result time 11/01/20 14:49:49    Final result by Moreno Brewster Jr., MD (11/01/20 14:49:49)                 Impression:      Development of a moderate large  left pleural effusion with overlying atelectasis or consolidation.  Unchanged patchy airspace disease within the periphery of the right upper lobe.      Electronically signed by: Moreno Brewster Jr., MD  Date:    11/01/2020  Time:    14:49             Narrative:    EXAMINATION:  XR CHEST AP PORTABLE    CLINICAL HISTORY:  generalized weakness;    COMPARISON:  Prior radiograph from August 28, 2020.    FINDINGS:  Left-sided pacer device in place.  The of element of moderate to large left pleural effusion with overlying atelectasis or consolidation.  Small right pleural effusion.  Persistent patchy ground-glass and irregular linear opacity within the periphery of the right upper lobe.  No pneumothorax.  The heart is mildly enlarged.  Left atrial enlargement.  No significant bony findings.                                  Assessment/Plan:     * Symptomatic anemia  --histroy of macrocytic anemia Her previous work-up with bone marrow, B12 and folate level was negative  --Followed by Dr. Hauser, Hematology  --transfuse one unit PRBC today    Dilated cardiomyopathy  --resume Lasix, BB  --s/p ICD  --was taken of Entresto due to hypotension        Paroxysmal ventricular tachycardia  --resume amiodarone  --followed by Maday, CArdiology      Elevated troponin  Chronically elevated and stable  Has history of CHF/Cardiomyopathy        HTN (hypertension)  --resume amlodipine and carvedilol          VTE Risk Mitigation (From admission, onward)    None          Jordan Stringer NP  Department of Hospital Medicine   [facility]

## 2020-11-02 ENCOUNTER — DOCUMENTATION ONLY (OUTPATIENT)
Dept: CARDIOLOGY | Facility: CLINIC | Age: 85
End: 2020-11-02

## 2020-11-02 VITALS
WEIGHT: 152.31 LBS | BODY MASS INDEX: 28.03 KG/M2 | HEART RATE: 57 BPM | HEIGHT: 62 IN | OXYGEN SATURATION: 92 % | SYSTOLIC BLOOD PRESSURE: 135 MMHG | TEMPERATURE: 96 F | DIASTOLIC BLOOD PRESSURE: 77 MMHG | RESPIRATION RATE: 18 BRPM

## 2020-11-02 PROBLEM — D64.9 SYMPTOMATIC ANEMIA: Status: RESOLVED | Noted: 2020-11-01 | Resolved: 2020-11-02

## 2020-11-02 LAB
ANION GAP SERPL CALC-SCNC: 7 MMOL/L (ref 8–16)
BASOPHILS # BLD AUTO: 0.02 K/UL (ref 0–0.2)
BASOPHILS NFR BLD: 0.4 % (ref 0–1.9)
BLD PROD TYP BPU: NORMAL
BLOOD UNIT EXPIRATION DATE: NORMAL
BLOOD UNIT TYPE CODE: 9500
BLOOD UNIT TYPE: NORMAL
BUN SERPL-MCNC: 17 MG/DL (ref 8–23)
CALCIUM SERPL-MCNC: 8.5 MG/DL (ref 8.7–10.5)
CHLORIDE SERPL-SCNC: 106 MMOL/L (ref 95–110)
CO2 SERPL-SCNC: 30 MMOL/L (ref 23–29)
CODING SYSTEM: NORMAL
CREAT SERPL-MCNC: 0.8 MG/DL (ref 0.5–1.4)
DACRYOCYTES BLD QL SMEAR: ABNORMAL
DIFFERENTIAL METHOD: ABNORMAL
DISPENSE STATUS: NORMAL
EOSINOPHIL # BLD AUTO: 0.1 K/UL (ref 0–0.5)
EOSINOPHIL NFR BLD: 2.1 % (ref 0–8)
ERYTHROCYTE [DISTWIDTH] IN BLOOD BY AUTOMATED COUNT: ABNORMAL % (ref 11.5–14.5)
EST. GFR  (AFRICAN AMERICAN): >60 ML/MIN/1.73 M^2
EST. GFR  (NON AFRICAN AMERICAN): >60 ML/MIN/1.73 M^2
GLUCOSE SERPL-MCNC: 90 MG/DL (ref 70–110)
HCT VFR BLD AUTO: 28.1 % (ref 37–48.5)
HGB BLD-MCNC: 9.3 G/DL (ref 12–16)
IMM GRANULOCYTES # BLD AUTO: 0.01 K/UL (ref 0–0.04)
IMM GRANULOCYTES NFR BLD AUTO: 0.2 % (ref 0–0.5)
LYMPHOCYTES # BLD AUTO: 1.6 K/UL (ref 1–4.8)
LYMPHOCYTES NFR BLD: 27.8 % (ref 18–48)
MCH RBC QN AUTO: 34.8 PG (ref 27–31)
MCHC RBC AUTO-ENTMCNC: 33.1 G/DL (ref 32–36)
MCV RBC AUTO: 105 FL (ref 82–98)
MONOCYTES # BLD AUTO: 0.5 K/UL (ref 0.3–1)
MONOCYTES NFR BLD: 8 % (ref 4–15)
NEUTROPHILS # BLD AUTO: 3.5 K/UL (ref 1.8–7.7)
NEUTROPHILS NFR BLD: 61.5 % (ref 38–73)
NRBC BLD-RTO: 0 /100 WBC
NUM UNITS TRANS PACKED RBC: NORMAL
OVALOCYTES BLD QL SMEAR: ABNORMAL
PLATELET # BLD AUTO: 344 K/UL (ref 150–350)
PLATELET BLD QL SMEAR: ABNORMAL
PMV BLD AUTO: 11.5 FL (ref 9.2–12.9)
POIKILOCYTOSIS BLD QL SMEAR: SLIGHT
POLYCHROMASIA BLD QL SMEAR: ABNORMAL
POTASSIUM SERPL-SCNC: 4.1 MMOL/L (ref 3.5–5.1)
RBC # BLD AUTO: 2.67 M/UL (ref 4–5.4)
SODIUM SERPL-SCNC: 143 MMOL/L (ref 136–145)
TROPONIN I SERPL DL<=0.01 NG/ML-MCNC: 0.03 NG/ML (ref 0–0.03)
TROPONIN I SERPL DL<=0.01 NG/ML-MCNC: 0.03 NG/ML (ref 0–0.03)
WBC # BLD AUTO: 5.62 K/UL (ref 3.9–12.7)

## 2020-11-02 PROCEDURE — 25000003 PHARM REV CODE 250: Performed by: NURSE PRACTITIONER

## 2020-11-02 PROCEDURE — 25000242 PHARM REV CODE 250 ALT 637 W/ HCPCS: Performed by: NURSE PRACTITIONER

## 2020-11-02 PROCEDURE — 80048 BASIC METABOLIC PNL TOTAL CA: CPT

## 2020-11-02 PROCEDURE — 25000003 PHARM REV CODE 250: Performed by: INTERNAL MEDICINE

## 2020-11-02 PROCEDURE — 36415 COLL VENOUS BLD VENIPUNCTURE: CPT

## 2020-11-02 PROCEDURE — 85025 COMPLETE CBC W/AUTO DIFF WBC: CPT

## 2020-11-02 PROCEDURE — G0378 HOSPITAL OBSERVATION PER HR: HCPCS

## 2020-11-02 PROCEDURE — 84484 ASSAY OF TROPONIN QUANT: CPT | Mod: 91

## 2020-11-02 PROCEDURE — 36430 TRANSFUSION BLD/BLD COMPNT: CPT

## 2020-11-02 PROCEDURE — P9016 RBC LEUKOCYTES REDUCED: HCPCS

## 2020-11-02 RX ORDER — POTASSIUM CHLORIDE 20 MEQ/1
40 TABLET, EXTENDED RELEASE ORAL ONCE
Status: COMPLETED | OUTPATIENT
Start: 2020-11-02 | End: 2020-11-02

## 2020-11-02 RX ORDER — FERROUS SULFATE 325(65) MG
325 TABLET, DELAYED RELEASE (ENTERIC COATED) ORAL DAILY
Status: DISCONTINUED | OUTPATIENT
Start: 2020-11-02 | End: 2020-11-02 | Stop reason: HOSPADM

## 2020-11-02 RX ADMIN — FLUTICASONE PROPIONATE 50 MCG: 50 SPRAY, METERED NASAL at 08:11

## 2020-11-02 RX ADMIN — LACTOBACILLUS ACIDOPHILUS / LACTOBACILLUS BULGARICUS 1 EACH: 100 MILLION CFU STRENGTH GRANULES at 08:11

## 2020-11-02 RX ADMIN — BUSPIRONE HYDROCHLORIDE 15 MG: 10 TABLET ORAL at 08:11

## 2020-11-02 RX ADMIN — CLOPIDOGREL 75 MG: 75 TABLET, FILM COATED ORAL at 08:11

## 2020-11-02 RX ADMIN — FUROSEMIDE 40 MG: 40 TABLET ORAL at 08:11

## 2020-11-02 RX ADMIN — FERROUS SULFATE TAB EC 325 MG (65 MG FE EQUIVALENT) 325 MG: 325 (65 FE) TABLET DELAYED RESPONSE at 10:11

## 2020-11-02 RX ADMIN — LOSARTAN POTASSIUM 25 MG: 25 TABLET, FILM COATED ORAL at 08:11

## 2020-11-02 RX ADMIN — POTASSIUM CHLORIDE 40 MEQ: 1500 TABLET, EXTENDED RELEASE ORAL at 10:11

## 2020-11-02 RX ADMIN — FAMOTIDINE 20 MG: 20 TABLET ORAL at 08:11

## 2020-11-02 RX ADMIN — POTASSIUM CHLORIDE 20 MEQ: 1500 TABLET, EXTENDED RELEASE ORAL at 08:11

## 2020-11-02 RX ADMIN — LEVOTHYROXINE SODIUM 50 MCG: 50 TABLET ORAL at 05:11

## 2020-11-02 RX ADMIN — ASPIRIN 81 MG: 81 TABLET, COATED ORAL at 08:11

## 2020-11-02 RX ADMIN — CARVEDILOL 6.25 MG: 6.25 TABLET, FILM COATED ORAL at 08:11

## 2020-11-02 RX ADMIN — ATORVASTATIN CALCIUM 20 MG: 10 TABLET, FILM COATED ORAL at 08:11

## 2020-11-02 RX ADMIN — THERA TABS 1 TABLET: TAB at 08:11

## 2020-11-02 NOTE — PLAN OF CARE
Went over discharge instructions with patient at bedside.  Stressed the importance of making and keeping all appointments.  Pt verbalized understanding.   Had all questions regarding discharge answered to satisfaction.  IV removed without complications.  Tele box removed and returned to monitor tech.  Awaiting personal transportation to arrive (pt's daughter).  Gave updated report to [facility].

## 2020-11-02 NOTE — PLAN OF CARE
CM spoke with pt to complete initial assessment.  Pt lives at [facility] with her .  Pt relies on the facility to provide housekeeping, transportation and meal preparation.  Pt takes own meds.  Pt independent with use of rollator, BSC and shower chair.   Pt stated she has an advanced directive, but no copy on chart.  Current with [facility] and preference is to resume at [facility].  Preference obtained and referral sent via monica.  Denies discharge needs at this time.  Updated white board with CM contact information provided.  Transitional care folder given to pt, information on bedside delivery, [facility], discharge begins on admit pamphlet, and  advance directive information provided to pt.  Instructed to call CM with questions or concerns.       D/c plan: Assisted living with home health  Transport home: facility or daughter if after 1500   PCP: Ani  Preferred [facility]  Bedside delivery: yes  [facility]: pending     11/02/20 1315   Discharge Assessment   Assessment Type Discharge Planning Assessment   Confirmed/corrected address and phone number on facesheet? Yes   Assessment information obtained from? Patient   Expected Length of Stay (days)   (2-3)   Communicated expected length of stay with patient/caregiver yes   Prior to hospitilization cognitive status: Alert/Oriented;No Deficits   Prior to hospitalization functional status: Assistive Equipment   Current cognitive status: Alert/Oriented   Current Functional Status: Assistive Equipment   Facility Arrived From: [facility] [phone]   Lives With facility resident   Able to Return to Prior Arrangements yes   Is patient able to care for self after discharge? Yes   Who are your caregiver(s) and their phone number(s)? Kristy Willson, daughter, [phone]   Patient's perception of discharge disposition assisted living   Readmission Within the Last 30 Days no previous  admission in last 30 days   Patient currently being followed by outpatient case management? No   Patient currently receives any other outside agency services? Yes   Name and contact number of agency or person providing outside services [facility] [phone]   Is it the patient/care giver preference to resume care with the current outside agency? Yes   Equipment Currently Used at Home rollator;bedside commode;shower chair   Part D Coverage NA   Do you have any problems affording any of your prescribed medications? No   Is the patient taking medications as prescribed? yes   Does the patient have transportation home? Yes   Transportation Anticipated agency;family or friend will provide   Dialysis Name and Scheduled days NA   Does the patient receive services at the [facility]? No   Discharge Plan A Assisted Living;Home Health   DME Needed Upon Discharge  none   Patient/Family in Agreement with Plan yes

## 2020-11-02 NOTE — PROGRESS NOTES
Spoke with pt at bedside. Pt requesting more detail about heart failure. CHF education with pt in detail. Provide and reviewed heart failure brochure and salt/sodium brochure.  Recommend 2 gram sodium restriction and 1500cc fluid restriction.  Reviewed to watch food labels for sodium servings and flavor substitutions for sodium.   Encourage physical activity with graded exercise program.  Requested patient to weigh themselves daily, and to notify us if their weight increases by more than 2 lbs in 1 day or 5 lbs in 1 week. Pt informed does weigh self daily.  Also recommended pt check BP/HR daily, record to review with provider. Pt informed checks BP/HR randomly.     Watch for these Signs and Symptoms  If any of these occur, contact your doctor immediately:   Increase in shortness of breath with movement   Increase in swelling in your legs and ankles   Weight gain of more than 2 pounds in a night or 5 pounds in a week   Difficulty breathing when you are lying down   Worsening fatigue or tiredness   Stomach bloating, a full feeling or a loss of appetite   Increased coughing--especially when you are lying down    Call 911 if any of the followin: Worsening chest tightness or chest pain  2: Cough with pink, frothy sputum    Pt follows Dr. Nassar, regular cardiologist for heart failure. Pt inquired about [facility] heart failure program. I have reviewed information about the [facility] heart failure management program with the patient. Provided brochure with contact information and signs/symptoms to watch for after discharge. Patient verbalized understanding of information and all questions answered.

## 2020-11-02 NOTE — PLAN OF CARE
AOx4. POC reviewed; interventions implemented as appropriate.   Able to verbalize needs. Calm & cooperative at this time.  VSS; NSR.  Receiving one  unit PRBCs d/t anemia; tolerating well no evidence of transfusion reaction noted.   C/o L-foot pain; bandage noted to area; second toe amputated recently.   Progressive mobility level - edge of bed. Ambulates w/ walker.  Able to reposition self in bed. Frequent position changes encouraged. Educated on s/sx of  DTI.  NADN. Resting quietly in bed.   Hourly rounding complete.   All safety measures remain in place. Bed alarm on & audible. Free of falls. SR up x2; bed low & locked. Call light w/in reach.   Will continue to monitor throughout shift.

## 2020-11-02 NOTE — DISCHARGE SUMMARY
[facility] Hospital Medicine  Discharge Summary      Patient Name: Love Davey  MRN: [id number]  Admission Date: 11/1/2020  Hospital Length of Stay: 0 days  Discharge Date and Time:  11/02/2020 3:14 PM  Attending Physician: Ayush Lord, *   Discharging Provider: Yesi Cole NP  Primary Care Provider: Kaley Law MD      HPI:   Love Davey is an 86 year old female with history of macrocytic anemia, thrombocytosis, TIA, CHF, and hypertension who presents to ED for further evaluation of progressive weakness over the last 4 months. Patient states she has not been able to get out of bed. She feels symptoms could also be related to a new cardiac medication that was started around this time. She is uncertain of the name. She denies obvious bleeding, fever, chills, or LOL. She is being followed by Dr. Hauser for anemia.     In ED, H/H 7.3/23.2, . Orders placed to transfuse one unit.         * No surgery found *      Hospital Course:   85 y/o female admitted fro symptomatic anemia. Patient was transfused 1 unit of PRBCs. Today H/H 9.3/28.1. Pt feeling better. Case discussed with Dr. Montague, pt stable for discharge. Home meds reconciled. Home Health resumed (SN, PT). Patient to follow-up with PCP in 3 days for hospital follow-up. Patient to follow-up with Dr. Hauser (hematology) for anemia in outpatient clinic. Patient seen and examined on the dtae of discharge and found suitable for discharge.         Consults:     No new Assessment & Plan notes have been filed under this hospital service since the last note was generated.  Service: Hospital Medicine    Final Active Diagnoses:    Diagnosis Date Noted POA    Paroxysmal ventricular tachycardia [I47.2] 11/01/2020 Yes    Dilated cardiomyopathy [I42.0] 11/01/2020 Yes    Elevated troponin [R77.8] 08/28/2020 Yes    HTN (hypertension) [I10] 01/04/2020 Yes      Problems Resolved During this Admission:    Diagnosis  Date Noted Date Resolved POA    PRINCIPAL PROBLEM:  Symptomatic anemia [D64.9] 11/01/2020 11/02/2020 Yes       Discharged Condition: stable    Disposition: Home or Self Care    Follow Up:  Follow-up Information     Kaley Law MD In 3 days.    Specialty: Internal Medicine  Why: for hospital follow-up   Contact information:  [address]  [address]  [place] [address]  [phone]             Taiwo Hauser MD In 1 week.    Specialty: Hematology and Oncology  Why: for hospital follow-up   Contact information:  [address]  [address]  [place] [address]  [phone]             Rosario Nassar MD In 1 week.    Specialty: Cardiology  Why: for hospital follow-up   Contact information:  [address]  [address]  [place] [address]  [phone]                 Patient Instructions:      Notify your health care provider if you experience any of the following:  difficulty breathing or increased cough     Notify your health care provider if you experience any of the following:  persistent dizziness, light-headedness, or visual disturbances     Notify your health care provider if you experience any of the following:  increased confusion or weakness     Activity as tolerated       Significant Diagnostic Studies: Labs:   BMP:   Recent Labs   Lab 11/01/20  1437 11/02/20  0959   * 90    143   K 3.3* 4.1    106   CO2 30* 30*   BUN 14 17   CREATININE 0.9 0.8   CALCIUM 8.4* 8.5*   MG 1.9  --    , CMP   Recent Labs   Lab 11/01/20  1437 11/02/20  0959    143   K 3.3* 4.1    106   CO2 30* 30*   * 90   BUN 14 17   CREATININE 0.9 0.8   CALCIUM 8.4* 8.5*   PROT 5.7*  --    ALBUMIN 3.0*  --    BILITOT 0.2  --    ALKPHOS 62  --    AST 16  --    ALT 10  --    ANIONGAP 9 7*   ESTGFRAFRICA >60 >60   EGFRNONAA 58* >60    and CBC   Recent Labs   Lab 11/01/20  1437 11/02/20  0959   WBC 4.18 5.62   HGB 7.3* 9.3*   HCT 23.2* 28.1*    344       Pending  Diagnostic Studies:     Procedure Component Value Units Date/Time    Troponin I [[id number]] Collected: 11/02/20 1340    Order Status: Sent Lab Status: In process Updated: 11/02/20 1421    Specimen: Blood          Medications:  Reconciled Home Medications:      Medication List      CHANGE how you take these medications    levothyroxine 75 MCG tablet  Commonly known as: SYNTHROID  Take 1 tablet (75 mcg total) by mouth before breakfast.  What changed: how much to take        CONTINUE taking these medications    amiodarone 200 MG Tab  Commonly known as: PACERONE  Take 1 tablet by mouth once daily.     amLODIPine 10 MG tablet  Commonly known as: NORVASC  Take 10 mg by mouth once daily.     ARIPiprazole 10 MG Tab  Commonly known as: ABILIFY  Take 5 mg by mouth once daily.     aspirin 81 MG EC tablet  Commonly known as: ECOTRIN  Take 81 mg by mouth once daily.     atorvastatin 20 MG tablet  Commonly known as: LIPITOR  Take 20 mg by mouth once daily.     busPIRone 15 MG tablet  Commonly known as: BUSPAR  Take 1 tablet by mouth 2 (two) times daily.     carvediloL 6.25 MG tablet  Commonly known as: COREG  Take 6.25 mg by mouth 2 (two) times daily with meals.     clopidogreL 75 mg tablet  Commonly known as: PLAVIX  Take 1 tablet by mouth once daily.     cyanocobalamin 1000 MCG tablet  Commonly known as: VITAMIN B-12  Take 1 tablet by mouth once daily.     donepeziL 10 MG tablet  Commonly known as: ARICEPT  Take 1 tablet by mouth every evening.     DULoxetine 30 MG capsule  Commonly known as: CYMBALTA  Take 1 capsule by mouth once daily.     esomeprazole 40 MG capsule  Commonly known as: NEXIUM  Take 40 mg by mouth before breakfast.     famotidine 20 MG tablet  Commonly known as: PEPCID  Take 1 tablet (20 mg total) by mouth 2 (two) times daily as needed (heartburn).     ferrous sulfate 324 mg (65 mg iron) Tbec  Take 65 mg by mouth once daily.     fluticasone propionate 50 mcg/actuation nasal spray  Commonly known as: FLONASE  1  spray by Each Nare route once daily.     furosemide 40 MG tablet  Commonly known as: LASIX  Take 1 tablet by mouth 2 (two) times daily.     glucosamine-chondroitin 500-400 mg tablet  Take 1 tablet by mouth 2 (two) times daily.     Lactobacillus rhamnosus GG 10 billion cell capsule  Commonly known as: CULTURELLE  Take 1 capsule by mouth once daily.     losartan 25 MG tablet  Commonly known as: COZAAR  Take 1 tablet by mouth once daily.     meclizine 12.5 mg tablet  Commonly known as: ANTIVERT  Take 12.5 mg by mouth 3 (three) times daily as needed.     melatonin 1 mg Tab  Take 1 mg by mouth every evening.     mirabegron 50 mg Tb24  Commonly known as: MYRBETRIQ  Take by mouth.     mirtazapine 7.5 MG Tab  Commonly known as: REMERON     multivitamin capsule  Take 1 capsule by mouth once daily.     nystatin-triamcinolone ointment  Commonly known as: MYCOLOG  2 (two) times daily as needed.     polyethylene glycol 17 gram Pwpk  Commonly known as: GLYCOLAX  Take by mouth.     potassium chloride SA 20 MEQ tablet  Commonly known as: K-DUR,KLOR-CON  Take 20 mEq by mouth 2 (two) times daily.     PROAIR HFA 90 mcg/actuation inhaler  Generic drug: albuterol  Inhale 2 puffs into the lungs every 6 (six) hours as needed for Wheezing.     RESTASIS 0.05 % ophthalmic emulsion  Generic drug: cycloSPORINE  Apply 1 drop to eye.     solifenacin 10 MG tablet  Commonly known as: VESICARE  Take 5 mg by mouth once daily.     traMADoL 50 mg tablet  Commonly known as: ULTRAM  Take 1 tablet by mouth 3 (three) times daily as needed.     venlafaxine 150 MG Cp24  Commonly known as: EFFEXOR-XR  Take 75 mg by mouth once daily.            Indwelling Lines/Drains at time of discharge:   Lines/Drains/Airways     None                 Time spent on the discharge of patient: 62 minutes  Patient was seen and examined on the date of discharge and determined to be suitable for discharge.         Yesi Cole NP  Department of Hospital Medicine  [facility]

## 2020-11-02 NOTE — PLAN OF CARE
Pt AAO x4.  Pt remains free of falls throughout shift.  Pt denies pain throughout shift.  Plan of care reviewed. Pt verbalizes understanding.  Pt moving and turning independently. Frequent weight shifting encouraged.  Sinus josefina to normal sinus rhythm on monitor.  Hourly rounding completed.  Will continue to monitor.

## 2020-11-02 NOTE — HOSPITAL COURSE
87 y/o female admitted fro symptomatic anemia. Patient was transfused 1 unit of PRBCs. Today H/H 9.3/28.1. Pt feeling better. Case discussed with Dr. Montague, pt stable for discharge. Home meds reconciled. Home Health resumed (SN, PT). Patient to follow-up with PCP in 3 days for hospital follow-up. Patient to follow-up with Dr. Hauser (hematology) for anemia in outpatient clinic. Patient seen and examined on the dtae of discharge and found suitable for discharge.

## 2020-11-03 NOTE — PLAN OF CARE
CM contacted [facility] and notified of patient admitted as observation.  [facility] has contacted patient.       11/03/20 2543   Final Note   Assessment Type Final Discharge Note   Anticipated Discharge Disposition Home-Health   Right Care Referral Info   Post Acute Recommendation Home-care   Facility Name [facility]

## 2020-11-20 ENCOUNTER — HOSPITAL ENCOUNTER (EMERGENCY)
Facility: HOSPITAL | Age: 85
Discharge: HOME | End: 2020-11-21
Attending: EMERGENCY MEDICINE
Payer: MEDICARE

## 2020-11-20 DIAGNOSIS — R07.9 CHEST PAIN: ICD-10-CM

## 2020-11-20 DIAGNOSIS — R06.02 SOB (SHORTNESS OF BREATH): ICD-10-CM

## 2020-11-20 DIAGNOSIS — I50.9 ACUTE ON CHRONIC CONGESTIVE HEART FAILURE, UNSPECIFIED HEART FAILURE TYPE: Primary | ICD-10-CM

## 2020-11-20 LAB
ABO + RH BLD: NORMAL
ALBUMIN SERPL BCP-MCNC: 3.4 G/DL (ref 3.5–5.2)
ALP SERPL-CCNC: 67 U/L (ref 55–135)
ALT SERPL W/O P-5'-P-CCNC: 10 U/L (ref 10–44)
ANION GAP SERPL CALC-SCNC: 10 MMOL/L (ref 8–16)
ANISOCYTOSIS BLD QL SMEAR: SLIGHT
AST SERPL-CCNC: 15 U/L (ref 10–40)
BASO STIPL BLD QL SMEAR: ABNORMAL
BASOPHILS # BLD AUTO: 0.03 K/UL (ref 0–0.2)
BASOPHILS NFR BLD: 0.5 % (ref 0–1.9)
BILIRUB SERPL-MCNC: 0.5 MG/DL (ref 0.1–1)
BLD GP AB SCN CELLS X3 SERPL QL: NORMAL
BNP SERPL-MCNC: 1130 PG/ML (ref 0–99)
BUN SERPL-MCNC: 13 MG/DL (ref 8–23)
CALCIUM SERPL-MCNC: 8.8 MG/DL (ref 8.7–10.5)
CHLORIDE SERPL-SCNC: 103 MMOL/L (ref 95–110)
CO2 SERPL-SCNC: 30 MMOL/L (ref 23–29)
CREAT SERPL-MCNC: 0.9 MG/DL (ref 0.5–1.4)
DIFFERENTIAL METHOD: ABNORMAL
EOSINOPHIL # BLD AUTO: 0.1 K/UL (ref 0–0.5)
EOSINOPHIL NFR BLD: 1.3 % (ref 0–8)
ERYTHROCYTE [DISTWIDTH] IN BLOOD BY AUTOMATED COUNT: ABNORMAL % (ref 11.5–14.5)
EST. GFR  (AFRICAN AMERICAN): >60 ML/MIN/1.73 M^2
EST. GFR  (NON AFRICAN AMERICAN): 58 ML/MIN/1.73 M^2
GLUCOSE SERPL-MCNC: 103 MG/DL (ref 70–110)
HCT VFR BLD AUTO: 26.4 % (ref 37–48.5)
HGB BLD-MCNC: 8.4 G/DL (ref 12–16)
IMM GRANULOCYTES # BLD AUTO: 0.03 K/UL (ref 0–0.04)
IMM GRANULOCYTES NFR BLD AUTO: 0.5 % (ref 0–0.5)
LYMPHOCYTES # BLD AUTO: 1.4 K/UL (ref 1–4.8)
LYMPHOCYTES NFR BLD: 23 % (ref 18–48)
MCH RBC QN AUTO: 36.1 PG (ref 27–31)
MCHC RBC AUTO-ENTMCNC: 31.8 G/DL (ref 32–36)
MCV RBC AUTO: 113 FL (ref 82–98)
MONOCYTES # BLD AUTO: 0.4 K/UL (ref 0.3–1)
MONOCYTES NFR BLD: 7.1 % (ref 4–15)
NEUTROPHILS # BLD AUTO: 4.2 K/UL (ref 1.8–7.7)
NEUTROPHILS NFR BLD: 67.6 % (ref 38–73)
NRBC BLD-RTO: 0 /100 WBC
PLATELET # BLD AUTO: 357 K/UL (ref 150–350)
PLATELET BLD QL SMEAR: ABNORMAL
PMV BLD AUTO: 11.5 FL (ref 9.2–12.9)
POIKILOCYTOSIS BLD QL SMEAR: SLIGHT
POLYCHROMASIA BLD QL SMEAR: ABNORMAL
POTASSIUM SERPL-SCNC: 3.6 MMOL/L (ref 3.5–5.1)
PROT SERPL-MCNC: 6.3 G/DL (ref 6–8.4)
RBC # BLD AUTO: 2.33 M/UL (ref 4–5.4)
SARS-COV-2 RDRP RESP QL NAA+PROBE: NEGATIVE
SODIUM SERPL-SCNC: 143 MMOL/L (ref 136–145)
TROPONIN I SERPL DL<=0.01 NG/ML-MCNC: 0.05 NG/ML (ref 0–0.03)
TROPONIN I SERPL DL<=0.01 NG/ML-MCNC: 0.06 NG/ML (ref 0–0.03)
WBC # BLD AUTO: 6.18 K/UL (ref 3.9–12.7)

## 2020-11-20 PROCEDURE — U0002 COVID-19 LAB TEST NON-CDC: HCPCS

## 2020-11-20 PROCEDURE — 96374 THER/PROPH/DIAG INJ IV PUSH: CPT

## 2020-11-20 PROCEDURE — 94640 AIRWAY INHALATION TREATMENT: CPT

## 2020-11-20 PROCEDURE — 93010 EKG 12-LEAD: ICD-10-PCS | Mod: ,,, | Performed by: INTERNAL MEDICINE

## 2020-11-20 PROCEDURE — 99291 CRITICAL CARE FIRST HOUR: CPT | Mod: 25

## 2020-11-20 PROCEDURE — 25000003 PHARM REV CODE 250: Performed by: EMERGENCY MEDICINE

## 2020-11-20 PROCEDURE — 36415 COLL VENOUS BLD VENIPUNCTURE: CPT

## 2020-11-20 PROCEDURE — 86901 BLOOD TYPING SEROLOGIC RH(D): CPT

## 2020-11-20 PROCEDURE — 25000242 PHARM REV CODE 250 ALT 637 W/ HCPCS: Performed by: EMERGENCY MEDICINE

## 2020-11-20 PROCEDURE — 85025 COMPLETE CBC W/AUTO DIFF WBC: CPT

## 2020-11-20 PROCEDURE — 93005 ELECTROCARDIOGRAM TRACING: CPT

## 2020-11-20 PROCEDURE — 83880 ASSAY OF NATRIURETIC PEPTIDE: CPT

## 2020-11-20 PROCEDURE — 63600175 PHARM REV CODE 636 W HCPCS: Performed by: EMERGENCY MEDICINE

## 2020-11-20 PROCEDURE — 80053 COMPREHEN METABOLIC PANEL: CPT

## 2020-11-20 PROCEDURE — 84484 ASSAY OF TROPONIN QUANT: CPT

## 2020-11-20 PROCEDURE — 93010 ELECTROCARDIOGRAM REPORT: CPT | Mod: ,,, | Performed by: INTERNAL MEDICINE

## 2020-11-20 RX ORDER — IPRATROPIUM BROMIDE AND ALBUTEROL SULFATE 2.5; .5 MG/3ML; MG/3ML
3 SOLUTION RESPIRATORY (INHALATION)
Status: COMPLETED | OUTPATIENT
Start: 2020-11-20 | End: 2020-11-20

## 2020-11-20 RX ORDER — FUROSEMIDE 40 MG/1
TABLET ORAL
Qty: 15 TABLET | Refills: 0 | Status: ON HOLD | OUTPATIENT
Start: 2020-11-20 | End: 2021-10-16 | Stop reason: SDUPTHER

## 2020-11-20 RX ORDER — FUROSEMIDE 10 MG/ML
40 INJECTION INTRAMUSCULAR; INTRAVENOUS
Status: COMPLETED | OUTPATIENT
Start: 2020-11-20 | End: 2020-11-20

## 2020-11-20 RX ORDER — POTASSIUM CHLORIDE 20 MEQ/1
40 TABLET, EXTENDED RELEASE ORAL
Status: COMPLETED | OUTPATIENT
Start: 2020-11-20 | End: 2020-11-20

## 2020-11-20 RX ADMIN — POTASSIUM CHLORIDE 40 MEQ: 1500 TABLET, EXTENDED RELEASE ORAL at 08:11

## 2020-11-20 RX ADMIN — IPRATROPIUM BROMIDE AND ALBUTEROL SULFATE 3 ML: .5; 2.5 SOLUTION RESPIRATORY (INHALATION) at 08:11

## 2020-11-20 RX ADMIN — FUROSEMIDE 40 MG: 10 INJECTION, SOLUTION INTRAMUSCULAR; INTRAVENOUS at 07:11

## 2020-11-21 VITALS
HEART RATE: 68 BPM | SYSTOLIC BLOOD PRESSURE: 154 MMHG | DIASTOLIC BLOOD PRESSURE: 65 MMHG | RESPIRATION RATE: 18 BRPM | TEMPERATURE: 99 F | OXYGEN SATURATION: 98 %

## 2020-11-21 NOTE — ED PROVIDER NOTES
SCRIBE #1 NOTE: I, Reza James, am scribing for, and in the presence of, Jose Carlos Ardon Do, MD. I have scribed the HPI, ROS, & PEx.     SCRIBE #2 NOTE: I, Noelle Guerrero, am scribing for, and in the presence of,  Ramon Peace MD. I have scribed the remaining portions of the note not scribed by Scribe #1.      History     Chief Complaint   Patient presents with    Shortness of Breath     pt states she thinks she needs blood     Review of patient's allergies indicates:   Allergen Reactions    Bacitracin-polymyxin b Itching and Swelling    Merthiolate (thimerosal) Rash and Swelling    Diazepam      Hallucinations    Levofloxacin Itching    Metronidazole     Oxycodone Itching    Tizanidine      Hallucinations    Tramadol Itching    Cefdinir Itching     Pt not positive about allergy         History of Present Illness     HPI    11/20/2020, 6:39 PM  History obtained from the patient      History of Present Illness: Love Davey is a 86 y.o. female patient with a PMHx of CHF, cardiomegaly, HTN, stroke who presents to the Emergency Department for evaluation of SOB which onset gradually yesterday. Symptoms are constant and moderate in severity. No mitigating or exacerbating factors reported. Associated sxs include cough. Patient denies any CP, fever, chills, N/V, and all other sxs at this time. Pt uses 2 L of oxygen at home. Pt takes 40 mg lasix. She lives in assisted living with her . No further complaints or concerns at this time.       Arrival mode: Personal vehicle    PCP: Kaley Law MD        Past Medical History:  Past Medical History:   Diagnosis Date    Anemia     Anxiety     Arthritis     Back pain     Cardiomyopathy     CHF (congestive heart failure)     Depression     Dizziness     Hypertension     Insomnia     Stroke     Use of cane as ambulatory aid        Past Surgical History:  Past Surgical History:   Procedure Laterality Date    APPENDECTOMY       arthritis      BLADDER REPAIR      BUNIONECTOMY      CARDIAC DEFIBRILLATOR PLACEMENT      CATARACT EXTRACTION      HYSTERECTOMY      metal implant Bilateral     placed in the bladder.         Family History:  Family History   Problem Relation Age of Onset    Hypertension Unknown     Cancer Mother     Heart disease Father        Social History:  Social History     Tobacco Use    Smoking status: Never Smoker    Smokeless tobacco: Never Used   Substance and Sexual Activity    Alcohol use: No    Drug use: No    Sexual activity: Not Currently        Review of Systems     Review of Systems   Constitutional: Negative for activity change, appetite change, chills, diaphoresis and fever.   HENT: Negative for congestion, drooling, ear pain, mouth sores, rhinorrhea, sinus pain, sore throat and trouble swallowing.    Eyes: Negative for pain and discharge.   Respiratory: Positive for cough and shortness of breath. Negative for chest tightness, wheezing and stridor.    Cardiovascular: Negative for chest pain, palpitations and leg swelling.   Gastrointestinal: Negative for abdominal distention, abdominal pain, blood in stool, constipation, diarrhea, nausea and vomiting.   Genitourinary: Negative for difficulty urinating, dysuria, flank pain, frequency, hematuria and urgency.   Musculoskeletal: Negative for arthralgias, back pain and myalgias.   Skin: Negative for pallor, rash and wound.   Neurological: Negative for dizziness, syncope, weakness, light-headedness and numbness.   All other systems reviewed and are negative.       Physical Exam     Initial Vitals [11/20/20 1626]   BP Pulse Resp Temp SpO2   (!) 160/80 60 18 98.5 °F (36.9 °C) 95 %      MAP       --          Physical Exam  Nursing Notes and Vital Signs Reviewed.  Constitutional: Patient is in no acute distress.   Head: Atraumatic. Normocephalic.  Eyes: PERRL. EOM intact. Conjunctivae are not pale. No scleral icterus.  ENT: Mucous membranes are moist.  Oropharynx is clear and symmetric.    Neck: Supple. Full ROM. No lymphadenopathy.  Cardiovascular: Regular rate. Regular rhythm. No murmurs, rubs, or gallops. Distal pulses are 2+ and symmetric.  Pulmonary/Chest: No respiratory distress. Clear to auscultation bilaterally. No wheezing or rales.  Abdominal: Soft and non-distended.  There is no tenderness.  No rebound, guarding, or rigidity. Good bowel sounds.  Genitourinary: No CVA tenderness  Musculoskeletal: Moves all extremities. No obvious deformities. No edema. No calf tenderness.  Skin: Warm and dry.  Neurological:  Alert, awake, and appropriate.  Normal speech.  No acute focal neurological deficits are appreciated.  Psychiatric: Normal affect. Good eye contact. Appropriate in content.     ED Course   Critical Care    Date/Time: 11/20/2020 9:33 PM  Performed by: Ramon Peace MD  Authorized by: Ramon Peace MD   Direct patient critical care time: 15 minutes  Additional history critical care time: 5 minutes  Ordering / reviewing critical care time: 5 minutes  Documentation critical care time: 5 minutes  Consulting other physicians critical care time: 5 minutes  Total critical care time (exclusive of procedural time) : 35 minutes  Critical care time was exclusive of separately billable procedures and treating other patients and teaching time.  Critical care was necessary to treat or prevent imminent or life-threatening deterioration of the following conditions: CHF.  Critical care was time spent personally by me on the following activities: blood draw for specimens, development of treatment plan with patient or surrogate, discussions with consultants, interpretation of cardiac output measurements, evaluation of patient's response to treatment, examination of patient, obtaining history from patient or surrogate, ordering and performing treatments and interventions, ordering and review of laboratory studies, ordering and review of radiographic studies,  pulse oximetry, re-evaluation of patient's condition and review of old charts.        ED Vital Signs:  Vitals:    11/20/20 1626 11/20/20 1641 11/20/20 1650 11/20/20 1732   BP: (!) 160/80  (!) 142/97 (!) 153/63   Pulse: 60 60 61 62   Resp: 18  20 (!) 23   Temp: 98.5 °F (36.9 °C)      TempSrc: Oral      SpO2: 95%  96% 95%    11/20/20 1817 11/20/20 1901 11/20/20 1928 11/20/20 2001   BP: (!) 156/74 (!) 168/72 (!) 143/80 (!) 174/74   Pulse: 60 71 65 65   Resp: (!) 21  18 (!) 21   Temp:       TempSrc:       SpO2: 100% 100% 100% 100%    11/20/20 2020 11/20/20 2032 11/20/20 2102 11/20/20 2131   BP:  (!) 170/74 (!) 167/75 (!) 156/68   Pulse: 63 66 66 65   Resp: 20 18 17    Temp:       TempSrc:       SpO2: 100% 100% 100% 100%    11/20/20 2201 11/20/20 2334   BP: (!) 147/65 (!) 154/65   Pulse: 64 68   Resp:  18   Temp:     TempSrc:     SpO2: 100% 98%       Abnormal Lab Results:  Labs Reviewed   CBC W/ AUTO DIFFERENTIAL - Abnormal; Notable for the following components:       Result Value    RBC 2.33 (*)     Hemoglobin 8.4 (*)     Hematocrit 26.4 (*)      (*)     MCH 36.1 (*)     MCHC 31.8 (*)     Platelets 357 (*)     All other components within normal limits   COMPREHENSIVE METABOLIC PANEL - Abnormal; Notable for the following components:    CO2 30 (*)     Albumin 3.4 (*)     eGFR if non  58 (*)     All other components within normal limits   TROPONIN I - Abnormal; Notable for the following components:    Troponin I 0.051 (*)     All other components within normal limits   TROPONIN I - Abnormal; Notable for the following components:    Troponin I 0.061 (*)     All other components within normal limits   B-TYPE NATRIURETIC PEPTIDE - Abnormal; Notable for the following components:    BNP 1,130 (*)     All other components within normal limits   SARS-COV-2 RNA AMPLIFICATION, QUAL   TYPE & SCREEN        All Lab Results:  Results for orders placed or performed during the hospital encounter of 11/20/20   CBC  auto differential   Result Value Ref Range    WBC 6.18 3.90 - 12.70 K/uL    RBC 2.33 (L) 4.00 - 5.40 M/uL    Hemoglobin 8.4 (L) 12.0 - 16.0 g/dL    Hematocrit 26.4 (L) 37.0 - 48.5 %     (H) 82 - 98 fL    MCH 36.1 (H) 27.0 - 31.0 pg    MCHC 31.8 (L) 32.0 - 36.0 g/dL    RDW SEE COMMENT 11.5 - 14.5 %    Platelets 357 (H) 150 - 350 K/uL    MPV 11.5 9.2 - 12.9 fL    Immature Granulocytes 0.5 0.0 - 0.5 %    Gran # (ANC) 4.2 1.8 - 7.7 K/uL    Immature Grans (Abs) 0.03 0.00 - 0.04 K/uL    Lymph # 1.4 1.0 - 4.8 K/uL    Mono # 0.4 0.3 - 1.0 K/uL    Eos # 0.1 0.0 - 0.5 K/uL    Baso # 0.03 0.00 - 0.20 K/uL    nRBC 0 0 /100 WBC    Gran % 67.6 38.0 - 73.0 %    Lymph % 23.0 18.0 - 48.0 %    Mono % 7.1 4.0 - 15.0 %    Eosinophil % 1.3 0.0 - 8.0 %    Basophil % 0.5 0.0 - 1.9 %    Platelet Estimate Appears normal     Aniso Slight     Poik Slight     Poly Occasional     Basophilic Stippling Occasional     Differential Method Automated    Comprehensive metabolic panel   Result Value Ref Range    Sodium 143 136 - 145 mmol/L    Potassium 3.6 3.5 - 5.1 mmol/L    Chloride 103 95 - 110 mmol/L    CO2 30 (H) 23 - 29 mmol/L    Glucose 103 70 - 110 mg/dL    BUN 13 8 - 23 mg/dL    Creatinine 0.9 0.5 - 1.4 mg/dL    Calcium 8.8 8.7 - 10.5 mg/dL    Total Protein 6.3 6.0 - 8.4 g/dL    Albumin 3.4 (L) 3.5 - 5.2 g/dL    Total Bilirubin 0.5 0.1 - 1.0 mg/dL    Alkaline Phosphatase 67 55 - 135 U/L    AST 15 10 - 40 U/L    ALT 10 10 - 44 U/L    Anion Gap 10 8 - 16 mmol/L    eGFR if African American >60 >60 mL/min/1.73 m^2    eGFR if non African American 58 (A) >60 mL/min/1.73 m^2   Troponin I #1   Result Value Ref Range    Troponin I 0.051 (H) 0.000 - 0.026 ng/mL   Troponin I #2   Result Value Ref Range    Troponin I 0.061 (H) 0.000 - 0.026 ng/mL   BNP   Result Value Ref Range    BNP 1,130 (H) 0 - 99 pg/mL   COVID-19 Rapid Screening   Result Value Ref Range    SARS-CoV-2 RNA, Amplification, Qual Negative Negative   Type & Screen   Result Value Ref  Range    Group & Rh O POS     Indirect Paula NEG          Imaging Results:  Imaging Results          X-Ray Chest AP Portable (Final result)  Result time 11/20/20 17:37:12    Final result by Ken Bedoya MD (11/20/20 17:37:12)                 Impression:      No interval change at the left base.  Interval improvement right lung which appears clear      Electronically signed by: Ken Beodya MD  Date:    11/20/2020  Time:    17:37             Narrative:    EXAMINATION:  XR CHEST AP PORTABLE    CLINICAL HISTORY:  Chest Pain;    TECHNIQUE:  Single frontal view of the chest was performed.    COMPARISON:  11/01/2020    FINDINGS:  Stable mild cardiomegaly.  Left chest AICD.  Aortic atherosclerosis.    Stable small left pleural effusion with some left lung base atelectasis and/or consolidation.    Right lung clear.  Improved aeration in the right lung.                                 The EKG was ordered, reviewed, and independently interpreted by the ED provider.  Interpretation time: 16:36  Rate: 60 BPM  Rhythm: normal sinus rhythm  Interpretation: Left axis deviation. Incomplete LBBB. LVH with repolarization abnormality. No STEMI.  When compared to EKG performed 01-NOV-2020, Aberrant conduction is no longer present . Incomplete LBBB is now present. Minimal criteria for septal infarct are now longer present.          The Emergency Provider reviewed the vital signs and test results, which are outlined above.     ED Discussion       8:05 PM: Dr. Soriano transfers care of patient to Dr. Peace pending lab results.    10:05 PM: Reassessed pt at this time. Pt diuresed over 500 cc of fluid. Pt states her SOB has improved at this time. Will d/c with increased Lasix dose. Discussed with pt all pertinent ED information and results. Discussed pt dx and plan of tx. Gave pt all f/u and return to the ED instructions. All questions and concerns were addressed at this time. Pt expresses understanding of information and instructions, and  is comfortable with plan to discharge. Pt is stable for discharge.    I discussed with patient and/or family/caretaker that evaluation in the ED does not suggest any emergent or life threatening medical conditions requiring immediate intervention beyond what was provided in the ED, and I believe patient is safe for discharge.  Regardless, an unremarkable evaluation in the ED does not preclude the development or presence of a serious of life threatening condition. As such, patient was instructed to return immediately for any worsening or change in current symptoms.         Medical Decision Making:   Clinical Tests:   Lab Tests: Ordered and Reviewed  Radiological Study: Ordered and Reviewed  Medical Tests: Ordered and Reviewed           ED Medication(s):  Medications   furosemide injection 40 mg (40 mg Intravenous Given 11/20/20 1944)   potassium chloride SA CR tablet 40 mEq (40 mEq Oral Given 11/20/20 2000)   albuterol-ipratropium 2.5 mg-0.5 mg/3 mL nebulizer solution 3 mL (3 mLs Nebulization Given 11/20/20 2020)       Discharge Medication List as of 11/20/2020 10:08 PM          Follow-up Information     Kaley Law MD In 5 days.    Specialty: Internal Medicine  Contact information:  [address]  [address]  [place] [address]  [phone]             [facility]    Specialty: Emergency Medicine  Why: As needed, If symptoms worsen  Contact information:  [address]  [place] [address] [phone]                     Scribe Attestation:   Scribe #1: I performed the above scribed service and the documentation accurately describes the services I performed. I attest to the accuracy of the note.     Attending:   Physician Attestation Statement for Scribe #1: I, Jose Carlos Ardon Do, MD, personally performed the services described in this documentation, as scribed by Reza James, in my presence, and it is both accurate and complete.       Scribe  Attestation:   Scribe #2: I performed the above scribed service and the documentation accurately describes the services I performed. I attest to the accuracy of the note.    Attending Attestation:           Physician Attestation for Scribe:    Physician Attestation Statement for Scribe #2: I, Ramon Peace MD, reviewed documentation, as scribed by Noelle Guerrero in my presence, and it is both accurate and complete. I also acknowledge and confirm the content of the note done by Magdyibe #1.           Clinical Impression       ICD-10-CM ICD-9-CM   1. Acute on chronic congestive heart failure, unspecified heart failure type  I50.9 428.0   2. SOB (shortness of breath)  R06.02 786.05   3. Chest pain  R07.9 786.50       Disposition:   Disposition: Discharged  Condition: Stable         Ramon Peace MD  11/21/20 1612

## 2020-12-07 NOTE — PT/OT/SLP PROGRESS
"Speech Language Pathology      Love Davey  MRN: [id number]      Pt was admitted with chest pain and recently vomited up her medications.  ST was consulted for a swallow assessment.  Patient was seen at bedside with an emesis bag.  She reported that she has been experiencing burning in her throat and chest along with intermittent difficulty swallowing for the past couple of days to which she reported getting some relief from her symptoms when she drank a root beer.  Currently, pt is experiencing throat burning and spitting up clear liquid and mucus.  She states that she is not nauseated, "it just comes up".  ST spoke with DYLLAN Chen who ordered a GI cocktail for the patient.  ST did not assess swallow at this visit.  Swallowing will be assessed when pt's symptoms have resolved.     Anel Bynum CCC-SLP  15:15-15:30  " No

## 2020-12-14 ENCOUNTER — HOSPITAL ENCOUNTER (INPATIENT)
Facility: HOSPITAL | Age: 85
LOS: 3 days | Discharge: HOME HEALTH | DRG: 193 | End: 2020-12-18
Attending: EMERGENCY MEDICINE | Admitting: HOSPITALIST
Payer: MEDICARE

## 2020-12-14 DIAGNOSIS — A41.9 SEPSIS, DUE TO UNSPECIFIED ORGANISM, UNSPECIFIED WHETHER ACUTE ORGAN DYSFUNCTION PRESENT: ICD-10-CM

## 2020-12-14 DIAGNOSIS — D64.9 ANEMIA, UNSPECIFIED TYPE: Chronic | ICD-10-CM

## 2020-12-14 DIAGNOSIS — I50.42 CHRONIC COMBINED SYSTOLIC AND DIASTOLIC HEART FAILURE: Chronic | ICD-10-CM

## 2020-12-14 DIAGNOSIS — R06.02 SOB (SHORTNESS OF BREATH): ICD-10-CM

## 2020-12-14 DIAGNOSIS — N39.0 UTI (URINARY TRACT INFECTION): ICD-10-CM

## 2020-12-14 DIAGNOSIS — F03.90 DEMENTIA WITHOUT BEHAVIORAL DISTURBANCE, UNSPECIFIED DEMENTIA TYPE: Chronic | ICD-10-CM

## 2020-12-14 DIAGNOSIS — J90 PLEURAL EFFUSION ON LEFT: Chronic | ICD-10-CM

## 2020-12-14 DIAGNOSIS — J18.9 ATYPICAL PNEUMONIA: ICD-10-CM

## 2020-12-14 DIAGNOSIS — I47.20 VENTRICULAR TACHYCARDIA: Primary | ICD-10-CM

## 2020-12-14 DIAGNOSIS — E03.9 HYPOTHYROIDISM, UNSPECIFIED TYPE: Chronic | ICD-10-CM

## 2020-12-14 DIAGNOSIS — R79.89 ELEVATED TROPONIN I LEVEL: ICD-10-CM

## 2020-12-14 DIAGNOSIS — N39.0 URINARY TRACT INFECTION WITHOUT HEMATURIA, SITE UNSPECIFIED: ICD-10-CM

## 2020-12-14 DIAGNOSIS — R79.89 ELEVATED TROPONIN: ICD-10-CM

## 2020-12-14 DIAGNOSIS — E87.8 ELECTROLYTE DISTURBANCE: ICD-10-CM

## 2020-12-14 DIAGNOSIS — I47.20 V-TACH: ICD-10-CM

## 2020-12-14 DIAGNOSIS — Z86.73 HISTORY OF STROKE: Chronic | ICD-10-CM

## 2020-12-14 PROCEDURE — 25000003 PHARM REV CODE 250: Performed by: EMERGENCY MEDICINE

## 2020-12-14 PROCEDURE — 83605 ASSAY OF LACTIC ACID: CPT

## 2020-12-14 PROCEDURE — 96365 THER/PROPH/DIAG IV INF INIT: CPT

## 2020-12-14 PROCEDURE — 83880 ASSAY OF NATRIURETIC PEPTIDE: CPT

## 2020-12-14 PROCEDURE — 87040 BLOOD CULTURE FOR BACTERIA: CPT

## 2020-12-14 PROCEDURE — 85025 COMPLETE CBC W/AUTO DIFF WBC: CPT

## 2020-12-14 PROCEDURE — 80053 COMPREHEN METABOLIC PANEL: CPT

## 2020-12-14 PROCEDURE — 99291 CRITICAL CARE FIRST HOUR: CPT | Mod: 25

## 2020-12-14 PROCEDURE — 84484 ASSAY OF TROPONIN QUANT: CPT

## 2020-12-14 PROCEDURE — 63600175 PHARM REV CODE 636 W HCPCS: Performed by: EMERGENCY MEDICINE

## 2020-12-14 RX ORDER — ACETAMINOPHEN 500 MG
1000 TABLET ORAL
Status: COMPLETED | OUTPATIENT
Start: 2020-12-14 | End: 2020-12-14

## 2020-12-14 RX ORDER — IBUPROFEN 400 MG/1
400 TABLET ORAL
Status: COMPLETED | OUTPATIENT
Start: 2020-12-14 | End: 2020-12-14

## 2020-12-14 RX ADMIN — PIPERACILLIN AND TAZOBACTAM 4.5 G: 4; .5 INJECTION, POWDER, LYOPHILIZED, FOR SOLUTION INTRAVENOUS; PARENTERAL at 11:12

## 2020-12-14 RX ADMIN — IBUPROFEN 400 MG: 400 TABLET, FILM COATED ORAL at 11:12

## 2020-12-14 RX ADMIN — ACETAMINOPHEN 1000 MG: 500 TABLET ORAL at 11:12

## 2020-12-15 PROBLEM — J18.9 COMMUNITY ACQUIRED PNEUMONIA: Status: ACTIVE | Noted: 2020-12-15

## 2020-12-15 PROBLEM — J90 PLEURAL EFFUSION ON LEFT: Status: ACTIVE | Noted: 2020-12-15

## 2020-12-15 PROBLEM — N39.0 UTI (URINARY TRACT INFECTION): Status: ACTIVE | Noted: 2020-12-15

## 2020-12-15 PROBLEM — A41.9 SEPSIS: Status: ACTIVE | Noted: 2020-12-15

## 2020-12-15 LAB
ABO + RH BLD: NORMAL
ALBUMIN SERPL BCP-MCNC: 3.3 G/DL (ref 3.5–5.2)
ALP SERPL-CCNC: 96 U/L (ref 55–135)
ALT SERPL W/O P-5'-P-CCNC: 11 U/L (ref 10–44)
ANION GAP SERPL CALC-SCNC: 7 MMOL/L (ref 8–16)
ANION GAP SERPL CALC-SCNC: 9 MMOL/L (ref 8–16)
ANISOCYTOSIS BLD QL SMEAR: SLIGHT
ANISOCYTOSIS BLD QL SMEAR: SLIGHT
AORTIC ROOT ANNULUS: 3.66 CM
ASCENDING AORTA: 3.5 CM
AST SERPL-CCNC: 17 U/L (ref 10–40)
AV INDEX (PROSTH): 0.49
AV MEAN GRADIENT: 11 MMHG
AV PEAK GRADIENT: 20 MMHG
AV VALVE AREA: 1.82 CM2
AV VELOCITY RATIO: 0.41
BACTERIA #/AREA URNS HPF: ABNORMAL /HPF
BASOPHILS # BLD AUTO: 0.02 K/UL (ref 0–0.2)
BASOPHILS # BLD AUTO: 0.04 K/UL (ref 0–0.2)
BASOPHILS NFR BLD: 0.3 % (ref 0–1.9)
BASOPHILS NFR BLD: 0.4 % (ref 0–1.9)
BILIRUB SERPL-MCNC: 0.4 MG/DL (ref 0.1–1)
BILIRUB UR QL STRIP: NEGATIVE
BLD GP AB SCN CELLS X3 SERPL QL: NORMAL
BLD PROD TYP BPU: NORMAL
BLOOD UNIT EXPIRATION DATE: NORMAL
BLOOD UNIT TYPE CODE: 5100
BLOOD UNIT TYPE: NORMAL
BNP SERPL-MCNC: 1662 PG/ML (ref 0–99)
BSA FOR ECHO PROCEDURE: 1.66 M2
BUN SERPL-MCNC: 10 MG/DL (ref 8–23)
BUN SERPL-MCNC: 10 MG/DL (ref 8–23)
CALCIUM SERPL-MCNC: 8.5 MG/DL (ref 8.7–10.5)
CALCIUM SERPL-MCNC: 8.9 MG/DL (ref 8.7–10.5)
CHLORIDE SERPL-SCNC: 102 MMOL/L (ref 95–110)
CHLORIDE SERPL-SCNC: 102 MMOL/L (ref 95–110)
CLARITY UR: CLEAR
CO2 SERPL-SCNC: 29 MMOL/L (ref 23–29)
CO2 SERPL-SCNC: 30 MMOL/L (ref 23–29)
CODING SYSTEM: NORMAL
COLOR UR: YELLOW
CREAT SERPL-MCNC: 1 MG/DL (ref 0.5–1.4)
CREAT SERPL-MCNC: 1.1 MG/DL (ref 0.5–1.4)
CV ECHO LV RWT: 0.6 CM
DIFFERENTIAL METHOD: ABNORMAL
DIFFERENTIAL METHOD: ABNORMAL
DISPENSE STATUS: NORMAL
DOP CALC AO PEAK VEL: 2.21 M/S
DOP CALC AO VTI: 50.96 CM
DOP CALC LVOT AREA: 3.7 CM2
DOP CALC LVOT DIAMETER: 2.17 CM
DOP CALC LVOT PEAK VEL: 0.91 M/S
DOP CALC LVOT STROKE VOLUME: 92.82 CM3
DOP CALC RVOT PEAK VEL: 0.63 M/S
DOP CALC RVOT VTI: 14.8 CM
DOP CALCLVOT PEAK VEL VTI: 25.11 CM
E WAVE DECELERATION TIME: 172.41 MSEC
E/A RATIO: 1.29
E/E' RATIO: 12.57 M/S
ECHO LV POSTERIOR WALL: 1.36 CM (ref 0.6–1.1)
EOSINOPHIL # BLD AUTO: 0.1 K/UL (ref 0–0.5)
EOSINOPHIL # BLD AUTO: 0.1 K/UL (ref 0–0.5)
EOSINOPHIL NFR BLD: 0.5 % (ref 0–8)
EOSINOPHIL NFR BLD: 1 % (ref 0–8)
ERYTHROCYTE [DISTWIDTH] IN BLOOD BY AUTOMATED COUNT: ABNORMAL % (ref 11.5–14.5)
ERYTHROCYTE [DISTWIDTH] IN BLOOD BY AUTOMATED COUNT: ABNORMAL % (ref 11.5–14.5)
EST. GFR  (AFRICAN AMERICAN): 52 ML/MIN/1.73 M^2
EST. GFR  (AFRICAN AMERICAN): 59 ML/MIN/1.73 M^2
EST. GFR  (NON AFRICAN AMERICAN): 45 ML/MIN/1.73 M^2
EST. GFR  (NON AFRICAN AMERICAN): 51 ML/MIN/1.73 M^2
FRACTIONAL SHORTENING: 8 % (ref 28–44)
GLUCOSE SERPL-MCNC: 113 MG/DL (ref 70–110)
GLUCOSE SERPL-MCNC: 125 MG/DL (ref 70–110)
GLUCOSE UR QL STRIP: NEGATIVE
HCT VFR BLD AUTO: 25.3 % (ref 37–48.5)
HCT VFR BLD AUTO: 27.4 % (ref 37–48.5)
HGB BLD-MCNC: 7.7 G/DL (ref 12–16)
HGB BLD-MCNC: 8.7 G/DL (ref 12–16)
HGB UR QL STRIP: ABNORMAL
HYALINE CASTS #/AREA URNS LPF: 2 /LPF
HYPOCHROMIA BLD QL SMEAR: ABNORMAL
IMM GRANULOCYTES # BLD AUTO: 0.03 K/UL (ref 0–0.04)
IMM GRANULOCYTES # BLD AUTO: 0.05 K/UL (ref 0–0.04)
IMM GRANULOCYTES NFR BLD AUTO: 0.5 % (ref 0–0.5)
IMM GRANULOCYTES NFR BLD AUTO: 0.5 % (ref 0–0.5)
INTERVENTRICULAR SEPTUM: 1.14 CM (ref 0.6–1.1)
IVRT: 91.34 MSEC
KETONES UR QL STRIP: NEGATIVE
LA MAJOR: 4.04 CM
LA MINOR: 3.15 CM
LA WIDTH: 3.11 CM
LACTATE SERPL-SCNC: 1 MMOL/L (ref 0.5–2.2)
LEFT ATRIUM SIZE: 3.15 CM
LEFT ATRIUM VOLUME INDEX: 18.1 ML/M2
LEFT ATRIUM VOLUME: 29.48 CM3
LEFT INTERNAL DIMENSION IN SYSTOLE: 4.19 CM (ref 2.1–4)
LEFT VENTRICLE DIASTOLIC VOLUME INDEX: 58.53 ML/M2
LEFT VENTRICLE DIASTOLIC VOLUME: 95.3 ML
LEFT VENTRICLE MASS INDEX: 132 G/M2
LEFT VENTRICLE SYSTOLIC VOLUME INDEX: 48 ML/M2
LEFT VENTRICLE SYSTOLIC VOLUME: 78.22 ML
LEFT VENTRICULAR INTERNAL DIMENSION IN DIASTOLE: 4.56 CM (ref 3.5–6)
LEFT VENTRICULAR MASS: 214.49 G
LEUKOCYTE ESTERASE UR QL STRIP: ABNORMAL
LV LATERAL E/E' RATIO: 12.57 M/S
LV SEPTAL E/E' RATIO: 12.57 M/S
LYMPHOCYTES # BLD AUTO: 1.4 K/UL (ref 1–4.8)
LYMPHOCYTES # BLD AUTO: 1.5 K/UL (ref 1–4.8)
LYMPHOCYTES NFR BLD: 15.8 % (ref 18–48)
LYMPHOCYTES NFR BLD: 21.6 % (ref 18–48)
MAGNESIUM SERPL-MCNC: 2.4 MG/DL (ref 1.6–2.6)
MCH RBC QN AUTO: 35.3 PG (ref 27–31)
MCH RBC QN AUTO: 36.6 PG (ref 27–31)
MCHC RBC AUTO-ENTMCNC: 30.4 G/DL (ref 32–36)
MCHC RBC AUTO-ENTMCNC: 31.8 G/DL (ref 32–36)
MCV RBC AUTO: 115 FL (ref 82–98)
MCV RBC AUTO: 116 FL (ref 82–98)
MICROSCOPIC COMMENT: ABNORMAL
MONOCYTES # BLD AUTO: 0.8 K/UL (ref 0.3–1)
MONOCYTES # BLD AUTO: 0.9 K/UL (ref 0.3–1)
MONOCYTES NFR BLD: 12.4 % (ref 4–15)
MONOCYTES NFR BLD: 9.1 % (ref 4–15)
MV PEAK A VEL: 0.68 M/S
MV PEAK E VEL: 0.88 M/S
NEUTROPHILS # BLD AUTO: 4.1 K/UL (ref 1.8–7.7)
NEUTROPHILS # BLD AUTO: 6.9 K/UL (ref 1.8–7.7)
NEUTROPHILS NFR BLD: 64.2 % (ref 38–73)
NEUTROPHILS NFR BLD: 73.7 % (ref 38–73)
NITRITE UR QL STRIP: POSITIVE
NRBC BLD-RTO: 0 /100 WBC
NRBC BLD-RTO: 0 /100 WBC
NUM UNITS TRANS PACKED RBC: NORMAL
OVALOCYTES BLD QL SMEAR: ABNORMAL
OVALOCYTES BLD QL SMEAR: ABNORMAL
PH UR STRIP: 7 [PH] (ref 5–8)
PISA TR MAX VEL: 3.41 M/S
PLATELET # BLD AUTO: 328 K/UL (ref 150–350)
PLATELET # BLD AUTO: 401 K/UL (ref 150–350)
PLATELET BLD QL SMEAR: ABNORMAL
PLATELET BLD QL SMEAR: ABNORMAL
PMV BLD AUTO: 11.9 FL (ref 9.2–12.9)
PMV BLD AUTO: 12.1 FL (ref 9.2–12.9)
POIKILOCYTOSIS BLD QL SMEAR: SLIGHT
POIKILOCYTOSIS BLD QL SMEAR: SLIGHT
POLYCHROMASIA BLD QL SMEAR: ABNORMAL
POTASSIUM SERPL-SCNC: 3.9 MMOL/L (ref 3.5–5.1)
POTASSIUM SERPL-SCNC: 4.3 MMOL/L (ref 3.5–5.1)
PROCALCITONIN SERPL IA-MCNC: 0.12 NG/ML
PROT SERPL-MCNC: 7 G/DL (ref 6–8.4)
PROT UR QL STRIP: ABNORMAL
PV MEAN GRADIENT: 1 MMHG
RA MAJOR: 3.69 CM
RA PRESSURE: 3 MMHG
RBC # BLD AUTO: 2.18 M/UL (ref 4–5.4)
RBC # BLD AUTO: 2.38 M/UL (ref 4–5.4)
RBC #/AREA URNS HPF: 5 /HPF (ref 0–4)
SARS-COV-2 RDRP RESP QL NAA+PROBE: NEGATIVE
SINUS: 3.81 CM
SODIUM SERPL-SCNC: 139 MMOL/L (ref 136–145)
SODIUM SERPL-SCNC: 140 MMOL/L (ref 136–145)
SP GR UR STRIP: 1.01 (ref 1–1.03)
STJ: 3.24 CM
TARGETS BLD QL SMEAR: ABNORMAL
TARGETS BLD QL SMEAR: ABNORMAL
TDI LATERAL: 0.07 M/S
TDI SEPTAL: 0.07 M/S
TDI: 0.07 M/S
TR MAX PG: 47 MMHG
TRICUSPID ANNULAR PLANE SYSTOLIC EXCURSION: 1.71 CM
TROPONIN I SERPL DL<=0.01 NG/ML-MCNC: 0.2 NG/ML (ref 0–0.03)
TROPONIN I SERPL DL<=0.01 NG/ML-MCNC: 0.26 NG/ML (ref 0–0.03)
TROPONIN I SERPL DL<=0.01 NG/ML-MCNC: 0.33 NG/ML (ref 0–0.03)
TV REST PULMONARY ARTERY PRESSURE: 50 MMHG
URN SPEC COLLECT METH UR: ABNORMAL
UROBILINOGEN UR STRIP-ACNC: NEGATIVE EU/DL
WBC # BLD AUTO: 6.31 K/UL (ref 3.9–12.7)
WBC # BLD AUTO: 9.41 K/UL (ref 3.9–12.7)
WBC #/AREA URNS HPF: 25 /HPF (ref 0–5)
WBC CLUMPS URNS QL MICRO: ABNORMAL

## 2020-12-15 PROCEDURE — 99233 PR SUBSEQUENT HOSPITAL CARE,LEVL III: ICD-10-PCS | Mod: 25,,, | Performed by: INTERNAL MEDICINE

## 2020-12-15 PROCEDURE — 84484 ASSAY OF TROPONIN QUANT: CPT

## 2020-12-15 PROCEDURE — P9016 RBC LEUKOCYTES REDUCED: HCPCS

## 2020-12-15 PROCEDURE — 25000003 PHARM REV CODE 250: Performed by: NURSE PRACTITIONER

## 2020-12-15 PROCEDURE — U0002 COVID-19 LAB TEST NON-CDC: HCPCS

## 2020-12-15 PROCEDURE — 36415 COLL VENOUS BLD VENIPUNCTURE: CPT

## 2020-12-15 PROCEDURE — 27000221 HC OXYGEN, UP TO 24 HOURS

## 2020-12-15 PROCEDURE — 84145 PROCALCITONIN (PCT): CPT

## 2020-12-15 PROCEDURE — 63600175 PHARM REV CODE 636 W HCPCS: Performed by: NURSE PRACTITIONER

## 2020-12-15 PROCEDURE — 99900035 HC TECH TIME PER 15 MIN (STAT)

## 2020-12-15 PROCEDURE — 93010 EKG 12-LEAD: ICD-10-PCS | Mod: ,,, | Performed by: INTERNAL MEDICINE

## 2020-12-15 PROCEDURE — 87086 URINE CULTURE/COLONY COUNT: CPT

## 2020-12-15 PROCEDURE — 96375 TX/PRO/DX INJ NEW DRUG ADDON: CPT

## 2020-12-15 PROCEDURE — 94761 N-INVAS EAR/PLS OXIMETRY MLT: CPT

## 2020-12-15 PROCEDURE — 93005 ELECTROCARDIOGRAM TRACING: CPT

## 2020-12-15 PROCEDURE — 25000242 PHARM REV CODE 250 ALT 637 W/ HCPCS: Performed by: NURSE PRACTITIONER

## 2020-12-15 PROCEDURE — 25000003 PHARM REV CODE 250: Performed by: EMERGENCY MEDICINE

## 2020-12-15 PROCEDURE — 36430 TRANSFUSION BLD/BLD COMPNT: CPT

## 2020-12-15 PROCEDURE — 81000 URINALYSIS NONAUTO W/SCOPE: CPT

## 2020-12-15 PROCEDURE — 83735 ASSAY OF MAGNESIUM: CPT

## 2020-12-15 PROCEDURE — 85025 COMPLETE CBC W/AUTO DIFF WBC: CPT

## 2020-12-15 PROCEDURE — 99233 SBSQ HOSP IP/OBS HIGH 50: CPT | Mod: 25,,, | Performed by: INTERNAL MEDICINE

## 2020-12-15 PROCEDURE — 94640 AIRWAY INHALATION TREATMENT: CPT

## 2020-12-15 PROCEDURE — 63600175 PHARM REV CODE 636 W HCPCS: Performed by: EMERGENCY MEDICINE

## 2020-12-15 PROCEDURE — 11000001 HC ACUTE MED/SURG PRIVATE ROOM

## 2020-12-15 PROCEDURE — 86920 COMPATIBILITY TEST SPIN: CPT

## 2020-12-15 PROCEDURE — 86901 BLOOD TYPING SEROLOGIC RH(D): CPT

## 2020-12-15 PROCEDURE — 87040 BLOOD CULTURE FOR BACTERIA: CPT

## 2020-12-15 PROCEDURE — 93010 ELECTROCARDIOGRAM REPORT: CPT | Mod: ,,, | Performed by: INTERNAL MEDICINE

## 2020-12-15 PROCEDURE — 80048 BASIC METABOLIC PNL TOTAL CA: CPT

## 2020-12-15 RX ORDER — FAMOTIDINE 20 MG/1
20 TABLET, FILM COATED ORAL DAILY
Status: DISCONTINUED | OUTPATIENT
Start: 2020-12-15 | End: 2020-12-18 | Stop reason: HOSPADM

## 2020-12-15 RX ORDER — LEVOTHYROXINE SODIUM 50 UG/1
50 TABLET ORAL
Status: DISCONTINUED | OUTPATIENT
Start: 2020-12-15 | End: 2020-12-18 | Stop reason: HOSPADM

## 2020-12-15 RX ORDER — ONDANSETRON 2 MG/ML
4 INJECTION INTRAMUSCULAR; INTRAVENOUS EVERY 6 HOURS PRN
Status: DISCONTINUED | OUTPATIENT
Start: 2020-12-15 | End: 2020-12-18 | Stop reason: HOSPADM

## 2020-12-15 RX ORDER — GABAPENTIN 300 MG/1
300 CAPSULE ORAL NIGHTLY
Status: DISCONTINUED | OUTPATIENT
Start: 2020-12-15 | End: 2020-12-18 | Stop reason: HOSPADM

## 2020-12-15 RX ORDER — DULOXETIN HYDROCHLORIDE 30 MG/1
30 CAPSULE, DELAYED RELEASE ORAL DAILY
Status: DISCONTINUED | OUTPATIENT
Start: 2020-12-15 | End: 2020-12-18 | Stop reason: HOSPADM

## 2020-12-15 RX ORDER — IPRATROPIUM BROMIDE AND ALBUTEROL SULFATE 2.5; .5 MG/3ML; MG/3ML
3 SOLUTION RESPIRATORY (INHALATION)
Status: DISCONTINUED | OUTPATIENT
Start: 2020-12-15 | End: 2020-12-18 | Stop reason: HOSPADM

## 2020-12-15 RX ORDER — VENLAFAXINE HYDROCHLORIDE 75 MG/1
75 CAPSULE, EXTENDED RELEASE ORAL DAILY
Status: DISCONTINUED | OUTPATIENT
Start: 2020-12-15 | End: 2020-12-18 | Stop reason: HOSPADM

## 2020-12-15 RX ORDER — LOSARTAN POTASSIUM 25 MG/1
25 TABLET ORAL DAILY
Status: DISCONTINUED | OUTPATIENT
Start: 2020-12-15 | End: 2020-12-16

## 2020-12-15 RX ORDER — ACETAMINOPHEN 325 MG/1
650 TABLET ORAL EVERY 6 HOURS PRN
Status: DISCONTINUED | OUTPATIENT
Start: 2020-12-15 | End: 2020-12-18 | Stop reason: HOSPADM

## 2020-12-15 RX ORDER — ASPIRIN 81 MG/1
81 TABLET ORAL DAILY
Status: DISCONTINUED | OUTPATIENT
Start: 2020-12-15 | End: 2020-12-18 | Stop reason: HOSPADM

## 2020-12-15 RX ORDER — CLOPIDOGREL BISULFATE 75 MG/1
75 TABLET ORAL DAILY
Status: DISCONTINUED | OUTPATIENT
Start: 2020-12-15 | End: 2020-12-18 | Stop reason: HOSPADM

## 2020-12-15 RX ORDER — GABAPENTIN 300 MG/1
300 CAPSULE ORAL
COMMUNITY
Start: 2020-11-23 | End: 2021-03-26

## 2020-12-15 RX ORDER — POLYETHYLENE GLYCOL 3350 17 G/17G
17 POWDER, FOR SOLUTION ORAL DAILY PRN
Status: DISCONTINUED | OUTPATIENT
Start: 2020-12-15 | End: 2020-12-18 | Stop reason: HOSPADM

## 2020-12-15 RX ORDER — AZITHROMYCIN 250 MG/1
250 TABLET, FILM COATED ORAL DAILY
Status: DISCONTINUED | OUTPATIENT
Start: 2020-12-15 | End: 2020-12-15

## 2020-12-15 RX ORDER — GUAIFENESIN 100 MG/5ML
200 SOLUTION ORAL EVERY 4 HOURS PRN
Status: DISCONTINUED | OUTPATIENT
Start: 2020-12-15 | End: 2020-12-18 | Stop reason: HOSPADM

## 2020-12-15 RX ORDER — TALC
3 POWDER (GRAM) TOPICAL NIGHTLY
Status: DISCONTINUED | OUTPATIENT
Start: 2020-12-15 | End: 2020-12-18 | Stop reason: HOSPADM

## 2020-12-15 RX ORDER — FUROSEMIDE 10 MG/ML
40 INJECTION INTRAMUSCULAR; INTRAVENOUS
Status: DISCONTINUED | OUTPATIENT
Start: 2020-12-15 | End: 2020-12-18 | Stop reason: HOSPADM

## 2020-12-15 RX ORDER — ATORVASTATIN CALCIUM 10 MG/1
20 TABLET, FILM COATED ORAL DAILY
Status: DISCONTINUED | OUTPATIENT
Start: 2020-12-15 | End: 2020-12-18 | Stop reason: HOSPADM

## 2020-12-15 RX ORDER — DOXYCYCLINE HYCLATE 100 MG
100 TABLET ORAL EVERY 12 HOURS
Status: DISCONTINUED | OUTPATIENT
Start: 2020-12-15 | End: 2020-12-17

## 2020-12-15 RX ORDER — HYDROCODONE BITARTRATE AND ACETAMINOPHEN 500; 5 MG/1; MG/1
TABLET ORAL
Status: DISCONTINUED | OUTPATIENT
Start: 2020-12-15 | End: 2020-12-18 | Stop reason: HOSPADM

## 2020-12-15 RX ORDER — DONEPEZIL HYDROCHLORIDE 5 MG/1
10 TABLET, FILM COATED ORAL NIGHTLY
Status: DISCONTINUED | OUTPATIENT
Start: 2020-12-15 | End: 2020-12-18 | Stop reason: HOSPADM

## 2020-12-15 RX ORDER — FUROSEMIDE 40 MG/1
80 TABLET ORAL DAILY
Status: DISCONTINUED | OUTPATIENT
Start: 2020-12-15 | End: 2020-12-15

## 2020-12-15 RX ORDER — GABAPENTIN 300 MG/1
300 CAPSULE ORAL 3 TIMES DAILY
Status: DISCONTINUED | OUTPATIENT
Start: 2020-12-15 | End: 2020-12-15

## 2020-12-15 RX ORDER — DOCUSATE SODIUM 100 MG/1
100 CAPSULE, LIQUID FILLED ORAL DAILY
Status: DISCONTINUED | OUTPATIENT
Start: 2020-12-15 | End: 2020-12-18 | Stop reason: HOSPADM

## 2020-12-15 RX ORDER — FLUTICASONE PROPIONATE 50 MCG
1 SPRAY, SUSPENSION (ML) NASAL DAILY
Status: DISCONTINUED | OUTPATIENT
Start: 2020-12-15 | End: 2020-12-18 | Stop reason: HOSPADM

## 2020-12-15 RX ORDER — FERROUS SULFATE 325(65) MG
325 TABLET, DELAYED RELEASE (ENTERIC COATED) ORAL DAILY
Status: DISCONTINUED | OUTPATIENT
Start: 2020-12-15 | End: 2020-12-18 | Stop reason: HOSPADM

## 2020-12-15 RX ORDER — HYDRALAZINE HYDROCHLORIDE 20 MG/ML
10 INJECTION INTRAMUSCULAR; INTRAVENOUS EVERY 6 HOURS PRN
Status: DISCONTINUED | OUTPATIENT
Start: 2020-12-15 | End: 2020-12-18 | Stop reason: HOSPADM

## 2020-12-15 RX ORDER — CARVEDILOL 6.25 MG/1
6.25 TABLET ORAL 2 TIMES DAILY WITH MEALS
Status: DISCONTINUED | OUTPATIENT
Start: 2020-12-15 | End: 2020-12-18 | Stop reason: HOSPADM

## 2020-12-15 RX ORDER — AMLODIPINE BESYLATE 10 MG/1
10 TABLET ORAL DAILY
Status: DISCONTINUED | OUTPATIENT
Start: 2020-12-15 | End: 2020-12-16

## 2020-12-15 RX ORDER — IPRATROPIUM BROMIDE AND ALBUTEROL SULFATE 2.5; .5 MG/3ML; MG/3ML
3 SOLUTION RESPIRATORY (INHALATION) EVERY 6 HOURS
Status: DISCONTINUED | OUTPATIENT
Start: 2020-12-15 | End: 2020-12-18 | Stop reason: HOSPADM

## 2020-12-15 RX ORDER — ARIPIPRAZOLE 5 MG/1
5 TABLET ORAL DAILY
Status: DISCONTINUED | OUTPATIENT
Start: 2020-12-15 | End: 2020-12-18 | Stop reason: HOSPADM

## 2020-12-15 RX ORDER — OXYBUTYNIN CHLORIDE 5 MG/1
10 TABLET, EXTENDED RELEASE ORAL DAILY
Status: DISCONTINUED | OUTPATIENT
Start: 2020-12-15 | End: 2020-12-18 | Stop reason: HOSPADM

## 2020-12-15 RX ORDER — ASPIRIN 325 MG
325 TABLET ORAL
Status: COMPLETED | OUTPATIENT
Start: 2020-12-15 | End: 2020-12-15

## 2020-12-15 RX ORDER — LANOLIN ALCOHOL/MO/W.PET/CERES
1000 CREAM (GRAM) TOPICAL DAILY
Status: DISCONTINUED | OUTPATIENT
Start: 2020-12-15 | End: 2020-12-18

## 2020-12-15 RX ORDER — AMIODARONE HYDROCHLORIDE 200 MG/1
200 TABLET ORAL DAILY
Status: DISCONTINUED | OUTPATIENT
Start: 2020-12-15 | End: 2020-12-16

## 2020-12-15 RX ORDER — MIRTAZAPINE 7.5 MG/1
7.5 TABLET, FILM COATED ORAL NIGHTLY
Status: DISCONTINUED | OUTPATIENT
Start: 2020-12-15 | End: 2020-12-18 | Stop reason: HOSPADM

## 2020-12-15 RX ADMIN — FERROUS SULFATE TAB EC 325 MG (65 MG FE EQUIVALENT) 325 MG: 325 (65 FE) TABLET DELAYED RESPONSE at 09:12

## 2020-12-15 RX ADMIN — LACTOBACILLUS TAB 1 TABLET: TAB at 09:12

## 2020-12-15 RX ADMIN — DONEPEZIL HYDROCHLORIDE 10 MG: 5 TABLET, FILM COATED ORAL at 04:12

## 2020-12-15 RX ADMIN — FLUTICASONE PROPIONATE 50 MCG: 50 SPRAY, METERED NASAL at 09:12

## 2020-12-15 RX ADMIN — CEFTRIAXONE 1 G: 1 INJECTION, SOLUTION INTRAVENOUS at 03:12

## 2020-12-15 RX ADMIN — CLOPIDOGREL 75 MG: 75 TABLET, FILM COATED ORAL at 09:12

## 2020-12-15 RX ADMIN — CYANOCOBALAMIN TAB 1000 MCG 1000 MCG: 1000 TAB at 09:12

## 2020-12-15 RX ADMIN — VANCOMYCIN HYDROCHLORIDE 1250 MG: 1.25 INJECTION, POWDER, LYOPHILIZED, FOR SOLUTION INTRAVENOUS at 02:12

## 2020-12-15 RX ADMIN — LEVOTHYROXINE SODIUM 50 MCG: 50 TABLET ORAL at 05:12

## 2020-12-15 RX ADMIN — VENLAFAXINE HYDROCHLORIDE 75 MG: 75 CAPSULE, EXTENDED RELEASE ORAL at 09:12

## 2020-12-15 RX ADMIN — OXYBUTYNIN CHLORIDE 10 MG: 5 TABLET, EXTENDED RELEASE ORAL at 09:12

## 2020-12-15 RX ADMIN — DOCUSATE SODIUM 100 MG: 100 CAPSULE, LIQUID FILLED ORAL at 09:12

## 2020-12-15 RX ADMIN — GABAPENTIN 300 MG: 300 CAPSULE ORAL at 08:12

## 2020-12-15 RX ADMIN — DOXYCYCLINE HYCLATE 100 MG: 100 TABLET, COATED ORAL at 10:12

## 2020-12-15 RX ADMIN — ACETAMINOPHEN 650 MG: 325 TABLET ORAL at 02:12

## 2020-12-15 RX ADMIN — CARVEDILOL 6.25 MG: 6.25 TABLET, FILM COATED ORAL at 06:12

## 2020-12-15 RX ADMIN — IPRATROPIUM BROMIDE AND ALBUTEROL SULFATE 3 ML: .5; 3 SOLUTION RESPIRATORY (INHALATION) at 07:12

## 2020-12-15 RX ADMIN — BUSPIRONE HYDROCHLORIDE 15 MG: 10 TABLET ORAL at 09:12

## 2020-12-15 RX ADMIN — ASPIRIN 325 MG ORAL TABLET 325 MG: 325 PILL ORAL at 01:12

## 2020-12-15 RX ADMIN — FUROSEMIDE 40 MG: 10 INJECTION, SOLUTION INTRAMUSCULAR; INTRAVENOUS at 09:12

## 2020-12-15 RX ADMIN — FAMOTIDINE 20 MG: 20 TABLET ORAL at 09:12

## 2020-12-15 RX ADMIN — DULOXETINE HYDROCHLORIDE 30 MG: 30 CAPSULE, DELAYED RELEASE ORAL at 09:12

## 2020-12-15 RX ADMIN — ATORVASTATIN CALCIUM 20 MG: 10 TABLET, FILM COATED ORAL at 09:12

## 2020-12-15 RX ADMIN — AMLODIPINE BESYLATE 10 MG: 10 TABLET ORAL at 09:12

## 2020-12-15 RX ADMIN — THERA TABS 1 TABLET: TAB at 09:12

## 2020-12-15 RX ADMIN — AMIODARONE HYDROCHLORIDE 200 MG: 200 TABLET ORAL at 09:12

## 2020-12-15 RX ADMIN — GABAPENTIN 300 MG: 300 CAPSULE ORAL at 04:12

## 2020-12-15 RX ADMIN — MIRTAZAPINE 7.5 MG: 7.5 TABLET ORAL at 08:12

## 2020-12-15 RX ADMIN — IPRATROPIUM BROMIDE AND ALBUTEROL SULFATE 3 ML: .5; 3 SOLUTION RESPIRATORY (INHALATION) at 12:12

## 2020-12-15 RX ADMIN — ARIPIPRAZOLE 5 MG: 5 TABLET ORAL at 09:12

## 2020-12-15 RX ADMIN — LOSARTAN POTASSIUM 25 MG: 25 TABLET, FILM COATED ORAL at 09:12

## 2020-12-15 RX ADMIN — CARVEDILOL 6.25 MG: 6.25 TABLET, FILM COATED ORAL at 09:12

## 2020-12-15 RX ADMIN — ASPIRIN 81 MG: 81 TABLET, COATED ORAL at 09:12

## 2020-12-15 RX ADMIN — BUSPIRONE HYDROCHLORIDE 15 MG: 10 TABLET ORAL at 08:12

## 2020-12-15 RX ADMIN — DONEPEZIL HYDROCHLORIDE 10 MG: 5 TABLET, FILM COATED ORAL at 08:12

## 2020-12-15 RX ADMIN — DOXYCYCLINE HYCLATE 100 MG: 100 TABLET, COATED ORAL at 08:12

## 2020-12-15 NOTE — SUBJECTIVE & OBJECTIVE
Past Medical History:   Diagnosis Date    Anemia     Anxiety     Arthritis     Asthma     Back pain     Cardiomyopathy     CHF (congestive heart failure)     Dementia     Depression     Dizziness     Hypertension     Hypothyroidism     Insomnia     OAB (overactive bladder)     Stroke     Use of cane as ambulatory aid        Past Surgical History:   Procedure Laterality Date    APPENDECTOMY      arthritis      BLADDER REPAIR      BUNIONECTOMY      CARDIAC DEFIBRILLATOR PLACEMENT      CATARACT EXTRACTION      HYSTERECTOMY      metal implant Bilateral     placed in the bladder.       Review of patient's allergies indicates:   Allergen Reactions    Bacitracin-polymyxin b Itching and Swelling    Merthiolate (thimerosal) Rash and Swelling    Diazepam      Hallucinations    Levofloxacin Itching    Metronidazole     Oxycodone Itching    Tizanidine      Hallucinations    Tramadol Itching    Cefdinir Itching     Pt not positive about allergy       No current facility-administered medications on file prior to encounter.      Current Outpatient Medications on File Prior to Encounter   Medication Sig    gabapentin (NEURONTIN) 300 MG capsule Take 300 mg by mouth.    albuterol (PROAIR HFA) 90 mcg/actuation inhaler Inhale 2 puffs into the lungs every 6 (six) hours as needed for Wheezing.    amiodarone (PACERONE) 200 MG Tab Take 1 tablet by mouth once daily.    amlodipine (NORVASC) 10 MG tablet Take 10 mg by mouth once daily.    aripiprazole (ABILIFY) 10 MG Tab Take 5 mg by mouth once daily.    aspirin (ECOTRIN) 81 MG EC tablet Take 81 mg by mouth once daily.    atorvastatin (LIPITOR) 20 MG tablet Take 20 mg by mouth once daily.    busPIRone (BUSPAR) 15 MG tablet Take 1 tablet by mouth 2 (two) times daily.    carvedilol (COREG) 6.25 MG tablet Take 6.25 mg by mouth 2 (two) times daily with meals.    clopidogreL (PLAVIX) 75 mg tablet Take 1 tablet by mouth once daily.    cyanocobalamin  (VITAMIN B-12) 1000 MCG tablet Take 1 tablet by mouth once daily.    cycloSPORINE (RESTASIS) 0.05 % ophthalmic emulsion Apply 1 drop to eye.    donepezil (ARICEPT) 10 MG tablet Take 1 tablet by mouth every evening.    DULoxetine (CYMBALTA) 30 MG capsule Take 1 capsule by mouth once daily.    esomeprazole (NEXIUM) 40 MG capsule Take 40 mg by mouth before breakfast.    famotidine (PEPCID) 20 MG tablet Take 1 tablet (20 mg total) by mouth 2 (two) times daily as needed (heartburn).    ferrous sulfate 324 mg (65 mg iron) TbEC Take 65 mg by mouth once daily.    fluticasone (FLONASE) 50 mcg/actuation nasal spray 1 spray by Each Nare route once daily.    furosemide (LASIX) 40 MG tablet Take two tabs (80 mg) each morning and one tab (40 mg) each afternoon for five days. After five days, go back to your normal furosemide dosage.    glucosamine-chondroitin 500-400 mg tablet Take 1 tablet by mouth 2 (two) times daily.    Lactobacillus rhamnosus GG (CULTURELLE) 10 billion cell capsule Take 1 capsule by mouth once daily.    levothyroxine (SYNTHROID) 75 MCG tablet Take 1 tablet (75 mcg total) by mouth before breakfast. (Patient taking differently: Take 50 mcg by mouth before breakfast. )    losartan (COZAAR) 25 MG tablet Take 1 tablet by mouth once daily.    meclizine (ANTIVERT) 12.5 mg tablet Take 12.5 mg by mouth 3 (three) times daily as needed.    melatonin 1 mg Tab Take 1 mg by mouth every evening.    mirabegron 50 mg Tb24 Take by mouth.    mirtazapine (REMERON) 7.5 MG Tab     multivitamin capsule Take 1 capsule by mouth once daily.    nystatin-triamcinolone (MYCOLOG) ointment 2 (two) times daily as needed.    polyethylene glycol (GLYCOLAX) 17 gram PwPk Take by mouth.    potassium chloride SA (K-DUR,KLOR-CON) 20 MEQ tablet Take 20 mEq by mouth 2 (two) times daily.    solifenacin (VESICARE) 10 MG tablet Take 5 mg by mouth once daily.    traMADol (ULTRAM) 50 mg tablet Take 1 tablet by mouth 3 (three) times  daily as needed.    venlafaxine (EFFEXOR-XR) 150 MG Cp24 Take 75 mg by mouth once daily.     Family History     Problem Relation (Age of Onset)    Cancer Mother    Heart disease Father    Hypertension         Tobacco Use    Smoking status: Never Smoker    Smokeless tobacco: Never Used   Substance and Sexual Activity    Alcohol use: No    Drug use: No    Sexual activity: Not Currently     Review of Systems   Constitution: Positive for malaise/fatigue.   HENT: Negative for hearing loss and hoarse voice.    Eyes: Negative for blurred vision and visual disturbance.   Cardiovascular: Positive for dyspnea on exertion and orthopnea. Negative for chest pain, claudication, irregular heartbeat, leg swelling, near-syncope, palpitations, paroxysmal nocturnal dyspnea and syncope.   Respiratory: Positive for cough and shortness of breath. Negative for hemoptysis, sleep disturbances due to breathing, snoring and wheezing.    Endocrine: Negative for cold intolerance and heat intolerance.   Hematologic/Lymphatic: Bruises/bleeds easily.   Skin: Negative for color change, dry skin and nail changes.   Musculoskeletal: Positive for arthritis and back pain. Negative for joint pain and myalgias.   Gastrointestinal: Positive for bloating. Negative for abdominal pain, constipation, nausea and vomiting.   Genitourinary: Positive for dysuria. Negative for flank pain, hematuria and hesitancy.   Neurological: Negative for headaches, light-headedness, loss of balance, numbness, paresthesias and weakness.   Psychiatric/Behavioral: Negative for altered mental status.   Allergic/Immunologic: Negative for environmental allergies.     Objective:     Vital Signs (Most Recent):  Temp: 98 °F (36.7 °C) (12/15/20 0737)  Pulse: 62 (12/15/20 0737)  Resp: 17 (12/15/20 0737)  BP: (!) 140/63 (12/15/20 0737)  SpO2: 98 % (12/15/20 0737) Vital Signs (24h Range):  Temp:  [98 °F (36.7 °C)-101.4 °F (38.6 °C)] 98 °F (36.7 °C)  Pulse:  [62-86] 62  Resp:  [16-22]  17  SpO2:  [95 %-100 %] 98 %  BP: (140-170)/(63-92) 140/63     Weight: 64 kg (141 lb)  Body mass index is 26.64 kg/m².    SpO2: 98 %  O2 Device (Oxygen Therapy): nasal cannula      Intake/Output Summary (Last 24 hours) at 12/15/2020 1135  Last data filed at 12/15/2020 0043  Gross per 24 hour   Intake 100 ml   Output --   Net 100 ml       Lines/Drains/Airways     Drain            Female External Urinary Catheter 12/15/20 0258 less than 1 day          Peripheral Intravenous Line                 Peripheral IV - Single Lumen 12/14/20 2354 20 G Left Forearm less than 1 day                Physical Exam   Constitutional: She is oriented to person, place, and time. She appears well-developed and well-nourished. No distress.   HENT:   Head: Normocephalic and atraumatic.   Eyes: Pupils are equal, round, and reactive to light.   Neck: Normal range of motion and full passive range of motion without pain. Neck supple. No JVD present. No thyromegaly present.   Cardiovascular: Normal rate, regular rhythm, S1 normal, S2 normal and intact distal pulses. PMI is not displaced. Exam reveals no distant heart sounds.   No murmur heard.  Pulses:       Radial pulses are 2+ on the right side and 2+ on the left side.        Dorsalis pedis pulses are 2+ on the right side and 2+ on the left side.   CHEST PAIN FREE  LEFT CHEST WALL ICD, WELL HEALED  NO RECENT ICD SHOCKS   Pulmonary/Chest: Effort normal and breath sounds normal. No accessory muscle usage. No respiratory distress. She has no decreased breath sounds. She has no wheezes. She has no rales.   Abdominal: Soft. Bowel sounds are normal. She exhibits no distension. There is no abdominal tenderness.   Musculoskeletal: Normal range of motion.         General: Edema present.      Right ankle: She exhibits no swelling.      Left ankle: She exhibits no swelling.   Neurological: She is alert and oriented to person, place, and time.   Skin: Skin is warm and dry. She is not diaphoretic. No  cyanosis. Nails show no clubbing.   Psychiatric: She has a normal mood and affect. Her speech is normal and behavior is normal. Judgment and thought content normal. Cognition and memory are normal.   Nursing note and vitals reviewed.      Significant Labs:   Blood Culture: No results for input(s): LABBLOO in the last 48 hours., BMP:   Recent Labs   Lab 12/14/20 2352 12/15/20  0706   * 113*    139   K 4.3 3.9    102   CO2 29 30*   BUN 10 10   CREATININE 1.0 1.1   CALCIUM 8.9 8.5*   MG  --  2.4   , CBC   Recent Labs   Lab 12/14/20 2352 12/15/20  0706   WBC 9.41 6.31   HGB 8.7* 7.7*   HCT 27.4* 25.3*   * 328   , Troponin   Recent Labs   Lab 12/14/20 2352 12/15/20  0706   TROPONINI 0.332* 0.259*    and All pertinent lab results from the last 24 hours have been reviewed.    Significant Imaging: Echocardiogram:   Transthoracic echo (TTE) complete (Cupid Only):   Results for orders placed or performed during the hospital encounter of 12/14/20   Echo   Result Value Ref Range    BSA 1.66 m2    TDI SEPTAL 0.07 m/s    LV LATERAL E/E' RATIO 12.57 m/s    LV SEPTAL E/E' RATIO 12.57 m/s    LA WIDTH 3.11 cm    TDI LATERAL 0.07 m/s    LVIDd 4.56 3.5 - 6.0 cm    IVS 1.14 (A) 0.6 - 1.1 cm    Posterior Wall 1.36 (A) 0.6 - 1.1 cm    Ao root annulus 3.66 cm    LVIDs 4.19 (A) 2.1 - 4.0 cm    FS 8 28 - 44 %    LA volume 29.48 cm3    Sinus 3.81 cm    STJ 3.24 cm    Ascending aorta 3.50 cm    LV mass 214.49 g    LA size 3.15 cm    TAPSE 1.71 cm    Left Ventricle Relative Wall Thickness 0.60 cm    AV mean gradient 11 mmHg    AV valve area 1.82 cm2    AV Velocity Ratio 0.41     AV index (prosthetic) 0.49     E/A ratio 1.29     Mean e' 0.07 m/s    E wave decelartion time 172.41 msec    IVRT 91.34 msec    LVOT diameter 2.17 cm    LVOT area 3.7 cm2    LVOT peak ulises 0.91 m/s    LVOT peak VTI 25.11 cm    Ao peak ulises 2.21 m/s    Ao VTI 50.96 cm    RVOT peak ulises 0.63 m/s    RVOT peak VTI 14.80 cm    LVOT stroke volume  92.82 cm3    AV peak gradient 20 mmHg    PV mean gradient 1 mmHg    E/E' ratio 12.57 m/s    MV Peak E Jason 0.88 m/s    TR Max Jason 3.41 m/s    MV Peak A Jason 0.68 m/s    LV Systolic Volume 78.22 mL    LV Systolic Volume Index 48.0 mL/m2    LV Diastolic Volume 95.30 mL    LV Diastolic Volume Index 58.53 mL/m2    LA Volume Index 18.1 mL/m2    LV Mass Index 132 g/m2    RA Major Axis 3.69 cm    Left Atrium Minor Axis 3.15 cm    Left Atrium Major Axis 4.04 cm    Triscuspid Valve Regurgitation Peak Gradient 47 mmHg    Right Atrial Pressure (from IVC) 3 mmHg    TV rest pulmonary artery pressure 50 mmHg    Narrative    · The left ventricle is mildly enlarged with severely decreased systolic   function. There is mild left ventricular concentric hypertrophy. The   estimated ejection fraction is 25%.  · Grade I left ventricular diastolic dysfunction.  · There is severe left ventricular global hypokinesis.  · Normal right ventricular size with low normal right ventricular systolic   function.  · There is mild aortic valve stenosis.  · Aortic valve area is 1.82 cm2; peak velocity is 2.21 m/s; mean gradient   is 11 mmHg.  · There is mild aortic regurgitation.  · There is moderate tricuspid valve regurgitation.  · Normal central venous pressure (3 mmHg).  · The estimated PA systolic pressure is 50 mmHg.  · There is pulmonary hypertension.  · There is a moderate left pleural effusion.

## 2020-12-15 NOTE — ED NOTES
Patient in pull up. Patient urinated in pull up. Patient cleaned and new diaper placed. Informed patient of need of urine specimen. Placed purewick on patient.

## 2020-12-15 NOTE — ASSESSMENT & PLAN NOTE
Patient reports ongoing anemia issues  Followed by Outside Hem/Onc  Recommend transfuse 1 unit PRBC given worsening anemia issues  Likely worsening CHF exacerbation as well.

## 2020-12-15 NOTE — PROGRESS NOTES
"[facility] Hospital Medicine  Progress Note    Patient Name: Love Davey  MRN: [id number]  Patient Class: IP- Inpatient   Admission Date: 12/14/2020  Length of Stay: 0 days  Attending Physician: Alf Szymanski MD  Primary Care Provider: Kaley Law MD        Subjective:     Principal Problem:UTI (urinary tract infection)        HPI:  86 y/o WF with hx of stroke, HTN, combined CHF, arthritis, insomnia, depression, anxiety, cardiomyopathy, chronic back pain, anemia, hysterectomy, cataract sx, AICD, appendectomy, asthma, dementia, hypothyroidism, OAB to ED with c/o gradually increasing SOB (worst on exertion) and nonproductive cough over the past 4 days. Also c/o dysuria - states she was diagnosed with a UTI at urgent care 1 week ago but her antibiotics have not come in the mail yet so she has not begun treatment. Symptoms are persistent and of moderate severity and without known mitigating factors. Denies chest pain, edema, palpitations, wheezing, orthopnea, abdominal pain, N/V/D, flank pain, hematuria, HA, dizziness, weakness, fever, cough, chills, falls/trauma, blurred vision, focal deficits. Pt was given tylenol, ASA, ibuprofen, vancomycin, zosyn in ED with improvement in temperature at 98.4 and BP to 150/67; WBCs 9.41, H&H 8.7&27.4, Na 140, K+ 4.3, creatinine 1.0, , BNP 1662, troponin 0.332, lactic acid 1.0; COVID-19 negative; EKG showed NSR with 1st degree AV block with PACS and aberrant conduction (79BPM); Cxray showed bilateral faint patchy infiltrates and LLL effusion. Pt was given tylenol, ASA, ibuprofen, vancomycin, zosyn in ED with improvement in BP to 150/67 and temperature to 98.4; pt states she is breathing "much better" at this time. Hospital medicine was called and the pt was placed in observation on telemetry monitoring. Pt is a full code - surrogate decision maker is her , Alf Davey.     Overview/Hospital Course:  Pt presented with fever and " determined to have pneumonia. CXR small left pleural effusion with LLL atelectasis and/or consolidation. Normal WBC, normal procal, pt on 2 L NC and Doxycycline and Rocephin. Also, pt with UTI - urine culture pending. Hx of E coli UTI. H/H 8.7/27.4 >> 7.7/25.3. She says she receives blood transfusions periodically. She denies any active bleeding. LLL pleural effusion - likely a degree of decompensated heart failure. BNP 1662 (was 1116). IV lasix in progress. Pt with elevated troponins 0.332, 0.202 and Cardiology feels that likely demand ischemia. Cardiology recommends 1 unit PRBCs transfusion due to cardiac history.         Review of Systems   Constitutional: Negative for activity change, chills, diaphoresis, fatigue and fever.   HENT: Negative for congestion, trouble swallowing and voice change.    Eyes: Negative for photophobia and discharge.   Respiratory: Positive for cough (nonproductive) and shortness of breath. Negative for chest tightness and wheezing.    Cardiovascular: Negative for chest pain, palpitations and leg swelling.   Gastrointestinal: Negative for abdominal pain, blood in stool, constipation, diarrhea, nausea and vomiting.   Endocrine: Negative for cold intolerance, heat intolerance, polydipsia, polyphagia and polyuria.   Genitourinary: Positive for dysuria. Negative for difficulty urinating, flank pain, frequency and urgency.   Musculoskeletal: Negative for back pain, joint swelling and myalgias.   Skin: Negative for rash and wound.   Neurological: Negative for dizziness, seizures, syncope, facial asymmetry, weakness, light-headedness, numbness and headaches.   Psychiatric/Behavioral: Negative for confusion and hallucinations.     Objective:     Vital Signs (Most Recent):  Temp: 97.9 °F (36.6 °C) (12/15/20 1152)  Pulse: (!) 57 (12/15/20 1255)  Resp: 18 (12/15/20 1255)  BP: (!) 124/59 (12/15/20 1152)  SpO2: 98 % (12/15/20 1255) Vital Signs (24h Range):  Temp:  [97.9 °F (36.6 °C)-101.4 °F (38.6  °C)] 97.9 °F (36.6 °C)  Pulse:  [57-86] 57  Resp:  [16-22] 18  SpO2:  [95 %-100 %] 98 %  BP: (124-170)/(59-92) 124/59     Weight: 64 kg (141 lb)  Body mass index is 26.64 kg/m².    Intake/Output Summary (Last 24 hours) at 12/15/2020 1426  Last data filed at 12/15/2020 0043  Gross per 24 hour   Intake 100 ml   Output --   Net 100 ml      Physical Exam  Vitals signs reviewed.   Constitutional:       General: She is not in acute distress.     Appearance: Normal appearance. She is normal weight. She is not toxic-appearing.   HENT:      Head: Normocephalic and atraumatic.      Nose: Nose normal. No congestion.      Mouth/Throat:      Mouth: Mucous membranes are moist.   Eyes:      General: No scleral icterus.     Pupils: Pupils are equal, round, and reactive to light.   Neck:      Musculoskeletal: Normal range of motion and neck supple. No muscular tenderness.   Cardiovascular:      Rate and Rhythm: Normal rate and regular rhythm.      Pulses: Normal pulses.      Heart sounds: Normal heart sounds.   Pulmonary:      Effort: Pulmonary effort is normal. No respiratory distress.      Breath sounds: Rhonchi (mildly coarse throughout) present. No wheezing.   Abdominal:      General: Abdomen is flat. Bowel sounds are normal. There is no distension.      Palpations: Abdomen is soft.      Tenderness: There is no abdominal tenderness. There is no rebound.   Musculoskeletal: Normal range of motion.         General: No deformity.      Right lower leg: No edema.      Left lower leg: No edema.   Skin:     General: Skin is warm and dry.      Capillary Refill: Capillary refill takes less than 2 seconds.      Coloration: Skin is not jaundiced.      Findings: No bruising or rash.   Neurological:      General: No focal deficit present.      Mental Status: She is alert and oriented to person, place, and time.      Cranial Nerves: No dysarthria.      Sensory: Sensation is intact. No sensory deficit.      Motor: Weakness (generalized)  present. No tremor.   Psychiatric:         Mood and Affect: Mood normal.         Speech: Speech normal.         Behavior: Behavior normal.         Thought Content: Thought content normal.         Judgment: Judgment normal.      Comments: AAOX4, follows commands, converses appropriately         Significant Labs:   CBC:   Recent Labs   Lab 12/14/20 2352 12/15/20  0706   WBC 9.41 6.31   HGB 8.7* 7.7*   HCT 27.4* 25.3*   * 328     CMP:   Recent Labs   Lab 12/14/20  2352 12/15/20  0706    139   K 4.3 3.9    102   CO2 29 30*   * 113*   BUN 10 10   CREATININE 1.0 1.1   CALCIUM 8.9 8.5*   PROT 7.0  --    ALBUMIN 3.3*  --    BILITOT 0.4  --    ALKPHOS 96  --    AST 17  --    ALT 11  --    ANIONGAP 9 7*   EGFRNONAA 51* 45*     All pertinent labs within the past 24 hours have been reviewed.    Significant Imaging: I have reviewed all pertinent imaging results/findings within the past 24 hours.      Assessment/Plan:      * UTI (urinary tract infection)  Reports diagnosis 1 week ago but has not begun treatment - abx have not arrived  Repeat UA pending  IV Rocephin  Hx of e coli UTI  Monitor I&Os        Pleural effusion on left  Continue IV diuresis  · ECHO - The left ventricle is mildly enlarged with severely decreased systolic function. There is mild left ventricular concentric hypertrophy. The estimated ejection fraction is 25%.  · Grade I left ventricular diastolic dysfunction.  There is severe left ventricular global hypokinesis.  According to ECHO moderate left pleural effusion -       Community acquired pneumonia  Cxray with B faint patch infiltrates and LLL effusion per ED physician  BC x2 pending  Supplemental O2 prn  Duonebs scheduled and prn  Continue doxycycline and Rocephin  Tylenol prn fever        Anemia  H/H 8.7/27.4>>> 7.7/25.3 - type and cross match and transfuse 1 unit PRBC      Elevated troponin  Troponin 0.332 in ED - denies chest pain, EKG unrevealing  Cardiology  consulted  troponins reflect demand ischemia secondary to infection  Continuous cardiac monitoring  EKG prn    ECHO  - EF 25 % and diastolic dysfunction      Hypothyroidism  Resumed home synthroid  Pt to f/u with PCP as outpatient      Dementia  AAOx4 upon admission  Home medications continued  Reorient frequently  Fall precautions      Acute on chronic combined systolic and diastolic heart failure  ECHO 12/15/2020 - EF 25%, Grade 1 diastolic dysfunction  BNP 1662>>>> 1116, LLE pleural effusion on cxray  Cardiology consulted  Lasix 40mg IV BID pending cardiology input  Supplemental O2 prn  Monitor I&Os  Daily weights  Cardiac diet  Continue home medications      HTN (hypertension)  Improved since ED arrival  Home meds resumed  Hydralazine IV prn SBP > 170  Cardiac diet  VS per unit routine  Monitor closely      Asthma  No evidence of acute exacerbation  Supplemental O2 prn   Duonebs scheduled and prn        VTE Risk Mitigation (From admission, onward)         Ordered     Place sequential compression device  Until discontinued      12/15/20 0345                Discharge Planning   ELISSA:      Code Status: Full Code   Is the patient medically ready for discharge?:     Reason for patient still in hospital (select all that apply): Patient trending condition                     Joseline Smith NP  Department of Hospital Medicine   [facility]

## 2020-12-15 NOTE — ASSESSMENT & PLAN NOTE
ECHO 12/15/2020 - EF 25%, Grade 1 diastolic dysfunction  BNP 1662>>>> 1116, LLE pleural effusion on cxray  Cardiology consulted  Lasix 40mg IV BID pending cardiology input  Supplemental O2 prn  Monitor I&Os  Daily weights  Cardiac diet  Continue home medications

## 2020-12-15 NOTE — ASSESSMENT & PLAN NOTE
Cxray with B faint patch infiltrates and LLL effusion per ED physician  BC x2 pending  Supplemental O2 prn  Duonebs scheduled and prn  Continue IV vancomycin/zosyn for now  Tylenol prn fever

## 2020-12-15 NOTE — ASSESSMENT & PLAN NOTE
Troponin 0.332 in ED - denies chest pain, EKG unrevealing  Cardiology consulted  troponins reflect demand ischemia secondary to infection  Continuous cardiac monitoring  EKG prn    ECHO  - EF 25 % and diastolic dysfunction

## 2020-12-15 NOTE — PLAN OF CARE
Richar sent home health referral to [facility].      12/15/20 3057   Post-Acute Status   Post-Acute Authorization Home Health   Home Health Status Referrals Sent   Discharge Plan   Discharge Plan A [facility]

## 2020-12-15 NOTE — PLAN OF CARE
Swer attempted to contact pt for initial assessment. Swer was unsuccessful. Pt is current with [facility] [facility]. Swer sent referral via [employer].     PCP; Kaley Law MD  Pharm;   [id number] - [place] - [address] AT [facility] [address]  [address]  [place] [address]  Phone: [phone] Fax: [phone]    [id number] - [place] - [address]  [address]  [place] [address]  Phone: [phone] Fax: [phone]  My Chart; pending  Bedside Delivery; Yes     12/15/20 1505   Discharge Assessment   Assessment Type Discharge Planning Assessment   Confirmed/corrected address and phone number on facesheet? Yes   Assessment information obtained from? Medical Record   Prior to hospitilization cognitive status: Alert/Oriented   Prior to hospitalization functional status: Independent   Current cognitive status: Unable to Assess   Current Functional Status:   (unable to assess)   Facility Arrived From: home   Lives With spouse   Able to Return to Prior Arrangements yes   Is patient able to care for self after discharge? Yes   Who are your caregiver(s) and their phone number(s)? Kristy Willson (daughter) [phone]   Patient's perception of discharge disposition home health   Readmission Within the Last 30 Days no previous admission in last 30 days   Patient currently being followed by outpatient case management? No   Patient currently receives any other outside agency services? No   Do you have any problems affording any of your prescribed medications? No   Is the patient taking medications as prescribed? yes   Does the patient have transportation home? Yes   Transportation Anticipated family or friend will provide   Discharge Plan A Home with family;[facility]

## 2020-12-15 NOTE — HOSPITAL COURSE
Pt presented with fever and determined to have pneumonia. CXR small left pleural effusion with LLL atelectasis and/or consolidation. Normal WBC, normal procal, pt on 2 L NC and Doxycycline and Rocephin. Also, pt with UTI - urine culture pending. Hx of E coli UTI. H/H 8.7/27.4 >> 7.7/25.3. She says she receives blood transfusions periodically. She denies any active bleeding. LLL pleural effusion - likely a degree of decompensated heart failure. BNP 1662 (was 1116). IV lasix in progress. Pt with elevated troponins 0.332, 0.202 and Cardiology feels that likely demand ischemia. Cardiology recommends 1 unit PRBCs transfusion due to cardiac history.   12/16 - Patient with a rapid response today found to be in SVT.Patient transferred to ICU 's. Plan per CARDS to initiate on amiodarone gtt. Patient access clotted off. Patient cardioverted now NSR. Gtt infusing. Patient improved and comfortable.   12/18- seen and d/w Dr. Holt- doing well, remains in NSR on oral Amiodarone, H/H remains stable at 10/30 since she got the unit of blood 2 days ago, inj Venofer and Inj B12 1000 mcg IM given. She has remained afebrile, without any leukocytosis and all her urine and blood Cx have remained NGTD and she has received three days of Rocephin. No signs of Pneumonia but appeared to have fluid overload with small L pleural effusion on admission and she has been diuresed well. She is eating drinking well, walking around well with PT using a walker. Cardiology has already cleared her since yesterday and she feels ready to go home. She was seen and examined deemed stable for discharge home with HH including Home PT/OT today.

## 2020-12-15 NOTE — SUBJECTIVE & OBJECTIVE
Review of Systems   Constitutional: Negative for activity change, chills, diaphoresis, fatigue and fever.   HENT: Negative for congestion, trouble swallowing and voice change.    Eyes: Negative for photophobia and discharge.   Respiratory: Positive for cough (nonproductive) and shortness of breath. Negative for chest tightness and wheezing.    Cardiovascular: Negative for chest pain, palpitations and leg swelling.   Gastrointestinal: Negative for abdominal pain, blood in stool, constipation, diarrhea, nausea and vomiting.   Endocrine: Negative for cold intolerance, heat intolerance, polydipsia, polyphagia and polyuria.   Genitourinary: Positive for dysuria. Negative for difficulty urinating, flank pain, frequency and urgency.   Musculoskeletal: Negative for back pain, joint swelling and myalgias.   Skin: Negative for rash and wound.   Neurological: Negative for dizziness, seizures, syncope, facial asymmetry, weakness, light-headedness, numbness and headaches.   Psychiatric/Behavioral: Negative for confusion and hallucinations.     Objective:     Vital Signs (Most Recent):  Temp: 97.9 °F (36.6 °C) (12/15/20 1152)  Pulse: (!) 57 (12/15/20 1255)  Resp: 18 (12/15/20 1255)  BP: (!) 124/59 (12/15/20 1152)  SpO2: 98 % (12/15/20 1255) Vital Signs (24h Range):  Temp:  [97.9 °F (36.6 °C)-101.4 °F (38.6 °C)] 97.9 °F (36.6 °C)  Pulse:  [57-86] 57  Resp:  [16-22] 18  SpO2:  [95 %-100 %] 98 %  BP: (124-170)/(59-92) 124/59     Weight: 64 kg (141 lb)  Body mass index is 26.64 kg/m².    Intake/Output Summary (Last 24 hours) at 12/15/2020 1426  Last data filed at 12/15/2020 0043  Gross per 24 hour   Intake 100 ml   Output --   Net 100 ml      Physical Exam  Vitals signs reviewed.   Constitutional:       General: She is not in acute distress.     Appearance: Normal appearance. She is normal weight. She is not toxic-appearing.   HENT:      Head: Normocephalic and atraumatic.      Nose: Nose normal. No congestion.      Mouth/Throat:       Mouth: Mucous membranes are moist.   Eyes:      General: No scleral icterus.     Pupils: Pupils are equal, round, and reactive to light.   Neck:      Musculoskeletal: Normal range of motion and neck supple. No muscular tenderness.   Cardiovascular:      Rate and Rhythm: Normal rate and regular rhythm.      Pulses: Normal pulses.      Heart sounds: Normal heart sounds.   Pulmonary:      Effort: Pulmonary effort is normal. No respiratory distress.      Breath sounds: Rhonchi (mildly coarse throughout) present. No wheezing.   Abdominal:      General: Abdomen is flat. Bowel sounds are normal. There is no distension.      Palpations: Abdomen is soft.      Tenderness: There is no abdominal tenderness. There is no rebound.   Musculoskeletal: Normal range of motion.         General: No deformity.      Right lower leg: No edema.      Left lower leg: No edema.   Skin:     General: Skin is warm and dry.      Capillary Refill: Capillary refill takes less than 2 seconds.      Coloration: Skin is not jaundiced.      Findings: No bruising or rash.   Neurological:      General: No focal deficit present.      Mental Status: She is alert and oriented to person, place, and time.      Cranial Nerves: No dysarthria.      Sensory: Sensation is intact. No sensory deficit.      Motor: Weakness (generalized) present. No tremor.   Psychiatric:         Mood and Affect: Mood normal.         Speech: Speech normal.         Behavior: Behavior normal.         Thought Content: Thought content normal.         Judgment: Judgment normal.      Comments: AAOX4, follows commands, converses appropriately         Significant Labs:   CBC:   Recent Labs   Lab 12/14/20 2352 12/15/20  0706   WBC 9.41 6.31   HGB 8.7* 7.7*   HCT 27.4* 25.3*   * 328     CMP:   Recent Labs   Lab 12/14/20 2352 12/15/20  0706    139   K 4.3 3.9    102   CO2 29 30*   * 113*   BUN 10 10   CREATININE 1.0 1.1   CALCIUM 8.9 8.5*   PROT 7.0  --    ALBUMIN  3.3*  --    BILITOT 0.4  --    ALKPHOS 96  --    AST 17  --    ALT 11  --    ANIONGAP 9 7*   EGFRNONAA 51* 45*     All pertinent labs within the past 24 hours have been reviewed.    Significant Imaging: I have reviewed all pertinent imaging results/findings within the past 24 hours.

## 2020-12-15 NOTE — ASSESSMENT & PLAN NOTE
Troponin 0.332 in ED - denies chest pain, EKG unrevealing  Cardiology consulted  Continuous cardiac monitoring  EKG prn  Trend troponin  ECHO pending

## 2020-12-15 NOTE — ASSESSMENT & PLAN NOTE
BNP 1662  Continue IV diuresis  Continue Coreg, ARB  Followed by Dr Nassar, [facility]  Dash diet, 2 gm sodium restriction  1200 ml fluid restriction  Daily weights  Strict I/Os  Assess response

## 2020-12-15 NOTE — ASSESSMENT & PLAN NOTE
Improved since ED arrival  Home meds resumed  Hydralazine IV prn SBP > 170  Cardiac diet  VS per unit routine  Monitor closely

## 2020-12-15 NOTE — ASSESSMENT & PLAN NOTE
Continue IV diuresis  · ECHO - The left ventricle is mildly enlarged with severely decreased systolic function. There is mild left ventricular concentric hypertrophy. The estimated ejection fraction is 25%.  · Grade I left ventricular diastolic dysfunction.  There is severe left ventricular global hypokinesis.  According to ECHO moderate left pleural effusion -

## 2020-12-15 NOTE — PROGRESS NOTES
Clinical Pharmacy: Vancomycin Consult -- Signoff Note    Therapy with vancomycin complete and/or consult discontinued by provider.  Pharmacy will sign off, please re-consult as needed.    Thank you for allowing us to participate in this patient's care.   Katherine McArdle, Pharm.D. 12/15/2020 9:37 AM

## 2020-12-15 NOTE — SUBJECTIVE & OBJECTIVE
Past Medical History:   Diagnosis Date    Anemia     Anxiety     Arthritis     Asthma     Back pain     Cardiomyopathy     CHF (congestive heart failure)     Dementia     Depression     Dizziness     Hypertension     Hypothyroidism     Insomnia     OAB (overactive bladder)     Stroke     Use of cane as ambulatory aid        Past Surgical History:   Procedure Laterality Date    APPENDECTOMY      arthritis      BLADDER REPAIR      BUNIONECTOMY      CARDIAC DEFIBRILLATOR PLACEMENT      CATARACT EXTRACTION      HYSTERECTOMY      metal implant Bilateral     placed in the bladder.       Review of patient's allergies indicates:   Allergen Reactions    Bacitracin-polymyxin b Itching and Swelling    Merthiolate (thimerosal) Rash and Swelling    Diazepam      Hallucinations    Levofloxacin Itching    Metronidazole     Oxycodone Itching    Tizanidine      Hallucinations    Tramadol Itching    Cefdinir Itching     Pt not positive about allergy       No current facility-administered medications on file prior to encounter.      Current Outpatient Medications on File Prior to Encounter   Medication Sig    albuterol (PROAIR HFA) 90 mcg/actuation inhaler Inhale 2 puffs into the lungs every 6 (six) hours as needed for Wheezing.    amiodarone (PACERONE) 200 MG Tab Take 1 tablet by mouth once daily.    amlodipine (NORVASC) 10 MG tablet Take 10 mg by mouth once daily.    aripiprazole (ABILIFY) 10 MG Tab Take 5 mg by mouth once daily.    aspirin (ECOTRIN) 81 MG EC tablet Take 81 mg by mouth once daily.    atorvastatin (LIPITOR) 20 MG tablet Take 20 mg by mouth once daily.    busPIRone (BUSPAR) 15 MG tablet Take 1 tablet by mouth 2 (two) times daily.    carvedilol (COREG) 6.25 MG tablet Take 6.25 mg by mouth 2 (two) times daily with meals.    clopidogreL (PLAVIX) 75 mg tablet Take 1 tablet by mouth once daily.    cyanocobalamin (VITAMIN B-12) 1000 MCG tablet Take 1 tablet by mouth once daily.     cycloSPORINE (RESTASIS) 0.05 % ophthalmic emulsion Apply 1 drop to eye.    donepezil (ARICEPT) 10 MG tablet Take 1 tablet by mouth every evening.    DULoxetine (CYMBALTA) 30 MG capsule Take 1 capsule by mouth once daily.    esomeprazole (NEXIUM) 40 MG capsule Take 40 mg by mouth before breakfast.    famotidine (PEPCID) 20 MG tablet Take 1 tablet (20 mg total) by mouth 2 (two) times daily as needed (heartburn).    ferrous sulfate 324 mg (65 mg iron) TbEC Take 65 mg by mouth once daily.    fluticasone (FLONASE) 50 mcg/actuation nasal spray 1 spray by Each Nare route once daily.    furosemide (LASIX) 40 MG tablet Take two tabs (80 mg) each morning and one tab (40 mg) each afternoon for five days. After five days, go back to your normal furosemide dosage.    glucosamine-chondroitin 500-400 mg tablet Take 1 tablet by mouth 2 (two) times daily.    Lactobacillus rhamnosus GG (CULTURELLE) 10 billion cell capsule Take 1 capsule by mouth once daily.    levothyroxine (SYNTHROID) 75 MCG tablet Take 1 tablet (75 mcg total) by mouth before breakfast. (Patient taking differently: Take 50 mcg by mouth before breakfast. )    losartan (COZAAR) 25 MG tablet Take 1 tablet by mouth once daily.    meclizine (ANTIVERT) 12.5 mg tablet Take 12.5 mg by mouth 3 (three) times daily as needed.    melatonin 1 mg Tab Take 1 mg by mouth every evening.    mirabegron 50 mg Tb24 Take by mouth.    mirtazapine (REMERON) 7.5 MG Tab     multivitamin capsule Take 1 capsule by mouth once daily.    nystatin-triamcinolone (MYCOLOG) ointment 2 (two) times daily as needed.    polyethylene glycol (GLYCOLAX) 17 gram PwPk Take by mouth.    potassium chloride SA (K-DUR,KLOR-CON) 20 MEQ tablet Take 20 mEq by mouth 2 (two) times daily.    solifenacin (VESICARE) 10 MG tablet Take 5 mg by mouth once daily.    traMADol (ULTRAM) 50 mg tablet Take 1 tablet by mouth 3 (three) times daily as needed.    venlafaxine (EFFEXOR-XR) 150 MG Cp24 Take 75 mg  by mouth once daily.     Family History     Problem Relation (Age of Onset)    Cancer Mother    Heart disease Father    Hypertension         Tobacco Use    Smoking status: Never Smoker    Smokeless tobacco: Never Used   Substance and Sexual Activity    Alcohol use: No    Drug use: No    Sexual activity: Not Currently     Review of Systems   Constitutional: Negative for activity change, chills, diaphoresis, fatigue and fever.   HENT: Negative for congestion, trouble swallowing and voice change.    Eyes: Negative for photophobia and discharge.   Respiratory: Positive for cough (nonproductive) and shortness of breath. Negative for chest tightness and wheezing.    Cardiovascular: Negative for chest pain, palpitations and leg swelling.   Gastrointestinal: Negative for abdominal pain, blood in stool, constipation, diarrhea, nausea and vomiting.   Endocrine: Negative for cold intolerance, heat intolerance, polydipsia, polyphagia and polyuria.   Genitourinary: Positive for dysuria. Negative for difficulty urinating, flank pain, frequency and urgency.   Musculoskeletal: Negative for back pain, joint swelling and myalgias.   Skin: Negative for rash and wound.   Neurological: Negative for dizziness, seizures, syncope, facial asymmetry, weakness, light-headedness, numbness and headaches.   Psychiatric/Behavioral: Negative for confusion and hallucinations.     Objective:     Vital Signs (Most Recent):  Temp: 98.4 °F (36.9 °C) (12/15/20 0254)  Pulse: 62 (12/15/20 0254)  Resp: 16 (12/15/20 0254)  BP: (!) 150/67 (12/15/20 0254)  SpO2: 97 % (12/15/20 0254) Vital Signs (24h Range):  Temp:  [98.4 °F (36.9 °C)-101.4 °F (38.6 °C)] 98.4 °F (36.9 °C)  Pulse:  [62-86] 62  Resp:  [16-22] 16  SpO2:  [95 %-100 %] 97 %  BP: (150-170)/(67-92) 150/67     Weight: 68.7 kg (151 lb 7.3 oz)  Body mass index is 28.62 kg/m².    Physical Exam  Vitals signs reviewed.   Constitutional:       General: She is not in acute distress.     Appearance:  Normal appearance. She is normal weight. She is not toxic-appearing.   HENT:      Head: Normocephalic and atraumatic.      Nose: Nose normal. No congestion.      Mouth/Throat:      Mouth: Mucous membranes are moist.   Eyes:      General: No scleral icterus.     Pupils: Pupils are equal, round, and reactive to light.   Neck:      Musculoskeletal: Normal range of motion and neck supple. No muscular tenderness.   Cardiovascular:      Rate and Rhythm: Normal rate and regular rhythm.      Pulses: Normal pulses.      Heart sounds: Normal heart sounds.   Pulmonary:      Effort: Pulmonary effort is normal. No respiratory distress.      Breath sounds: Rhonchi (mildly coarse throughout) present. No wheezing.   Abdominal:      General: Abdomen is flat. Bowel sounds are normal. There is no distension.      Palpations: Abdomen is soft.      Tenderness: There is no abdominal tenderness. There is no rebound.   Musculoskeletal: Normal range of motion.         General: No deformity.      Right lower leg: No edema.      Left lower leg: No edema.   Skin:     General: Skin is warm and dry.      Capillary Refill: Capillary refill takes less than 2 seconds.      Coloration: Skin is not jaundiced.      Findings: No bruising or rash.   Neurological:      General: No focal deficit present.      Mental Status: She is alert and oriented to person, place, and time.      Cranial Nerves: No dysarthria.      Sensory: Sensation is intact. No sensory deficit.      Motor: Weakness (generalized) present. No tremor.   Psychiatric:         Mood and Affect: Mood normal.         Speech: Speech normal.         Behavior: Behavior normal.         Thought Content: Thought content normal.         Judgment: Judgment normal.      Comments: AAOX4, follows commands, converses appropriately          Significant Labs:   Results for orders placed or performed during the hospital encounter of 12/14/20   CBC auto differential   Result Value Ref Range    WBC 9.41 3.90  - 12.70 K/uL    RBC 2.38 (L) 4.00 - 5.40 M/uL    Hemoglobin 8.7 (L) 12.0 - 16.0 g/dL    Hematocrit 27.4 (L) 37.0 - 48.5 %     (H) 82 - 98 fL    MCH 36.6 (H) 27.0 - 31.0 pg    MCHC 31.8 (L) 32.0 - 36.0 g/dL    RDW SEE COMMENT 11.5 - 14.5 %    Platelets 401 (H) 150 - 350 K/uL    MPV 12.1 9.2 - 12.9 fL    Immature Granulocytes 0.5 0.0 - 0.5 %    Gran # (ANC) 6.9 1.8 - 7.7 K/uL    Immature Grans (Abs) 0.05 (H) 0.00 - 0.04 K/uL    Lymph # 1.5 1.0 - 4.8 K/uL    Mono # 0.9 0.3 - 1.0 K/uL    Eos # 0.1 0.0 - 0.5 K/uL    Baso # 0.04 0.00 - 0.20 K/uL    nRBC 0 0 /100 WBC    Gran % 73.7 (H) 38.0 - 73.0 %    Lymph % 15.8 (L) 18.0 - 48.0 %    Mono % 9.1 4.0 - 15.0 %    Eosinophil % 0.5 0.0 - 8.0 %    Basophil % 0.4 0.0 - 1.9 %    Platelet Estimate Appears normal     Aniso Slight     Poik Slight     Ovalocytes Occasional     Target Cells Occasional     Differential Method Automated    Comprehensive metabolic panel   Result Value Ref Range    Sodium 140 136 - 145 mmol/L    Potassium 4.3 3.5 - 5.1 mmol/L    Chloride 102 95 - 110 mmol/L    CO2 29 23 - 29 mmol/L    Glucose 125 (H) 70 - 110 mg/dL    BUN 10 8 - 23 mg/dL    Creatinine 1.0 0.5 - 1.4 mg/dL    Calcium 8.9 8.7 - 10.5 mg/dL    Total Protein 7.0 6.0 - 8.4 g/dL    Albumin 3.3 (L) 3.5 - 5.2 g/dL    Total Bilirubin 0.4 0.1 - 1.0 mg/dL    Alkaline Phosphatase 96 55 - 135 U/L    AST 17 10 - 40 U/L    ALT 11 10 - 44 U/L    Anion Gap 9 8 - 16 mmol/L    eGFR if African American 59 (A) >60 mL/min/1.73 m^2    eGFR if non African American 51 (A) >60 mL/min/1.73 m^2   Lactic acid, plasma   Result Value Ref Range    Lactate (Lactic Acid) 1.0 0.5 - 2.2 mmol/L   Troponin I   Result Value Ref Range    Troponin I 0.332 (H) 0.000 - 0.026 ng/mL   Brain natriuretic peptide   Result Value Ref Range    BNP 1,662 (H) 0 - 99 pg/mL   COVID-19 Rapid Screening   Result Value Ref Range    SARS-CoV-2 RNA, Amplification, Qual Negative Negative         Significant Imaging:   Imaging Results           X-Ray Chest AP Portable (In process) - 12/15/2020 at 12:42 AM: Per ED physician, pt's CXR results: Bilateral faint patchy infiltrates, L lower lobe effusion.              The EKG was ordered, reviewed, and independently interpreted by the ED provider.  Interpretation time: 0053  Rate: 79 BPM  Rhythm: Sinus rhythm with 1st degree AV block with premature atrial complexes with aberrant conduction  Interpretation: Left axis deviation. Nonspecific intraventricular block. Abnormal QRS-T angle, consider primary T wave abnormality. No STEMI.

## 2020-12-15 NOTE — ED NOTES
Bed: 09  Expected date:   Expected time:   Means of arrival: [facility]  Comments:  COLETTE

## 2020-12-15 NOTE — PLAN OF CARE
No adverse events during shift.  Remained free of falls. Call light in reach. Side rails x 2. IV abx administered per MAR. Ambulates with walker. Repositions in the bed independently. Purewick in place. Cont heart monitor initiated. NSR. VSS. Chart reviewed, will continue to monitor.

## 2020-12-15 NOTE — H&P
"[facility] Hospital Medicine  History & Physical    Patient Name: Love Davey  MRN: [id number]  Admission Date: 12/14/2020  Attending Physician: Harlan Atwood MD   Primary Care Provider: Kaley Law MD         Patient information was obtained from patient, past medical records and ER records.     Subjective:     Principal Problem:Community acquired pneumonia    Chief Complaint:   Chief Complaint   Patient presents with    Shortness of Breath        HPI: 88 y/o WF with hx of stroke, HTN, combined CHF, arthritis, insomnia, depression, anxiety, cardiomyopathy, chronic back pain, anemia, hysterectomy, cataract sx, AICD, appendectomy, asthma, dementia, hypothyroidism, OAB to ED with c/o gradually increasing SOB (worst on exertion) and nonproductive cough over the past 4 days. Also c/o dysuria - states she was diagnosed with a UTI at urgent care 1 week ago but her antibiotics have not come in the mail yet so she has not begun treatment. Symptoms are persistent and of moderate severity and without known mitigating factors. Denies chest pain, edema, palpitations, wheezing, orthopnea, abdominal pain, N/V/D, flank pain, hematuria, HA, dizziness, weakness, fever, cough, chills, falls/trauma, blurred vision, focal deficits. Pt was given tylenol, ASA, ibuprofen, vancomycin, zosyn in ED with improvement in temperature at 98.4 and BP to 150/67; WBCs 9.41, H&H 8.7&27.4, Na 140, K+ 4.3, creatinine 1.0, , BNP 1662, troponin 0.332, lactic acid 1.0; COVID-19 negative; EKG showed NSR with 1st degree AV block with PACS and aberrant conduction (79BPM); Cxray showed bilateral faint patchy infiltrates and LLL effusion. Pt was given tylenol, ASA, ibuprofen, vancomycin, zosyn in ED with improvement in BP to 150/67 and temperature to 98.4; pt states she is breathing "much better" at this time. Hospital medicine was called and the pt was placed in observation on telemetry monitoring. Pt is a full code " - surrogate decision maker is her , Alf Davey.     Past Medical History:   Diagnosis Date    Anemia     Anxiety     Arthritis     Asthma     Back pain     Cardiomyopathy     CHF (congestive heart failure)     Dementia     Depression     Dizziness     Hypertension     Hypothyroidism     Insomnia     OAB (overactive bladder)     Stroke     Use of cane as ambulatory aid        Past Surgical History:   Procedure Laterality Date    APPENDECTOMY      arthritis      BLADDER REPAIR      BUNIONECTOMY      CARDIAC DEFIBRILLATOR PLACEMENT      CATARACT EXTRACTION      HYSTERECTOMY      metal implant Bilateral     placed in the bladder.       Review of patient's allergies indicates:   Allergen Reactions    Bacitracin-polymyxin b Itching and Swelling    Merthiolate (thimerosal) Rash and Swelling    Diazepam      Hallucinations    Levofloxacin Itching    Metronidazole     Oxycodone Itching    Tizanidine      Hallucinations    Tramadol Itching    Cefdinir Itching     Pt not positive about allergy       No current facility-administered medications on file prior to encounter.      Current Outpatient Medications on File Prior to Encounter   Medication Sig    albuterol (PROAIR HFA) 90 mcg/actuation inhaler Inhale 2 puffs into the lungs every 6 (six) hours as needed for Wheezing.    amiodarone (PACERONE) 200 MG Tab Take 1 tablet by mouth once daily.    amlodipine (NORVASC) 10 MG tablet Take 10 mg by mouth once daily.    aripiprazole (ABILIFY) 10 MG Tab Take 5 mg by mouth once daily.    aspirin (ECOTRIN) 81 MG EC tablet Take 81 mg by mouth once daily.    atorvastatin (LIPITOR) 20 MG tablet Take 20 mg by mouth once daily.    busPIRone (BUSPAR) 15 MG tablet Take 1 tablet by mouth 2 (two) times daily.    carvedilol (COREG) 6.25 MG tablet Take 6.25 mg by mouth 2 (two) times daily with meals.    clopidogreL (PLAVIX) 75 mg tablet Take 1 tablet by mouth once daily.    cyanocobalamin  (VITAMIN B-12) 1000 MCG tablet Take 1 tablet by mouth once daily.    cycloSPORINE (RESTASIS) 0.05 % ophthalmic emulsion Apply 1 drop to eye.    donepezil (ARICEPT) 10 MG tablet Take 1 tablet by mouth every evening.    DULoxetine (CYMBALTA) 30 MG capsule Take 1 capsule by mouth once daily.    esomeprazole (NEXIUM) 40 MG capsule Take 40 mg by mouth before breakfast.    famotidine (PEPCID) 20 MG tablet Take 1 tablet (20 mg total) by mouth 2 (two) times daily as needed (heartburn).    ferrous sulfate 324 mg (65 mg iron) TbEC Take 65 mg by mouth once daily.    fluticasone (FLONASE) 50 mcg/actuation nasal spray 1 spray by Each Nare route once daily.    furosemide (LASIX) 40 MG tablet Take two tabs (80 mg) each morning and one tab (40 mg) each afternoon for five days. After five days, go back to your normal furosemide dosage.    glucosamine-chondroitin 500-400 mg tablet Take 1 tablet by mouth 2 (two) times daily.    Lactobacillus rhamnosus GG (CULTURELLE) 10 billion cell capsule Take 1 capsule by mouth once daily.    levothyroxine (SYNTHROID) 75 MCG tablet Take 1 tablet (75 mcg total) by mouth before breakfast. (Patient taking differently: Take 50 mcg by mouth before breakfast. )    losartan (COZAAR) 25 MG tablet Take 1 tablet by mouth once daily.    meclizine (ANTIVERT) 12.5 mg tablet Take 12.5 mg by mouth 3 (three) times daily as needed.    melatonin 1 mg Tab Take 1 mg by mouth every evening.    mirabegron 50 mg Tb24 Take by mouth.    mirtazapine (REMERON) 7.5 MG Tab     multivitamin capsule Take 1 capsule by mouth once daily.    nystatin-triamcinolone (MYCOLOG) ointment 2 (two) times daily as needed.    polyethylene glycol (GLYCOLAX) 17 gram PwPk Take by mouth.    potassium chloride SA (K-DUR,KLOR-CON) 20 MEQ tablet Take 20 mEq by mouth 2 (two) times daily.    solifenacin (VESICARE) 10 MG tablet Take 5 mg by mouth once daily.    traMADol (ULTRAM) 50 mg tablet Take 1 tablet by mouth 3 (three) times  daily as needed.    venlafaxine (EFFEXOR-XR) 150 MG Cp24 Take 75 mg by mouth once daily.     Family History     Problem Relation (Age of Onset)    Cancer Mother    Heart disease Father    Hypertension         Tobacco Use    Smoking status: Never Smoker    Smokeless tobacco: Never Used   Substance and Sexual Activity    Alcohol use: No    Drug use: No    Sexual activity: Not Currently     Review of Systems   Constitutional: Negative for activity change, chills, diaphoresis, fatigue and fever.   HENT: Negative for congestion, trouble swallowing and voice change.    Eyes: Negative for photophobia and discharge.   Respiratory: Positive for cough (nonproductive) and shortness of breath. Negative for chest tightness and wheezing.    Cardiovascular: Negative for chest pain, palpitations and leg swelling.   Gastrointestinal: Negative for abdominal pain, blood in stool, constipation, diarrhea, nausea and vomiting.   Endocrine: Negative for cold intolerance, heat intolerance, polydipsia, polyphagia and polyuria.   Genitourinary: Positive for dysuria. Negative for difficulty urinating, flank pain, frequency and urgency.   Musculoskeletal: Negative for back pain, joint swelling and myalgias.   Skin: Negative for rash and wound.   Neurological: Negative for dizziness, seizures, syncope, facial asymmetry, weakness, light-headedness, numbness and headaches.   Psychiatric/Behavioral: Negative for confusion and hallucinations.     Objective:     Vital Signs (Most Recent):  Temp: 98.4 °F (36.9 °C) (12/15/20 0254)  Pulse: 62 (12/15/20 0254)  Resp: 16 (12/15/20 0254)  BP: (!) 150/67 (12/15/20 0254)  SpO2: 97 % (12/15/20 0254) Vital Signs (24h Range):  Temp:  [98.4 °F (36.9 °C)-101.4 °F (38.6 °C)] 98.4 °F (36.9 °C)  Pulse:  [62-86] 62  Resp:  [16-22] 16  SpO2:  [95 %-100 %] 97 %  BP: (150-170)/(67-92) 150/67     Weight: 68.7 kg (151 lb 7.3 oz)  Body mass index is 28.62 kg/m².    Physical Exam  Vitals signs reviewed.    Constitutional:       General: She is not in acute distress.     Appearance: Normal appearance. She is normal weight. She is not toxic-appearing.   HENT:      Head: Normocephalic and atraumatic.      Nose: Nose normal. No congestion.      Mouth/Throat:      Mouth: Mucous membranes are moist.   Eyes:      General: No scleral icterus.     Pupils: Pupils are equal, round, and reactive to light.   Neck:      Musculoskeletal: Normal range of motion and neck supple. No muscular tenderness.   Cardiovascular:      Rate and Rhythm: Normal rate and regular rhythm.      Pulses: Normal pulses.      Heart sounds: Normal heart sounds.   Pulmonary:      Effort: Pulmonary effort is normal. No respiratory distress.      Breath sounds: Rhonchi (mildly coarse throughout) present. No wheezing.   Abdominal:      General: Abdomen is flat. Bowel sounds are normal. There is no distension.      Palpations: Abdomen is soft.      Tenderness: There is no abdominal tenderness. There is no rebound.   Musculoskeletal: Normal range of motion.         General: No deformity.      Right lower leg: No edema.      Left lower leg: No edema.   Skin:     General: Skin is warm and dry.      Capillary Refill: Capillary refill takes less than 2 seconds.      Coloration: Skin is not jaundiced.      Findings: No bruising or rash.   Neurological:      General: No focal deficit present.      Mental Status: She is alert and oriented to person, place, and time.      Cranial Nerves: No dysarthria.      Sensory: Sensation is intact. No sensory deficit.      Motor: Weakness (generalized) present. No tremor.   Psychiatric:         Mood and Affect: Mood normal.         Speech: Speech normal.         Behavior: Behavior normal.         Thought Content: Thought content normal.         Judgment: Judgment normal.      Comments: AAOX4, follows commands, converses appropriately          Significant Labs:   Results for orders placed or performed during the hospital encounter  of 12/14/20   CBC auto differential   Result Value Ref Range    WBC 9.41 3.90 - 12.70 K/uL    RBC 2.38 (L) 4.00 - 5.40 M/uL    Hemoglobin 8.7 (L) 12.0 - 16.0 g/dL    Hematocrit 27.4 (L) 37.0 - 48.5 %     (H) 82 - 98 fL    MCH 36.6 (H) 27.0 - 31.0 pg    MCHC 31.8 (L) 32.0 - 36.0 g/dL    RDW SEE COMMENT 11.5 - 14.5 %    Platelets 401 (H) 150 - 350 K/uL    MPV 12.1 9.2 - 12.9 fL    Immature Granulocytes 0.5 0.0 - 0.5 %    Gran # (ANC) 6.9 1.8 - 7.7 K/uL    Immature Grans (Abs) 0.05 (H) 0.00 - 0.04 K/uL    Lymph # 1.5 1.0 - 4.8 K/uL    Mono # 0.9 0.3 - 1.0 K/uL    Eos # 0.1 0.0 - 0.5 K/uL    Baso # 0.04 0.00 - 0.20 K/uL    nRBC 0 0 /100 WBC    Gran % 73.7 (H) 38.0 - 73.0 %    Lymph % 15.8 (L) 18.0 - 48.0 %    Mono % 9.1 4.0 - 15.0 %    Eosinophil % 0.5 0.0 - 8.0 %    Basophil % 0.4 0.0 - 1.9 %    Platelet Estimate Appears normal     Aniso Slight     Poik Slight     Ovalocytes Occasional     Target Cells Occasional     Differential Method Automated    Comprehensive metabolic panel   Result Value Ref Range    Sodium 140 136 - 145 mmol/L    Potassium 4.3 3.5 - 5.1 mmol/L    Chloride 102 95 - 110 mmol/L    CO2 29 23 - 29 mmol/L    Glucose 125 (H) 70 - 110 mg/dL    BUN 10 8 - 23 mg/dL    Creatinine 1.0 0.5 - 1.4 mg/dL    Calcium 8.9 8.7 - 10.5 mg/dL    Total Protein 7.0 6.0 - 8.4 g/dL    Albumin 3.3 (L) 3.5 - 5.2 g/dL    Total Bilirubin 0.4 0.1 - 1.0 mg/dL    Alkaline Phosphatase 96 55 - 135 U/L    AST 17 10 - 40 U/L    ALT 11 10 - 44 U/L    Anion Gap 9 8 - 16 mmol/L    eGFR if African American 59 (A) >60 mL/min/1.73 m^2    eGFR if non African American 51 (A) >60 mL/min/1.73 m^2   Lactic acid, plasma   Result Value Ref Range    Lactate (Lactic Acid) 1.0 0.5 - 2.2 mmol/L   Troponin I   Result Value Ref Range    Troponin I 0.332 (H) 0.000 - 0.026 ng/mL   Brain natriuretic peptide   Result Value Ref Range    BNP 1,662 (H) 0 - 99 pg/mL   COVID-19 Rapid Screening   Result Value Ref Range    SARS-CoV-2 RNA, Amplification,  Qual Negative Negative         Significant Imaging:   Imaging Results          X-Ray Chest AP Portable (In process) - 12/15/2020 at 12:42 AM: Per ED physician, pt's CXR results: Bilateral faint patchy infiltrates, L lower lobe effusion.              The EKG was ordered, reviewed, and independently interpreted by the ED provider.  Interpretation time: 0053  Rate: 79 BPM  Rhythm: Sinus rhythm with 1st degree AV block with premature atrial complexes with aberrant conduction  Interpretation: Left axis deviation. Nonspecific intraventricular block. Abnormal QRS-T angle, consider primary T wave abnormality. No STEMI.              Assessment/Plan:     * Community acquired pneumonia  Cxray with B faint patch infiltrates and LLL effusion per ED physician  BC x2 pending  Supplemental O2 prn  Duonebs scheduled and prn  Continue IV vancomycin/zosyn for now  Tylenol prn fever        Elevated troponin  Troponin 0.332 in ED - denies chest pain, EKG unrevealing  Cardiology consulted  Continuous cardiac monitoring  EKG prn  Trend troponin  ECHO pending      Acute on chronic combined systolic and diastolic heart failure  ECHO 03/11/2020 - EF 30%, Grade 1 diastolic dysfunction  BNP 1662, LLE pleural effusion on cxray  Cardiology consulted  Lasix 40mg IV BID pending cardiology input  Supplemental O2 prn  Monitor I&Os  Daily weights  Cardiac diet  Continue home medications  ECHO pending    UTI (urinary tract infection)  Reports diagnosis 1 week ago but has not begun treatment - abx have not arrived  Repeat UA pending  Continue IV zosyn for now  Monitor I&Os        HTN (hypertension)  Improved since ED arrival  Home meds resumed  Hydralazine IV prn SBP > 170  Cardiac diet  VS per unit routine  Monitor closely      Asthma  No evidence of acute exacerbation  Supplemental O2 prn   Duonebs scheduled and prn      Dementia  AAOx4 upon admission  Home medications continued  Reorient frequently  Fall precautions      Hypothyroidism  Resumed home  synthroid  Pt to f/u with PCP as outpatient        VTE Risk Mitigation (From admission, onward)         Ordered     Place sequential compression device  Until discontinued      12/15/20 [id number]                   Christelle Acevedo NP  Department of Hospital Medicine   [facility]

## 2020-12-15 NOTE — CONSULTS
[facility]  Cardiology  Consult Note    Patient Name: Love Davey  MRN: [id number]  Admission Date: 12/14/2020  Hospital Length of Stay: 0 days  Code Status: Full Code   Attending Provider: Alf Szymanski MD   Consulting Provider: YONATAN Blanca  Primary Care Physician: Kaley Law MD  Principal Problem:Community acquired pneumonia    Patient information was obtained from patient, past medical records and ER records.     Inpatient consult to Cardiology  Consult performed by: YONATAN Dang  Consult ordered by: Christelle Acevedo NP        Subjective:     Chief Complaint:  Shortness of breath     HPI:   Love Davey is a 87 year old female who presented to [facility] due to shortness of breath, nonproductive cough and dysuria. Her current medical conditions include h/o CVA, HTN, HLP, Chronic combined CHF, HFrEF of 30%, s/p ICD, Arthritis, insomnia, depression, anemia, OAB, recurrent UTI. ED workup revealed WBC 9k, H/H 8.7/27.4, K+ 4.3, Cr 1.0, BNP 1662, Troponin 0.332>0.259. She was placed in observation under the care of hospital medicine. Cardiology consulted to assist with medical management. Chart reviewed, patient seen and examined. Patient seems comfortable at time of exam today. No chest pain or anginal equivalents. NO shortness of breath, CHEW or palpitations. Trace LE edema today on exam. AM labs reviewed, H/H 7.7/25. She was started on IV ABX for UTI and CAP. Further recs to follow.     Past Medical History:   Diagnosis Date    Anemia     Anxiety     Arthritis     Asthma     Back pain     Cardiomyopathy     CHF (congestive heart failure)     Dementia     Depression     Dizziness     Hypertension     Hypothyroidism     Insomnia     OAB (overactive bladder)     Stroke     Use of cane as ambulatory aid        Past Surgical History:   Procedure Laterality Date    APPENDECTOMY      arthritis      BLADDER REPAIR      BUNIONECTOMY      CARDIAC  DEFIBRILLATOR PLACEMENT      CATARACT EXTRACTION      HYSTERECTOMY      metal implant Bilateral     placed in the bladder.       Review of patient's allergies indicates:   Allergen Reactions    Bacitracin-polymyxin b Itching and Swelling    Merthiolate (thimerosal) Rash and Swelling    Diazepam      Hallucinations    Levofloxacin Itching    Metronidazole     Oxycodone Itching    Tizanidine      Hallucinations    Tramadol Itching    Cefdinir Itching     Pt not positive about allergy       No current facility-administered medications on file prior to encounter.      Current Outpatient Medications on File Prior to Encounter   Medication Sig    gabapentin (NEURONTIN) 300 MG capsule Take 300 mg by mouth.    albuterol (PROAIR HFA) 90 mcg/actuation inhaler Inhale 2 puffs into the lungs every 6 (six) hours as needed for Wheezing.    amiodarone (PACERONE) 200 MG Tab Take 1 tablet by mouth once daily.    amlodipine (NORVASC) 10 MG tablet Take 10 mg by mouth once daily.    aripiprazole (ABILIFY) 10 MG Tab Take 5 mg by mouth once daily.    aspirin (ECOTRIN) 81 MG EC tablet Take 81 mg by mouth once daily.    atorvastatin (LIPITOR) 20 MG tablet Take 20 mg by mouth once daily.    busPIRone (BUSPAR) 15 MG tablet Take 1 tablet by mouth 2 (two) times daily.    carvedilol (COREG) 6.25 MG tablet Take 6.25 mg by mouth 2 (two) times daily with meals.    clopidogreL (PLAVIX) 75 mg tablet Take 1 tablet by mouth once daily.    cyanocobalamin (VITAMIN B-12) 1000 MCG tablet Take 1 tablet by mouth once daily.    cycloSPORINE (RESTASIS) 0.05 % ophthalmic emulsion Apply 1 drop to eye.    donepezil (ARICEPT) 10 MG tablet Take 1 tablet by mouth every evening.    DULoxetine (CYMBALTA) 30 MG capsule Take 1 capsule by mouth once daily.    esomeprazole (NEXIUM) 40 MG capsule Take 40 mg by mouth before breakfast.    famotidine (PEPCID) 20 MG tablet Take 1 tablet (20 mg total) by mouth 2 (two) times daily as needed  (heartburn).    ferrous sulfate 324 mg (65 mg iron) TbEC Take 65 mg by mouth once daily.    fluticasone (FLONASE) 50 mcg/actuation nasal spray 1 spray by Each Nare route once daily.    furosemide (LASIX) 40 MG tablet Take two tabs (80 mg) each morning and one tab (40 mg) each afternoon for five days. After five days, go back to your normal furosemide dosage.    glucosamine-chondroitin 500-400 mg tablet Take 1 tablet by mouth 2 (two) times daily.    Lactobacillus rhamnosus GG (CULTURELLE) 10 billion cell capsule Take 1 capsule by mouth once daily.    levothyroxine (SYNTHROID) 75 MCG tablet Take 1 tablet (75 mcg total) by mouth before breakfast. (Patient taking differently: Take 50 mcg by mouth before breakfast. )    losartan (COZAAR) 25 MG tablet Take 1 tablet by mouth once daily.    meclizine (ANTIVERT) 12.5 mg tablet Take 12.5 mg by mouth 3 (three) times daily as needed.    melatonin 1 mg Tab Take 1 mg by mouth every evening.    mirabegron 50 mg Tb24 Take by mouth.    mirtazapine (REMERON) 7.5 MG Tab     multivitamin capsule Take 1 capsule by mouth once daily.    nystatin-triamcinolone (MYCOLOG) ointment 2 (two) times daily as needed.    polyethylene glycol (GLYCOLAX) 17 gram PwPk Take by mouth.    potassium chloride SA (K-DUR,KLOR-CON) 20 MEQ tablet Take 20 mEq by mouth 2 (two) times daily.    solifenacin (VESICARE) 10 MG tablet Take 5 mg by mouth once daily.    traMADol (ULTRAM) 50 mg tablet Take 1 tablet by mouth 3 (three) times daily as needed.    venlafaxine (EFFEXOR-XR) 150 MG Cp24 Take 75 mg by mouth once daily.     Family History     Problem Relation (Age of Onset)    Cancer Mother    Heart disease Father    Hypertension         Tobacco Use    Smoking status: Never Smoker    Smokeless tobacco: Never Used   Substance and Sexual Activity    Alcohol use: No    Drug use: No    Sexual activity: Not Currently     Review of Systems   Constitution: Positive for malaise/fatigue.   HENT:  Negative for hearing loss and hoarse voice.    Eyes: Negative for blurred vision and visual disturbance.   Cardiovascular: Positive for dyspnea on exertion and orthopnea. Negative for chest pain, claudication, irregular heartbeat, leg swelling, near-syncope, palpitations, paroxysmal nocturnal dyspnea and syncope.   Respiratory: Positive for cough and shortness of breath. Negative for hemoptysis, sleep disturbances due to breathing, snoring and wheezing.    Endocrine: Negative for cold intolerance and heat intolerance.   Hematologic/Lymphatic: Bruises/bleeds easily.   Skin: Negative for color change, dry skin and nail changes.   Musculoskeletal: Positive for arthritis and back pain. Negative for joint pain and myalgias.   Gastrointestinal: Positive for bloating. Negative for abdominal pain, constipation, nausea and vomiting.   Genitourinary: Positive for dysuria. Negative for flank pain, hematuria and hesitancy.   Neurological: Negative for headaches, light-headedness, loss of balance, numbness, paresthesias and weakness.   Psychiatric/Behavioral: Negative for altered mental status.   Allergic/Immunologic: Negative for environmental allergies.     Objective:     Vital Signs (Most Recent):  Temp: 98 °F (36.7 °C) (12/15/20 0737)  Pulse: 62 (12/15/20 0737)  Resp: 17 (12/15/20 0737)  BP: (!) 140/63 (12/15/20 0737)  SpO2: 98 % (12/15/20 0737) Vital Signs (24h Range):  Temp:  [98 °F (36.7 °C)-101.4 °F (38.6 °C)] 98 °F (36.7 °C)  Pulse:  [62-86] 62  Resp:  [16-22] 17  SpO2:  [95 %-100 %] 98 %  BP: (140-170)/(63-92) 140/63     Weight: 64 kg (141 lb)  Body mass index is 26.64 kg/m².    SpO2: 98 %  O2 Device (Oxygen Therapy): nasal cannula      Intake/Output Summary (Last 24 hours) at 12/15/2020 1135  Last data filed at 12/15/2020 0043  Gross per 24 hour   Intake 100 ml   Output --   Net 100 ml       Lines/Drains/Airways     Drain            Female External Urinary Catheter 12/15/20 0258 less than 1 day          Peripheral  Intravenous Line                 Peripheral IV - Single Lumen 12/14/20 2354 20 G Left Forearm less than 1 day                Physical Exam   Constitutional: She is oriented to person, place, and time. She appears well-developed and well-nourished. No distress.   HENT:   Head: Normocephalic and atraumatic.   Eyes: Pupils are equal, round, and reactive to light.   Neck: Normal range of motion and full passive range of motion without pain. Neck supple. No JVD present. No thyromegaly present.   Cardiovascular: Normal rate, regular rhythm, S1 normal, S2 normal and intact distal pulses. PMI is not displaced. Exam reveals no distant heart sounds.   No murmur heard.  Pulses:       Radial pulses are 2+ on the right side and 2+ on the left side.        Dorsalis pedis pulses are 2+ on the right side and 2+ on the left side.   CHEST PAIN FREE  LEFT CHEST WALL ICD, WELL HEALED  NO RECENT ICD SHOCKS   Pulmonary/Chest: Effort normal and breath sounds normal. No accessory muscle usage. No respiratory distress. She has no decreased breath sounds. She has no wheezes. She has no rales.   Abdominal: Soft. Bowel sounds are normal. She exhibits no distension. There is no abdominal tenderness.   Musculoskeletal: Normal range of motion.         General: Edema present.      Right ankle: She exhibits no swelling.      Left ankle: She exhibits no swelling.   Neurological: She is alert and oriented to person, place, and time.   Skin: Skin is warm and dry. She is not diaphoretic. No cyanosis. Nails show no clubbing.   Psychiatric: She has a normal mood and affect. Her speech is normal and behavior is normal. Judgment and thought content normal. Cognition and memory are normal.   Nursing note and vitals reviewed.      Significant Labs:   Blood Culture: No results for input(s): LABBLOO in the last 48 hours., BMP:   Recent Labs   Lab 12/14/20  2352 12/15/20  0706   * 113*    139   K 4.3 3.9    102   CO2 29 30*   BUN 10 10    CREATININE 1.0 1.1   CALCIUM 8.9 8.5*   MG  --  2.4   , CBC   Recent Labs   Lab 12/14/20  2352 12/15/20  0706   WBC 9.41 6.31   HGB 8.7* 7.7*   HCT 27.4* 25.3*   * 328   , Troponin   Recent Labs   Lab 12/14/20  2352 12/15/20  0706   TROPONINI 0.332* 0.259*    and All pertinent lab results from the last 24 hours have been reviewed.    Significant Imaging: Echocardiogram:   Transthoracic echo (TTE) complete (Cupid Only):   Results for orders placed or performed during the hospital encounter of 12/14/20   Echo   Result Value Ref Range    BSA 1.66 m2    TDI SEPTAL 0.07 m/s    LV LATERAL E/E' RATIO 12.57 m/s    LV SEPTAL E/E' RATIO 12.57 m/s    LA WIDTH 3.11 cm    TDI LATERAL 0.07 m/s    LVIDd 4.56 3.5 - 6.0 cm    IVS 1.14 (A) 0.6 - 1.1 cm    Posterior Wall 1.36 (A) 0.6 - 1.1 cm    Ao root annulus 3.66 cm    LVIDs 4.19 (A) 2.1 - 4.0 cm    FS 8 28 - 44 %    LA volume 29.48 cm3    Sinus 3.81 cm    STJ 3.24 cm    Ascending aorta 3.50 cm    LV mass 214.49 g    LA size 3.15 cm    TAPSE 1.71 cm    Left Ventricle Relative Wall Thickness 0.60 cm    AV mean gradient 11 mmHg    AV valve area 1.82 cm2    AV Velocity Ratio 0.41     AV index (prosthetic) 0.49     E/A ratio 1.29     Mean e' 0.07 m/s    E wave decelartion time 172.41 msec    IVRT 91.34 msec    LVOT diameter 2.17 cm    LVOT area 3.7 cm2    LVOT peak jason 0.91 m/s    LVOT peak VTI 25.11 cm    Ao peak jason 2.21 m/s    Ao VTI 50.96 cm    RVOT peak jason 0.63 m/s    RVOT peak VTI 14.80 cm    LVOT stroke volume 92.82 cm3    AV peak gradient 20 mmHg    PV mean gradient 1 mmHg    E/E' ratio 12.57 m/s    MV Peak E Jason 0.88 m/s    TR Max Jason 3.41 m/s    MV Peak A Jason 0.68 m/s    LV Systolic Volume 78.22 mL    LV Systolic Volume Index 48.0 mL/m2    LV Diastolic Volume 95.30 mL    LV Diastolic Volume Index 58.53 mL/m2    LA Volume Index 18.1 mL/m2    LV Mass Index 132 g/m2    RA Major Axis 3.69 cm    Left Atrium Minor Axis 3.15 cm    Left Atrium Major Axis 4.04 cm     Triscuspid Valve Regurgitation Peak Gradient 47 mmHg    Right Atrial Pressure (from IVC) 3 mmHg    TV rest pulmonary artery pressure 50 mmHg    Narrative    · The left ventricle is mildly enlarged with severely decreased systolic   function. There is mild left ventricular concentric hypertrophy. The   estimated ejection fraction is 25%.  · Grade I left ventricular diastolic dysfunction.  · There is severe left ventricular global hypokinesis.  · Normal right ventricular size with low normal right ventricular systolic   function.  · There is mild aortic valve stenosis.  · Aortic valve area is 1.82 cm2; peak velocity is 2.21 m/s; mean gradient   is 11 mmHg.  · There is mild aortic regurgitation.  · There is moderate tricuspid valve regurgitation.  · Normal central venous pressure (3 mmHg).  · The estimated PA systolic pressure is 50 mmHg.  · There is pulmonary hypertension.  · There is a moderate left pleural effusion.           Assessment and Plan:     * Community acquired pneumonia  On IV ABX  Mgmt per primary team    UTI (urinary tract infection)  On IV ABX  Per Primary team    Anemia  Patient reports ongoing anemia issues  Followed by Outside Hem/Onc  Recommend transfuse 1 unit PRBC given worsening anemia issues  Likely worsening CHF exacerbation as well.     Elevated troponin  Troponin elevated but trending downward  Likely secondary to demand ischemia from Pneumonia and UTI   ECHO revealed EF 25%, Grade I DD  Chest pain free on exam    Hypothyroidism  Continue synthroid  Mgmt per primary team    Dementia  PER PRIMARY TEAM    Acute on chronic combined systolic and diastolic heart failure  BNP 1662  Continue IV diuresis  Continue Coreg, ARB  Followed by CARLOTA Ellison  Dash diet, 2 gm sodium restriction  1200 ml fluid restriction  Daily weights  Strict I/Os  Assess response    HTN (hypertension)  On medical therapy  Continue Coreg, ARB        VTE Risk Mitigation (From admission, onward)         Ordered     Place  sequential compression device  Until discontinued      12/15/20 1840                Thank you for your consult. I will follow-up with patient. Please contact us if you have any additional questions.    RAMAN Blanca-BARTOLOME  Cardiology   [facility]

## 2020-12-15 NOTE — PLAN OF CARE
Fall precautions maintained. Tolerating IV antibiotic. VSS. To receive a unit of RBC. NSR on cardiac monitor. Repositioned. All needs met. Will continue to monitor.

## 2020-12-15 NOTE — ASSESSMENT & PLAN NOTE
Reports diagnosis 1 week ago but has not begun treatment - abx have not arrived  Repeat UA pending  Continue IV zosyn for now  Monitor I&Os

## 2020-12-15 NOTE — ASSESSMENT & PLAN NOTE
Cxray with B faint patch infiltrates and LLL effusion per ED physician  BC x2 pending  Supplemental O2 prn  Duonebs scheduled and prn  Continue doxycycline and Rocephin  Tylenol prn fever

## 2020-12-15 NOTE — ASSESSMENT & PLAN NOTE
ECHO 03/11/2020 - EF 30%, Grade 1 diastolic dysfunction  BNP 1662, LLE pleural effusion on cxray  Cardiology consulted  Lasix 40mg IV BID pending cardiology input  Supplemental O2 prn  Monitor I&Os  Daily weights  Cardiac diet  Continue home medications  ECHO pending

## 2020-12-15 NOTE — ASSESSMENT & PLAN NOTE
Reports diagnosis 1 week ago but has not begun treatment - abx have not arrived  Repeat UA pending  IV Rocephin  Hx of e coli UTI  Monitor I&Os

## 2020-12-15 NOTE — ASSESSMENT & PLAN NOTE
Troponin elevated but trending downward  Likely secondary to demand ischemia from Pneumonia and UTI   ECHO revealed EF 25%, Grade I DD  Chest pain free on exam

## 2020-12-15 NOTE — HPI
Love Davey is a 87 year old female who presented to [facility] due to shortness of breath, nonproductive cough and dysuria. Her current medical conditions include h/o CVA, HTN, HLP, Chronic combined CHF, HFrEF of 30%, s/p ICD, Arthritis, insomnia, depression, anemia, OAB, recurrent UTI. ED workup revealed WBC 9k, H/H 8.7/27.4, K+ 4.3, Cr 1.0, BNP 1662, Troponin 0.332>0.259. She was placed in observation under the care of hospital medicine. Cardiology consulted to assist with medical management. Chart reviewed, patient seen and examined. Patient seems comfortable at time of exam today. No chest pain or anginal equivalents. NO shortness of breath, CHEW or palpitations. Trace LE edema today on exam. AM labs reviewed, H/H 7.7/25. She was started on IV ABX for UTI and CAP. Further recs to follow.

## 2020-12-15 NOTE — HPI
"88 y/o WF with hx of stroke, HTN, combined CHF, arthritis, insomnia, depression, anxiety, cardiomyopathy, chronic back pain, anemia, hysterectomy, cataract sx, AICD, appendectomy, asthma, dementia, hypothyroidism, OAB to ED with c/o gradually increasing SOB (worst on exertion) and nonproductive cough over the past 4 days. Also c/o dysuria - states she was diagnosed with a UTI at urgent care 1 week ago but her antibiotics have not come in the mail yet so she has not begun treatment. Symptoms are persistent and of moderate severity and without known mitigating factors. Denies chest pain, edema, palpitations, wheezing, orthopnea, abdominal pain, N/V/D, flank pain, hematuria, HA, dizziness, weakness, fever, cough, chills, falls/trauma, blurred vision, focal deficits. Pt was given tylenol, ASA, ibuprofen, vancomycin, zosyn in ED with improvement in temperature at 98.4 and BP to 150/67; WBCs 9.41, H&H 8.7&27.4, Na 140, K+ 4.3, creatinine 1.0, , BNP 1662, troponin 0.332, lactic acid 1.0; COVID-19 negative; EKG showed NSR with 1st degree AV block with PACS and aberrant conduction (79BPM); Cxray showed bilateral faint patchy infiltrates and LLL effusion. Pt was given tylenol, ASA, ibuprofen, vancomycin, zosyn in ED with improvement in BP to 150/67 and temperature to 98.4; pt states she is breathing "much better" at this time. Hospital medicine was called and the pt was placed in observation on telemetry monitoring. Pt is a full code - surrogate decision maker is her , Alf Davey.   "

## 2020-12-16 PROBLEM — I50.42 CHRONIC COMBINED SYSTOLIC AND DIASTOLIC HEART FAILURE: Chronic | Status: ACTIVE | Noted: 2020-01-04

## 2020-12-16 PROBLEM — D64.9 ANEMIA: Chronic | Status: ACTIVE | Noted: 2020-11-01

## 2020-12-16 PROBLEM — Z86.73 HISTORY OF STROKE: Chronic | Status: ACTIVE | Noted: 2020-08-28

## 2020-12-16 PROBLEM — I50.42 CHRONIC COMBINED SYSTOLIC AND DIASTOLIC HEART FAILURE: Status: ACTIVE | Noted: 2020-01-04

## 2020-12-16 PROBLEM — E03.9 HYPOTHYROIDISM: Chronic | Status: ACTIVE | Noted: 2020-03-12

## 2020-12-16 PROBLEM — J90 PLEURAL EFFUSION ON LEFT: Chronic | Status: ACTIVE | Noted: 2020-12-15

## 2020-12-16 PROBLEM — E87.8 ELECTROLYTE DISTURBANCE: Status: ACTIVE | Noted: 2020-12-16

## 2020-12-16 PROBLEM — I47.20 VENTRICULAR TACHYCARDIA: Status: ACTIVE | Noted: 2020-11-01

## 2020-12-16 LAB
ANION GAP SERPL CALC-SCNC: 12 MMOL/L (ref 8–16)
ANION GAP SERPL CALC-SCNC: 13 MMOL/L (ref 8–16)
ANISOCYTOSIS BLD QL SMEAR: SLIGHT
BACTERIA UR CULT: NORMAL
BASOPHILS # BLD AUTO: 0.03 K/UL (ref 0–0.2)
BASOPHILS NFR BLD: 0.3 % (ref 0–1.9)
BUN SERPL-MCNC: 13 MG/DL (ref 8–23)
BUN SERPL-MCNC: 13 MG/DL (ref 8–23)
CALCIUM SERPL-MCNC: 8.3 MG/DL (ref 8.7–10.5)
CALCIUM SERPL-MCNC: 8.6 MG/DL (ref 8.7–10.5)
CHLORIDE SERPL-SCNC: 98 MMOL/L (ref 95–110)
CHLORIDE SERPL-SCNC: 99 MMOL/L (ref 95–110)
CO2 SERPL-SCNC: 28 MMOL/L (ref 23–29)
CO2 SERPL-SCNC: 29 MMOL/L (ref 23–29)
CREAT SERPL-MCNC: 0.9 MG/DL (ref 0.5–1.4)
CREAT SERPL-MCNC: 0.9 MG/DL (ref 0.5–1.4)
DIFFERENTIAL METHOD: ABNORMAL
EOSINOPHIL # BLD AUTO: 0 K/UL (ref 0–0.5)
EOSINOPHIL NFR BLD: 0.2 % (ref 0–8)
ERYTHROCYTE [DISTWIDTH] IN BLOOD BY AUTOMATED COUNT: 23.6 % (ref 11.5–14.5)
EST. GFR  (AFRICAN AMERICAN): >60 ML/MIN/1.73 M^2
EST. GFR  (AFRICAN AMERICAN): >60 ML/MIN/1.73 M^2
EST. GFR  (NON AFRICAN AMERICAN): 58 ML/MIN/1.73 M^2
EST. GFR  (NON AFRICAN AMERICAN): 58 ML/MIN/1.73 M^2
GLUCOSE SERPL-MCNC: 109 MG/DL (ref 70–110)
GLUCOSE SERPL-MCNC: 125 MG/DL (ref 70–110)
HCT VFR BLD AUTO: 32 % (ref 37–48.5)
HGB BLD-MCNC: 10.5 G/DL (ref 12–16)
HYPOCHROMIA BLD QL SMEAR: ABNORMAL
IMM GRANULOCYTES # BLD AUTO: 0.1 K/UL (ref 0–0.04)
IMM GRANULOCYTES NFR BLD AUTO: 1 % (ref 0–0.5)
LYMPHOCYTES # BLD AUTO: 1.3 K/UL (ref 1–4.8)
LYMPHOCYTES NFR BLD: 12.2 % (ref 18–48)
MAGNESIUM SERPL-MCNC: 1.7 MG/DL (ref 1.6–2.6)
MAGNESIUM SERPL-MCNC: 2.6 MG/DL (ref 1.6–2.6)
MCH RBC QN AUTO: 35.1 PG (ref 27–31)
MCHC RBC AUTO-ENTMCNC: 32.8 G/DL (ref 32–36)
MCV RBC AUTO: 107 FL (ref 82–98)
MONOCYTES # BLD AUTO: 0.8 K/UL (ref 0.3–1)
MONOCYTES NFR BLD: 7.5 % (ref 4–15)
NEUTROPHILS # BLD AUTO: 8.3 K/UL (ref 1.8–7.7)
NEUTROPHILS NFR BLD: 78.8 % (ref 38–73)
NRBC BLD-RTO: 0 /100 WBC
OVALOCYTES BLD QL SMEAR: ABNORMAL
PLATELET # BLD AUTO: 370 K/UL (ref 150–350)
PLATELET BLD QL SMEAR: ABNORMAL
PMV BLD AUTO: 12.4 FL (ref 9.2–12.9)
POCT GLUCOSE: 156 MG/DL (ref 70–110)
POIKILOCYTOSIS BLD QL SMEAR: SLIGHT
POLYCHROMASIA BLD QL SMEAR: ABNORMAL
POTASSIUM SERPL-SCNC: 3.1 MMOL/L (ref 3.5–5.1)
POTASSIUM SERPL-SCNC: 4 MMOL/L (ref 3.5–5.1)
RBC # BLD AUTO: 2.99 M/UL (ref 4–5.4)
SODIUM SERPL-SCNC: 139 MMOL/L (ref 136–145)
SODIUM SERPL-SCNC: 140 MMOL/L (ref 136–145)
TARGETS BLD QL SMEAR: ABNORMAL
WBC # BLD AUTO: 10.45 K/UL (ref 3.9–12.7)

## 2020-12-16 PROCEDURE — 92960 CARDIOVERSION ELECTRIC EXT: CPT

## 2020-12-16 PROCEDURE — 99291 CRITICAL CARE FIRST HOUR: CPT | Mod: ,,, | Performed by: NURSE PRACTITIONER

## 2020-12-16 PROCEDURE — 85025 COMPLETE CBC W/AUTO DIFF WBC: CPT

## 2020-12-16 PROCEDURE — 99291 CRITICAL CARE FIRST HOUR: CPT | Mod: ,,, | Performed by: INTERNAL MEDICINE

## 2020-12-16 PROCEDURE — 36415 COLL VENOUS BLD VENIPUNCTURE: CPT

## 2020-12-16 PROCEDURE — 25000242 PHARM REV CODE 250 ALT 637 W/ HCPCS: Performed by: NURSE PRACTITIONER

## 2020-12-16 PROCEDURE — 83735 ASSAY OF MAGNESIUM: CPT | Mod: 91

## 2020-12-16 PROCEDURE — 36410 VNPNXR 3YR/> PHY/QHP DX/THER: CPT

## 2020-12-16 PROCEDURE — 99900035 HC TECH TIME PER 15 MIN (STAT)

## 2020-12-16 PROCEDURE — 94640 AIRWAY INHALATION TREATMENT: CPT

## 2020-12-16 PROCEDURE — 25000003 PHARM REV CODE 250: Performed by: NURSE PRACTITIONER

## 2020-12-16 PROCEDURE — 63600175 PHARM REV CODE 636 W HCPCS: Performed by: NURSE PRACTITIONER

## 2020-12-16 PROCEDURE — 80048 BASIC METABOLIC PNL TOTAL CA: CPT | Mod: 91

## 2020-12-16 PROCEDURE — 99291 PR CRITICAL CARE, E/M 30-74 MINUTES: ICD-10-PCS | Mod: ,,, | Performed by: NURSE PRACTITIONER

## 2020-12-16 PROCEDURE — 99291 PR CRITICAL CARE, E/M 30-74 MINUTES: ICD-10-PCS | Mod: ,,, | Performed by: INTERNAL MEDICINE

## 2020-12-16 PROCEDURE — 94761 N-INVAS EAR/PLS OXIMETRY MLT: CPT

## 2020-12-16 PROCEDURE — 83735 ASSAY OF MAGNESIUM: CPT

## 2020-12-16 PROCEDURE — 25000003 PHARM REV CODE 250: Performed by: INTERNAL MEDICINE

## 2020-12-16 PROCEDURE — 27000221 HC OXYGEN, UP TO 24 HOURS

## 2020-12-16 PROCEDURE — 63600175 PHARM REV CODE 636 W HCPCS: Performed by: INTERNAL MEDICINE

## 2020-12-16 PROCEDURE — 80048 BASIC METABOLIC PNL TOTAL CA: CPT

## 2020-12-16 PROCEDURE — 20000000 HC ICU ROOM

## 2020-12-16 RX ORDER — POTASSIUM CHLORIDE 7.45 MG/ML
10 INJECTION INTRAVENOUS ONCE
Status: COMPLETED | OUTPATIENT
Start: 2020-12-16 | End: 2020-12-16

## 2020-12-16 RX ORDER — SODIUM CHLORIDE 9 MG/ML
INJECTION, SOLUTION INTRAVENOUS CONTINUOUS
Status: DISCONTINUED | OUTPATIENT
Start: 2020-12-16 | End: 2020-12-17

## 2020-12-16 RX ORDER — POTASSIUM CHLORIDE 20 MEQ/1
40 TABLET, EXTENDED RELEASE ORAL ONCE
Status: COMPLETED | OUTPATIENT
Start: 2020-12-16 | End: 2020-12-16

## 2020-12-16 RX ORDER — MAGNESIUM SULFATE HEPTAHYDRATE 40 MG/ML
2 INJECTION, SOLUTION INTRAVENOUS ONCE
Status: COMPLETED | OUTPATIENT
Start: 2020-12-16 | End: 2020-12-16

## 2020-12-16 RX ORDER — HEPARIN SODIUM 5000 [USP'U]/ML
5000 INJECTION, SOLUTION INTRAVENOUS; SUBCUTANEOUS EVERY 8 HOURS
Status: DISCONTINUED | OUTPATIENT
Start: 2020-12-16 | End: 2020-12-18 | Stop reason: HOSPADM

## 2020-12-16 RX ORDER — MUPIROCIN 20 MG/G
OINTMENT TOPICAL 2 TIMES DAILY
Status: DISCONTINUED | OUTPATIENT
Start: 2020-12-16 | End: 2020-12-18 | Stop reason: HOSPADM

## 2020-12-16 RX ADMIN — POTASSIUM CHLORIDE 40 MEQ: 1500 TABLET, EXTENDED RELEASE ORAL at 10:12

## 2020-12-16 RX ADMIN — IPRATROPIUM BROMIDE AND ALBUTEROL SULFATE 3 ML: .5; 3 SOLUTION RESPIRATORY (INHALATION) at 07:12

## 2020-12-16 RX ADMIN — SODIUM CHLORIDE 75 ML/HR: 0.9 INJECTION, SOLUTION INTRAVENOUS at 12:12

## 2020-12-16 RX ADMIN — FAMOTIDINE 20 MG: 20 TABLET ORAL at 10:12

## 2020-12-16 RX ADMIN — ASPIRIN 81 MG: 81 TABLET, COATED ORAL at 10:12

## 2020-12-16 RX ADMIN — OXYBUTYNIN CHLORIDE 10 MG: 5 TABLET, EXTENDED RELEASE ORAL at 10:12

## 2020-12-16 RX ADMIN — DONEPEZIL HYDROCHLORIDE 10 MG: 5 TABLET, FILM COATED ORAL at 09:12

## 2020-12-16 RX ADMIN — ATORVASTATIN CALCIUM 20 MG: 10 TABLET, FILM COATED ORAL at 10:12

## 2020-12-16 RX ADMIN — HYDRALAZINE HYDROCHLORIDE 10 MG: 20 INJECTION INTRAMUSCULAR; INTRAVENOUS at 04:12

## 2020-12-16 RX ADMIN — CARVEDILOL 6.25 MG: 6.25 TABLET, FILM COATED ORAL at 04:12

## 2020-12-16 RX ADMIN — AMIODARONE HYDROCHLORIDE 0.5 MG/MIN: 1.8 INJECTION, SOLUTION INTRAVENOUS at 02:12

## 2020-12-16 RX ADMIN — BUSPIRONE HYDROCHLORIDE 15 MG: 10 TABLET ORAL at 09:12

## 2020-12-16 RX ADMIN — CARVEDILOL 6.25 MG: 6.25 TABLET, FILM COATED ORAL at 07:12

## 2020-12-16 RX ADMIN — MIRTAZAPINE 7.5 MG: 7.5 TABLET ORAL at 09:12

## 2020-12-16 RX ADMIN — LEVOTHYROXINE SODIUM 50 MCG: 50 TABLET ORAL at 05:12

## 2020-12-16 RX ADMIN — VENLAFAXINE HYDROCHLORIDE 75 MG: 75 CAPSULE, EXTENDED RELEASE ORAL at 10:12

## 2020-12-16 RX ADMIN — HEPARIN SODIUM 5000 UNITS: 5000 INJECTION INTRAVENOUS; SUBCUTANEOUS at 09:12

## 2020-12-16 RX ADMIN — CYANOCOBALAMIN TAB 1000 MCG 1000 MCG: 1000 TAB at 10:12

## 2020-12-16 RX ADMIN — CLOPIDOGREL 75 MG: 75 TABLET, FILM COATED ORAL at 10:12

## 2020-12-16 RX ADMIN — IPRATROPIUM BROMIDE AND ALBUTEROL SULFATE 3 ML: .5; 3 SOLUTION RESPIRATORY (INHALATION) at 01:12

## 2020-12-16 RX ADMIN — AMIODARONE HYDROCHLORIDE 1 MG/MIN: 1.8 INJECTION, SOLUTION INTRAVENOUS at 09:12

## 2020-12-16 RX ADMIN — THERA TABS 1 TABLET: TAB at 10:12

## 2020-12-16 RX ADMIN — POTASSIUM CHLORIDE 10 MEQ: 7.46 INJECTION, SOLUTION INTRAVENOUS at 10:12

## 2020-12-16 RX ADMIN — DOXYCYCLINE HYCLATE 100 MG: 100 TABLET, COATED ORAL at 09:12

## 2020-12-16 RX ADMIN — FUROSEMIDE 40 MG: 10 INJECTION, SOLUTION INTRAMUSCULAR; INTRAVENOUS at 09:12

## 2020-12-16 RX ADMIN — HEPARIN SODIUM 5000 UNITS: 5000 INJECTION INTRAVENOUS; SUBCUTANEOUS at 02:12

## 2020-12-16 RX ADMIN — ARIPIPRAZOLE 5 MG: 5 TABLET ORAL at 10:12

## 2020-12-16 RX ADMIN — MUPIROCIN: 20 OINTMENT TOPICAL at 09:12

## 2020-12-16 RX ADMIN — FUROSEMIDE 40 MG: 10 INJECTION, SOLUTION INTRAMUSCULAR; INTRAVENOUS at 10:12

## 2020-12-16 RX ADMIN — BUSPIRONE HYDROCHLORIDE 15 MG: 10 TABLET ORAL at 10:12

## 2020-12-16 RX ADMIN — GABAPENTIN 300 MG: 300 CAPSULE ORAL at 09:12

## 2020-12-16 RX ADMIN — DULOXETINE HYDROCHLORIDE 30 MG: 30 CAPSULE, DELAYED RELEASE ORAL at 10:12

## 2020-12-16 RX ADMIN — FLUTICASONE PROPIONATE 50 MCG: 50 SPRAY, METERED NASAL at 02:12

## 2020-12-16 RX ADMIN — FERROUS SULFATE TAB EC 325 MG (65 MG FE EQUIVALENT) 325 MG: 325 (65 FE) TABLET DELAYED RESPONSE at 10:12

## 2020-12-16 RX ADMIN — CEFTRIAXONE 1 G: 1 INJECTION, SOLUTION INTRAVENOUS at 04:12

## 2020-12-16 RX ADMIN — Medication 3 MG: at 09:12

## 2020-12-16 RX ADMIN — POTASSIUM CHLORIDE 10 MEQ: 7.46 INJECTION, SOLUTION INTRAVENOUS at 12:12

## 2020-12-16 RX ADMIN — DOXYCYCLINE HYCLATE 100 MG: 100 TABLET, COATED ORAL at 10:12

## 2020-12-16 RX ADMIN — MAGNESIUM SULFATE IN WATER 2 G: 40 INJECTION, SOLUTION INTRAVENOUS at 09:12

## 2020-12-16 RX ADMIN — DOCUSATE SODIUM 100 MG: 100 CAPSULE, LIQUID FILLED ORAL at 10:12

## 2020-12-16 RX ADMIN — LACTOBACILLUS TAB 1 TABLET: TAB at 10:12

## 2020-12-16 RX ADMIN — IPRATROPIUM BROMIDE AND ALBUTEROL SULFATE 3 ML: .5; 3 SOLUTION RESPIRATORY (INHALATION) at 12:12

## 2020-12-16 NOTE — ASSESSMENT & PLAN NOTE
Chronic  Hx multiple transfusions  Follow up outpt with Dr. Hauser  No active bleeding  Conservative transfusion protocol  On Ferrous sulfate and MVI

## 2020-12-16 NOTE — HOSPITAL COURSE
12/16/2020-Patient seen and examined in ICU this AM post Rapid response. Rapid response called this AM due to persistent VT in 170s on Med-Surg. Patient transferred to ICU for Amiodarone infusion. S/p DCCV x 1 this AM in ICU due to unstable VT with conversion to SR. Labs reviewed, K+ 3.1, Cr 0.9, Mag 1.7. Repletion ordered at this time in ICU. Further recs to follow.     12/17/2020-Patient seen and examined earlier today in ICU. Remains in SR. Transitioned to PO Amiodarone this AM. Feels good today on exam. Labs reviewed, K+ 3.5, Cr 0.9, Mag 2.0    12/18/2020--Patient seen and examined in ICU earlier this AM. Feels good today. Ok to discharge home from cardiology standpoint today. Needs OP follow up with DR Nassar after discharge.

## 2020-12-16 NOTE — NURSING
Pt began running Vtach on the monitor at 0821. Pt was responsive complaining of chest tightness and headache. Rapid Response was called. Vitals were BP 91/50 pulse 160 O2 98% on 2L nasal cannula. Vagal maneuver was performed at 0825 with no response BP 84/52 pulse 173. Pt was transferred to ICU at 0828. Report given to ICU nurse.

## 2020-12-16 NOTE — HOSPITAL COURSE
12/16 - Cards at bed side in ICU for Cardioversion to SR.  Patient awake, alert and responsive in no distress  12/17 - SR since cardioversion; no acute issues overnight; easily aroused this am and denies complaints

## 2020-12-16 NOTE — ASSESSMENT & PLAN NOTE
Currently on Donepezil, Effexor, Cymbalta, Buspirone, Abilify  Consider removing some meds defer to HM

## 2020-12-16 NOTE — CONSULTS
[facility]  Critical Care Medicine  Consult Note    Patient Name: Love Davey  MRN: [id number]  Admission Date: 12/14/2020  Hospital Length of Stay: 1 days  Code Status: Full Code  Attending Physician: Alf Szymanski MD   Primary Care Provider: Kaley Law MD   Principal Problem: Ventricular tachycardia      Subjective:     HPI:  Ms Davey is a 88 yo WF with a PMH of CVA, chronic Anemia, ROXANN/Depression, CHF w/ ICD in place, Dementia, Hypothyroidism and HTN.  She has had recent hospitalizations for malaise and anemia last month and August transfused both times.  Reportedly she was diagnosed with UTI 1 week ago and was still waiting on Antb to be delivered but then presented to [facility] ED early yesterday morning about MN with complaints of SOB and cough progressive over 4 days.  In ED /92 and temp 101.4 with + UA, mild elevation of troponin and persistent LLL pleural effusion on CXR.  She was admitted and started on Antb.  TTE revealed EF 25%.  Cards was following.  This AM she had rapid response called due to persistent VT in 170s on Med-Surg w/ pulse. Patient transferred to ICU for Amiodarone infusion. S/p DCCV x 1 this AM in ICU due to unstable VT with conversion to SR. Labs reviewed, K+ 3.1, Cr 0.9, Mag 1.7.    Hospital/ICU Course:  12/16 - Cards at bed side in ICU for Cardioversion to SR.  Patient awake, alert and responsive in no distress    Past Medical History:   Diagnosis Date    Anemia     Anxiety     Arthritis     Asthma     Back pain     Cardiomyopathy     CHF (congestive heart failure)     Dementia     Depression     Dizziness     Hypertension     Hypothyroidism     Insomnia     OAB (overactive bladder)     Stroke     Use of cane as ambulatory aid        Past Surgical History:   Procedure Laterality Date    APPENDECTOMY      arthritis      BLADDER REPAIR      BUNIONECTOMY      CARDIAC DEFIBRILLATOR PLACEMENT      CATARACT EXTRACTION       HYSTERECTOMY      metal implant Bilateral     placed in the bladder.       Review of patient's allergies indicates:   Allergen Reactions    Bacitracin-polymyxin b Itching and Swelling    Merthiolate (thimerosal) Rash and Swelling    Diazepam      Hallucinations    Levofloxacin Itching    Metronidazole     Oxycodone Itching    Tizanidine      Hallucinations    Tramadol Itching    Cefdinir Itching     Pt not positive about allergy       Family History     Problem Relation (Age of Onset)    Cancer Mother    Heart disease Father    Hypertension         Tobacco Use    Smoking status: Never Smoker    Smokeless tobacco: Never Used   Substance and Sexual Activity    Alcohol use: No    Drug use: No    Sexual activity: Not Currently         Review of Systems   Constitutional: Positive for activity change, appetite change and fatigue. Negative for chills, diaphoresis and fever.   HENT: Negative for congestion.    Respiratory: Negative for apnea, cough, shortness of breath and wheezing.    Cardiovascular: Positive for chest pain (post cardioversion). Negative for leg swelling.   Gastrointestinal: Negative for abdominal pain, diarrhea, nausea and vomiting.   Endocrine: Negative for polydipsia.   Genitourinary: Negative for difficulty urinating.   Musculoskeletal: Negative for myalgias.   Skin: Negative for color change and wound.   Allergic/Immunologic: Negative for immunocompromised state.   Neurological: Negative for dizziness, syncope and weakness.   Hematological: Does not bruise/bleed easily.   Psychiatric/Behavioral: Negative for agitation, confusion and hallucinations. The patient is nervous/anxious.      Objective:     Vital Signs (Most Recent):  Temp: 98.4 °F (36.9 °C) (12/16/20 0900)  Pulse: 74 (12/16/20 1000)  Resp: 20 (12/16/20 1000)  BP: (!) 113/57 (12/16/20 1000)  SpO2: 100 % (12/16/20 1000) Vital Signs (24h Range):  Temp:  [96.8 °F (36 °C)-99.3 °F (37.4 °C)] 98.4 °F (36.9 °C)  Pulse:  [54-91]  74  Resp:  [15-39] 20  SpO2:  [95 %-100 %] 100 %  BP: (112-180)/(56-78) 113/57     Weight: 61.3 kg (135 lb 2.3 oz)  Body mass index is 25.53 kg/m².      Intake/Output Summary (Last 24 hours) at 12/16/2020 1133  Last data filed at 12/16/2020 1000  Gross per 24 hour   Intake 330 ml   Output 1200 ml   Net -870 ml       Physical Exam  Vitals signs and nursing note reviewed.   Constitutional:       General: She is awake. She is not in acute distress.     Appearance: Normal appearance. She is well-developed and overweight. She is not ill-appearing, toxic-appearing or diaphoretic.      Interventions: She is not intubated.Nasal cannula in place.   HENT:      Head: Normocephalic and atraumatic.      Mouth/Throat:      Mouth: Mucous membranes are dry.   Eyes:      General: Lids are normal.      Pupils: Pupils are equal, round, and reactive to light.   Neck:      Musculoskeletal: Normal range of motion.      Vascular: No carotid bruit.      Trachea: Trachea normal.   Cardiovascular:      Rate and Rhythm: Normal rate and regular rhythm.      Pulses: Normal pulses.           Radial pulses are 2+ on the right side and 2+ on the left side.        Dorsalis pedis pulses are 2+ on the right side and 2+ on the left side.      Heart sounds: Murmur present. Systolic murmur present.   Pulmonary:      Effort: Pulmonary effort is normal. No tachypnea, accessory muscle usage or respiratory distress. She is not intubated.      Breath sounds: Examination of the left-lower field reveals decreased breath sounds. Decreased breath sounds present.   Chest:      Chest wall: No deformity or tenderness.   Abdominal:      General: Bowel sounds are normal. There is no distension.      Palpations: Abdomen is soft.      Tenderness: There is no abdominal tenderness.   Musculoskeletal: Normal range of motion.      Right lower leg: No edema.      Left lower leg: No edema.      Right foot: No deformity.      Left foot: No deformity.   Lymphadenopathy:       Cervical: No cervical adenopathy.   Skin:     General: Skin is warm and dry.      Capillary Refill: Capillary refill takes less than 2 seconds.      Findings: No rash.   Neurological:      Mental Status: She is alert and oriented to person, place, and time.   Psychiatric:         Attention and Perception: Attention normal.         Mood and Affect: Mood is anxious (mild).         Speech: Speech normal.         Behavior: Behavior normal. Behavior is cooperative.         Judgment: Judgment normal.         Vents:  Oxygen Concentration (%): 28 (12/15/20 1255)    Lines/Drains/Airways     Peripheral Intravenous Line                 Peripheral IV - Single Lumen 12/16/20 0900 Other (Comments) Right Upper Arm less than 1 day                Significant Labs:    CBC/Anemia Profile:  Recent Labs   Lab 12/14/20  2352 12/15/20  0706 12/16/20  0740   WBC 9.41 6.31 10.45   HGB 8.7* 7.7* 10.5*   HCT 27.4* 25.3* 32.0*   * 328 370*   * 116* 107*   RDW SEE COMMENT SEE COMMENT 23.6*        Chemistries:  Recent Labs   Lab 12/14/20  2352 12/15/20  0706 12/16/20  0740    139 140   K 4.3 3.9 3.1*    102 99   CO2 29 30* 29   BUN 10 10 13   CREATININE 1.0 1.1 0.9   CALCIUM 8.9 8.5* 8.6*   ALBUMIN 3.3*  --   --    PROT 7.0  --   --    BILITOT 0.4  --   --    ALKPHOS 96  --   --    ALT 11  --   --    AST 17  --   --    MG  --  2.4 1.7       Lactic Acid:   Recent Labs   Lab 12/14/20 2352   LACTATE 1.0     Troponin:   Recent Labs   Lab 12/14/20  2352 12/15/20  0706 12/15/20  1147   TROPONINI 0.332* 0.259* 0.202*     All pertinent labs within the past 24 hours have been reviewed.    Significant Imaging:   CXR: I have reviewed all pertinent results/findings within the past 24 hours and my personal findings are:  Small to moderate persistent LLL pleural effusion with compression atelectasis  Echo: I have reviewed all pertinent results/findings within the past 24 hours and my personal findings are:  TTE: EF 25% with DD1,  mild AS and PAP 50      Assessment/Plan:     Neuro  History of stroke  On ASA and Plavix    Dementia  Currently on Donepezil, Effexor, Cymbalta, Buspirone, Abilify  Consider removing some meds defer to HM    Pulmonary  Pleural effusion on left  Appears chronic in review of old films    Cardiac/Vascular  * Ventricular tachycardia  Cards following  Cardioversion upon arrival to ICU successful to SR  Started on Amiodarone infusion w/ bolus  Correcting electrolytes  ICU cardiac monitoring  Has ICD but VT rate < 180 therefore did not fire    Chronic combined systolic and diastolic heart failure  Cards following  On Coreg  Daily weights    Renal/  Electrolyte disturbance  Replacing K+ and Mg+  Cont Cardiac monitoring  Follow up and daily labs    UTI (urinary tract infection)  Rocephin Day #2  Blood and Urine cultures NGTD  Cont IVFs slow  Afeb w/ normal WBC    Oncology  Anemia  Chronic  Hx multiple transfusions  Follow up outpt with Dr. Hauser  No active bleeding  Conservative transfusion protocol  On Ferrous sulfate and MVI    Endocrine  Hypothyroidism  Cont Levothyroxine       Preventive Measures and Monitoring:   Stress Ulcer: Pepcid  Nutrition: Cardiac diet  Glucose control: stable  Bowel prophylaxis: Colace  DVT prophylaxis: SQ Hep/SCDs  Hx CAD on B-Blocker: Coreg  Head of Bed/Reposition: Elevate HOB and turn Q1-2 hours   Early Mobility: bed rest  IVAB Day: #2  Code Status: Full  Pneumonia Vaccine: UTD  Flu Vaccine: ordered    Counseling/Consultation:I have discussed the care of this patient in detail with the bedside nursing staff and Dr. Travis and Dr. Szymanski and Dr. Holt    Critical Care Time: 52 minutes  Critical secondary to Patient has a condition that poses threat to life and bodily function: Acute V-Tach in 170s S/P Cardioversion  Patient is currently on drug therapy requiring intensive monitoring for toxicity: Amiodarone bolus and infusion     Critical care was time spent personally by me on the  following activities: development of treatment plan with patient or surrogate and bedside caregivers, discussions with consultants, evaluation of patient's response to treatment, examination of patient, ordering and performing treatments and interventions, ordering and review of laboratory studies, ordering and review of radiographic studies, pulse oximetry, re-evaluation of patient's condition. This critical care time did not overlap with that of any other provider or involve time for any procedures.    Thank you for your consult. I will follow-up with patient. Please contact us if you have any additional questions.     Ken Beasley NP  Critical Care Medicine  [facility]

## 2020-12-16 NOTE — SUBJECTIVE & OBJECTIVE
Interval History: Rapid Response called this AM due to persistent VT and transferred to ICU. Unstable VT and underwent CV x 1 in ICU with conversion to SR in 80s. Continue IV Amiodarone infusion. Plan to replete K+ and Mag levels this AM. Continue ICU Cardiac Monitoring. Further recs to follow.     Review of Systems   Constitution: Positive for malaise/fatigue.   HENT: Negative for hearing loss and hoarse voice.    Eyes: Negative for blurred vision and visual disturbance.   Cardiovascular: Positive for dyspnea on exertion, irregular heartbeat, orthopnea and palpitations. Negative for chest pain, claudication, leg swelling, near-syncope, paroxysmal nocturnal dyspnea and syncope.   Respiratory: Positive for cough and shortness of breath. Negative for hemoptysis, sleep disturbances due to breathing, snoring and wheezing.    Endocrine: Negative for cold intolerance and heat intolerance.   Hematologic/Lymphatic: Bruises/bleeds easily (on ASA and Plavix).   Skin: Negative for color change, dry skin and nail changes.   Musculoskeletal: Positive for arthritis and back pain. Negative for joint pain and myalgias.   Gastrointestinal: Positive for bloating. Negative for abdominal pain, constipation, nausea and vomiting.   Genitourinary: Positive for dysuria. Negative for flank pain, hematuria and hesitancy.   Neurological: Positive for weakness. Negative for headaches, light-headedness, loss of balance, numbness and paresthesias.   Psychiatric/Behavioral: Negative for altered mental status.   Allergic/Immunologic: Negative for environmental allergies.     Objective:     Vital Signs (Most Recent):  Temp: 99.3 °F (37.4 °C) (12/16/20 0733)  Pulse: 74 (12/16/20 0736)  Resp: 18 (12/16/20 0736)  BP: (!) 156/70 (12/16/20 0733)  SpO2: 98 % (12/16/20 0736) Vital Signs (24h Range):  Temp:  [96.8 °F (36 °C)-99.3 °F (37.4 °C)] 99.3 °F (37.4 °C)  Pulse:  [54-91] 74  Resp:  [15-18] 18  SpO2:  [95 %-99 %] 98 %  BP: (112-180)/(57-78) 156/70      Weight: 61.3 kg (135 lb 2.3 oz)  Body mass index is 25.53 kg/m².     SpO2: 98 %  O2 Device (Oxygen Therapy): nasal cannula      Intake/Output Summary (Last 24 hours) at 12/16/2020 0850  Last data filed at 12/15/2020 1700  Gross per 24 hour   Intake 180 ml   Output 1200 ml   Net -1020 ml       Lines/Drains/Airways     Drain            Female External Urinary Catheter 12/15/20 0258 1 day          Peripheral Intravenous Line                 Peripheral IV - Single Lumen 12/15/20 1000 22 G Anterior;Right Wrist less than 1 day                Physical Exam   Constitutional: She is oriented to person, place, and time. She appears well-developed and well-nourished. No distress.   HENT:   Head: Normocephalic and atraumatic.   Eyes: Pupils are equal, round, and reactive to light.   Neck: Normal range of motion and full passive range of motion without pain. Neck supple. No JVD present. No thyromegaly present.   Cardiovascular: S1 normal, S2 normal and intact distal pulses. An irregularly irregular rhythm present. Tachycardia present. PMI is not displaced. Exam reveals no distant heart sounds.   No murmur heard.  Pulses:       Radial pulses are 2+ on the right side and 2+ on the left side.        Dorsalis pedis pulses are 2+ on the right side and 2+ on the left side.   CHEST PAIN FREE  LEFT CHEST WALL ICD, WELL HEALED  NO RECENT ICD SHOCKS    Remains in VT, 170s at time of exam today   Pulmonary/Chest: Effort normal and breath sounds normal. No accessory muscle usage. No respiratory distress. She has no decreased breath sounds. She has no wheezes. She has no rales.   Abdominal: Soft. Bowel sounds are normal. She exhibits no distension. There is no abdominal tenderness.   Musculoskeletal: Normal range of motion.         General: No edema.      Right ankle: She exhibits no swelling.      Left ankle: She exhibits no swelling.   Neurological: She is alert and oriented to person, place, and time.   Skin: Skin is warm and dry. She  is not diaphoretic. No cyanosis. Nails show no clubbing.   Psychiatric: She has a normal mood and affect. Her speech is normal and behavior is normal. Judgment and thought content normal. Cognition and memory are normal.   Nursing note and vitals reviewed.      Significant Labs:   Blood Culture:   Recent Labs   Lab 12/14/20  2352 12/15/20  0004   LABBLOO No Growth to date No Growth to date   , BMP:   Recent Labs   Lab 12/14/20  2352 12/15/20  0706 12/16/20  0740   * 113* 109    139 140   K 4.3 3.9 3.1*    102 99   CO2 29 30* 29   BUN 10 10 13   CREATININE 1.0 1.1 0.9   CALCIUM 8.9 8.5* 8.6*   MG  --  2.4 1.7   , CMP   Recent Labs   Lab 12/14/20  2352 12/15/20  0706 12/16/20  0740    139 140   K 4.3 3.9 3.1*    102 99   CO2 29 30* 29   * 113* 109   BUN 10 10 13   CREATININE 1.0 1.1 0.9   CALCIUM 8.9 8.5* 8.6*   PROT 7.0  --   --    ALBUMIN 3.3*  --   --    BILITOT 0.4  --   --    ALKPHOS 96  --   --    AST 17  --   --    ALT 11  --   --    ANIONGAP 9 7* 12   ESTGFRAFRICA 59* 52* >60   EGFRNONAA 51* 45* 58*   , CBC   Recent Labs   Lab 12/14/20  2352 12/15/20  0706 12/16/20  0740   WBC 9.41 6.31 10.45   HGB 8.7* 7.7* 10.5*   HCT 27.4* 25.3* 32.0*   * 328 370*   , Troponin   Recent Labs   Lab 12/14/20  2352 12/15/20  0706 12/15/20  1147   TROPONINI 0.332* 0.259* 0.202*    and All pertinent lab results from the last 24 hours have been reviewed.    Significant Imaging: Echocardiogram:   Transthoracic echo (TTE) complete (Cupid Only):   Results for orders placed or performed during the hospital encounter of 12/14/20   Echo   Result Value Ref Range    BSA 1.66 m2    TDI SEPTAL 0.07 m/s    LV LATERAL E/E' RATIO 12.57 m/s    LV SEPTAL E/E' RATIO 12.57 m/s    LA WIDTH 3.11 cm    TDI LATERAL 0.07 m/s    LVIDd 4.56 3.5 - 6.0 cm    IVS 1.14 (A) 0.6 - 1.1 cm    Posterior Wall 1.36 (A) 0.6 - 1.1 cm    Ao root annulus 3.66 cm    LVIDs 4.19 (A) 2.1 - 4.0 cm    FS 8 28 - 44 %    LA volume  29.48 cm3    Sinus 3.81 cm    STJ 3.24 cm    Ascending aorta 3.50 cm    LV mass 214.49 g    LA size 3.15 cm    TAPSE 1.71 cm    Left Ventricle Relative Wall Thickness 0.60 cm    AV mean gradient 11 mmHg    AV valve area 1.82 cm2    AV Velocity Ratio 0.41     AV index (prosthetic) 0.49     E/A ratio 1.29     Mean e' 0.07 m/s    E wave decelartion time 172.41 msec    IVRT 91.34 msec    LVOT diameter 2.17 cm    LVOT area 3.7 cm2    LVOT peak jason 0.91 m/s    LVOT peak VTI 25.11 cm    Ao peak jason 2.21 m/s    Ao VTI 50.96 cm    RVOT peak jason 0.63 m/s    RVOT peak VTI 14.80 cm    LVOT stroke volume 92.82 cm3    AV peak gradient 20 mmHg    PV mean gradient 1 mmHg    E/E' ratio 12.57 m/s    MV Peak E Jason 0.88 m/s    TR Max Jason 3.41 m/s    MV Peak A Jason 0.68 m/s    LV Systolic Volume 78.22 mL    LV Systolic Volume Index 48.0 mL/m2    LV Diastolic Volume 95.30 mL    LV Diastolic Volume Index 58.53 mL/m2    LA Volume Index 18.1 mL/m2    LV Mass Index 132 g/m2    RA Major Axis 3.69 cm    Left Atrium Minor Axis 3.15 cm    Left Atrium Major Axis 4.04 cm    Triscuspid Valve Regurgitation Peak Gradient 47 mmHg    Right Atrial Pressure (from IVC) 3 mmHg    TV rest pulmonary artery pressure 50 mmHg    Narrative    · The left ventricle is mildly enlarged with severely decreased systolic   function. There is mild left ventricular concentric hypertrophy. The   estimated ejection fraction is 25%.  · Grade I left ventricular diastolic dysfunction.  · There is severe left ventricular global hypokinesis.  · Normal right ventricular size with low normal right ventricular systolic   function.  · There is mild aortic valve stenosis.  · Aortic valve area is 1.82 cm2; peak velocity is 2.21 m/s; mean gradient   is 11 mmHg.  · There is mild aortic regurgitation.  · There is moderate tricuspid valve regurgitation.  · Normal central venous pressure (3 mmHg).  · The estimated PA systolic pressure is 50 mmHg.  · There is pulmonary hypertension.  ·  There is a moderate left pleural effusion.

## 2020-12-16 NOTE — ASSESSMENT & PLAN NOTE
BNP 1662  Continue IV diuresis  Continue Coreg, ARB  Followed by Dr Nassar, [facility]  Dash diet, 2 gm sodium restriction  1200 ml fluid restriction  Daily weights  Strict I/Os  Assess response    12/16  -s/p VT with CV x 1 today  -Hold ARB due to hypotension earlier today  -Continue Coreg as BP permits

## 2020-12-16 NOTE — ASSESSMENT & PLAN NOTE
ECHO 12/15/2020 - EF 25%, Grade 1 diastolic dysfunction  BNP 1662>>>> 1116, LLE pleural effusion on cxray  Cardiology consulted  Lasix 40mg IV BID pending cardiology input  Supplemental O2 prn  Monitor I&Os  Daily weights  Cardiac diet  Continue home medications  Amiodarone Gtt

## 2020-12-16 NOTE — PROGRESS NOTES
"[facility] Hospital Medicine  Progress Note    Patient Name: Love Davey  MRN: [id number]  Patient Class: IP- Inpatient   Admission Date: 12/14/2020  Length of Stay: 1 days  Attending Physician: Alf Szymanski MD  Primary Care Provider: Kaley Law MD        Subjective:     Principal Problem:UTI (urinary tract infection)        HPI:  88 y/o WF with hx of stroke, HTN, combined CHF, arthritis, insomnia, depression, anxiety, cardiomyopathy, chronic back pain, anemia, hysterectomy, cataract sx, AICD, appendectomy, asthma, dementia, hypothyroidism, OAB to ED with c/o gradually increasing SOB (worst on exertion) and nonproductive cough over the past 4 days. Also c/o dysuria - states she was diagnosed with a UTI at urgent care 1 week ago but her antibiotics have not come in the mail yet so she has not begun treatment. Symptoms are persistent and of moderate severity and without known mitigating factors. Denies chest pain, edema, palpitations, wheezing, orthopnea, abdominal pain, N/V/D, flank pain, hematuria, HA, dizziness, weakness, fever, cough, chills, falls/trauma, blurred vision, focal deficits. Pt was given tylenol, ASA, ibuprofen, vancomycin, zosyn in ED with improvement in temperature at 98.4 and BP to 150/67; WBCs 9.41, H&H 8.7&27.4, Na 140, K+ 4.3, creatinine 1.0, , BNP 1662, troponin 0.332, lactic acid 1.0; COVID-19 negative; EKG showed NSR with 1st degree AV block with PACS and aberrant conduction (79BPM); Cxray showed bilateral faint patchy infiltrates and LLL effusion. Pt was given tylenol, ASA, ibuprofen, vancomycin, zosyn in ED with improvement in BP to 150/67 and temperature to 98.4; pt states she is breathing "much better" at this time. Hospital medicine was called and the pt was placed in observation on telemetry monitoring. Pt is a full code - surrogate decision maker is her , Alf Davey.     Overview/Hospital Course:  Pt presented with fever and " determined to have pneumonia. CXR small left pleural effusion with LLL atelectasis and/or consolidation. Normal WBC, normal procal, pt on 2 L NC and Doxycycline and Rocephin. Also, pt with UTI - urine culture pending. Hx of E coli UTI. H/H 8.7/27.4 >> 7.7/25.3. She says she receives blood transfusions periodically. She denies any active bleeding. LLL pleural effusion - likely a degree of decompensated heart failure. BNP 1662 (was 1116). IV lasix in progress. Pt with elevated troponins 0.332, 0.202 and Cardiology feels that likely demand ischemia. Cardiology recommends 1 unit PRBCs transfusion due to cardiac history.   12/16 - Patient with a rapid response today found to be in SVT.Patient transferred to ICU 's. Plan per CARDS to initiate on amiodarone gtt. Patient access clotted off. Patient cardioverted now NSR. Gtt infusing. Patient improved and comfortable.     Interval History: Rapid Response with xfer to ICU. Found to be in VTACH. Cards on - Cardioverted. Amiodarone gtt.    Review of Systems   Unable to perform ROS: Acuity of condition     Objective:     Vital Signs (Most Recent):  Temp: 98.4 °F (36.9 °C) (12/16/20 0900)  Pulse: 74 (12/16/20 1000)  Resp: 20 (12/16/20 1000)  BP: (!) 113/57 (12/16/20 1000)  SpO2: 100 % (12/16/20 1000) Vital Signs (24h Range):  Temp:  [96.8 °F (36 °C)-99.3 °F (37.4 °C)] 98.4 °F (36.9 °C)  Pulse:  [54-91] 74  Resp:  [15-39] 20  SpO2:  [95 %-100 %] 100 %  BP: (112-180)/(56-78) 113/57     Weight: 61.3 kg (135 lb 2.3 oz)  Body mass index is 25.53 kg/m².    Intake/Output Summary (Last 24 hours) at 12/16/2020 1021  Last data filed at 12/16/2020 1000  Gross per 24 hour   Intake 330 ml   Output 1200 ml   Net -870 ml      Physical Exam  Vitals signs reviewed.   Constitutional:       General: She is not in acute distress.     Appearance: Normal appearance. She is normal weight. She is not toxic-appearing.   HENT:      Head: Normocephalic and atraumatic.      Nose: Nose normal. No  congestion.      Mouth/Throat:      Mouth: Mucous membranes are moist.   Eyes:      General: No scleral icterus.     Pupils: Pupils are equal, round, and reactive to light.   Neck:      Musculoskeletal: Normal range of motion and neck supple. No muscular tenderness.   Cardiovascular:      Rate and Rhythm: Regular rhythm. Tachycardia present.      Pulses: Normal pulses.      Heart sounds: Murmur present. Systolic murmur present with a grade of 3/6.   Pulmonary:      Effort: Pulmonary effort is normal. No respiratory distress.      Breath sounds: Rhonchi (mildly coarse throughout) present. No wheezing.   Abdominal:      General: Abdomen is flat. Bowel sounds are normal. There is no distension.      Palpations: Abdomen is soft.      Tenderness: There is no abdominal tenderness. There is no rebound.   Musculoskeletal: Normal range of motion.         General: No deformity.      Right lower leg: No edema.      Left lower leg: No edema.   Skin:     General: Skin is warm and dry.      Capillary Refill: Capillary refill takes less than 2 seconds.      Coloration: Skin is not jaundiced.      Findings: No bruising or rash.   Neurological:      General: No focal deficit present.      Mental Status: She is alert and oriented to person, place, and time.      Cranial Nerves: No dysarthria.      Sensory: Sensation is intact. No sensory deficit.      Motor: Weakness (generalized) present. No tremor.   Psychiatric:         Mood and Affect: Mood normal.         Speech: Speech normal.         Behavior: Behavior normal.         Thought Content: Thought content normal.         Judgment: Judgment normal.      Comments: AAOX4, follows commands, converses appropriately         Significant Labs:   BMP:   Recent Labs   Lab 12/16/20  0740         K 3.1*   CL 99   CO2 29   BUN 13   CREATININE 0.9   CALCIUM 8.6*   MG 1.7     CBC:   Recent Labs   Lab 12/14/20  2352 12/15/20  0706 12/16/20  0740   WBC 9.41 6.31 10.45   HGB 8.7* 7.7*  10.5*   HCT 27.4* 25.3* 32.0*   * 328 370*     CMP:   Recent Labs   Lab 12/14/20  2352 12/15/20  0706 12/16/20  0740    139 140   K 4.3 3.9 3.1*    102 99   CO2 29 30* 29   * 113* 109   BUN 10 10 13   CREATININE 1.0 1.1 0.9   CALCIUM 8.9 8.5* 8.6*   PROT 7.0  --   --    ALBUMIN 3.3*  --   --    BILITOT 0.4  --   --    ALKPHOS 96  --   --    AST 17  --   --    ALT 11  --   --    ANIONGAP 9 7* 12   EGFRNONAA 51* 45* 58*     All pertinent labs within the past 24 hours have been reviewed.    Significant Imaging: I have reviewed all pertinent imaging results/findings within the past 24 hours.      Assessment/Plan:      * UTI (urinary tract infection)  Reports diagnosis 1 week ago but has not begun treatment - abx have not arrived  Repeat UA pending  IV Rocephin  Hx of e coli UTI  Monitor I&Os        Pleural effusion on left  Continue IV diuresis  · ECHO - The left ventricle is mildly enlarged with severely decreased systolic function. There is mild left ventricular concentric hypertrophy. The estimated ejection fraction is 25%.  · Grade I left ventricular diastolic dysfunction.  There is severe left ventricular global hypokinesis.  According to ECHO moderate left pleural effusion -       Community acquired pneumonia  Cxray with B faint patch infiltrates and LLL effusion per ED physician  BC x2 pending  Supplemental O2 prn  Duonebs scheduled and prn  Continue doxycycline and Rocephin  Tylenol prn fever        Ventricular tachycardia  Cards  Amiodarone Gtt      Anemia  H/H 8.7/27.4>>> 7.7/25.3 - type and cross match and transfuse 1 unit PRBC  Hgb 10.5 today      Elevated troponin  Troponin 0.332 in ED - denies chest pain, EKG unrevealing  Cardiology consulted  troponins reflect demand ischemia secondary to infection  Continuous cardiac monitoring  EKG prn    ECHO  - EF 25 % and diastolic dysfunction      Hypothyroidism  Resumed home synthroid  Pt to f/u with PCP as outpatient      Dementia  AAOx4  upon admission  Home medications continued  Reorient frequently  Fall precautions      Acute on chronic combined systolic and diastolic heart failure  ECHO 12/15/2020 - EF 25%, Grade 1 diastolic dysfunction  BNP 1662>>>> 1116, LLE pleural effusion on cxray  Cardiology consulted  Lasix 40mg IV BID pending cardiology input  Supplemental O2 prn  Monitor I&Os  Daily weights  Cardiac diet  Continue home medications  Amiodarone Gtt      HTN (hypertension)  Improved since ED arrival  Home meds resumed  Hydralazine IV prn SBP > 170  Cardiac diet  VS per unit routine  Monitor closely      Asthma  No evidence of acute exacerbation  Supplemental O2 prn   Duonebs scheduled and prn        VTE Risk Mitigation (From admission, onward)         Ordered     heparin (porcine) injection 5,000 Units  Every 8 hours      12/16/20 0917     Place sequential compression device  Until discontinued      12/15/20 0345                Discharge Planning   ELISSA:      Code Status: Full Code   Is the patient medically ready for discharge?: No    Reason for patient still in hospital (select all that apply): Patient unstable, Patient trending condition, Consult recommendations and Pending disposition  Discharge Plan A: Home with family, Home Health            Critical care time spent on the evaluation and treatment of severe organ dysfunction, review of pertinent labs and imaging studies, discussions with consulting providers and discussions with patient/family: 45 minutes.      Alf Szymanski MD  Department of Hospital Medicine   [facility]

## 2020-12-16 NOTE — ASSESSMENT & PLAN NOTE
Rapid response due to Sustained unstable VT this AM  Transferred to ICU  Underwent CV x 1 with conversion to SR in 80s  Continue IV Amiodarone infusion today  Continue Coreg as BP permits  ICU Cardiac Monitoring  Keep K+>4 and Mag >2  Assess response

## 2020-12-16 NOTE — PLAN OF CARE
No adverse events during shift.  Remained free of falls. Call light in reach. Side rails x 2.  Repositions in the bed independently. Turn Q2. Purewick in place. Cont heart monitor initiated. NSR. 1 unit of blood administered. VSS. Chart reviewed, will continue to monitor.

## 2020-12-16 NOTE — PROGRESS NOTES
[facility]  Cardiology  Progress Note    Patient Name: Love Davey  MRN: [id number]  Admission Date: 12/14/2020  Hospital Length of Stay: 1 days  Code Status: Full Code   Attending Physician: Alf Szymanski MD   Primary Care Physician: Kaley Law MD  Expected Discharge Date:   Principal Problem:UTI (urinary tract infection)    Subjective:   HPI      Love Davey is a 87 year old female who presented to [facility] due to shortness of breath, nonproductive cough and dysuria. Her current medical conditions include h/o CVA, HTN, HLP, Chronic combined CHF, HFrEF of 30%, s/p ICD, Arthritis, insomnia, depression, anemia, OAB, recurrent UTI. ED workup revealed WBC 9k, H/H 8.7/27.4, K+ 4.3, Cr 1.0, BNP 1662, Troponin 0.332>0.259. She was placed in observation under the care of hospital medicine. Cardiology consulted to assist with medical management. Chart reviewed, patient seen and examined. Patient seems comfortable at time of exam today. No chest pain or anginal equivalents. NO shortness of breath, CHEW or palpitations. Trace LE edema today on exam. AM labs reviewed, H/H 7.7/25. She was started on IV ABX for UTI and CAP. Further recs to follow.       Hospital Course:   12/16/2020-Patient seen and examined in ICU this AM post Rapid response. Rapid response called this AM due to persistent VT in 170s on Med-Surg. Patient transferred to ICU for Amiodarone infusion. S/p DCCV x 1 this AM in ICU due to unstable VT with conversion to SR. Labs reviewed, K+ 3.1, Cr 0.9, Mag 1.7. Repletion ordered at this time in ICU. Further recs to follow.     Interval History: Rapid Response called this AM due to persistent VT and transferred to ICU. Unstable VT and underwent CV x 1 in ICU with conversion to SR in 80s. Continue IV Amiodarone infusion. Plan to replete K+ and Mag levels this AM. Continue ICU Cardiac Monitoring. Further recs to follow.     Review of Systems   Constitution: Positive for  malaise/fatigue.   HENT: Negative for hearing loss and hoarse voice.    Eyes: Negative for blurred vision and visual disturbance.   Cardiovascular: Positive for dyspnea on exertion, irregular heartbeat, orthopnea and palpitations. Negative for chest pain, claudication, leg swelling, near-syncope, paroxysmal nocturnal dyspnea and syncope.   Respiratory: Positive for cough and shortness of breath. Negative for hemoptysis, sleep disturbances due to breathing, snoring and wheezing.    Endocrine: Negative for cold intolerance and heat intolerance.   Hematologic/Lymphatic: Bruises/bleeds easily (on ASA and Plavix).   Skin: Negative for color change, dry skin and nail changes.   Musculoskeletal: Positive for arthritis and back pain. Negative for joint pain and myalgias.   Gastrointestinal: Positive for bloating. Negative for abdominal pain, constipation, nausea and vomiting.   Genitourinary: Positive for dysuria. Negative for flank pain, hematuria and hesitancy.   Neurological: Positive for weakness. Negative for headaches, light-headedness, loss of balance, numbness and paresthesias.   Psychiatric/Behavioral: Negative for altered mental status.   Allergic/Immunologic: Negative for environmental allergies.     Objective:     Vital Signs (Most Recent):  Temp: 99.3 °F (37.4 °C) (12/16/20 0733)  Pulse: 74 (12/16/20 0736)  Resp: 18 (12/16/20 0736)  BP: (!) 156/70 (12/16/20 0733)  SpO2: 98 % (12/16/20 0736) Vital Signs (24h Range):  Temp:  [96.8 °F (36 °C)-99.3 °F (37.4 °C)] 99.3 °F (37.4 °C)  Pulse:  [54-91] 74  Resp:  [15-18] 18  SpO2:  [95 %-99 %] 98 %  BP: (112-180)/(57-78) 156/70     Weight: 61.3 kg (135 lb 2.3 oz)  Body mass index is 25.53 kg/m².     SpO2: 98 %  O2 Device (Oxygen Therapy): nasal cannula      Intake/Output Summary (Last 24 hours) at 12/16/2020 0850  Last data filed at 12/15/2020 1700  Gross per 24 hour   Intake 180 ml   Output 1200 ml   Net -1020 ml       Lines/Drains/Airways     Drain            Female  External Urinary Catheter 12/15/20 0258 1 day          Peripheral Intravenous Line                 Peripheral IV - Single Lumen 12/15/20 1000 22 G Anterior;Right Wrist less than 1 day                Physical Exam   Constitutional: She is oriented to person, place, and time. She appears well-developed and well-nourished. No distress.   HENT:   Head: Normocephalic and atraumatic.   Eyes: Pupils are equal, round, and reactive to light.   Neck: Normal range of motion and full passive range of motion without pain. Neck supple. No JVD present. No thyromegaly present.   Cardiovascular: S1 normal, S2 normal and intact distal pulses. An irregularly irregular rhythm present. Tachycardia present. PMI is not displaced. Exam reveals no distant heart sounds.   No murmur heard.  Pulses:       Radial pulses are 2+ on the right side and 2+ on the left side.        Dorsalis pedis pulses are 2+ on the right side and 2+ on the left side.   CHEST PAIN FREE  LEFT CHEST WALL ICD, WELL HEALED  NO RECENT ICD SHOCKS    Remains in VT, 170s at time of exam today   Pulmonary/Chest: Effort normal and breath sounds normal. No accessory muscle usage. No respiratory distress. She has no decreased breath sounds. She has no wheezes. She has no rales.   Abdominal: Soft. Bowel sounds are normal. She exhibits no distension. There is no abdominal tenderness.   Musculoskeletal: Normal range of motion.         General: No edema.      Right ankle: She exhibits no swelling.      Left ankle: She exhibits no swelling.   Neurological: She is alert and oriented to person, place, and time.   Skin: Skin is warm and dry. She is not diaphoretic. No cyanosis. Nails show no clubbing.   Psychiatric: She has a normal mood and affect. Her speech is normal and behavior is normal. Judgment and thought content normal. Cognition and memory are normal.   Nursing note and vitals reviewed.      Significant Labs:   Blood Culture:   Recent Labs   Lab 12/14/20  0732 12/15/20  0004    LABBLOO No Growth to date No Growth to date   , BMP:   Recent Labs   Lab 12/14/20 2352 12/15/20  0706 12/16/20  0740   * 113* 109    139 140   K 4.3 3.9 3.1*    102 99   CO2 29 30* 29   BUN 10 10 13   CREATININE 1.0 1.1 0.9   CALCIUM 8.9 8.5* 8.6*   MG  --  2.4 1.7   , CMP   Recent Labs   Lab 12/14/20 2352 12/15/20  0706 12/16/20  0740    139 140   K 4.3 3.9 3.1*    102 99   CO2 29 30* 29   * 113* 109   BUN 10 10 13   CREATININE 1.0 1.1 0.9   CALCIUM 8.9 8.5* 8.6*   PROT 7.0  --   --    ALBUMIN 3.3*  --   --    BILITOT 0.4  --   --    ALKPHOS 96  --   --    AST 17  --   --    ALT 11  --   --    ANIONGAP 9 7* 12   ESTGFRAFRICA 59* 52* >60   EGFRNONAA 51* 45* 58*   , CBC   Recent Labs   Lab 12/14/20  2352 12/15/20  0706 12/16/20  0740   WBC 9.41 6.31 10.45   HGB 8.7* 7.7* 10.5*   HCT 27.4* 25.3* 32.0*   * 328 370*   , Troponin   Recent Labs   Lab 12/14/20  2352 12/15/20  0706 12/15/20  1147   TROPONINI 0.332* 0.259* 0.202*    and All pertinent lab results from the last 24 hours have been reviewed.    Significant Imaging: Echocardiogram:   Transthoracic echo (TTE) complete (Cupid Only):   Results for orders placed or performed during the hospital encounter of 12/14/20   Echo   Result Value Ref Range    BSA 1.66 m2    TDI SEPTAL 0.07 m/s    LV LATERAL E/E' RATIO 12.57 m/s    LV SEPTAL E/E' RATIO 12.57 m/s    LA WIDTH 3.11 cm    TDI LATERAL 0.07 m/s    LVIDd 4.56 3.5 - 6.0 cm    IVS 1.14 (A) 0.6 - 1.1 cm    Posterior Wall 1.36 (A) 0.6 - 1.1 cm    Ao root annulus 3.66 cm    LVIDs 4.19 (A) 2.1 - 4.0 cm    FS 8 28 - 44 %    LA volume 29.48 cm3    Sinus 3.81 cm    STJ 3.24 cm    Ascending aorta 3.50 cm    LV mass 214.49 g    LA size 3.15 cm    TAPSE 1.71 cm    Left Ventricle Relative Wall Thickness 0.60 cm    AV mean gradient 11 mmHg    AV valve area 1.82 cm2    AV Velocity Ratio 0.41     AV index (prosthetic) 0.49     E/A ratio 1.29     Mean e' 0.07 m/s    E wave decelartion  time 172.41 msec    IVRT 91.34 msec    LVOT diameter 2.17 cm    LVOT area 3.7 cm2    LVOT peak jason 0.91 m/s    LVOT peak VTI 25.11 cm    Ao peak jason 2.21 m/s    Ao VTI 50.96 cm    RVOT peak jason 0.63 m/s    RVOT peak VTI 14.80 cm    LVOT stroke volume 92.82 cm3    AV peak gradient 20 mmHg    PV mean gradient 1 mmHg    E/E' ratio 12.57 m/s    MV Peak E Jason 0.88 m/s    TR Max Jason 3.41 m/s    MV Peak A Jason 0.68 m/s    LV Systolic Volume 78.22 mL    LV Systolic Volume Index 48.0 mL/m2    LV Diastolic Volume 95.30 mL    LV Diastolic Volume Index 58.53 mL/m2    LA Volume Index 18.1 mL/m2    LV Mass Index 132 g/m2    RA Major Axis 3.69 cm    Left Atrium Minor Axis 3.15 cm    Left Atrium Major Axis 4.04 cm    Triscuspid Valve Regurgitation Peak Gradient 47 mmHg    Right Atrial Pressure (from IVC) 3 mmHg    TV rest pulmonary artery pressure 50 mmHg    Narrative    · The left ventricle is mildly enlarged with severely decreased systolic   function. There is mild left ventricular concentric hypertrophy. The   estimated ejection fraction is 25%.  · Grade I left ventricular diastolic dysfunction.  · There is severe left ventricular global hypokinesis.  · Normal right ventricular size with low normal right ventricular systolic   function.  · There is mild aortic valve stenosis.  · Aortic valve area is 1.82 cm2; peak velocity is 2.21 m/s; mean gradient   is 11 mmHg.  · There is mild aortic regurgitation.  · There is moderate tricuspid valve regurgitation.  · Normal central venous pressure (3 mmHg).  · The estimated PA systolic pressure is 50 mmHg.  · There is pulmonary hypertension.  · There is a moderate left pleural effusion.        Assessment and Plan:       * UTI (urinary tract infection)  On IV ABX  Per Primary team    Electrolyte disturbance  Keep K+>4 and Mag >2  Repletion in progress  Repeat BMP, Mag later today    Community acquired pneumonia  On IV ABX  Mgmt per primary team    Ventricular tachycardia  Rapid response due  to Sustained unstable VT this AM  Transferred to ICU  Underwent CV x 1 with conversion to SR in 80s  Continue IV Amiodarone infusion today  Continue Coreg as BP permits  ICU Cardiac Monitoring  Keep K+>4 and Mag >2  Assess response    Anemia  Patient reports ongoing anemia issues  Followed by Outside Hem/Onc  Recommend transfuse 1 unit PRBC given worsening anemia issues  Likely worsening CHF exacerbation as well.     Elevated troponin  Troponin elevated but trending downward  Likely secondary to demand ischemia from Pneumonia and UTI   ECHO revealed EF 25%, Grade I DD  Chest pain free on exam    Hypothyroidism  Continue synthroid  Mgmt per primary team    Dementia  PER PRIMARY TEAM    Acute on chronic combined systolic and diastolic heart failure  BNP 1662  Continue IV diuresis  Continue Coreg, ARB  Followed by Dr Nassar, [facility]  Dash diet, 2 gm sodium restriction  1200 ml fluid restriction  Daily weights  Strict I/Os  Assess response    12/16  -s/p VT with CV x 1 today  -Hold ARB due to hypotension earlier today  -Continue Coreg as BP permits    HTN (hypertension)  May need to hold ARB given unstable VT episode this AM and hypotension  Continue Coreg as BP permits        VTE Risk Mitigation (From admission, onward)         Ordered     Place sequential compression device  Until discontinued      12/15/20 8145                Daxa Zepeda, MICHELLEP-C  Cardiology  [facility]

## 2020-12-16 NOTE — SUBJECTIVE & OBJECTIVE
Past Medical History:   Diagnosis Date    Anemia     Anxiety     Arthritis     Asthma     Back pain     Cardiomyopathy     CHF (congestive heart failure)     Dementia     Depression     Dizziness     Hypertension     Hypothyroidism     Insomnia     OAB (overactive bladder)     Stroke     Use of cane as ambulatory aid        Past Surgical History:   Procedure Laterality Date    APPENDECTOMY      arthritis      BLADDER REPAIR      BUNIONECTOMY      CARDIAC DEFIBRILLATOR PLACEMENT      CATARACT EXTRACTION      HYSTERECTOMY      metal implant Bilateral     placed in the bladder.       Review of patient's allergies indicates:   Allergen Reactions    Bacitracin-polymyxin b Itching and Swelling    Merthiolate (thimerosal) Rash and Swelling    Diazepam      Hallucinations    Levofloxacin Itching    Metronidazole     Oxycodone Itching    Tizanidine      Hallucinations    Tramadol Itching    Cefdinir Itching     Pt not positive about allergy       Family History     Problem Relation (Age of Onset)    Cancer Mother    Heart disease Father    Hypertension         Tobacco Use    Smoking status: Never Smoker    Smokeless tobacco: Never Used   Substance and Sexual Activity    Alcohol use: No    Drug use: No    Sexual activity: Not Currently         Review of Systems   Constitutional: Positive for activity change, appetite change and fatigue. Negative for chills, diaphoresis and fever.   HENT: Negative for congestion.    Respiratory: Negative for apnea, cough, shortness of breath and wheezing.    Cardiovascular: Positive for chest pain (post cardioversion). Negative for leg swelling.   Gastrointestinal: Negative for abdominal pain, diarrhea, nausea and vomiting.   Endocrine: Negative for polydipsia.   Genitourinary: Negative for difficulty urinating.   Musculoskeletal: Negative for myalgias.   Skin: Negative for color change and wound.   Allergic/Immunologic: Negative for immunocompromised  state.   Neurological: Negative for dizziness, syncope and weakness.   Hematological: Does not bruise/bleed easily.   Psychiatric/Behavioral: Negative for agitation, confusion and hallucinations. The patient is nervous/anxious.      Objective:     Vital Signs (Most Recent):  Temp: 98.4 °F (36.9 °C) (12/16/20 0900)  Pulse: 74 (12/16/20 1000)  Resp: 20 (12/16/20 1000)  BP: (!) 113/57 (12/16/20 1000)  SpO2: 100 % (12/16/20 1000) Vital Signs (24h Range):  Temp:  [96.8 °F (36 °C)-99.3 °F (37.4 °C)] 98.4 °F (36.9 °C)  Pulse:  [54-91] 74  Resp:  [15-39] 20  SpO2:  [95 %-100 %] 100 %  BP: (112-180)/(56-78) 113/57     Weight: 61.3 kg (135 lb 2.3 oz)  Body mass index is 25.53 kg/m².      Intake/Output Summary (Last 24 hours) at 12/16/2020 1133  Last data filed at 12/16/2020 1000  Gross per 24 hour   Intake 330 ml   Output 1200 ml   Net -870 ml       Physical Exam  Vitals signs and nursing note reviewed.   Constitutional:       General: She is awake. She is not in acute distress.     Appearance: Normal appearance. She is well-developed and overweight. She is not ill-appearing, toxic-appearing or diaphoretic.      Interventions: She is not intubated.Nasal cannula in place.   HENT:      Head: Normocephalic and atraumatic.      Mouth/Throat:      Mouth: Mucous membranes are dry.   Eyes:      General: Lids are normal.      Pupils: Pupils are equal, round, and reactive to light.   Neck:      Musculoskeletal: Normal range of motion.      Vascular: No carotid bruit.      Trachea: Trachea normal.   Cardiovascular:      Rate and Rhythm: Normal rate and regular rhythm.      Pulses: Normal pulses.           Radial pulses are 2+ on the right side and 2+ on the left side.        Dorsalis pedis pulses are 2+ on the right side and 2+ on the left side.      Heart sounds: Murmur present. Systolic murmur present.   Pulmonary:      Effort: Pulmonary effort is normal. No tachypnea, accessory muscle usage or respiratory distress. She is not  intubated.      Breath sounds: Examination of the left-lower field reveals decreased breath sounds. Decreased breath sounds present.   Chest:      Chest wall: No deformity or tenderness.   Abdominal:      General: Bowel sounds are normal. There is no distension.      Palpations: Abdomen is soft.      Tenderness: There is no abdominal tenderness.   Musculoskeletal: Normal range of motion.      Right lower leg: No edema.      Left lower leg: No edema.      Right foot: No deformity.      Left foot: No deformity.   Lymphadenopathy:      Cervical: No cervical adenopathy.   Skin:     General: Skin is warm and dry.      Capillary Refill: Capillary refill takes less than 2 seconds.      Findings: No rash.   Neurological:      Mental Status: She is alert and oriented to person, place, and time.   Psychiatric:         Attention and Perception: Attention normal.         Mood and Affect: Mood is anxious (mild).         Speech: Speech normal.         Behavior: Behavior normal. Behavior is cooperative.         Judgment: Judgment normal.         Vents:  Oxygen Concentration (%): 28 (12/15/20 1255)    Lines/Drains/Airways     Peripheral Intravenous Line                 Peripheral IV - Single Lumen 12/16/20 0900 Other (Comments) Right Upper Arm less than 1 day                Significant Labs:    CBC/Anemia Profile:  Recent Labs   Lab 12/14/20  2352 12/15/20  0706 12/16/20  0740   WBC 9.41 6.31 10.45   HGB 8.7* 7.7* 10.5*   HCT 27.4* 25.3* 32.0*   * 328 370*   * 116* 107*   RDW SEE COMMENT SEE COMMENT 23.6*        Chemistries:  Recent Labs   Lab 12/14/20 2352 12/15/20  0706 12/16/20  0740    139 140   K 4.3 3.9 3.1*    102 99   CO2 29 30* 29   BUN 10 10 13   CREATININE 1.0 1.1 0.9   CALCIUM 8.9 8.5* 8.6*   ALBUMIN 3.3*  --   --    PROT 7.0  --   --    BILITOT 0.4  --   --    ALKPHOS 96  --   --    ALT 11  --   --    AST 17  --   --    MG  --  2.4 1.7       Lactic Acid:   Recent Labs   Lab 12/14/20 2352    LACTATE 1.0     Troponin:   Recent Labs   Lab 12/14/20  2352 12/15/20  0706 12/15/20  1147   TROPONINI 0.332* 0.259* 0.202*     All pertinent labs within the past 24 hours have been reviewed.    Significant Imaging:   CXR: I have reviewed all pertinent results/findings within the past 24 hours and my personal findings are:  Small to moderate persistent LLL pleural effusion with compression atelectasis  Echo: I have reviewed all pertinent results/findings within the past 24 hours and my personal findings are:  TTE: EF 25% with DD1, mild AS and PAP 50

## 2020-12-16 NOTE — SUBJECTIVE & OBJECTIVE
Interval History: Rapid Response with xfer to ICU. Found to be in VTACH. Cards on - Cardioverted. Amiodarone gtt.    Review of Systems   Unable to perform ROS: Acuity of condition     Objective:     Vital Signs (Most Recent):  Temp: 98.4 °F (36.9 °C) (12/16/20 0900)  Pulse: 74 (12/16/20 1000)  Resp: 20 (12/16/20 1000)  BP: (!) 113/57 (12/16/20 1000)  SpO2: 100 % (12/16/20 1000) Vital Signs (24h Range):  Temp:  [96.8 °F (36 °C)-99.3 °F (37.4 °C)] 98.4 °F (36.9 °C)  Pulse:  [54-91] 74  Resp:  [15-39] 20  SpO2:  [95 %-100 %] 100 %  BP: (112-180)/(56-78) 113/57     Weight: 61.3 kg (135 lb 2.3 oz)  Body mass index is 25.53 kg/m².    Intake/Output Summary (Last 24 hours) at 12/16/2020 1021  Last data filed at 12/16/2020 1000  Gross per 24 hour   Intake 330 ml   Output 1200 ml   Net -870 ml      Physical Exam  Vitals signs reviewed.   Constitutional:       General: She is not in acute distress.     Appearance: Normal appearance. She is normal weight. She is not toxic-appearing.   HENT:      Head: Normocephalic and atraumatic.      Nose: Nose normal. No congestion.      Mouth/Throat:      Mouth: Mucous membranes are moist.   Eyes:      General: No scleral icterus.     Pupils: Pupils are equal, round, and reactive to light.   Neck:      Musculoskeletal: Normal range of motion and neck supple. No muscular tenderness.   Cardiovascular:      Rate and Rhythm: Regular rhythm. Tachycardia present.      Pulses: Normal pulses.      Heart sounds: Murmur present. Systolic murmur present with a grade of 3/6.   Pulmonary:      Effort: Pulmonary effort is normal. No respiratory distress.      Breath sounds: Rhonchi (mildly coarse throughout) present. No wheezing.   Abdominal:      General: Abdomen is flat. Bowel sounds are normal. There is no distension.      Palpations: Abdomen is soft.      Tenderness: There is no abdominal tenderness. There is no rebound.   Musculoskeletal: Normal range of motion.         General: No deformity.       Right lower leg: No edema.      Left lower leg: No edema.   Skin:     General: Skin is warm and dry.      Capillary Refill: Capillary refill takes less than 2 seconds.      Coloration: Skin is not jaundiced.      Findings: No bruising or rash.   Neurological:      General: No focal deficit present.      Mental Status: She is alert and oriented to person, place, and time.      Cranial Nerves: No dysarthria.      Sensory: Sensation is intact. No sensory deficit.      Motor: Weakness (generalized) present. No tremor.   Psychiatric:         Mood and Affect: Mood normal.         Speech: Speech normal.         Behavior: Behavior normal.         Thought Content: Thought content normal.         Judgment: Judgment normal.      Comments: AAOX4, follows commands, converses appropriately         Significant Labs:   BMP:   Recent Labs   Lab 12/16/20  0740         K 3.1*   CL 99   CO2 29   BUN 13   CREATININE 0.9   CALCIUM 8.6*   MG 1.7     CBC:   Recent Labs   Lab 12/14/20  2352 12/15/20  0706 12/16/20  0740   WBC 9.41 6.31 10.45   HGB 8.7* 7.7* 10.5*   HCT 27.4* 25.3* 32.0*   * 328 370*     CMP:   Recent Labs   Lab 12/14/20  2352 12/15/20  0706 12/16/20  0740    139 140   K 4.3 3.9 3.1*    102 99   CO2 29 30* 29   * 113* 109   BUN 10 10 13   CREATININE 1.0 1.1 0.9   CALCIUM 8.9 8.5* 8.6*   PROT 7.0  --   --    ALBUMIN 3.3*  --   --    BILITOT 0.4  --   --    ALKPHOS 96  --   --    AST 17  --   --    ALT 11  --   --    ANIONGAP 9 7* 12   EGFRNONAA 51* 45* 58*     All pertinent labs within the past 24 hours have been reviewed.    Significant Imaging: I have reviewed all pertinent imaging results/findings within the past 24 hours.

## 2020-12-16 NOTE — ASSESSMENT & PLAN NOTE
Cards following  Cardioversion upon arrival to ICU successful to SR  Started on Amiodarone infusion w/ bolus  Correcting electrolytes  ICU cardiac monitoring  Has ICD but VT rate < 180 therefore did not fire

## 2020-12-16 NOTE — HPI
Ms Davey is a 86 yo WF with a PMH of CVA, chronic Anemia, ROXANN/Depression, CHF w/ ICD in place, Dementia, Hypothyroidism and HTN.  She has had recent hospitalizations for malaise and anemia last month and August transfused both times.  Reportedly she was diagnosed with UTI 1 week ago and was still waiting on Antb to be delivered but then presented to [facility] ED early yesterday morning about MN with complaints of SOB and cough progressive over 4 days.  In ED /92 and temp 101.4 with + UA, mild elevation of troponin and persistent LLL pleural effusion on CXR.  She was admitted and started on Antb.  TTE revealed EF 25%.  Cards was following.  This AM she had rapid response called due to persistent VT in 170s on Med-Surg w/ pulse. Patient transferred to ICU for Amiodarone infusion. S/p DCCV x 1 this AM in ICU due to unstable VT with conversion to SR. Labs reviewed, K+ 3.1, Cr 0.9, Mag 1.7.

## 2020-12-17 LAB
ANION GAP SERPL CALC-SCNC: 8 MMOL/L (ref 8–16)
ANION GAP SERPL CALC-SCNC: 8 MMOL/L (ref 8–16)
BASOPHILS # BLD AUTO: 0.02 K/UL (ref 0–0.2)
BASOPHILS NFR BLD: 0.2 % (ref 0–1.9)
BUN SERPL-MCNC: 13 MG/DL (ref 8–23)
BUN SERPL-MCNC: 13 MG/DL (ref 8–23)
CALCIUM SERPL-MCNC: 8.6 MG/DL (ref 8.7–10.5)
CALCIUM SERPL-MCNC: 8.6 MG/DL (ref 8.7–10.5)
CHLORIDE SERPL-SCNC: 100 MMOL/L (ref 95–110)
CHLORIDE SERPL-SCNC: 99 MMOL/L (ref 95–110)
CO2 SERPL-SCNC: 30 MMOL/L (ref 23–29)
CO2 SERPL-SCNC: 32 MMOL/L (ref 23–29)
CREAT SERPL-MCNC: 0.9 MG/DL (ref 0.5–1.4)
CREAT SERPL-MCNC: 0.9 MG/DL (ref 0.5–1.4)
DIFFERENTIAL METHOD: ABNORMAL
EOSINOPHIL # BLD AUTO: 0.1 K/UL (ref 0–0.5)
EOSINOPHIL NFR BLD: 1.2 % (ref 0–8)
ERYTHROCYTE [DISTWIDTH] IN BLOOD BY AUTOMATED COUNT: 23.7 % (ref 11.5–14.5)
EST. GFR  (AFRICAN AMERICAN): >60 ML/MIN/1.73 M^2
EST. GFR  (AFRICAN AMERICAN): >60 ML/MIN/1.73 M^2
EST. GFR  (NON AFRICAN AMERICAN): 58 ML/MIN/1.73 M^2
EST. GFR  (NON AFRICAN AMERICAN): 58 ML/MIN/1.73 M^2
GLUCOSE SERPL-MCNC: 94 MG/DL (ref 70–110)
GLUCOSE SERPL-MCNC: 99 MG/DL (ref 70–110)
HCT VFR BLD AUTO: 30.4 % (ref 37–48.5)
HGB BLD-MCNC: 9.7 G/DL (ref 12–16)
IMM GRANULOCYTES # BLD AUTO: 0.04 K/UL (ref 0–0.04)
IMM GRANULOCYTES NFR BLD AUTO: 0.5 % (ref 0–0.5)
LYMPHOCYTES # BLD AUTO: 1.4 K/UL (ref 1–4.8)
LYMPHOCYTES NFR BLD: 17.2 % (ref 18–48)
MAGNESIUM SERPL-MCNC: 1.9 MG/DL (ref 1.6–2.6)
MAGNESIUM SERPL-MCNC: 2 MG/DL (ref 1.6–2.6)
MCH RBC QN AUTO: 35 PG (ref 27–31)
MCHC RBC AUTO-ENTMCNC: 31.9 G/DL (ref 32–36)
MCV RBC AUTO: 110 FL (ref 82–98)
MONOCYTES # BLD AUTO: 0.8 K/UL (ref 0.3–1)
MONOCYTES NFR BLD: 9.8 % (ref 4–15)
NEUTROPHILS # BLD AUTO: 5.9 K/UL (ref 1.8–7.7)
NEUTROPHILS NFR BLD: 71.1 % (ref 38–73)
NRBC BLD-RTO: 0 /100 WBC
PLATELET # BLD AUTO: 341 K/UL (ref 150–350)
PMV BLD AUTO: 12.4 FL (ref 9.2–12.9)
POTASSIUM SERPL-SCNC: 3.5 MMOL/L (ref 3.5–5.1)
POTASSIUM SERPL-SCNC: 4 MMOL/L (ref 3.5–5.1)
RBC # BLD AUTO: 2.77 M/UL (ref 4–5.4)
SODIUM SERPL-SCNC: 137 MMOL/L (ref 136–145)
SODIUM SERPL-SCNC: 140 MMOL/L (ref 136–145)
WBC # BLD AUTO: 8.35 K/UL (ref 3.9–12.7)

## 2020-12-17 PROCEDURE — 99900035 HC TECH TIME PER 15 MIN (STAT)

## 2020-12-17 PROCEDURE — 25000242 PHARM REV CODE 250 ALT 637 W/ HCPCS: Performed by: NURSE PRACTITIONER

## 2020-12-17 PROCEDURE — 93010 ELECTROCARDIOGRAM REPORT: CPT | Mod: ,,, | Performed by: INTERNAL MEDICINE

## 2020-12-17 PROCEDURE — 85025 COMPLETE CBC W/AUTO DIFF WBC: CPT

## 2020-12-17 PROCEDURE — 99233 SBSQ HOSP IP/OBS HIGH 50: CPT | Mod: ,,, | Performed by: NURSE PRACTITIONER

## 2020-12-17 PROCEDURE — 25000003 PHARM REV CODE 250: Performed by: HOSPITALIST

## 2020-12-17 PROCEDURE — 93005 ELECTROCARDIOGRAM TRACING: CPT

## 2020-12-17 PROCEDURE — 63600175 PHARM REV CODE 636 W HCPCS: Performed by: NURSE PRACTITIONER

## 2020-12-17 PROCEDURE — 25000003 PHARM REV CODE 250: Performed by: INTERNAL MEDICINE

## 2020-12-17 PROCEDURE — 83735 ASSAY OF MAGNESIUM: CPT

## 2020-12-17 PROCEDURE — 36415 COLL VENOUS BLD VENIPUNCTURE: CPT

## 2020-12-17 PROCEDURE — 99291 CRITICAL CARE FIRST HOUR: CPT | Mod: 25,,, | Performed by: INTERNAL MEDICINE

## 2020-12-17 PROCEDURE — 20000000 HC ICU ROOM

## 2020-12-17 PROCEDURE — 94761 N-INVAS EAR/PLS OXIMETRY MLT: CPT

## 2020-12-17 PROCEDURE — 83735 ASSAY OF MAGNESIUM: CPT | Mod: 91

## 2020-12-17 PROCEDURE — 25000003 PHARM REV CODE 250: Performed by: NURSE PRACTITIONER

## 2020-12-17 PROCEDURE — 94640 AIRWAY INHALATION TREATMENT: CPT

## 2020-12-17 PROCEDURE — 99233 PR SUBSEQUENT HOSPITAL CARE,LEVL III: ICD-10-PCS | Mod: ,,, | Performed by: NURSE PRACTITIONER

## 2020-12-17 PROCEDURE — 80048 BASIC METABOLIC PNL TOTAL CA: CPT

## 2020-12-17 PROCEDURE — 99291 PR CRITICAL CARE, E/M 30-74 MINUTES: ICD-10-PCS | Mod: 25,,, | Performed by: INTERNAL MEDICINE

## 2020-12-17 PROCEDURE — 80048 BASIC METABOLIC PNL TOTAL CA: CPT | Mod: 91

## 2020-12-17 PROCEDURE — 27000221 HC OXYGEN, UP TO 24 HOURS

## 2020-12-17 PROCEDURE — 93010 EKG 12-LEAD: ICD-10-PCS | Mod: ,,, | Performed by: INTERNAL MEDICINE

## 2020-12-17 RX ORDER — POTASSIUM CHLORIDE 20 MEQ/1
40 TABLET, EXTENDED RELEASE ORAL DAILY
Status: DISCONTINUED | OUTPATIENT
Start: 2020-12-17 | End: 2020-12-18 | Stop reason: HOSPADM

## 2020-12-17 RX ORDER — LANOLIN ALCOHOL/MO/W.PET/CERES
400 CREAM (GRAM) TOPICAL DAILY
Status: DISCONTINUED | OUTPATIENT
Start: 2020-12-17 | End: 2020-12-18

## 2020-12-17 RX ORDER — POTASSIUM CHLORIDE 20 MEQ/1
40 TABLET, EXTENDED RELEASE ORAL ONCE
Status: COMPLETED | OUTPATIENT
Start: 2020-12-17 | End: 2020-12-17

## 2020-12-17 RX ORDER — LISINOPRIL 10 MG/1
10 TABLET ORAL DAILY
Status: DISCONTINUED | OUTPATIENT
Start: 2020-12-17 | End: 2020-12-18 | Stop reason: HOSPADM

## 2020-12-17 RX ORDER — SPIRONOLACTONE 25 MG/1
25 TABLET ORAL DAILY
Status: DISCONTINUED | OUTPATIENT
Start: 2020-12-17 | End: 2020-12-18 | Stop reason: HOSPADM

## 2020-12-17 RX ORDER — AMIODARONE HYDROCHLORIDE 200 MG/1
400 TABLET ORAL DAILY
Status: DISCONTINUED | OUTPATIENT
Start: 2020-12-17 | End: 2020-12-18 | Stop reason: HOSPADM

## 2020-12-17 RX ADMIN — MUPIROCIN: 20 OINTMENT TOPICAL at 08:12

## 2020-12-17 RX ADMIN — CARVEDILOL 6.25 MG: 6.25 TABLET, FILM COATED ORAL at 06:12

## 2020-12-17 RX ADMIN — SPIRONOLACTONE 25 MG: 25 TABLET ORAL at 11:12

## 2020-12-17 RX ADMIN — DONEPEZIL HYDROCHLORIDE 10 MG: 5 TABLET, FILM COATED ORAL at 09:12

## 2020-12-17 RX ADMIN — FAMOTIDINE 20 MG: 20 TABLET ORAL at 08:12

## 2020-12-17 RX ADMIN — HEPARIN SODIUM 5000 UNITS: 5000 INJECTION INTRAVENOUS; SUBCUTANEOUS at 09:12

## 2020-12-17 RX ADMIN — DULOXETINE HYDROCHLORIDE 30 MG: 30 CAPSULE, DELAYED RELEASE ORAL at 08:12

## 2020-12-17 RX ADMIN — DOXYCYCLINE HYCLATE 100 MG: 100 TABLET, COATED ORAL at 08:12

## 2020-12-17 RX ADMIN — DOCUSATE SODIUM 100 MG: 100 CAPSULE, LIQUID FILLED ORAL at 08:12

## 2020-12-17 RX ADMIN — LEVOTHYROXINE SODIUM 50 MCG: 50 TABLET ORAL at 06:12

## 2020-12-17 RX ADMIN — ATORVASTATIN CALCIUM 20 MG: 10 TABLET, FILM COATED ORAL at 08:12

## 2020-12-17 RX ADMIN — ARIPIPRAZOLE 5 MG: 5 TABLET ORAL at 08:12

## 2020-12-17 RX ADMIN — MIRTAZAPINE 7.5 MG: 7.5 TABLET ORAL at 09:12

## 2020-12-17 RX ADMIN — LISINOPRIL 10 MG: 10 TABLET ORAL at 11:12

## 2020-12-17 RX ADMIN — FUROSEMIDE 40 MG: 10 INJECTION, SOLUTION INTRAMUSCULAR; INTRAVENOUS at 09:12

## 2020-12-17 RX ADMIN — BUSPIRONE HYDROCHLORIDE 15 MG: 10 TABLET ORAL at 08:12

## 2020-12-17 RX ADMIN — CEFTRIAXONE 1 G: 1 INJECTION, SOLUTION INTRAVENOUS at 03:12

## 2020-12-17 RX ADMIN — HEPARIN SODIUM 5000 UNITS: 5000 INJECTION INTRAVENOUS; SUBCUTANEOUS at 03:12

## 2020-12-17 RX ADMIN — LACTOBACILLUS TAB 1 TABLET: TAB at 08:12

## 2020-12-17 RX ADMIN — AMIODARONE HYDROCHLORIDE 400 MG: 200 TABLET ORAL at 09:12

## 2020-12-17 RX ADMIN — FUROSEMIDE 40 MG: 10 INJECTION, SOLUTION INTRAMUSCULAR; INTRAVENOUS at 08:12

## 2020-12-17 RX ADMIN — ASPIRIN 81 MG: 81 TABLET, COATED ORAL at 08:12

## 2020-12-17 RX ADMIN — CYANOCOBALAMIN TAB 1000 MCG 1000 MCG: 1000 TAB at 08:12

## 2020-12-17 RX ADMIN — VENLAFAXINE HYDROCHLORIDE 75 MG: 75 CAPSULE, EXTENDED RELEASE ORAL at 08:12

## 2020-12-17 RX ADMIN — POTASSIUM CHLORIDE 40 MEQ: 1500 TABLET, EXTENDED RELEASE ORAL at 08:12

## 2020-12-17 RX ADMIN — CARVEDILOL 6.25 MG: 6.25 TABLET, FILM COATED ORAL at 08:12

## 2020-12-17 RX ADMIN — Medication 3 MG: at 09:12

## 2020-12-17 RX ADMIN — OXYBUTYNIN CHLORIDE 10 MG: 5 TABLET, EXTENDED RELEASE ORAL at 08:12

## 2020-12-17 RX ADMIN — IPRATROPIUM BROMIDE AND ALBUTEROL SULFATE 3 ML: .5; 3 SOLUTION RESPIRATORY (INHALATION) at 07:12

## 2020-12-17 RX ADMIN — IPRATROPIUM BROMIDE AND ALBUTEROL SULFATE 3 ML: .5; 3 SOLUTION RESPIRATORY (INHALATION) at 08:12

## 2020-12-17 RX ADMIN — IPRATROPIUM BROMIDE AND ALBUTEROL SULFATE 3 ML: .5; 3 SOLUTION RESPIRATORY (INHALATION) at 01:12

## 2020-12-17 RX ADMIN — FERROUS SULFATE TAB EC 325 MG (65 MG FE EQUIVALENT) 325 MG: 325 (65 FE) TABLET DELAYED RESPONSE at 08:12

## 2020-12-17 RX ADMIN — FLUTICASONE PROPIONATE 50 MCG: 50 SPRAY, METERED NASAL at 08:12

## 2020-12-17 RX ADMIN — Medication 400 MG: at 11:12

## 2020-12-17 RX ADMIN — HEPARIN SODIUM 5000 UNITS: 5000 INJECTION INTRAVENOUS; SUBCUTANEOUS at 06:12

## 2020-12-17 RX ADMIN — THERA TABS 1 TABLET: TAB at 08:12

## 2020-12-17 RX ADMIN — CLOPIDOGREL 75 MG: 75 TABLET, FILM COATED ORAL at 08:12

## 2020-12-17 RX ADMIN — GABAPENTIN 300 MG: 300 CAPSULE ORAL at 09:12

## 2020-12-17 RX ADMIN — MUPIROCIN: 20 OINTMENT TOPICAL at 09:12

## 2020-12-17 RX ADMIN — AMIODARONE HYDROCHLORIDE 0.5 MG/MIN: 1.8 INJECTION, SOLUTION INTRAVENOUS at 03:12

## 2020-12-17 RX ADMIN — BUSPIRONE HYDROCHLORIDE 15 MG: 10 TABLET ORAL at 09:12

## 2020-12-17 NOTE — PLAN OF CARE
MARINA yeager scussed w/patient, verbalized understanding. NSR on monitor. Amio gtt continued. IVF off as ordered. VSS. Purewick in place, but came misaligned during sleep. Pt cleaned, linens changed, and new purewick applied and is now collecting. Q2 turns, heels floated. Fall precautions in place, bed alarm on.

## 2020-12-17 NOTE — ASSESSMENT & PLAN NOTE
BNP 1662  Continue IV diuresis  Continue Coreg, ARB  Followed by Dr Nassar, LCA  Dash diet, 2 gm sodium restriction  1200 ml fluid restriction  Daily weights  Strict I/Os  Assess response    12/16  -s/p VT with CV x 1 today  -Hold ARB due to hypotension earlier today  -Continue Coreg as BP permits    12/17  -Continue Coreg, Amio  -Add Entresto 24/26 BID

## 2020-12-17 NOTE — PROGRESS NOTES
[facility]  Cardiology  Progress Note    Patient Name: Love Davey  MRN: [id number]  Admission Date: 12/14/2020  Hospital Length of Stay: 2 days  Code Status: Full Code   Attending Physician: Harlan Atwood MD   Primary Care Physician: Kaley Law MD  Expected Discharge Date:   Principal Problem:Ventricular tachycardia    Subjective:   HPI    Love Davey is a 87 year old female who presented to [facility] due to shortness of breath, nonproductive cough and dysuria. Her current medical conditions include h/o CVA, HTN, HLP, Chronic combined CHF, HFrEF of 30%, s/p ICD, Arthritis, insomnia, depression, anemia, OAB, recurrent UTI. ED workup revealed WBC 9k, H/H 8.7/27.4, K+ 4.3, Cr 1.0, BNP 1662, Troponin 0.332>0.259. She was placed in observation under the care of hospital medicine. Cardiology consulted to assist with medical management. Chart reviewed, patient seen and examined. Patient seems comfortable at time of exam today. No chest pain or anginal equivalents. NO shortness of breath, CHEW or palpitations. Trace LE edema today on exam. AM labs reviewed, H/H 7.7/25. She was started on IV ABX for UTI and CAP. Further recs to follow.         Hospital Course:   12/16/2020-Patient seen and examined in ICU this AM post Rapid response. Rapid response called this AM due to persistent VT in 170s on Med-Surg. Patient transferred to ICU for Amiodarone infusion. S/p DCCV x 1 this AM in ICU due to unstable VT with conversion to SR. Labs reviewed, K+ 3.1, Cr 0.9, Mag 1.7. Repletion ordered at this time in ICU. Further recs to follow.     12/17/2020-Patient seen and examined earlier today in ICU. Remains in SR. Transitioned to PO Amiodarone this AM. Feels good today on exam. Labs reviewed, K+ 3.5, Cr 0.9, Mag 2.0    Interval History: no acute issues noted o/n. Remains in SR today. OK to transition to PO Amiodarone today. Ok to transfer to telemetry today as well. Assess response in AM. Keep K+>4 and  mag >2    Review of Systems   Constitution: Positive for malaise/fatigue.   HENT: Negative for hearing loss and hoarse voice.    Eyes: Negative for blurred vision and visual disturbance.   Cardiovascular: Positive for dyspnea on exertion, irregular heartbeat, orthopnea and palpitations. Negative for chest pain, claudication, leg swelling, near-syncope, paroxysmal nocturnal dyspnea and syncope.   Respiratory: Positive for cough and shortness of breath. Negative for hemoptysis, sleep disturbances due to breathing, snoring and wheezing.    Endocrine: Negative for cold intolerance and heat intolerance.   Hematologic/Lymphatic: Bruises/bleeds easily (on ASA and Plavix).   Skin: Negative for color change, dry skin and nail changes.   Musculoskeletal: Positive for arthritis and back pain. Negative for joint pain and myalgias.   Gastrointestinal: Positive for bloating. Negative for abdominal pain, constipation, nausea and vomiting.   Genitourinary: Positive for dysuria. Negative for flank pain, hematuria and hesitancy.   Neurological: Positive for weakness. Negative for headaches, light-headedness, loss of balance, numbness and paresthesias.   Psychiatric/Behavioral: Negative for altered mental status.   Allergic/Immunologic: Negative for environmental allergies.     Objective:     Vital Signs (Most Recent):  Temp: 98.5 °F (36.9 °C) (12/17/20 1115)  Pulse: 61 (12/17/20 1345)  Resp: (!) 28 (12/17/20 1345)  BP: (!) 128/58 (12/17/20 1300)  SpO2: 95 % (12/17/20 1345) Vital Signs (24h Range):  Temp:  [97.9 °F (36.6 °C)-99.3 °F (37.4 °C)] 98.5 °F (36.9 °C)  Pulse:  [59-76] 61  Resp:  [16-37] 28  SpO2:  [95 %-100 %] 95 %  BP: (114-158)/(53-83) 128/58     Weight: 63.6 kg (140 lb 3.4 oz)  Body mass index is 26.49 kg/m².     SpO2: 95 %  O2 Device (Oxygen Therapy): nasal cannula      Intake/Output Summary (Last 24 hours) at 12/17/2020 1446  Last data filed at 12/17/2020 1000  Gross per 24 hour   Intake 1672.2 ml   Output 300 ml   Net  1372.2 ml       Lines/Drains/Airways     Drain            Female External Urinary Catheter 12/16/20 1500 less than 1 day          Peripheral Intravenous Line                 Peripheral IV - Single Lumen 12/16/20 0900 Other (Comments) Right Upper Arm 1 day                Physical Exam   Constitutional: She is oriented to person, place, and time. She appears well-developed and well-nourished. No distress.   HENT:   Head: Normocephalic and atraumatic.   Eyes: Pupils are equal, round, and reactive to light.   Neck: Normal range of motion and full passive range of motion without pain. Neck supple. No JVD present. No thyromegaly present.   Cardiovascular: Normal rate, regular rhythm, S1 normal, S2 normal and intact distal pulses. PMI is not displaced. Exam reveals no distant heart sounds.   No murmur heard.  Pulses:       Radial pulses are 2+ on the right side and 2+ on the left side.        Dorsalis pedis pulses are 2+ on the right side and 2+ on the left side.   CHEST PAIN FREE  LEFT CHEST WALL ICD, WELL HEALED  NO RECENT ICD SHOCKS  Remains in SR   Pulmonary/Chest: Effort normal and breath sounds normal. No accessory muscle usage. No respiratory distress. She has no decreased breath sounds. She has no wheezes. She has no rales.   Abdominal: Soft. Bowel sounds are normal. She exhibits no distension. There is no abdominal tenderness.   Musculoskeletal: Normal range of motion.         General: No edema.      Right ankle: She exhibits no swelling.      Left ankle: She exhibits no swelling.   Neurological: She is alert and oriented to person, place, and time.   Skin: Skin is warm and dry. She is not diaphoretic. No cyanosis. Nails show no clubbing.   Psychiatric: She has a normal mood and affect. Her speech is normal and behavior is normal. Judgment and thought content normal. Cognition and memory are normal.   Nursing note and vitals reviewed.      Significant Labs:   BMP:   Recent Labs   Lab 12/16/20  0740 12/16/20  1322  12/17/20  0448    125* 99    139 140   K 3.1* 4.0 3.5   CL 99 98 100   CO2 29 28 32*   BUN 13 13 13   CREATININE 0.9 0.9 0.9   CALCIUM 8.6* 8.3* 8.6*   MG 1.7 2.6 2.0   , CBC   Recent Labs   Lab 12/16/20  0740 12/17/20  0448   WBC 10.45 8.35   HGB 10.5* 9.7*   HCT 32.0* 30.4*   * 341    and All pertinent lab results from the last 24 hours have been reviewed.    Significant Imaging: Echocardiogram:   Transthoracic echo (TTE) complete (Cupid Only):   Results for orders placed or performed during the hospital encounter of 12/14/20   Echo   Result Value Ref Range    BSA 1.66 m2    TDI SEPTAL 0.07 m/s    LV LATERAL E/E' RATIO 12.57 m/s    LV SEPTAL E/E' RATIO 12.57 m/s    LA WIDTH 3.11 cm    TDI LATERAL 0.07 m/s    LVIDd 4.56 3.5 - 6.0 cm    IVS 1.14 (A) 0.6 - 1.1 cm    Posterior Wall 1.36 (A) 0.6 - 1.1 cm    Ao root annulus 3.66 cm    LVIDs 4.19 (A) 2.1 - 4.0 cm    FS 8 28 - 44 %    LA volume 29.48 cm3    Sinus 3.81 cm    STJ 3.24 cm    Ascending aorta 3.50 cm    LV mass 214.49 g    LA size 3.15 cm    TAPSE 1.71 cm    Left Ventricle Relative Wall Thickness 0.60 cm    AV mean gradient 11 mmHg    AV valve area 1.82 cm2    AV Velocity Ratio 0.41     AV index (prosthetic) 0.49     E/A ratio 1.29     Mean e' 0.07 m/s    E wave decelartion time 172.41 msec    IVRT 91.34 msec    LVOT diameter 2.17 cm    LVOT area 3.7 cm2    LVOT peak jason 0.91 m/s    LVOT peak VTI 25.11 cm    Ao peak jason 2.21 m/s    Ao VTI 50.96 cm    RVOT peak jason 0.63 m/s    RVOT peak VTI 14.80 cm    LVOT stroke volume 92.82 cm3    AV peak gradient 20 mmHg    PV mean gradient 1 mmHg    E/E' ratio 12.57 m/s    MV Peak E Jason 0.88 m/s    TR Max Jason 3.41 m/s    MV Peak A Jason 0.68 m/s    LV Systolic Volume 78.22 mL    LV Systolic Volume Index 48.0 mL/m2    LV Diastolic Volume 95.30 mL    LV Diastolic Volume Index 58.53 mL/m2    LA Volume Index 18.1 mL/m2    LV Mass Index 132 g/m2    RA Major Axis 3.69 cm    Left Atrium Minor Axis 3.15 cm     Left Atrium Major Axis 4.04 cm    Triscuspid Valve Regurgitation Peak Gradient 47 mmHg    Right Atrial Pressure (from IVC) 3 mmHg    TV rest pulmonary artery pressure 50 mmHg    Narrative    · The left ventricle is mildly enlarged with severely decreased systolic   function. There is mild left ventricular concentric hypertrophy. The   estimated ejection fraction is 25%.  · Grade I left ventricular diastolic dysfunction.  · There is severe left ventricular global hypokinesis.  · Normal right ventricular size with low normal right ventricular systolic   function.  · There is mild aortic valve stenosis.  · Aortic valve area is 1.82 cm2; peak velocity is 2.21 m/s; mean gradient   is 11 mmHg.  · There is mild aortic regurgitation.  · There is moderate tricuspid valve regurgitation.  · Normal central venous pressure (3 mmHg).  · The estimated PA systolic pressure is 50 mmHg.  · There is pulmonary hypertension.  · There is a moderate left pleural effusion.        Assessment and Plan:       * Ventricular tachycardia  Rapid response due to Sustained unstable VT this AM  Transferred to ICU  Underwent CV x 1 with conversion to SR in 80s  Continue IV Amiodarone infusion today  Continue Coreg as BP permits  ICU Cardiac Monitoring  Keep K+>4 and Mag >2  Assess response    12/17  -Remains in SR today  -Continue Coreg, Amio  -Keep K+>4 and Mag >2    Electrolyte disturbance  Keep K+>4 and Mag >2  Repletion in progress  Repeat BMP, Mag later today    UTI (urinary tract infection)  On IV ABX  Per Primary team    Community acquired pneumonia  On IV ABX  Mgmt per primary team    Anemia  Patient reports ongoing anemia issues  Followed by Outside Hem/Onc  Recommend transfuse 1 unit PRBC given worsening anemia issues  Likely worsening CHF exacerbation as well.     Elevated troponin  Troponin elevated but trending downward  Likely secondary to demand ischemia from Pneumonia and UTI   ECHO revealed EF 25%, Grade I DD  Chest pain free on  exam    Hypothyroidism  Continue synthroid  Mgmt per primary team    Dementia  PER PRIMARY TEAM    Chronic combined systolic and diastolic heart failure  BNP 1662  Continue IV diuresis  Continue Coreg, ARB  Followed by Dr Nassar, LCA  Dash diet, 2 gm sodium restriction  1200 ml fluid restriction  Daily weights  Strict I/Os  Assess response    12/16  -s/p VT with CV x 1 today  -Hold ARB due to hypotension earlier today  -Continue Coreg as BP permits    12/17  -Continue Coreg, Amio  -Add Entresto 24/26 BID    HTN (hypertension)  May need to hold ARB given unstable VT episode this AM and hypotension  Continue Coreg as BP permits        VTE Risk Mitigation (From admission, onward)         Ordered     heparin (porcine) injection 5,000 Units  Every 8 hours      12/16/20 0917     Place sequential compression device  Until discontinued      12/15/20 3965                RAMAN Blanca-C  Cardiology  [facility]

## 2020-12-17 NOTE — ASSESSMENT & PLAN NOTE
May need to hold ARB given unstable VT episode this AM and hypotension  Continue Coreg as BP permits   no

## 2020-12-17 NOTE — ASSESSMENT & PLAN NOTE
Rocephin Day #3  Blood and Urine cultures NGTD  Afeb w/ normal WBC  Will stop abx after day 3 dose

## 2020-12-17 NOTE — CHAPLAIN
Initial visit with patient.  Provided support through listening, presence, and prayer.  Pt took a lot of time reflecting on different events from within her life. I prayed with pt before leaving and spiritual care remains available as needed.    Chaplain Saqib Diaz M.Div., [facility]

## 2020-12-17 NOTE — SUBJECTIVE & OBJECTIVE
Review of Systems   Constitutional: Negative for chills, fever and malaise/fatigue.   Respiratory: Negative for cough and shortness of breath.    Cardiovascular: Negative for chest pain and orthopnea.   Gastrointestinal: Negative for abdominal pain, nausea and vomiting.   Genitourinary: Negative.    Musculoskeletal:        Chronic back and hip pain   Neurological: Negative for focal weakness and headaches.   Psychiatric/Behavioral: The patient is not nervous/anxious and does not have insomnia.          Objective:     Vital Signs (Most Recent):  Temp: 98.4 °F (36.9 °C) (12/17/20 0400)  Pulse: 68 (12/17/20 0748)  Resp: 20 (12/17/20 0748)  BP: 126/61 (12/17/20 0700)  SpO2: 100 % (12/17/20 0748) Vital Signs (24h Range):  Temp:  [97.9 °F (36.6 °C)-99.3 °F (37.4 °C)] 98.4 °F (36.9 °C)  Pulse:  [58-83] 68  Resp:  [15-39] 20  SpO2:  [97 %-100 %] 100 %  BP: (113-158)/(56-87) 126/61     Weight: 63.6 kg (140 lb 3.4 oz)  Body mass index is 26.49 kg/m².      Intake/Output Summary (Last 24 hours) at 12/17/2020 0818  Last data filed at 12/17/2020 0600  Gross per 24 hour   Intake 2015.4 ml   Output 450 ml   Net 1565.4 ml      amiodarone in dextrose 5% 0.5 mg/min (12/17/20 0600)       Physical Exam  Vitals signs and nursing note reviewed.   Constitutional:       Appearance: Normal appearance. She is not ill-appearing.   HENT:      Head: Atraumatic.      Mouth/Throat:      Mouth: Mucous membranes are dry.   Eyes:      Conjunctiva/sclera: Conjunctivae normal.      Pupils: Pupils are equal, round, and reactive to light.   Cardiovascular:      Rate and Rhythm: Normal rate and regular rhythm. Occasional extrasystoles are present.     Pulses:           Radial pulses are 2+ on the right side and 2+ on the left side.   Pulmonary:      Effort: Pulmonary effort is normal. No respiratory distress.      Breath sounds: Normal breath sounds. No wheezing.   Abdominal:      General: Bowel sounds are normal. There is no distension.      Palpations:  Abdomen is soft.   Musculoskeletal:      Right lower leg: No edema.      Left lower leg: No edema.   Skin:     General: Skin is warm and dry.      Capillary Refill: Capillary refill takes less than 2 seconds.   Neurological:      General: No focal deficit present.      Mental Status: She is alert and oriented to person, place, and time.         Vents:  Oxygen Concentration (%): 32 (12/17/20 0748)    Lines/Drains/Airways     Drain            Female External Urinary Catheter 12/16/20 1500 less than 1 day          Peripheral Intravenous Line                 Peripheral IV - Single Lumen 12/16/20 0900 Other (Comments) Right Upper Arm less than 1 day                Significant Labs:    CBC/Anemia Profile:  Recent Labs   Lab 12/16/20  0740 12/17/20  0448   WBC 10.45 8.35   HGB 10.5* 9.7*   HCT 32.0* 30.4*   * 341   * 110*   RDW 23.6* 23.7*        Chemistries:  Recent Labs   Lab 12/16/20  0740 12/16/20  1322 12/17/20  0448    139 140   K 3.1* 4.0 3.5   CL 99 98 100   CO2 29 28 32*   BUN 13 13 13   CREATININE 0.9 0.9 0.9   CALCIUM 8.6* 8.3* 8.6*   MG 1.7 2.6 2.0       All pertinent labs within the past 24 hours have been reviewed.    Significant Imaging:  I have reviewed all pertinent imaging results/findings within the past 24 hours.

## 2020-12-17 NOTE — PLAN OF CARE
Pt remains alert and oriented x 4. States she's tired and sleeps between care often. NSR on the monitor, sat 98 % on 3 l nc, all other VSS. Pure wick in place. Transfer orders to tele. POC reviewed with pt, heels floated, pt appears in no distress, will continue to monitor.

## 2020-12-17 NOTE — PLAN OF CARE
Pt transferred to ICU after converting into VT. Pt was AAOx4 at the time but BP couldn't be measured, and pt was cardioverted at bedside. Pt converted into NSR. BP stable. Pt remained AAOx4. Electrolytes replaced. Pt turned self more than q2hrs. Purewick in place. Adequate Uo/hr. No BM. Amiodarone gtt initiated. Skin remains intact other than blanchable redness noted in chart to bilat heels. Pt remains free of fever, pain and falls. POC discussed with pt, family and providers. Will continue to monitor progression.

## 2020-12-17 NOTE — ASSESSMENT & PLAN NOTE
Cards following  Cardioversion upon arrival to ICU successful to SR  12/17 - SR on amio infusion at 0.5mg/min  Replace potassium  Continue cardiac monitoring

## 2020-12-17 NOTE — PROGRESS NOTES
[facility]  Critical Care Medicine  Progress Note    Patient Name: Love Davey  MRN: [id number]  Admission Date: 12/14/2020  Hospital Length of Stay: 2 days  Code Status: Full Code  Attending Provider: Harlan Atwood MD  Primary Care Provider: Kaley Law MD   Principal Problem: Ventricular tachycardia    Subjective:     HPI:  Ms Davey is a 88 yo WF with a PMH of CVA, chronic Anemia, ROXANN/Depression, CHF w/ ICD in place, Dementia, Hypothyroidism and HTN.  She has had recent hospitalizations for malaise and anemia last month and August transfused both times.  Reportedly she was diagnosed with UTI 1 week ago and was still waiting on Antb to be delivered but then presented to [facility] ED early yesterday morning about MN with complaints of SOB and cough progressive over 4 days.  In ED /92 and temp 101.4 with + UA, mild elevation of troponin and persistent LLL pleural effusion on CXR.  She was admitted and started on Antb.  TTE revealed EF 25%.  Cards was following.  This AM she had rapid response called due to persistent VT in 170s on Med-Surg w/ pulse. Patient transferred to ICU for Amiodarone infusion. S/p DCCV x 1 this AM in ICU due to unstable VT with conversion to SR. Labs reviewed, K+ 3.1, Cr 0.9, Mag 1.7.    Hospital/ICU Course:  12/16 - Cards at bed side in ICU for Cardioversion to SR.  Patient awake, alert and responsive in no distress  12/17 - SR since cardioversion; no acute issues overnight; easily aroused this am and denies complaints    Review of Systems   Constitutional: Negative for chills, fever and malaise/fatigue.   Respiratory: Negative for cough and shortness of breath.    Cardiovascular: Negative for chest pain and orthopnea.   Gastrointestinal: Negative for abdominal pain, nausea and vomiting.   Genitourinary: Negative.    Musculoskeletal: negative  Neurological: Negative for focal weakness and headaches.   Psychiatric/Behavioral: The patient is not  nervous/anxious and does not have insomnia.          Objective:     Vital Signs (Most Recent):  Temp: 98.4 °F (36.9 °C) (12/17/20 0400)  Pulse: 68 (12/17/20 0748)  Resp: 20 (12/17/20 0748)  BP: 126/61 (12/17/20 0700)  SpO2: 100 % (12/17/20 0748) Vital Signs (24h Range):  Temp:  [97.9 °F (36.6 °C)-99.3 °F (37.4 °C)] 98.4 °F (36.9 °C)  Pulse:  [58-83] 68  Resp:  [15-39] 20  SpO2:  [97 %-100 %] 100 %  BP: (113-158)/(56-87) 126/61     Weight: 63.6 kg (140 lb 3.4 oz)  Body mass index is 26.49 kg/m².      Intake/Output Summary (Last 24 hours) at 12/17/2020 0818  Last data filed at 12/17/2020 0600  Gross per 24 hour   Intake 2015.4 ml   Output 450 ml   Net 1565.4 ml      amiodarone in dextrose 5% 0.5 mg/min (12/17/20 0600)       Physical Exam  Vitals signs and nursing note reviewed.   Constitutional:       Appearance: Normal appearance. She is not ill-appearing.   HENT:      Head: Atraumatic.      Mouth/Throat:      Mouth: Mucous membranes are dry.   Eyes:      Conjunctiva/sclera: Conjunctivae normal.      Pupils: Pupils are equal, round, and reactive to light.   Cardiovascular:      Rate and Rhythm: Normal rate and regular rhythm. Occasional extrasystoles are present.     Pulses:           Radial pulses are 2+ on the right side and 2+ on the left side.   Pulmonary:      Effort: Pulmonary effort is normal. No respiratory distress.      Breath sounds: Normal breath sounds. No wheezing.   Abdominal:      General: Bowel sounds are normal. There is no distension.      Palpations: Abdomen is soft.   Musculoskeletal:      Right lower leg: No edema.      Left lower leg: No edema.   Skin:     General: Skin is warm and dry.      Capillary Refill: Capillary refill takes less than 2 seconds.   Neurological:      General: No focal deficit present.      Mental Status: She is alert and oriented to person, place, and time.         Vents:  Oxygen Concentration (%): 32 (12/17/20 0319)    Lines/Drains/Airways     Drain            Female  External Urinary Catheter 12/16/20 1500 less than 1 day          Peripheral Intravenous Line                 Peripheral IV - Single Lumen 12/16/20 0900 Other (Comments) Right Upper Arm less than 1 day                Significant Labs:    CBC/Anemia Profile:  Recent Labs   Lab 12/16/20  0740 12/17/20  0448   WBC 10.45 8.35   HGB 10.5* 9.7*   HCT 32.0* 30.4*   * 341   * 110*   RDW 23.6* 23.7*        Chemistries:  Recent Labs   Lab 12/16/20  0740 12/16/20  1322 12/17/20  0448    139 140   K 3.1* 4.0 3.5   CL 99 98 100   CO2 29 28 32*   BUN 13 13 13   CREATININE 0.9 0.9 0.9   CALCIUM 8.6* 8.3* 8.6*   MG 1.7 2.6 2.0       All pertinent labs within the past 24 hours have been reviewed.    Significant Imaging:  I have reviewed all pertinent imaging results/findings within the past 24 hours.      ABG  No results for input(s): PH, PO2, PCO2, HCO3, BE in the last 168 hours.  Assessment/Plan:     Neuro  History of stroke  On ASA and Plavix    Dementia  Currently on Donepezil, Effexor, Cymbalta, Buspirone, Abilify  Consider removing some meds defer to HM    Pulmonary  Pleural effusion on left  Appears chronic in review of old films    Cardiac/Vascular  * Ventricular tachycardia  Cards following  Cardioversion upon arrival to ICU successful to SR  12/17 - SR on amio infusion at 0.5mg/min  Replace potassium  Continue cardiac monitoring    Chronic combined systolic and diastolic heart failure  Cards following  On Coreg  Daily weights  Strict I/O    HTN (hypertension)  Controlled on coreg    Renal/  Electrolyte disturbance  Replacing K+; Mg+ stable  Cont Cardiac monitoring  Monitor electrolytes daily    UTI (urinary tract infection)  Rocephin Day #3  Blood and Urine cultures NGTD  Afeb w/ normal WBC  Will stop abx after day 3 dose    Oncology  Anemia  Chronic  Hx multiple transfusions  Follow up outpt with Dr. Hauser  No active bleeding  Conservative transfusion protocol  On Ferrous sulfate and  MVI    Endocrine  Hypothyroidism  Cont Levothyroxine       Critical Care Daily Checklist:    A: Awake: RASS Goal/Actual Goal:    Actual: [place] Agitation Sedation Scale (RASS): Alert and calm   B: Spontaneous Breathing Trial Performed?     C: SAT & SBT Coordinated?  Not applicable                      D: Delirium: CAM-ICU Overall CAM-ICU: Negative   E: Early Mobility Performed? Yes   F: Feeding Goal:    Status:     Current Diet Order   Procedures    Diet Cardiac      AS: Analgesia/Sedation prn   T: Thromboembolic Prophylaxis heparin   H: HOB > 300 Yes   U: Stress Ulcer Prophylaxis (if needed) pepcid   G: Glucose Control monitoring   B: Bowel Function     I: Indwelling Catheter (Lines & Santizo) Necessity reviewed   D: De-escalation of Antimicrobials/Pharmacotherapies reviewed    Plan for the day/ETD Ok for transfer out of ICU    Code Status:  Family/Goals of Care: Full Code  Home on discharge   I have discussed case and plan of care in detail with Dr Travis; Status and plan of care were discussed with team on multidisciplinary rounds.    Ok for transfer out of ICU from pulmonary/critical care standpoint; we will sign off with transfer out of ICU, please re-consult if we can be of additional assistance.     Daxa Davis, MONTEZ-BC  Critical Care Medicine  [facility]

## 2020-12-17 NOTE — PROGRESS NOTES
"[facility] Hospital Medicine  Progress Note    Patient Name: Love Davey  MRN: [id number]  Patient Class: IP- Inpatient   Admission Date: 12/14/2020  Length of Stay: 2 days  Attending Physician: Harlan Atwood MD  Primary Care Provider: Kaley Law MD        Subjective:     Principal Problem:Ventricular tachycardia        HPI:  88 y/o WF with hx of stroke, HTN, combined CHF, arthritis, insomnia, depression, anxiety, cardiomyopathy, chronic back pain, anemia, hysterectomy, cataract sx, AICD, appendectomy, asthma, dementia, hypothyroidism, OAB to ED with c/o gradually increasing SOB (worst on exertion) and nonproductive cough over the past 4 days. Also c/o dysuria - states she was diagnosed with a UTI at urgent care 1 week ago but her antibiotics have not come in the mail yet so she has not begun treatment. Symptoms are persistent and of moderate severity and without known mitigating factors. Denies chest pain, edema, palpitations, wheezing, orthopnea, abdominal pain, N/V/D, flank pain, hematuria, HA, dizziness, weakness, fever, cough, chills, falls/trauma, blurred vision, focal deficits. Pt was given tylenol, ASA, ibuprofen, vancomycin, zosyn in ED with improvement in temperature at 98.4 and BP to 150/67; WBCs 9.41, H&H 8.7&27.4, Na 140, K+ 4.3, creatinine 1.0, , BNP 1662, troponin 0.332, lactic acid 1.0; COVID-19 negative; EKG showed NSR with 1st degree AV block with PACS and aberrant conduction (79BPM); Cxray showed bilateral faint patchy infiltrates and LLL effusion. Pt was given tylenol, ASA, ibuprofen, vancomycin, zosyn in ED with improvement in BP to 150/67 and temperature to 98.4; pt states she is breathing "much better" at this time. Hospital medicine was called and the pt was placed in observation on telemetry monitoring. Pt is a full code - surrogate decision maker is her , Alf Davey.     Overview/Hospital Course:  Pt presented with fever and determined to " have pneumonia. CXR small left pleural effusion with LLL atelectasis and/or consolidation. Normal WBC, normal procal, pt on 2 L NC and Doxycycline and Rocephin. Also, pt with UTI - urine culture pending. Hx of E coli UTI. H/H 8.7/27.4 >> 7.7/25.3. She says she receives blood transfusions periodically. She denies any active bleeding. LLL pleural effusion - likely a degree of decompensated heart failure. BNP 1662 (was 1116). IV lasix in progress. Pt with elevated troponins 0.332, 0.202 and Cardiology feels that likely demand ischemia. Cardiology recommends 1 unit PRBCs transfusion due to cardiac history.   12/16 - Patient with a rapid response today found to be in SVT.Patient transferred to ICU 's. Plan per CARDS to initiate on amiodarone gtt. Patient access clotted off. Patient cardioverted now NSR. Gtt infusing. Patient improved and comfortable.     Interval History:  No events overnight.  She is in normal sinus rhythm    Review of Systems  Objective:     Vital Signs (Most Recent):  Temp: 98.4 °F (36.9 °C) (12/17/20 0400)  Pulse: 68 (12/17/20 0748)  Resp: 20 (12/17/20 0748)  BP: 126/61 (12/17/20 0700)  SpO2: 100 % (12/17/20 0748) Vital Signs (24h Range):  Temp:  [97.9 °F (36.6 °C)-99.3 °F (37.4 °C)] 98.4 °F (36.9 °C)  Pulse:  [58-76] 68  Resp:  [15-27] 20  SpO2:  [97 %-100 %] 100 %  BP: (124-158)/(57-87) 126/61     Weight: 63.6 kg (140 lb 3.4 oz)  Body mass index is 26.49 kg/m².    Intake/Output Summary (Last 24 hours) at 12/17/2020 1021  Last data filed at 12/17/2020 0600  Gross per 24 hour   Intake 1625.4 ml   Output 450 ml   Net 1175.4 ml      Physical Exam  Constitutional:       General: She is not in acute distress.     Appearance: Normal appearance. She is normal weight. She is not toxic-appearing.   HENT:      Head: Normocephalic and atraumatic.      Nose: Nose normal. No congestion.      Mouth/Throat:      Mouth: Mucous membranes are moist.   Eyes:      General: No scleral icterus.     Pupils: Pupils  are equal, round, and reactive to light.   Neck:      Musculoskeletal: Normal range of motion and neck supple. No muscular tenderness.   JVP near one  finger above clavicle  Cardiovascular:      Rate and Rhythm: Regular rhythm.      Pulses: Normal pulses.      Heart sounds: Murmur present. Systolic murmur present with a grade of 3/6.   Pulmonary:      Effort: Pulmonary effort is normal. No respiratory distress.      Breath sounds: Rhonchi (mildly coarse throughout) present. No wheezing.   Abdominal:      General: Abdomen is flat. Bowel sounds are normal. There is no distension.      Palpations: Abdomen is soft.      Tenderness: There is no abdominal tenderness. There is no rebound.   Musculoskeletal: Normal range of motion.         General: No deformity.      Right lower leg: No edema.      Left lower leg: No edema.   Skin:     General: Skin is warm and dry.      Capillary Refill: Capillary refill takes less than 2 seconds.      Coloration: Skin is not jaundiced.      Findings: No bruising or rash.   Neurological:      General: No focal deficit present.      Mental Status: She is alert and oriented to person, place, and time.      Cranial Nerves: No dysarthria.      Sensory: Sensation is intact. No sensory deficit.      Motor: Weakness (generalized) present. No tremor.   Psychiatric:         Mood and Affect: Mood normal.         Speech: Speech normal.         Behavior: Behavior normal.         Thought Content: Thought content normal.         Judgment: Judgment normal.      Comments: AAOX4, follows commands, converses appropriately      Significant Labs: All pertinent labs within the past 24 hours have been reviewed.    Significant Imaging: I have reviewed and interpreted all pertinent imaging results/findings within the past 24 hours.      Assessment/Plan:      * Ventricular tachycardia  MMVT due to to hypokalemia and hypomagnesemia.  Electrolytes repleted .  looks currently euvolemic on exam she has loaded with  amiodarone.  Currently on amiodarone 400 mg daily.  Follow-up with cardiology as outpatient      Pleural effusion on left  Likely due to decompensated systolic heart failure.  Continue IV diuresis for 1 more day    UTI (urinary tract infection)  Continue IV Rocephin.  Follow-up on urine cultures        Anemia  H/H 8.7/27.4>>> 7.7/25.3 - type and cross match and transfuse 1 unit PRBC  Hgb 10.5 today      Elevated troponin   Demand ischemia    ECHO  - EF 25 % and diastolic dysfunction      Hypothyroidism  Resumed home synthroid  Pt to f/u with PCP as outpatient      Dementia  AAOx4 upon admission  Home medications continued  Reorient frequently  Fall precautions      Chronic combined systolic and diastolic heart failure  ECHO 12/15/2020 - EF 25%, Grade 1 diastolic dysfunction  BNP 1662>>>> 1116, LLE pleural effusion on cxray   Looks euvolemic given pleural effusion will diurese with IV Lasix 1 more day  GDMT- only on Coreg.  Will add lisinopril and Aldactone      HTN (hypertension)  On Coreg.  Will  Add isinopril and Aldactone       Asthma  No evidence of acute exacerbation  Supplemental O2 prn   Duonebs scheduled and prn        VTE Risk Mitigation (From admission, onward)         Ordered     heparin (porcine) injection 5,000 Units  Every 8 hours      12/16/20 0917     Place sequential compression device  Until discontinued      12/15/20 0345                Discharge Planning   ELISSA:      Code Status: Full Code   Is the patient medically ready for discharge?: No    Reason for patient still in hospital (select all that apply): Patient trending condition  Discharge Plan A: Home with family, Home Health            Critical care time spent on the evaluation and treatment of severe organ dysfunction, review of pertinent labs and imaging studies, discussions with consulting providers and discussions with patient/family: 35 minutes.      Harlan Atwood MD  Department of Hospital Medicine   [facility]

## 2020-12-17 NOTE — SUBJECTIVE & OBJECTIVE
Interval History:  No events overnight.  She is in normal sinus rhythm    Review of Systems  Objective:     Vital Signs (Most Recent):  Temp: 98.4 °F (36.9 °C) (12/17/20 0400)  Pulse: 68 (12/17/20 0748)  Resp: 20 (12/17/20 0748)  BP: 126/61 (12/17/20 0700)  SpO2: 100 % (12/17/20 0748) Vital Signs (24h Range):  Temp:  [97.9 °F (36.6 °C)-99.3 °F (37.4 °C)] 98.4 °F (36.9 °C)  Pulse:  [58-76] 68  Resp:  [15-27] 20  SpO2:  [97 %-100 %] 100 %  BP: (124-158)/(57-87) 126/61     Weight: 63.6 kg (140 lb 3.4 oz)  Body mass index is 26.49 kg/m².    Intake/Output Summary (Last 24 hours) at 12/17/2020 1021  Last data filed at 12/17/2020 0600  Gross per 24 hour   Intake 1625.4 ml   Output 450 ml   Net 1175.4 ml      Physical Exam  Constitutional:       General: She is not in acute distress.     Appearance: Normal appearance. She is normal weight. She is not toxic-appearing.   HENT:      Head: Normocephalic and atraumatic.      Nose: Nose normal. No congestion.      Mouth/Throat:      Mouth: Mucous membranes are moist.   Eyes:      General: No scleral icterus.     Pupils: Pupils are equal, round, and reactive to light.   Neck:      Musculoskeletal: Normal range of motion and neck supple. No muscular tenderness.   JVP near one  finger above clavicle  Cardiovascular:      Rate and Rhythm: Regular rhythm.      Pulses: Normal pulses.      Heart sounds: Murmur present. Systolic murmur present with a grade of 3/6.   Pulmonary:      Effort: Pulmonary effort is normal. No respiratory distress.      Breath sounds: Rhonchi (mildly coarse throughout) present. No wheezing.   Abdominal:      General: Abdomen is flat. Bowel sounds are normal. There is no distension.      Palpations: Abdomen is soft.      Tenderness: There is no abdominal tenderness. There is no rebound.   Musculoskeletal: Normal range of motion.         General: No deformity.      Right lower leg: No edema.      Left lower leg: No edema.   Skin:     General: Skin is warm and  dry.      Capillary Refill: Capillary refill takes less than 2 seconds.      Coloration: Skin is not jaundiced.      Findings: No bruising or rash.   Neurological:      General: No focal deficit present.      Mental Status: She is alert and oriented to person, place, and time.      Cranial Nerves: No dysarthria.      Sensory: Sensation is intact. No sensory deficit.      Motor: Weakness (generalized) present. No tremor.   Psychiatric:         Mood and Affect: Mood normal.         Speech: Speech normal.         Behavior: Behavior normal.         Thought Content: Thought content normal.         Judgment: Judgment normal.      Comments: AAOX4, follows commands, converses appropriately      Significant Labs: All pertinent labs within the past 24 hours have been reviewed.    Significant Imaging: I have reviewed and interpreted all pertinent imaging results/findings within the past 24 hours.

## 2020-12-17 NOTE — ASSESSMENT & PLAN NOTE
Rapid response due to Sustained unstable VT this AM  Transferred to ICU  Underwent CV x 1 with conversion to SR in 80s  Continue IV Amiodarone infusion today  Continue Coreg as BP permits  ICU Cardiac Monitoring  Keep K+>4 and Mag >2  Assess response    12/17  -Remains in SR today  -Continue Coreg, Amio  -Keep K+>4 and Mag >2

## 2020-12-17 NOTE — SUBJECTIVE & OBJECTIVE
Interval History: no acute issues noted o/n. Remains in SR today. OK to transition to PO Amiodarone today. Ok to transfer to telemetry today as well. Assess response in AM. Keep K+>4 and mag >2    Review of Systems   Constitution: Positive for malaise/fatigue.   HENT: Negative for hearing loss and hoarse voice.    Eyes: Negative for blurred vision and visual disturbance.   Cardiovascular: Positive for dyspnea on exertion, irregular heartbeat, orthopnea and palpitations. Negative for chest pain, claudication, leg swelling, near-syncope, paroxysmal nocturnal dyspnea and syncope.   Respiratory: Positive for cough and shortness of breath. Negative for hemoptysis, sleep disturbances due to breathing, snoring and wheezing.    Endocrine: Negative for cold intolerance and heat intolerance.   Hematologic/Lymphatic: Bruises/bleeds easily (on ASA and Plavix).   Skin: Negative for color change, dry skin and nail changes.   Musculoskeletal: Positive for arthritis and back pain. Negative for joint pain and myalgias.   Gastrointestinal: Positive for bloating. Negative for abdominal pain, constipation, nausea and vomiting.   Genitourinary: Positive for dysuria. Negative for flank pain, hematuria and hesitancy.   Neurological: Positive for weakness. Negative for headaches, light-headedness, loss of balance, numbness and paresthesias.   Psychiatric/Behavioral: Negative for altered mental status.   Allergic/Immunologic: Negative for environmental allergies.     Objective:     Vital Signs (Most Recent):  Temp: 98.5 °F (36.9 °C) (12/17/20 1115)  Pulse: 61 (12/17/20 1345)  Resp: (!) 28 (12/17/20 1345)  BP: (!) 128/58 (12/17/20 1300)  SpO2: 95 % (12/17/20 1345) Vital Signs (24h Range):  Temp:  [97.9 °F (36.6 °C)-99.3 °F (37.4 °C)] 98.5 °F (36.9 °C)  Pulse:  [59-76] 61  Resp:  [16-37] 28  SpO2:  [95 %-100 %] 95 %  BP: (114-158)/(53-83) 128/58     Weight: 63.6 kg (140 lb 3.4 oz)  Body mass index is 26.49 kg/m².     SpO2: 95 %  O2 Device  (Oxygen Therapy): nasal cannula      Intake/Output Summary (Last 24 hours) at 12/17/2020 1446  Last data filed at 12/17/2020 1000  Gross per 24 hour   Intake 1672.2 ml   Output 300 ml   Net 1372.2 ml       Lines/Drains/Airways     Drain            Female External Urinary Catheter 12/16/20 1500 less than 1 day          Peripheral Intravenous Line                 Peripheral IV - Single Lumen 12/16/20 0900 Other (Comments) Right Upper Arm 1 day                Physical Exam   Constitutional: She is oriented to person, place, and time. She appears well-developed and well-nourished. No distress.   HENT:   Head: Normocephalic and atraumatic.   Eyes: Pupils are equal, round, and reactive to light.   Neck: Normal range of motion and full passive range of motion without pain. Neck supple. No JVD present. No thyromegaly present.   Cardiovascular: Normal rate, regular rhythm, S1 normal, S2 normal and intact distal pulses. PMI is not displaced. Exam reveals no distant heart sounds.   No murmur heard.  Pulses:       Radial pulses are 2+ on the right side and 2+ on the left side.        Dorsalis pedis pulses are 2+ on the right side and 2+ on the left side.   CHEST PAIN FREE  LEFT CHEST WALL ICD, WELL HEALED  NO RECENT ICD SHOCKS  Remains in SR   Pulmonary/Chest: Effort normal and breath sounds normal. No accessory muscle usage. No respiratory distress. She has no decreased breath sounds. She has no wheezes. She has no rales.   Abdominal: Soft. Bowel sounds are normal. She exhibits no distension. There is no abdominal tenderness.   Musculoskeletal: Normal range of motion.         General: No edema.      Right ankle: She exhibits no swelling.      Left ankle: She exhibits no swelling.   Neurological: She is alert and oriented to person, place, and time.   Skin: Skin is warm and dry. She is not diaphoretic. No cyanosis. Nails show no clubbing.   Psychiatric: She has a normal mood and affect. Her speech is normal and behavior is  normal. Judgment and thought content normal. Cognition and memory are normal.   Nursing note and vitals reviewed.      Significant Labs:   BMP:   Recent Labs   Lab 12/16/20  0740 12/16/20  1322 12/17/20  0448    125* 99    139 140   K 3.1* 4.0 3.5   CL 99 98 100   CO2 29 28 32*   BUN 13 13 13   CREATININE 0.9 0.9 0.9   CALCIUM 8.6* 8.3* 8.6*   MG 1.7 2.6 2.0   , CBC   Recent Labs   Lab 12/16/20  0740 12/17/20  0448   WBC 10.45 8.35   HGB 10.5* 9.7*   HCT 32.0* 30.4*   * 341    and All pertinent lab results from the last 24 hours have been reviewed.    Significant Imaging: Echocardiogram:   Transthoracic echo (TTE) complete (Cupid Only):   Results for orders placed or performed during the hospital encounter of 12/14/20   Echo   Result Value Ref Range    BSA 1.66 m2    TDI SEPTAL 0.07 m/s    LV LATERAL E/E' RATIO 12.57 m/s    LV SEPTAL E/E' RATIO 12.57 m/s    LA WIDTH 3.11 cm    TDI LATERAL 0.07 m/s    LVIDd 4.56 3.5 - 6.0 cm    IVS 1.14 (A) 0.6 - 1.1 cm    Posterior Wall 1.36 (A) 0.6 - 1.1 cm    Ao root annulus 3.66 cm    LVIDs 4.19 (A) 2.1 - 4.0 cm    FS 8 28 - 44 %    LA volume 29.48 cm3    Sinus 3.81 cm    STJ 3.24 cm    Ascending aorta 3.50 cm    LV mass 214.49 g    LA size 3.15 cm    TAPSE 1.71 cm    Left Ventricle Relative Wall Thickness 0.60 cm    AV mean gradient 11 mmHg    AV valve area 1.82 cm2    AV Velocity Ratio 0.41     AV index (prosthetic) 0.49     E/A ratio 1.29     Mean e' 0.07 m/s    E wave decelartion time 172.41 msec    IVRT 91.34 msec    LVOT diameter 2.17 cm    LVOT area 3.7 cm2    LVOT peak jason 0.91 m/s    LVOT peak VTI 25.11 cm    Ao peak jason 2.21 m/s    Ao VTI 50.96 cm    RVOT peak jason 0.63 m/s    RVOT peak VTI 14.80 cm    LVOT stroke volume 92.82 cm3    AV peak gradient 20 mmHg    PV mean gradient 1 mmHg    E/E' ratio 12.57 m/s    MV Peak E Jason 0.88 m/s    TR Max Jason 3.41 m/s    MV Peak A Jason 0.68 m/s    LV Systolic Volume 78.22 mL    LV Systolic Volume Index 48.0 mL/m2     LV Diastolic Volume 95.30 mL    LV Diastolic Volume Index 58.53 mL/m2    LA Volume Index 18.1 mL/m2    LV Mass Index 132 g/m2    RA Major Axis 3.69 cm    Left Atrium Minor Axis 3.15 cm    Left Atrium Major Axis 4.04 cm    Triscuspid Valve Regurgitation Peak Gradient 47 mmHg    Right Atrial Pressure (from IVC) 3 mmHg    TV rest pulmonary artery pressure 50 mmHg    Narrative    · The left ventricle is mildly enlarged with severely decreased systolic   function. There is mild left ventricular concentric hypertrophy. The   estimated ejection fraction is 25%.  · Grade I left ventricular diastolic dysfunction.  · There is severe left ventricular global hypokinesis.  · Normal right ventricular size with low normal right ventricular systolic   function.  · There is mild aortic valve stenosis.  · Aortic valve area is 1.82 cm2; peak velocity is 2.21 m/s; mean gradient   is 11 mmHg.  · There is mild aortic regurgitation.  · There is moderate tricuspid valve regurgitation.  · Normal central venous pressure (3 mmHg).  · The estimated PA systolic pressure is 50 mmHg.  · There is pulmonary hypertension.  · There is a moderate left pleural effusion.

## 2020-12-18 VITALS
HEART RATE: 63 BPM | WEIGHT: 138.69 LBS | HEIGHT: 61 IN | BODY MASS INDEX: 26.19 KG/M2 | OXYGEN SATURATION: 92 % | RESPIRATION RATE: 23 BRPM | TEMPERATURE: 98 F | SYSTOLIC BLOOD PRESSURE: 112 MMHG | DIASTOLIC BLOOD PRESSURE: 51 MMHG

## 2020-12-18 PROBLEM — J18.9 COMMUNITY ACQUIRED PNEUMONIA: Status: RESOLVED | Noted: 2020-12-15 | Resolved: 2020-12-18

## 2020-12-18 PROBLEM — J90 PLEURAL EFFUSION ON LEFT: Chronic | Status: RESOLVED | Noted: 2020-12-15 | Resolved: 2020-12-18

## 2020-12-18 PROBLEM — N39.0 UTI (URINARY TRACT INFECTION): Status: RESOLVED | Noted: 2020-12-15 | Resolved: 2020-12-18

## 2020-12-18 PROBLEM — R79.89 ELEVATED TROPONIN: Status: RESOLVED | Noted: 2020-08-28 | Resolved: 2020-12-18

## 2020-12-18 PROBLEM — E87.8 ELECTROLYTE DISTURBANCE: Status: RESOLVED | Noted: 2020-12-16 | Resolved: 2020-12-18

## 2020-12-18 PROBLEM — D64.9 ANEMIA: Chronic | Status: RESOLVED | Noted: 2020-11-01 | Resolved: 2020-12-18

## 2020-12-18 PROBLEM — I47.20 VENTRICULAR TACHYCARDIA: Status: RESOLVED | Noted: 2020-11-01 | Resolved: 2020-12-18

## 2020-12-18 LAB
ANION GAP SERPL CALC-SCNC: 10 MMOL/L (ref 8–16)
BASOPHILS # BLD AUTO: 0.03 K/UL (ref 0–0.2)
BASOPHILS NFR BLD: 0.4 % (ref 0–1.9)
BUN SERPL-MCNC: 15 MG/DL (ref 8–23)
CALCIUM SERPL-MCNC: 8.8 MG/DL (ref 8.7–10.5)
CHLORIDE SERPL-SCNC: 100 MMOL/L (ref 95–110)
CO2 SERPL-SCNC: 31 MMOL/L (ref 23–29)
CREAT SERPL-MCNC: 0.9 MG/DL (ref 0.5–1.4)
DIFFERENTIAL METHOD: ABNORMAL
EOSINOPHIL # BLD AUTO: 0.2 K/UL (ref 0–0.5)
EOSINOPHIL NFR BLD: 2.6 % (ref 0–8)
ERYTHROCYTE [DISTWIDTH] IN BLOOD BY AUTOMATED COUNT: 23.5 % (ref 11.5–14.5)
EST. GFR  (AFRICAN AMERICAN): >60 ML/MIN/1.73 M^2
EST. GFR  (NON AFRICAN AMERICAN): 58 ML/MIN/1.73 M^2
GLUCOSE SERPL-MCNC: 84 MG/DL (ref 70–110)
HCT VFR BLD AUTO: 32.2 % (ref 37–48.5)
HGB BLD-MCNC: 10.1 G/DL (ref 12–16)
IMM GRANULOCYTES # BLD AUTO: 0.05 K/UL (ref 0–0.04)
IMM GRANULOCYTES NFR BLD AUTO: 0.7 % (ref 0–0.5)
LYMPHOCYTES # BLD AUTO: 1.4 K/UL (ref 1–4.8)
LYMPHOCYTES NFR BLD: 18.5 % (ref 18–48)
MAGNESIUM SERPL-MCNC: 1.9 MG/DL (ref 1.6–2.6)
MCH RBC QN AUTO: 34.8 PG (ref 27–31)
MCHC RBC AUTO-ENTMCNC: 31.4 G/DL (ref 32–36)
MCV RBC AUTO: 111 FL (ref 82–98)
MONOCYTES # BLD AUTO: 0.7 K/UL (ref 0.3–1)
MONOCYTES NFR BLD: 9.7 % (ref 4–15)
NEUTROPHILS # BLD AUTO: 5.2 K/UL (ref 1.8–7.7)
NEUTROPHILS NFR BLD: 68.1 % (ref 38–73)
NRBC BLD-RTO: 0 /100 WBC
PLATELET # BLD AUTO: 383 K/UL (ref 150–350)
PMV BLD AUTO: 12.2 FL (ref 9.2–12.9)
POTASSIUM SERPL-SCNC: 3.6 MMOL/L (ref 3.5–5.1)
RBC # BLD AUTO: 2.9 M/UL (ref 4–5.4)
SARS-COV-2 RDRP RESP QL NAA+PROBE: NEGATIVE
SODIUM SERPL-SCNC: 141 MMOL/L (ref 136–145)
WBC # BLD AUTO: 7.64 K/UL (ref 3.9–12.7)

## 2020-12-18 PROCEDURE — 93010 ELECTROCARDIOGRAM REPORT: CPT | Mod: ,,, | Performed by: INTERNAL MEDICINE

## 2020-12-18 PROCEDURE — 93005 ELECTROCARDIOGRAM TRACING: CPT

## 2020-12-18 PROCEDURE — 97165 OT EVAL LOW COMPLEX 30 MIN: CPT | Performed by: PHYSICAL THERAPIST

## 2020-12-18 PROCEDURE — 25000003 PHARM REV CODE 250: Performed by: INTERNAL MEDICINE

## 2020-12-18 PROCEDURE — 99900038 HC OT GENERIC THERAPY SCREENING (STAT): Performed by: PHYSICAL THERAPIST

## 2020-12-18 PROCEDURE — 63600175 PHARM REV CODE 636 W HCPCS: Performed by: NURSE PRACTITIONER

## 2020-12-18 PROCEDURE — 99233 SBSQ HOSP IP/OBS HIGH 50: CPT | Mod: ,,, | Performed by: INTERNAL MEDICINE

## 2020-12-18 PROCEDURE — 93010 EKG 12-LEAD: ICD-10-PCS | Mod: ,,, | Performed by: INTERNAL MEDICINE

## 2020-12-18 PROCEDURE — 80048 BASIC METABOLIC PNL TOTAL CA: CPT

## 2020-12-18 PROCEDURE — 97530 THERAPEUTIC ACTIVITIES: CPT | Performed by: PHYSICAL THERAPIST

## 2020-12-18 PROCEDURE — 25000242 PHARM REV CODE 250 ALT 637 W/ HCPCS: Performed by: NURSE PRACTITIONER

## 2020-12-18 PROCEDURE — 97162 PT EVAL MOD COMPLEX 30 MIN: CPT

## 2020-12-18 PROCEDURE — 94640 AIRWAY INHALATION TREATMENT: CPT

## 2020-12-18 PROCEDURE — 85025 COMPLETE CBC W/AUTO DIFF WBC: CPT

## 2020-12-18 PROCEDURE — 63600175 PHARM REV CODE 636 W HCPCS: Performed by: EMERGENCY MEDICINE

## 2020-12-18 PROCEDURE — 83735 ASSAY OF MAGNESIUM: CPT

## 2020-12-18 PROCEDURE — 25000003 PHARM REV CODE 250: Performed by: NURSE PRACTITIONER

## 2020-12-18 PROCEDURE — 27000221 HC OXYGEN, UP TO 24 HOURS

## 2020-12-18 PROCEDURE — U0002 COVID-19 LAB TEST NON-CDC: HCPCS

## 2020-12-18 PROCEDURE — 36415 COLL VENOUS BLD VENIPUNCTURE: CPT

## 2020-12-18 PROCEDURE — 99233 PR SUBSEQUENT HOSPITAL CARE,LEVL III: ICD-10-PCS | Mod: ,,, | Performed by: INTERNAL MEDICINE

## 2020-12-18 PROCEDURE — 25000003 PHARM REV CODE 250: Performed by: HOSPITALIST

## 2020-12-18 PROCEDURE — 97530 THERAPEUTIC ACTIVITIES: CPT

## 2020-12-18 RX ORDER — SPIRONOLACTONE 25 MG/1
25 TABLET ORAL DAILY
Qty: 30 TABLET | Refills: 11 | Status: ON HOLD | OUTPATIENT
Start: 2020-12-19 | End: 2021-03-28 | Stop reason: HOSPADM

## 2020-12-18 RX ORDER — LANOLIN ALCOHOL/MO/W.PET/CERES
400 CREAM (GRAM) TOPICAL 2 TIMES DAILY
Status: DISCONTINUED | OUTPATIENT
Start: 2020-12-18 | End: 2020-12-18 | Stop reason: HOSPADM

## 2020-12-18 RX ORDER — CYANOCOBALAMIN 1000 UG/ML
1000 INJECTION, SOLUTION INTRAMUSCULAR; SUBCUTANEOUS ONCE
Status: COMPLETED | OUTPATIENT
Start: 2020-12-18 | End: 2020-12-18

## 2020-12-18 RX ADMIN — HEPARIN SODIUM 5000 UNITS: 5000 INJECTION INTRAVENOUS; SUBCUTANEOUS at 06:12

## 2020-12-18 RX ADMIN — FUROSEMIDE 40 MG: 10 INJECTION, SOLUTION INTRAMUSCULAR; INTRAVENOUS at 08:12

## 2020-12-18 RX ADMIN — DOCUSATE SODIUM 100 MG: 100 CAPSULE, LIQUID FILLED ORAL at 09:12

## 2020-12-18 RX ADMIN — BUSPIRONE HYDROCHLORIDE 15 MG: 10 TABLET ORAL at 08:12

## 2020-12-18 RX ADMIN — CYANOCOBALAMIN 1000 MCG: 1000 INJECTION, SOLUTION INTRAMUSCULAR; SUBCUTANEOUS at 01:12

## 2020-12-18 RX ADMIN — CYANOCOBALAMIN TAB 1000 MCG 1000 MCG: 1000 TAB at 08:12

## 2020-12-18 RX ADMIN — OXYBUTYNIN CHLORIDE 10 MG: 5 TABLET, EXTENDED RELEASE ORAL at 08:12

## 2020-12-18 RX ADMIN — IPRATROPIUM BROMIDE AND ALBUTEROL SULFATE 3 ML: .5; 3 SOLUTION RESPIRATORY (INHALATION) at 07:12

## 2020-12-18 RX ADMIN — CARVEDILOL 6.25 MG: 6.25 TABLET, FILM COATED ORAL at 07:12

## 2020-12-18 RX ADMIN — POTASSIUM CHLORIDE 40 MEQ: 1500 TABLET, EXTENDED RELEASE ORAL at 08:12

## 2020-12-18 RX ADMIN — LACTOBACILLUS TAB 1 TABLET: TAB at 08:12

## 2020-12-18 RX ADMIN — LEVOTHYROXINE SODIUM 50 MCG: 50 TABLET ORAL at 06:12

## 2020-12-18 RX ADMIN — FAMOTIDINE 20 MG: 20 TABLET ORAL at 08:12

## 2020-12-18 RX ADMIN — AMIODARONE HYDROCHLORIDE 400 MG: 200 TABLET ORAL at 08:12

## 2020-12-18 RX ADMIN — ARIPIPRAZOLE 5 MG: 5 TABLET ORAL at 08:12

## 2020-12-18 RX ADMIN — ATORVASTATIN CALCIUM 20 MG: 10 TABLET, FILM COATED ORAL at 08:12

## 2020-12-18 RX ADMIN — DULOXETINE HYDROCHLORIDE 30 MG: 30 CAPSULE, DELAYED RELEASE ORAL at 08:12

## 2020-12-18 RX ADMIN — IRON SUCROSE 200 MG: 20 INJECTION, SOLUTION INTRAVENOUS at 01:12

## 2020-12-18 RX ADMIN — IPRATROPIUM BROMIDE AND ALBUTEROL SULFATE 3 ML: .5; 3 SOLUTION RESPIRATORY (INHALATION) at 01:12

## 2020-12-18 RX ADMIN — CLOPIDOGREL 75 MG: 75 TABLET, FILM COATED ORAL at 08:12

## 2020-12-18 RX ADMIN — LISINOPRIL 10 MG: 10 TABLET ORAL at 08:12

## 2020-12-18 RX ADMIN — FERROUS SULFATE TAB EC 325 MG (65 MG FE EQUIVALENT) 325 MG: 325 (65 FE) TABLET DELAYED RESPONSE at 08:12

## 2020-12-18 RX ADMIN — VENLAFAXINE HYDROCHLORIDE 75 MG: 75 CAPSULE, EXTENDED RELEASE ORAL at 08:12

## 2020-12-18 RX ADMIN — SPIRONOLACTONE 25 MG: 25 TABLET ORAL at 08:12

## 2020-12-18 RX ADMIN — THERA TABS 1 TABLET: TAB at 08:12

## 2020-12-18 RX ADMIN — IPRATROPIUM BROMIDE AND ALBUTEROL SULFATE 3 ML: .5; 3 SOLUTION RESPIRATORY (INHALATION) at 12:12

## 2020-12-18 RX ADMIN — ASPIRIN 81 MG: 81 TABLET, COATED ORAL at 08:12

## 2020-12-18 RX ADMIN — Medication 400 MG: at 08:12

## 2020-12-18 RX ADMIN — MUPIROCIN: 20 OINTMENT TOPICAL at 09:12

## 2020-12-18 RX ADMIN — HEPARIN SODIUM 5000 UNITS: 5000 INJECTION INTRAVENOUS; SUBCUTANEOUS at 01:12

## 2020-12-18 RX ADMIN — FLUTICASONE PROPIONATE 50 MCG: 50 SPRAY, METERED NASAL at 09:12

## 2020-12-18 NOTE — NURSING
Pt AAOx3, on Room air. NSR on montior. Ok to discharge. Repeat rapid covid test results negative. Notified [facility] where pt will be returning. Transferred patient via wheelchair to daughter's vehicle for her to bring her back to living facility. Discharge summary reviewed and given to pt.

## 2020-12-18 NOTE — DISCHARGE SUMMARY
"[facility] Hospital Medicine  Discharge Summary      Patient Name: Love Davey  MRN: [id number]  Patient Class: IP- Inpatient  Admission Date: 12/14/2020  Hospital Length of Stay: 3 days  Discharge Date and Time:  12/18/2020 1:05 PM  Attending Physician: Allyn Becerra MD   Discharging Provider: Allyn Becerra MD  Primary Care Provider: Kaley Law MD      HPI:   88 y/o WF with hx of stroke, HTN, combined CHF, arthritis, insomnia, depression, anxiety, cardiomyopathy, chronic back pain, anemia, hysterectomy, cataract sx, AICD, appendectomy, asthma, dementia, hypothyroidism, OAB to ED with c/o gradually increasing SOB (worst on exertion) and nonproductive cough over the past 4 days. Also c/o dysuria - states she was diagnosed with a UTI at urgent care 1 week ago but her antibiotics have not come in the mail yet so she has not begun treatment. Symptoms are persistent and of moderate severity and without known mitigating factors. Denies chest pain, edema, palpitations, wheezing, orthopnea, abdominal pain, N/V/D, flank pain, hematuria, HA, dizziness, weakness, fever, cough, chills, falls/trauma, blurred vision, focal deficits. Pt was given tylenol, ASA, ibuprofen, vancomycin, zosyn in ED with improvement in temperature at 98.4 and BP to 150/67; WBCs 9.41, H&H 8.7&27.4, Na 140, K+ 4.3, creatinine 1.0, , BNP 1662, troponin 0.332, lactic acid 1.0; COVID-19 negative; EKG showed NSR with 1st degree AV block with PACS and aberrant conduction (79BPM); Cxray showed bilateral faint patchy infiltrates and LLL effusion. Pt was given tylenol, ASA, ibuprofen, vancomycin, zosyn in ED with improvement in BP to 150/67 and temperature to 98.4; pt states she is breathing "much better" at this time. Hospital medicine was called and the pt was placed in observation on telemetry monitoring. Pt is a full code - surrogate decision maker is her , Alf Davey.     * No surgery found *      Hospital " Course:   Pt presented with fever and determined to have pneumonia. CXR small left pleural effusion with LLL atelectasis and/or consolidation. Normal WBC, normal procal, pt on 2 L NC and Doxycycline and Rocephin. Also, pt with UTI - urine culture pending. Hx of E coli UTI. H/H 8.7/27.4 >> 7.7/25.3. She says she receives blood transfusions periodically. She denies any active bleeding. LLL pleural effusion - likely a degree of decompensated heart failure. BNP 1662 (was 1116). IV lasix in progress. Pt with elevated troponins 0.332, 0.202 and Cardiology feels that likely demand ischemia. Cardiology recommends 1 unit PRBCs transfusion due to cardiac history.   12/16 - Patient with a rapid response today found to be in SVT.Patient transferred to ICU 's. Plan per CARDS to initiate on amiodarone gtt. Patient access clotted off. Patient cardioverted now NSR. Gtt infusing. Patient improved and comfortable.   12/18- seen and d/w Dr. Holt- doing well, remains in NSR on oral Amiodarone, H/H remains stable at 10/30 since she got the unit of blood 2 days ago, inj Venofer and Inj B12 1000 mcg IM given. She has remained afebrile, without any leukocytosis and all her urine and blood Cx have remained NGTD and she has received three days of Rocephin. No signs of Pneumonia but appeared to have fluid overload with small L pleural effusion on admission and she has been diuresed well. She is eating drinking well, walking around well with PT using a walker. Cardiology has already cleared her since yesterday and she feels ready to go home. She was seen and examined deemed stable for discharge home with HH including Home PT/OT today.      Consults:   Consults (From admission, onward)        Status Ordering Provider     Inpatient consult to Cardiology  Once     Provider:  Mark Holt MD    Completed NAIMA STONER     Inpatient consult to Pulmonology  Once     Provider:  Orion Travis MD    Completed LUIS AGUILA      Inpatient consult to Social Work  Once     Provider:  (Not yet assigned)    Completed CARMENZA MANCINI          No new Assessment & Plan notes have been filed under this hospital service since the last note was generated.  Service: Hospital Medicine    Final Active Diagnoses:    Diagnosis Date Noted POA    History of stroke [Z86.73] 08/28/2020 Yes     Chronic    Hypothyroidism [E03.9] 03/12/2020 Yes     Chronic    Chronic combined systolic and diastolic heart failure [I50.42] 01/04/2020 Yes     Chronic    HTN (hypertension) [I10] 01/04/2020 Yes    Asthma [J45.909] 01/04/2020 Yes    Dementia [F03.90] 01/04/2020 Yes     Chronic      Problems Resolved During this Admission:    Diagnosis Date Noted Date Resolved POA    PRINCIPAL PROBLEM:  Ventricular tachycardia [I47.2] 11/01/2020 12/18/2020 No    Electrolyte disturbance [E87.8] 12/16/2020 12/18/2020 No    Community acquired pneumonia [J18.9] 12/15/2020 12/18/2020 Yes    UTI (urinary tract infection) [N39.0] 12/15/2020 12/18/2020 Yes    Pleural effusion on left [J90] 12/15/2020 12/18/2020 Yes     Chronic    Anemia [D64.9] 11/01/2020 12/18/2020 Yes     Chronic    Elevated troponin [R77.8] 08/28/2020 12/18/2020 Yes       Discharged Condition: stable    Disposition: Home-Health Care [facility]    Follow Up:  Follow-up Information     Kaley Law MD. Schedule an appointment as soon as possible for a visit in 3 days.    Specialty: Internal Medicine  Why: Hospital follow up, As needed  Contact information:  [address]  [address]  [place] [address]  [phone]             YONATAN Blanca In 1 week.    Specialty: Cardiology  Why: Hospital follow up  Contact information:  [address] [place] [id number] [phone]             [facility] .    Specialty: Home Health Services  Contact information:  [address]  [address]  [place] [address]  [phone]                 Patient Instructions:       Ambulatory referral/consult to Home Health   Referral Priority: Routine Referral Type: Home Health   Referral Reason: Specialty Services Required   Referred to Provider: [facility] Requested Specialty: Home Health Services   Number of Visits Requested: 1     Diet Cardiac     Activity as tolerated       Significant Diagnostic Studies: Labs:   BMP:   Recent Labs   Lab 12/17/20  0448 12/17/20  1250 12/18/20  0353   GLU 99 94 84    137 141   K 3.5 4.0 3.6    99 100   CO2 32* 30* 31*   BUN 13 13 15   CREATININE 0.9 0.9 0.9   CALCIUM 8.6* 8.6* 8.8   MG 2.0 1.9 1.9   , CMP   Recent Labs   Lab 12/17/20  0448 12/17/20  1250 12/18/20  0353    137 141   K 3.5 4.0 3.6    99 100   CO2 32* 30* 31*   GLU 99 94 84   BUN 13 13 15   CREATININE 0.9 0.9 0.9   CALCIUM 8.6* 8.6* 8.8   ANIONGAP 8 8 10   ESTGFRAFRICA >60 >60 >60   EGFRNONAA 58* 58* 58*   , CBC   Recent Labs   Lab 12/17/20  0448 12/18/20  0353   WBC 8.35 7.64   HGB 9.7* 10.1*   HCT 30.4* 32.2*    383*   , Troponin   Recent Labs   Lab 12/14/20  2352 12/15/20  0706 12/15/20  1147   TROPONINI 0.332* 0.259* 0.202*    and All labs within the past 24 hours have been reviewed  Microbiology:   Blood Culture   Lab Results   Component Value Date    LABBLOO No Growth to date 12/15/2020    LABBLOO No Growth to date 12/15/2020    LABBLOO No Growth to date 12/15/2020    LABBLOO No Growth to date 12/15/2020    and Urine Culture    Lab Results   Component Value Date    LABURIN No significant growth 12/15/2020     Radiology: X-Ray:   Imaging Results          X-Ray Chest AP Portable (Final result)  Result time 12/15/20 07:48:43    Final result by Delgado Akers MD (12/15/20 07:48:43)                 Impression:      Stable small left pleural effusion with some left lung base atelectasis and/or consolidation.      Electronically signed by: Delgado Akers MD  Date:    12/15/2020  Time:    07:48             Narrative:    EXAMINATION:  XR CHEST  AP PORTABLE    CLINICAL HISTORY:  Shortness of breath    TECHNIQUE:  Single frontal view of the chest was performed.    COMPARISON:  11/20/2020    FINDINGS:  Stable mild cardiomegaly.  Left chest AICD.  Aortic atherosclerosis. Stable small left pleural effusion with some left lung base atelectasis and/or consolidation. Right lung clear of consolidation.  Punctate radiopacities at the base of the neck of unknown clinical significance.    In comparison to the prior study, there is no adverse interval changes.                              Cardiac Graphics:   Results for orders placed during the hospital encounter of 12/14/20   Echo    Narrative · The left ventricle is mildly enlarged with severely decreased systolic   function. There is mild left ventricular concentric hypertrophy. The   estimated ejection fraction is 25%.  · Grade I left ventricular diastolic dysfunction.  · There is severe left ventricular global hypokinesis.  · Normal right ventricular size with low normal right ventricular systolic   function.  · There is mild aortic valve stenosis.  · Aortic valve area is 1.82 cm2; peak velocity is 2.21 m/s; mean gradient   is 11 mmHg.  · There is mild aortic regurgitation.  · There is moderate tricuspid valve regurgitation.  · Normal central venous pressure (3 mmHg).  · The estimated PA systolic pressure is 50 mmHg.  · There is pulmonary hypertension.  · There is a moderate left pleural effusion.            Pending Diagnostic Studies:     None         Medications:  Reconciled Home Medications:      Medication List      START taking these medications    spironolactone 25 MG tablet  Commonly known as: ALDACTONE  Take 1 tablet (25 mg total) by mouth once daily.  Start taking on: December 19, 2020        CHANGE how you take these medications    levothyroxine 75 MCG tablet  Commonly known as: SYNTHROID  Take 1 tablet (75 mcg total) by mouth before breakfast.  What changed: how much to take        CONTINUE taking these  medications    amiodarone 200 MG Tab  Commonly known as: PACERONE  Take 1 tablet by mouth once daily.     amLODIPine 10 MG tablet  Commonly known as: NORVASC  Take 10 mg by mouth once daily.     ARIPiprazole 10 MG Tab  Commonly known as: ABILIFY  Take 5 mg by mouth once daily.     aspirin 81 MG EC tablet  Commonly known as: ECOTRIN  Take 81 mg by mouth once daily.     atorvastatin 20 MG tablet  Commonly known as: LIPITOR  Take 20 mg by mouth once daily.     busPIRone 15 MG tablet  Commonly known as: BUSPAR  Take 1 tablet by mouth 2 (two) times daily.     carvediloL 6.25 MG tablet  Commonly known as: COREG  Take 6.25 mg by mouth 2 (two) times daily with meals.     clopidogreL 75 mg tablet  Commonly known as: PLAVIX  Take 1 tablet by mouth once daily.     donepeziL 10 MG tablet  Commonly known as: ARICEPT  Take 1 tablet by mouth every evening.     DULoxetine 30 MG capsule  Commonly known as: CYMBALTA  Take 1 capsule by mouth once daily.     esomeprazole 40 MG capsule  Commonly known as: NEXIUM  Take 40 mg by mouth before breakfast.     ferrous sulfate 324 mg (65 mg iron) Tbec  Take 65 mg by mouth once daily.     fluticasone propionate 50 mcg/actuation nasal spray  Commonly known as: FLONASE  1 spray by Each Nare route once daily.     furosemide 40 MG tablet  Commonly known as: LASIX  Take two tabs (80 mg) each morning and one tab (40 mg) each afternoon for five days. After five days, go back to your normal furosemide dosage.     gabapentin 300 MG capsule  Commonly known as: NEURONTIN  Take 300 mg by mouth.     glucosamine-chondroitin 500-400 mg tablet  Take 1 tablet by mouth 2 (two) times daily.     losartan 25 MG tablet  Commonly known as: COZAAR  Take 1 tablet by mouth once daily.     meclizine 12.5 mg tablet  Commonly known as: ANTIVERT  Take 12.5 mg by mouth 3 (three) times daily as needed.     melatonin 1 mg Tab  Take 1 mg by mouth every evening.     mirabegron 50 mg Tb24  Commonly known as: MYRBETRIQ  Take by  mouth.     mirtazapine 7.5 MG Tab  Commonly known as: REMERON     multivitamin capsule  Take 1 capsule by mouth once daily.     nystatin-triamcinolone ointment  Commonly known as: MYCOLOG  2 (two) times daily as needed.     polyethylene glycol 17 gram Pwpk  Commonly known as: GLYCOLAX  Take by mouth.     potassium chloride SA 20 MEQ tablet  Commonly known as: K-DUR,KLOR-CON  Take 20 mEq by mouth 2 (two) times daily.     PROAIR HFA 90 mcg/actuation inhaler  Generic drug: albuterol  Inhale 2 puffs into the lungs every 6 (six) hours as needed for Wheezing.     RESTASIS 0.05 % ophthalmic emulsion  Generic drug: cycloSPORINE  Apply 1 drop to eye.     solifenacin 10 MG tablet  Commonly known as: VESICARE  Take 5 mg by mouth once daily.     traMADoL 50 mg tablet  Commonly known as: ULTRAM  Take 1 tablet by mouth 3 (three) times daily as needed.     venlafaxine 150 MG Cp24  Commonly known as: EFFEXOR-XR  Take 75 mg by mouth once daily.        STOP taking these medications    cyanocobalamin 1000 MCG tablet  Commonly known as: VITAMIN B-12     famotidine 20 MG tablet  Commonly known as: PEPCID     Lactobacillus rhamnosus GG 10 billion cell capsule  Commonly known as: CULTURELLE            Indwelling Lines/Drains at time of discharge:   Lines/Drains/Airways     Drain            Female External Urinary Catheter 12/16/20 1500 1 day                Time spent on the discharge of patient: 47 minutes  Patient was seen and examined on the date of discharge and determined to be suitable for discharge.        Allyn Becerra MD  Department of Hospital Medicine  [facility]

## 2020-12-18 NOTE — PT/OT/SLP EVAL
Occupational Therapy   Evaluation    Name: Love Davey  MRN: [id number]  Admitting Diagnosis:  Ventricular tachycardia      Recommendations:     Discharge Recommendations: home with home health  Discharge Equipment Recommendations:  none  Barriers to discharge:    UNKNOWN    Assessment:     Love Davey is a 87 y.o. female with a medical diagnosis of Ventricular tachycardia.  She presents with impaired functional mobility and ADLs. Performance deficits affecting function: weakness, impaired endurance, impaired self care skills, impaired functional mobilty, gait instability, impaired balance, decreased coordination, decreased safety awareness, impaired cardiopulmonary response to activity.      Rehab Prognosis: Fair; patient would benefit from acute skilled OT services to address these deficits and reach maximum level of function.       Plan:     Patient to be seen 3 x/week to address the above listed problems via self-care/home management, therapeutic activities, therapeutic exercises  · Plan of Care Expires: 12/25/20  · Plan of Care Reviewed with: patient    Subjective     Chief Complaint: weakness  Patient/Family Comments/goals: to get stronger    Occupational Profile:  Living Environment: lives with  in [facility]  Previous level of function: Ambulates with rollator, Mod I with ADLs  Roles and Routines: does not drive  Equipment Used at Home:  rollator, bedside commode, shower chair  Assistance upon Discharge:     Pain/Comfort:  · Pain Rating 1: 0/10    Patients cultural, spiritual, Sikh conflicts given the current situation:      Objective:     Communicated with: Nurse Chavez and epic chart review prior to session.  Patient found supine with blood pressure cuff, telemetry, pulse ox (continuous) upon OT entry to room.    General Precautions: Standard, fall   Orthopedic Precautions:N/A   Braces: N/A     Occupational Performance:    Bed Mobility:    · Patient completed Rolling/Turning to Left with   stand by assistance  · Patient completed Rolling/Turning to Right with stand by assistance  · Patient completed Scooting/Bridging with stand by assistance  · Patient completed Supine to Sit with stand by assistance    Functional Mobility/Transfers:  · Patient completed Sit <> Stand Transfer with minimum assistance  with  rolling walker   · Patient completed Bed <> Chair Transfer using Step Transfer technique with minimum assistance with rolling walker  · Functional Mobility: ~5ft using RW with Min A    Activities of Daily Living:  · Upper Body Dressing: minimum assistance .  · Lower Body Dressing: minimum assistance .    Cognitive/Visual Perceptual:  Cognitive/Psychosocial Skills:     -       Oriented to: Person, Place, Time and Situation   -       Follows Commands/attention:Follows multistep  commands  -       Communication: clear/fluent  -       Memory: No Deficits noted  -       Safety awareness/insight to disability: impaired   -       Mood/Affect/Coping skills/emotional control: Appropriate to situation  Visual/Perceptual:      -Intact .    Physical Exam:  Dominant hand:    -       right  Upper Extremity Range of Motion:     -       Right Upper Extremity: WFL  -       Left Upper Extremity: WFL  Upper Extremity Strength:    -       Right Upper Extremity: WFL  -       Left Upper Extremity: WFL   Strength:    -       Right Upper Extremity: WFL  -       Left Upper Extremity: WFL    AMPAC 6 Click ADL:  AMPAC Total Score: 20    Treatment & Education:  OT Eval completed as listed above. Pt educated in role of OT and POC.   Education:    Patient left up in chair with all lines intact, call button in reach and Nurse Kathy notified    GOALS:   Multidisciplinary Problems     Occupational Therapy Goals        Problem: Occupational Therapy Goal    Goal Priority Disciplines Outcome Interventions   Occupational Therapy Goal     OT, PT/OT     Description: Goals to be met by: 12/25/2020     Patient will increase functional  independence with ADLs by performing:    UE Dressing with Stand-by Assistance.  LE Dressing with Stand-by Assistance.  Grooming while standing at sink with Set-up Assistance.  Toilet transfer to toilet with Stand-by Assistance.  Upper extremity exercise program x20 reps per handout, with independence.                     History:     Past Medical History:   Diagnosis Date    Anemia     Anxiety     Arthritis     Asthma     Back pain     Cardiomyopathy     CHF (congestive heart failure)     Dementia     Depression     Dizziness     Hypertension     Hypothyroidism     Insomnia     OAB (overactive bladder)     Stroke     Use of cane as ambulatory aid        Past Surgical History:   Procedure Laterality Date    APPENDECTOMY      arthritis      BLADDER REPAIR      BUNIONECTOMY      CARDIAC DEFIBRILLATOR PLACEMENT      CATARACT EXTRACTION      HYSTERECTOMY      metal implant Bilateral     placed in the bladder.       Time Tracking:     OT Date of Treatment: 12/18/20  OT Start Time: 1030  OT Stop Time: 1055  OT Total Time (min): 25 min    Billable Minutes:Evaluation 15 min  Therapeutic Activity 10 min    Radha Red, PT/OT  12/18/2020

## 2020-12-18 NOTE — PT/OT/SLP EVAL
Physical Therapy Evaluation    Patient Name:  Love Davey   MRN:  [id number]    Recommendations:     Discharge Recommendations:  home health PT   Discharge Equipment Recommendations: walker, rolling   Barriers to discharge: None    Assessment:     Love Davey is a 87 y.o. female admitted with a medical diagnosis of Ventricular tachycardia.  She presents with the following impairments/functional limitations:  weakness, impaired endurance, impaired self care skills, decreased lower extremity function, impaired balance, gait instability, impaired functional mobilty .    Rehab Prognosis: Good; patient would benefit from acute skilled PT services to address these deficits and reach maximum level of function.    Recent Surgery: * No surgery found *      Plan:     During this hospitalization, patient to be seen   to address the identified rehab impairments via gait training, therapeutic activities, therapeutic exercises and progress toward the following goals:    · Plan of Care Expires:  12/25/20    Subjective     Chief Complaint: WEAKNESS   Patient/Family Comments/goals: INC STRENGTH  Pain/Comfort:  · Pain Rating 1: 0/10  · Pain Rating Post-Intervention 1: 0/10    Patients cultural, spiritual, Sabianist conflicts given the current situation:      Living Environment:   PT LIVES AT HOME IN ASSISTED LIVING FACILITY WITH    Prior to admission, patients level of function was MOD I WITH ROLLATOR X 5 WEEKS AGO. SINCE THEN PT HAS NOT BEEN WALKING MUCH..  Equipment used at home: rollator, bedside commode, shower chair.  DME owned (not currently used): none.  Upon discharge, patient will have assistance from  .    Objective:     Communicated with NURSE RAY AND Epic CHART REVIEW  prior to session.  Patient found supine with blood pressure cuff, telemetry, peripheral IV  upon PT entry to room.    General Precautions: Standard, fall   Orthopedic Precautions:N/A   Braces: N/A     Exams:  · RLE ROM: WFL  · RLE  Strength: 4/5  · LLE ROM: WFL  · LLE Strength: 4/5    Functional Mobility:  PT MET IN RM SUP.SIT EOB WITH SBA. PT SCOOTED TO EOB MMT AND ROM ASSESSED. PT STOOD WITH RW AND GT TRAINED X 5' WITH RW AND T/F TO CHAIR WITH CGA. PT LEFT SEATED IN CHAIR AND EDUCATED ON ROLE OF P.T. PT LEFT WITH ALL NEEDS MET   AM-PAC 6 CLICK MOBILITY  Total Score:18     Patient left up in chair with all lines intact, call button in reach and NURSE notified.    GOALS:   Multidisciplinary Problems     Physical Therapy Goals        Problem: Physical Therapy Goal    Goal Priority Disciplines Outcome Goal Variances Interventions   Physical Therapy Goal     PT, PT/OT      Description: PT WILL BE SEEN FOR P.T. FOR A MIN OF 5 OUT OF 7 DAYS A WEEK  LT20  1. PT WILL COMPLETE BED MOBILITY IND  2. PT WILL GT TRAIN X 50' WITH RW AND CGA  3. PT WILL T/F TO CHAIR WITH RW AND SBA  4. PT WILL COMPLETE B LE TE X 20 REPS                   History:     Past Medical History:   Diagnosis Date    Anemia     Anxiety     Arthritis     Asthma     Back pain     Cardiomyopathy     CHF (congestive heart failure)     Dementia     Depression     Dizziness     Hypertension     Hypothyroidism     Insomnia     OAB (overactive bladder)     Stroke     Use of cane as ambulatory aid        Past Surgical History:   Procedure Laterality Date    APPENDECTOMY      arthritis      BLADDER REPAIR      BUNIONECTOMY      CARDIAC DEFIBRILLATOR PLACEMENT      CATARACT EXTRACTION      HYSTERECTOMY      metal implant Bilateral     placed in the bladder.       Time Tracking:     PT Received On: 20  PT Start Time: 1035     PT Stop Time: 1100  PT Total Time (min): 25 min     Billable Minutes: Evaluation 15 and Therapeutic Activity 10      Camille Alfonso, PT  2020

## 2020-12-18 NOTE — SUBJECTIVE & OBJECTIVE
Interval History: no acute issues noted o/n. Remains in SR. OK to discharge home from cardiology standpoint. OP Cards follow up with Dr Nassar.     Review of Systems   Constitution: Positive for malaise/fatigue.   HENT: Negative for hearing loss and hoarse voice.    Eyes: Negative for blurred vision and visual disturbance.   Cardiovascular: Positive for dyspnea on exertion, irregular heartbeat, orthopnea and palpitations. Negative for chest pain, claudication, leg swelling, near-syncope, paroxysmal nocturnal dyspnea and syncope.   Respiratory: Positive for cough and shortness of breath. Negative for hemoptysis, sleep disturbances due to breathing, snoring and wheezing.    Endocrine: Negative for cold intolerance and heat intolerance.   Hematologic/Lymphatic: Bruises/bleeds easily (on ASA and Plavix).   Skin: Negative for color change, dry skin and nail changes.   Musculoskeletal: Positive for arthritis and back pain. Negative for joint pain and myalgias.   Gastrointestinal: Positive for bloating. Negative for abdominal pain, constipation, nausea and vomiting.   Genitourinary: Positive for dysuria. Negative for flank pain, hematuria and hesitancy.   Neurological: Positive for weakness. Negative for headaches, light-headedness, loss of balance, numbness and paresthesias.   Psychiatric/Behavioral: Negative for altered mental status.   Allergic/Immunologic: Negative for environmental allergies.     Objective:     Vital Signs (Most Recent):  Temp: 98.4 °F (36.9 °C) (12/18/20 0715)  Pulse: 67 (12/18/20 0915)  Resp: (!) 22 (12/18/20 0915)  BP: 137/61 (12/18/20 0900)  SpO2: 98 % (12/18/20 0915) Vital Signs (24h Range):  Temp:  [97.3 °F (36.3 °C)-98.7 °F (37.1 °C)] 98.4 °F (36.9 °C)  Pulse:  [58-76] 67  Resp:  [14-45] 22  SpO2:  [84 %-100 %] 98 %  BP: (100-159)/(49-77) 137/61     Weight: 62.9 kg (138 lb 10.7 oz)  Body mass index is 26.2 kg/m².     SpO2: 98 %  O2 Device (Oxygen Therapy): nasal cannula      Intake/Output  Summary (Last 24 hours) at 12/18/2020 1220  Last data filed at 12/18/2020 1000  Gross per 24 hour   Intake 360 ml   Output 1625 ml   Net -1265 ml       Lines/Drains/Airways     Drain            Female External Urinary Catheter 12/16/20 1500 1 day          Peripheral Intravenous Line                 Peripheral IV - Single Lumen 12/16/20 0900 Other (Comments) Right Upper Arm 2 days                Physical Exam   Constitutional: She is oriented to person, place, and time. She appears well-developed and well-nourished. No distress.   HENT:   Head: Normocephalic and atraumatic.   Eyes: Pupils are equal, round, and reactive to light.   Neck: Normal range of motion and full passive range of motion without pain. Neck supple. No JVD present. No thyromegaly present.   Cardiovascular: Normal rate, regular rhythm, S1 normal, S2 normal and intact distal pulses. PMI is not displaced. Exam reveals no distant heart sounds.   No murmur heard.  Pulses:       Radial pulses are 2+ on the right side and 2+ on the left side.        Dorsalis pedis pulses are 2+ on the right side and 2+ on the left side.   CHEST PAIN FREE  LEFT CHEST WALL ICD, WELL HEALED  NO RECENT ICD SHOCKS  Remains in SR   Pulmonary/Chest: Effort normal and breath sounds normal. No accessory muscle usage. No respiratory distress. She has no decreased breath sounds. She has no wheezes. She has no rales.   Abdominal: Soft. Bowel sounds are normal. She exhibits no distension. There is no abdominal tenderness.   Musculoskeletal: Normal range of motion.         General: No edema.      Right ankle: She exhibits no swelling.      Left ankle: She exhibits no swelling.   Neurological: She is alert and oriented to person, place, and time.   Skin: Skin is warm and dry. She is not diaphoretic. No cyanosis. Nails show no clubbing.   Psychiatric: She has a normal mood and affect. Her speech is normal and behavior is normal. Judgment and thought content normal. Cognition and memory  are normal.   Nursing note and vitals reviewed.      Significant Labs:   BMP:   Recent Labs   Lab 12/17/20  0448 12/17/20  1250 12/18/20  0353   GLU 99 94 84    137 141   K 3.5 4.0 3.6    99 100   CO2 32* 30* 31*   BUN 13 13 15   CREATININE 0.9 0.9 0.9   CALCIUM 8.6* 8.6* 8.8   MG 2.0 1.9 1.9   , CBC   Recent Labs   Lab 12/17/20  0448 12/18/20  0353   WBC 8.35 7.64   HGB 9.7* 10.1*   HCT 30.4* 32.2*    383*   , Troponin No results for input(s): TROPONINI in the last 48 hours. and All pertinent lab results from the last 24 hours have been reviewed.    Significant Imaging: Echocardiogram:   Transthoracic echo (TTE) complete (Cupid Only):   Results for orders placed or performed during the hospital encounter of 12/14/20   Echo   Result Value Ref Range    BSA 1.66 m2    TDI SEPTAL 0.07 m/s    LV LATERAL E/E' RATIO 12.57 m/s    LV SEPTAL E/E' RATIO 12.57 m/s    LA WIDTH 3.11 cm    TDI LATERAL 0.07 m/s    LVIDd 4.56 3.5 - 6.0 cm    IVS 1.14 (A) 0.6 - 1.1 cm    Posterior Wall 1.36 (A) 0.6 - 1.1 cm    Ao root annulus 3.66 cm    LVIDs 4.19 (A) 2.1 - 4.0 cm    FS 8 28 - 44 %    LA volume 29.48 cm3    Sinus 3.81 cm    STJ 3.24 cm    Ascending aorta 3.50 cm    LV mass 214.49 g    LA size 3.15 cm    TAPSE 1.71 cm    Left Ventricle Relative Wall Thickness 0.60 cm    AV mean gradient 11 mmHg    AV valve area 1.82 cm2    AV Velocity Ratio 0.41     AV index (prosthetic) 0.49     E/A ratio 1.29     Mean e' 0.07 m/s    E wave decelartion time 172.41 msec    IVRT 91.34 msec    LVOT diameter 2.17 cm    LVOT area 3.7 cm2    LVOT peak jason 0.91 m/s    LVOT peak VTI 25.11 cm    Ao peak jason 2.21 m/s    Ao VTI 50.96 cm    RVOT peak jason 0.63 m/s    RVOT peak VTI 14.80 cm    LVOT stroke volume 92.82 cm3    AV peak gradient 20 mmHg    PV mean gradient 1 mmHg    E/E' ratio 12.57 m/s    MV Peak E Jason 0.88 m/s    TR Max Jason 3.41 m/s    MV Peak A Jason 0.68 m/s    LV Systolic Volume 78.22 mL    LV Systolic Volume Index 48.0 mL/m2     LV Diastolic Volume 95.30 mL    LV Diastolic Volume Index 58.53 mL/m2    LA Volume Index 18.1 mL/m2    LV Mass Index 132 g/m2    RA Major Axis 3.69 cm    Left Atrium Minor Axis 3.15 cm    Left Atrium Major Axis 4.04 cm    Triscuspid Valve Regurgitation Peak Gradient 47 mmHg    Right Atrial Pressure (from IVC) 3 mmHg    TV rest pulmonary artery pressure 50 mmHg    Narrative    · The left ventricle is mildly enlarged with severely decreased systolic   function. There is mild left ventricular concentric hypertrophy. The   estimated ejection fraction is 25%.  · Grade I left ventricular diastolic dysfunction.  · There is severe left ventricular global hypokinesis.  · Normal right ventricular size with low normal right ventricular systolic   function.  · There is mild aortic valve stenosis.  · Aortic valve area is 1.82 cm2; peak velocity is 2.21 m/s; mean gradient   is 11 mmHg.  · There is mild aortic regurgitation.  · There is moderate tricuspid valve regurgitation.  · Normal central venous pressure (3 mmHg).  · The estimated PA systolic pressure is 50 mmHg.  · There is pulmonary hypertension.  · There is a moderate left pleural effusion.

## 2020-12-18 NOTE — PT/OT/SLP PROGRESS
Occupational Therapy      Patient Name:  Love Davey   MRN:  [id number]    OT attempted eval at 9:40am,pt receiving bath atthis time. OT Eval initiated through chart review, will complete later today.     Radha Red, PT/OT  12/18/2020

## 2020-12-18 NOTE — PLAN OF CARE
12/18/20 1341   Final Note   Assessment Type Final Discharge Note   Anticipated Discharge Disposition Home-Health   Right Care Referral Info   Post Acute Recommendation Home-care   Facility Name [facility]       Spoke to Dr. Law's office.  They will call patient with an appointment.    Scheduled patient with JOSEPHINE Dinero - 12-24-20 @ 8:00am

## 2020-12-18 NOTE — ASSESSMENT & PLAN NOTE
May need to hold ARB given unstable VT episode this AM and hypotension  Continue Coreg as BP permits    12/18  -Continue Coreg, ACEi

## 2020-12-18 NOTE — PLAN OF CARE
Pt currently in bed resting. No distress noted. Pt on 2l NC. Call light within reach. Side rails up x2 for safety. No distress noted. Rr/u. Skin wdi.

## 2020-12-18 NOTE — ASSESSMENT & PLAN NOTE
BNP 1662  Continue IV diuresis  Continue Coreg, ARB  Followed by Dr Nassar, [facility]  Dash diet, 2 gm sodium restriction  1200 ml fluid restriction  Daily weights  Strict I/Os  Assess response    12/16  -s/p VT with CV x 1 today  -Hold ARB due to hypotension earlier today  -Continue Coreg as BP permits    12/17  -Continue Coreg, Amio, ACEi

## 2020-12-18 NOTE — PROGRESS NOTES
[facility] -   Cardiology  Progress Note    Patient Name: Love Davey  MRN: [id number]  Admission Date: 12/14/2020  Hospital Length of Stay: 3 days  Code Status: Full Code   Attending Physician: Allyn Becerra MD   Primary Care Physician: Kaley Law MD  Expected Discharge Date: 12/18/2020  Principal Problem:Ventricular tachycardia    Subjective:   HPI    Love Davey is a 87 year old female who presented to [facility] due to shortness of breath, nonproductive cough and dysuria. Her current medical conditions include h/o CVA, HTN, HLP, Chronic combined CHF, HFrEF of 30%, s/p ICD, Arthritis, insomnia, depression, anemia, OAB, recurrent UTI. ED workup revealed WBC 9k, H/H 8.7/27.4, K+ 4.3, Cr 1.0, BNP 1662, Troponin 0.332>0.259. She was placed in observation under the care of hospital medicine. Cardiology consulted to assist with medical management. Chart reviewed, patient seen and examined. Patient seems comfortable at time of exam today. No chest pain or anginal equivalents. NO shortness of breath, CHEW or palpitations. Trace LE edema today on exam. AM labs reviewed, H/H 7.7/25. She was started on IV ABX for UTI and CAP. Further recs to follow.       Hospital Course:   12/16/2020-Patient seen and examined in ICU this AM post Rapid response. Rapid response called this AM due to persistent VT in 170s on Med-Surg. Patient transferred to ICU for Amiodarone infusion. S/p DCCV x 1 this AM in ICU due to unstable VT with conversion to SR. Labs reviewed, K+ 3.1, Cr 0.9, Mag 1.7. Repletion ordered at this time in ICU. Further recs to follow.     12/17/2020-Patient seen and examined earlier today in ICU. Remains in SR. Transitioned to PO Amiodarone this AM. Feels good today on exam. Labs reviewed, K+ 3.5, Cr 0.9, Mag 2.0    12/18/2020--Patient seen and examined in ICU earlier this AM. Feels good today. Ok to discharge home from cardiology standpoint today. Needs OP follow up with DR Nassar after  discharge.     Interval History: no acute issues noted o/n. Remains in SR. OK to discharge home from cardiology standpoint. OP Cards follow up with Dr Nassar.     Review of Systems   Constitution: Positive for malaise/fatigue.   HENT: Negative for hearing loss and hoarse voice.    Eyes: Negative for blurred vision and visual disturbance.   Cardiovascular: Positive for dyspnea on exertion, irregular heartbeat, orthopnea and palpitations. Negative for chest pain, claudication, leg swelling, near-syncope, paroxysmal nocturnal dyspnea and syncope.   Respiratory: Positive for cough and shortness of breath. Negative for hemoptysis, sleep disturbances due to breathing, snoring and wheezing.    Endocrine: Negative for cold intolerance and heat intolerance.   Hematologic/Lymphatic: Bruises/bleeds easily (on ASA and Plavix).   Skin: Negative for color change, dry skin and nail changes.   Musculoskeletal: Positive for arthritis and back pain. Negative for joint pain and myalgias.   Gastrointestinal: Positive for bloating. Negative for abdominal pain, constipation, nausea and vomiting.   Genitourinary: Positive for dysuria. Negative for flank pain, hematuria and hesitancy.   Neurological: Positive for weakness. Negative for headaches, light-headedness, loss of balance, numbness and paresthesias.   Psychiatric/Behavioral: Negative for altered mental status.   Allergic/Immunologic: Negative for environmental allergies.     Objective:     Vital Signs (Most Recent):  Temp: 98.4 °F (36.9 °C) (12/18/20 0715)  Pulse: 67 (12/18/20 0915)  Resp: (!) 22 (12/18/20 0915)  BP: 137/61 (12/18/20 0900)  SpO2: 98 % (12/18/20 0915) Vital Signs (24h Range):  Temp:  [97.3 °F (36.3 °C)-98.7 °F (37.1 °C)] 98.4 °F (36.9 °C)  Pulse:  [58-76] 67  Resp:  [14-45] 22  SpO2:  [84 %-100 %] 98 %  BP: (100-159)/(49-77) 137/61     Weight: 62.9 kg (138 lb 10.7 oz)  Body mass index is 26.2 kg/m².     SpO2: 98 %  O2 Device (Oxygen Therapy): nasal  cannula      Intake/Output Summary (Last 24 hours) at 12/18/2020 1220  Last data filed at 12/18/2020 1000  Gross per 24 hour   Intake 360 ml   Output 1625 ml   Net -1265 ml       Lines/Drains/Airways     Drain            Female External Urinary Catheter 12/16/20 1500 1 day          Peripheral Intravenous Line                 Peripheral IV - Single Lumen 12/16/20 0900 Other (Comments) Right Upper Arm 2 days                Physical Exam   Constitutional: She is oriented to person, place, and time. She appears well-developed and well-nourished. No distress.   HENT:   Head: Normocephalic and atraumatic.   Eyes: Pupils are equal, round, and reactive to light.   Neck: Normal range of motion and full passive range of motion without pain. Neck supple. No JVD present. No thyromegaly present.   Cardiovascular: Normal rate, regular rhythm, S1 normal, S2 normal and intact distal pulses. PMI is not displaced. Exam reveals no distant heart sounds.   No murmur heard.  Pulses:       Radial pulses are 2+ on the right side and 2+ on the left side.        Dorsalis pedis pulses are 2+ on the right side and 2+ on the left side.   CHEST PAIN FREE  LEFT CHEST WALL ICD, WELL HEALED  NO RECENT ICD SHOCKS  Remains in SR   Pulmonary/Chest: Effort normal and breath sounds normal. No accessory muscle usage. No respiratory distress. She has no decreased breath sounds. She has no wheezes. She has no rales.   Abdominal: Soft. Bowel sounds are normal. She exhibits no distension. There is no abdominal tenderness.   Musculoskeletal: Normal range of motion.         General: No edema.      Right ankle: She exhibits no swelling.      Left ankle: She exhibits no swelling.   Neurological: She is alert and oriented to person, place, and time.   Skin: Skin is warm and dry. She is not diaphoretic. No cyanosis. Nails show no clubbing.   Psychiatric: She has a normal mood and affect. Her speech is normal and behavior is normal. Judgment and thought content  normal. Cognition and memory are normal.   Nursing note and vitals reviewed.      Significant Labs:   BMP:   Recent Labs   Lab 12/17/20  0448 12/17/20  1250 12/18/20  0353   GLU 99 94 84    137 141   K 3.5 4.0 3.6    99 100   CO2 32* 30* 31*   BUN 13 13 15   CREATININE 0.9 0.9 0.9   CALCIUM 8.6* 8.6* 8.8   MG 2.0 1.9 1.9   , CBC   Recent Labs   Lab 12/17/20  0448 12/18/20  0353   WBC 8.35 7.64   HGB 9.7* 10.1*   HCT 30.4* 32.2*    383*   , Troponin No results for input(s): TROPONINI in the last 48 hours. and All pertinent lab results from the last 24 hours have been reviewed.    Significant Imaging: Echocardiogram:   Transthoracic echo (TTE) complete (Cupid Only):   Results for orders placed or performed during the hospital encounter of 12/14/20   Echo   Result Value Ref Range    BSA 1.66 m2    TDI SEPTAL 0.07 m/s    LV LATERAL E/E' RATIO 12.57 m/s    LV SEPTAL E/E' RATIO 12.57 m/s    LA WIDTH 3.11 cm    TDI LATERAL 0.07 m/s    LVIDd 4.56 3.5 - 6.0 cm    IVS 1.14 (A) 0.6 - 1.1 cm    Posterior Wall 1.36 (A) 0.6 - 1.1 cm    Ao root annulus 3.66 cm    LVIDs 4.19 (A) 2.1 - 4.0 cm    FS 8 28 - 44 %    LA volume 29.48 cm3    Sinus 3.81 cm    STJ 3.24 cm    Ascending aorta 3.50 cm    LV mass 214.49 g    LA size 3.15 cm    TAPSE 1.71 cm    Left Ventricle Relative Wall Thickness 0.60 cm    AV mean gradient 11 mmHg    AV valve area 1.82 cm2    AV Velocity Ratio 0.41     AV index (prosthetic) 0.49     E/A ratio 1.29     Mean e' 0.07 m/s    E wave decelartion time 172.41 msec    IVRT 91.34 msec    LVOT diameter 2.17 cm    LVOT area 3.7 cm2    LVOT peak jason 0.91 m/s    LVOT peak VTI 25.11 cm    Ao peak jason 2.21 m/s    Ao VTI 50.96 cm    RVOT peak jason 0.63 m/s    RVOT peak VTI 14.80 cm    LVOT stroke volume 92.82 cm3    AV peak gradient 20 mmHg    PV mean gradient 1 mmHg    E/E' ratio 12.57 m/s    MV Peak E Jason 0.88 m/s    TR Max Jason 3.41 m/s    MV Peak A Jason 0.68 m/s    LV Systolic Volume 78.22 mL    LV  Systolic Volume Index 48.0 mL/m2    LV Diastolic Volume 95.30 mL    LV Diastolic Volume Index 58.53 mL/m2    LA Volume Index 18.1 mL/m2    LV Mass Index 132 g/m2    RA Major Axis 3.69 cm    Left Atrium Minor Axis 3.15 cm    Left Atrium Major Axis 4.04 cm    Triscuspid Valve Regurgitation Peak Gradient 47 mmHg    Right Atrial Pressure (from IVC) 3 mmHg    TV rest pulmonary artery pressure 50 mmHg    Narrative    · The left ventricle is mildly enlarged with severely decreased systolic   function. There is mild left ventricular concentric hypertrophy. The   estimated ejection fraction is 25%.  · Grade I left ventricular diastolic dysfunction.  · There is severe left ventricular global hypokinesis.  · Normal right ventricular size with low normal right ventricular systolic   function.  · There is mild aortic valve stenosis.  · Aortic valve area is 1.82 cm2; peak velocity is 2.21 m/s; mean gradient   is 11 mmHg.  · There is mild aortic regurgitation.  · There is moderate tricuspid valve regurgitation.  · Normal central venous pressure (3 mmHg).  · The estimated PA systolic pressure is 50 mmHg.  · There is pulmonary hypertension.  · There is a moderate left pleural effusion.        Assessment and Plan:       Hypothyroidism  Continue synthroid  Mgmt per primary team    Dementia  PER PRIMARY TEAM    Chronic combined systolic and diastolic heart failure  BNP 1662  Continue IV diuresis  Continue Coreg, ARB  Followed by Dr Nassar, [facility]  Dash diet, 2 gm sodium restriction  1200 ml fluid restriction  Daily weights  Strict I/Os  Assess response    12/16  -s/p VT with CV x 1 today  -Hold ARB due to hypotension earlier today  -Continue Coreg as BP permits    12/17  -Continue Coreg, Amio, ACEi    HTN (hypertension)  May need to hold ARB given unstable VT episode this AM and hypotension  Continue Coreg as BP permits    12/18  -Continue Coreg, ACEi        VTE Risk Mitigation (From admission, onward)         Ordered     heparin  (porcine) injection 5,000 Units  Every 8 hours      12/16/20 0917     Place sequential compression device  Until discontinued      12/15/20 0345                RAMAN Blanca-C  Cardiology  [facility]

## 2020-12-18 NOTE — PLAN OF CARE
Home Health referral and orders sent to [facility].  Spoke to CM and advised patient discharging today.       12/18/20 0273   Post-Acute Status   Post-Acute Authorization Home Health   Home Health Status Set-up Complete   Discharge Plan   Discharge Plan A Home Health

## 2020-12-20 LAB
BACTERIA BLD CULT: NORMAL
BACTERIA BLD CULT: NORMAL

## 2020-12-21 ENCOUNTER — PATIENT OUTREACH (OUTPATIENT)
Dept: ADMINISTRATIVE | Facility: CLINIC | Age: 85
End: 2020-12-21

## 2021-01-07 ENCOUNTER — TELEPHONE (OUTPATIENT)
Dept: TRANSPLANT | Facility: CLINIC | Age: 86
End: 2021-01-07

## 2021-03-26 ENCOUNTER — HOSPITAL ENCOUNTER (INPATIENT)
Facility: HOSPITAL | Age: 86
LOS: 1 days | Discharge: HOSPICE | DRG: 292 | End: 2021-03-28
Attending: EMERGENCY MEDICINE | Admitting: INTERNAL MEDICINE
Payer: MEDICARE

## 2021-03-26 DIAGNOSIS — R07.9 CHEST PAIN: ICD-10-CM

## 2021-03-26 DIAGNOSIS — J90 BILATERAL PLEURAL EFFUSION: ICD-10-CM

## 2021-03-26 DIAGNOSIS — R06.00 DYSPNEA: ICD-10-CM

## 2021-03-26 DIAGNOSIS — D64.9 ANEMIA, UNSPECIFIED TYPE: Primary | ICD-10-CM

## 2021-03-26 PROBLEM — I47.10 PSVT (PAROXYSMAL SUPRAVENTRICULAR TACHYCARDIA): Chronic | Status: ACTIVE | Noted: 2021-03-26

## 2021-03-26 PROBLEM — D50.9 IRON DEFICIENCY ANEMIA: Chronic | Status: ACTIVE | Noted: 2021-03-26

## 2021-03-26 PROBLEM — I10 HTN (HYPERTENSION): Chronic | Status: ACTIVE | Noted: 2020-01-04

## 2021-03-26 PROBLEM — I47.29 PAROXYSMAL VT: Chronic | Status: ACTIVE | Noted: 2021-03-26

## 2021-03-26 PROBLEM — J45.909 ASTHMA: Chronic | Status: ACTIVE | Noted: 2020-01-04

## 2021-03-26 LAB
ABO + RH BLD: NORMAL
ALBUMIN SERPL BCP-MCNC: 3.2 G/DL (ref 3.5–5.2)
ALP SERPL-CCNC: 78 U/L (ref 55–135)
ALT SERPL W/O P-5'-P-CCNC: 11 U/L (ref 10–44)
ANION GAP SERPL CALC-SCNC: 11 MMOL/L (ref 8–16)
ANISOCYTOSIS BLD QL SMEAR: SLIGHT
APTT BLDCRRT: 25.9 SEC (ref 21–32)
AST SERPL-CCNC: 20 U/L (ref 10–40)
BASOPHILS # BLD AUTO: 0.03 K/UL (ref 0–0.2)
BASOPHILS NFR BLD: 0.6 % (ref 0–1.9)
BILIRUB SERPL-MCNC: 0.4 MG/DL (ref 0.1–1)
BLD GP AB SCN CELLS X3 SERPL QL: NORMAL
BNP SERPL-MCNC: 1037 PG/ML (ref 0–99)
BUN SERPL-MCNC: 12 MG/DL (ref 8–23)
CALCIUM SERPL-MCNC: 8.2 MG/DL (ref 8.7–10.5)
CHLORIDE SERPL-SCNC: 103 MMOL/L (ref 95–110)
CO2 SERPL-SCNC: 25 MMOL/L (ref 23–29)
CREAT SERPL-MCNC: 0.9 MG/DL (ref 0.5–1.4)
D DIMER PPP IA.FEU-MCNC: 0.71 MG/L FEU
DIFFERENTIAL METHOD: ABNORMAL
EOSINOPHIL # BLD AUTO: 0.1 K/UL (ref 0–0.5)
EOSINOPHIL NFR BLD: 1.7 % (ref 0–8)
ERYTHROCYTE [DISTWIDTH] IN BLOOD BY AUTOMATED COUNT: 24.8 % (ref 11.5–14.5)
EST. GFR  (AFRICAN AMERICAN): >60 ML/MIN/1.73 M^2
EST. GFR  (NON AFRICAN AMERICAN): 58 ML/MIN/1.73 M^2
GLUCOSE SERPL-MCNC: 110 MG/DL (ref 70–110)
HCT VFR BLD AUTO: 31.8 % (ref 37–48.5)
HGB BLD-MCNC: 10.5 G/DL (ref 12–16)
HYPOCHROMIA BLD QL SMEAR: ABNORMAL
IMM GRANULOCYTES # BLD AUTO: 0.03 K/UL (ref 0–0.04)
IMM GRANULOCYTES NFR BLD AUTO: 0.6 % (ref 0–0.5)
INR PPP: 1 (ref 0.8–1.2)
LIPASE SERPL-CCNC: 17 U/L (ref 4–60)
LYMPHOCYTES # BLD AUTO: 1.5 K/UL (ref 1–4.8)
LYMPHOCYTES NFR BLD: 28.1 % (ref 18–48)
MCH RBC QN AUTO: 32.8 PG (ref 27–31)
MCHC RBC AUTO-ENTMCNC: 33 G/DL (ref 32–36)
MCV RBC AUTO: 99 FL (ref 82–98)
MONOCYTES # BLD AUTO: 0.4 K/UL (ref 0.3–1)
MONOCYTES NFR BLD: 7.4 % (ref 4–15)
NEUTROPHILS # BLD AUTO: 3.4 K/UL (ref 1.8–7.7)
NEUTROPHILS NFR BLD: 61.6 % (ref 38–73)
NRBC BLD-RTO: 0 /100 WBC
OVALOCYTES BLD QL SMEAR: ABNORMAL
PLATELET # BLD AUTO: 316 K/UL (ref 150–350)
PLATELET BLD QL SMEAR: ABNORMAL
PMV BLD AUTO: 11.5 FL (ref 9.2–12.9)
POTASSIUM SERPL-SCNC: 3.9 MMOL/L (ref 3.5–5.1)
PROT SERPL-MCNC: 6.1 G/DL (ref 6–8.4)
PROTHROMBIN TIME: 10.7 SEC (ref 9–12.5)
RBC # BLD AUTO: 3.2 M/UL (ref 4–5.4)
SARS-COV-2 RDRP RESP QL NAA+PROBE: NEGATIVE
SODIUM SERPL-SCNC: 139 MMOL/L (ref 136–145)
TROPONIN I SERPL DL<=0.01 NG/ML-MCNC: 0.02 NG/ML (ref 0–0.03)
TROPONIN I SERPL DL<=0.01 NG/ML-MCNC: 0.03 NG/ML (ref 0–0.03)
TSH SERPL DL<=0.005 MIU/L-ACNC: 1.15 UIU/ML (ref 0.4–4)
WBC # BLD AUTO: 5.44 K/UL (ref 3.9–12.7)

## 2021-03-26 PROCEDURE — 63600175 PHARM REV CODE 636 W HCPCS: Performed by: NURSE PRACTITIONER

## 2021-03-26 PROCEDURE — 84484 ASSAY OF TROPONIN QUANT: CPT | Performed by: EMERGENCY MEDICINE

## 2021-03-26 PROCEDURE — 93005 ELECTROCARDIOGRAM TRACING: CPT

## 2021-03-26 PROCEDURE — 25000003 PHARM REV CODE 250: Performed by: NURSE PRACTITIONER

## 2021-03-26 PROCEDURE — 27000221 HC OXYGEN, UP TO 24 HOURS

## 2021-03-26 PROCEDURE — 99900035 HC TECH TIME PER 15 MIN (STAT)

## 2021-03-26 PROCEDURE — 84443 ASSAY THYROID STIM HORMONE: CPT | Performed by: EMERGENCY MEDICINE

## 2021-03-26 PROCEDURE — G0378 HOSPITAL OBSERVATION PER HR: HCPCS

## 2021-03-26 PROCEDURE — 36415 COLL VENOUS BLD VENIPUNCTURE: CPT | Performed by: NURSE PRACTITIONER

## 2021-03-26 PROCEDURE — U0002 COVID-19 LAB TEST NON-CDC: HCPCS | Performed by: EMERGENCY MEDICINE

## 2021-03-26 PROCEDURE — 94760 N-INVAS EAR/PLS OXIMETRY 1: CPT

## 2021-03-26 PROCEDURE — 25500020 PHARM REV CODE 255: Performed by: EMERGENCY MEDICINE

## 2021-03-26 PROCEDURE — 93010 ELECTROCARDIOGRAM REPORT: CPT | Mod: GW,,, | Performed by: INTERNAL MEDICINE

## 2021-03-26 PROCEDURE — 36415 COLL VENOUS BLD VENIPUNCTURE: CPT | Performed by: EMERGENCY MEDICINE

## 2021-03-26 PROCEDURE — 85025 COMPLETE CBC W/AUTO DIFF WBC: CPT | Performed by: EMERGENCY MEDICINE

## 2021-03-26 PROCEDURE — 80053 COMPREHEN METABOLIC PANEL: CPT | Performed by: EMERGENCY MEDICINE

## 2021-03-26 PROCEDURE — 96372 THER/PROPH/DIAG INJ SC/IM: CPT | Mod: 59 | Performed by: EMERGENCY MEDICINE

## 2021-03-26 PROCEDURE — 93010 EKG 12-LEAD: ICD-10-PCS | Mod: GW,,, | Performed by: INTERNAL MEDICINE

## 2021-03-26 PROCEDURE — 85379 FIBRIN DEGRADATION QUANT: CPT | Performed by: EMERGENCY MEDICINE

## 2021-03-26 PROCEDURE — 96374 THER/PROPH/DIAG INJ IV PUSH: CPT

## 2021-03-26 PROCEDURE — 85730 THROMBOPLASTIN TIME PARTIAL: CPT | Performed by: EMERGENCY MEDICINE

## 2021-03-26 PROCEDURE — 83880 ASSAY OF NATRIURETIC PEPTIDE: CPT | Performed by: EMERGENCY MEDICINE

## 2021-03-26 PROCEDURE — 84484 ASSAY OF TROPONIN QUANT: CPT | Mod: 91 | Performed by: NURSE PRACTITIONER

## 2021-03-26 PROCEDURE — 85610 PROTHROMBIN TIME: CPT | Performed by: EMERGENCY MEDICINE

## 2021-03-26 PROCEDURE — 83690 ASSAY OF LIPASE: CPT | Performed by: EMERGENCY MEDICINE

## 2021-03-26 PROCEDURE — 86900 BLOOD TYPING SEROLOGIC ABO: CPT | Performed by: EMERGENCY MEDICINE

## 2021-03-26 PROCEDURE — 99285 EMERGENCY DEPT VISIT HI MDM: CPT | Mod: 25

## 2021-03-26 PROCEDURE — 96372 THER/PROPH/DIAG INJ SC/IM: CPT | Mod: 59

## 2021-03-26 RX ORDER — TALC
3 POWDER (GRAM) TOPICAL NIGHTLY PRN
Status: DISCONTINUED | OUTPATIENT
Start: 2021-03-26 | End: 2021-03-28 | Stop reason: HOSPADM

## 2021-03-26 RX ORDER — ARIPIPRAZOLE 5 MG/1
5 TABLET ORAL DAILY
Status: DISCONTINUED | OUTPATIENT
Start: 2021-03-27 | End: 2021-03-28 | Stop reason: HOSPADM

## 2021-03-26 RX ORDER — PNV NO.95/FERROUS FUM/FOLIC AC 28MG-0.8MG
100 TABLET ORAL DAILY
Status: DISCONTINUED | OUTPATIENT
Start: 2021-03-27 | End: 2021-03-28 | Stop reason: HOSPADM

## 2021-03-26 RX ORDER — CARVEDILOL 6.25 MG/1
6.25 TABLET ORAL 2 TIMES DAILY WITH MEALS
Status: DISCONTINUED | OUTPATIENT
Start: 2021-03-26 | End: 2021-03-28 | Stop reason: HOSPADM

## 2021-03-26 RX ORDER — DONEPEZIL HYDROCHLORIDE 5 MG/1
10 TABLET, FILM COATED ORAL NIGHTLY
Status: DISCONTINUED | OUTPATIENT
Start: 2021-03-26 | End: 2021-03-28 | Stop reason: HOSPADM

## 2021-03-26 RX ORDER — FUROSEMIDE 10 MG/ML
40 INJECTION INTRAMUSCULAR; INTRAVENOUS ONCE
Status: COMPLETED | OUTPATIENT
Start: 2021-03-26 | End: 2021-03-26

## 2021-03-26 RX ORDER — ACETAMINOPHEN AND CODEINE PHOSPHATE 300; 30 MG/1; MG/1
1 TABLET ORAL EVERY 12 HOURS PRN
Status: ON HOLD | COMMUNITY
Start: 2020-10-28 | End: 2021-03-28 | Stop reason: HOSPADM

## 2021-03-26 RX ORDER — ACETAMINOPHEN 325 MG/1
650 TABLET ORAL EVERY 6 HOURS PRN
Status: DISCONTINUED | OUTPATIENT
Start: 2021-03-26 | End: 2021-03-28 | Stop reason: HOSPADM

## 2021-03-26 RX ORDER — LOSARTAN POTASSIUM 25 MG/1
25 TABLET ORAL DAILY
Status: DISCONTINUED | OUTPATIENT
Start: 2021-03-27 | End: 2021-03-26

## 2021-03-26 RX ORDER — POTASSIUM CHLORIDE 20 MEQ/1
20 TABLET, EXTENDED RELEASE ORAL 2 TIMES DAILY
Status: DISCONTINUED | OUTPATIENT
Start: 2021-03-26 | End: 2021-03-26

## 2021-03-26 RX ORDER — SODIUM CHLORIDE 0.9 % (FLUSH) 0.9 %
10 SYRINGE (ML) INJECTION
Status: DISCONTINUED | OUTPATIENT
Start: 2021-03-26 | End: 2021-03-28 | Stop reason: HOSPADM

## 2021-03-26 RX ORDER — ONDANSETRON 2 MG/ML
4 INJECTION INTRAMUSCULAR; INTRAVENOUS EVERY 8 HOURS PRN
Status: DISCONTINUED | OUTPATIENT
Start: 2021-03-26 | End: 2021-03-28 | Stop reason: HOSPADM

## 2021-03-26 RX ORDER — POLYETHYLENE GLYCOL 3350 17 G/17G
17 POWDER, FOR SOLUTION ORAL 2 TIMES DAILY PRN
Status: DISCONTINUED | OUTPATIENT
Start: 2021-03-26 | End: 2021-03-28 | Stop reason: HOSPADM

## 2021-03-26 RX ORDER — ENOXAPARIN SODIUM 100 MG/ML
40 INJECTION SUBCUTANEOUS EVERY 24 HOURS
Status: DISCONTINUED | OUTPATIENT
Start: 2021-03-26 | End: 2021-03-28 | Stop reason: HOSPADM

## 2021-03-26 RX ORDER — AMLODIPINE BESYLATE 10 MG/1
10 TABLET ORAL DAILY
Status: DISCONTINUED | OUTPATIENT
Start: 2021-03-27 | End: 2021-03-28 | Stop reason: HOSPADM

## 2021-03-26 RX ORDER — CLOPIDOGREL BISULFATE 75 MG/1
75 TABLET ORAL DAILY
Status: DISCONTINUED | OUTPATIENT
Start: 2021-03-27 | End: 2021-03-28 | Stop reason: HOSPADM

## 2021-03-26 RX ORDER — DUPILUMAB 300 MG/2ML
300 INJECTION, SOLUTION SUBCUTANEOUS
Status: ON HOLD | COMMUNITY
End: 2021-10-16 | Stop reason: HOSPADM

## 2021-03-26 RX ORDER — ATORVASTATIN CALCIUM 10 MG/1
20 TABLET, FILM COATED ORAL DAILY
Status: DISCONTINUED | OUTPATIENT
Start: 2021-03-27 | End: 2021-03-28 | Stop reason: HOSPADM

## 2021-03-26 RX ORDER — AMIODARONE HYDROCHLORIDE 200 MG/1
200 TABLET ORAL DAILY
Status: DISCONTINUED | OUTPATIENT
Start: 2021-03-27 | End: 2021-03-28 | Stop reason: HOSPADM

## 2021-03-26 RX ORDER — LANOLIN ALCOHOL/MO/W.PET/CERES
100 CREAM (GRAM) TOPICAL DAILY
COMMUNITY

## 2021-03-26 RX ORDER — LEVOTHYROXINE SODIUM 50 UG/1
50 TABLET ORAL
Status: DISCONTINUED | OUTPATIENT
Start: 2021-03-27 | End: 2021-03-28 | Stop reason: HOSPADM

## 2021-03-26 RX ORDER — NITROGLYCERIN 0.4 MG/1
0.4 TABLET SUBLINGUAL EVERY 5 MIN PRN
Status: DISCONTINUED | OUTPATIENT
Start: 2021-03-26 | End: 2021-03-28 | Stop reason: HOSPADM

## 2021-03-26 RX ORDER — DULOXETIN HYDROCHLORIDE 30 MG/1
30 CAPSULE, DELAYED RELEASE ORAL DAILY
Status: DISCONTINUED | OUTPATIENT
Start: 2021-03-27 | End: 2021-03-28 | Stop reason: HOSPADM

## 2021-03-26 RX ORDER — FERROUS SULFATE 325(65) MG
325 TABLET, DELAYED RELEASE (ENTERIC COATED) ORAL DAILY
Status: DISCONTINUED | OUTPATIENT
Start: 2021-03-27 | End: 2021-03-28 | Stop reason: HOSPADM

## 2021-03-26 RX ORDER — ALPRAZOLAM 0.25 MG/1
1 TABLET ORAL 2 TIMES DAILY PRN
COMMUNITY
Start: 2021-02-09

## 2021-03-26 RX ORDER — MECLIZINE HCL 12.5 MG 12.5 MG/1
12.5 TABLET ORAL 3 TIMES DAILY PRN
Status: DISCONTINUED | OUTPATIENT
Start: 2021-03-26 | End: 2021-03-28 | Stop reason: HOSPADM

## 2021-03-26 RX ORDER — DOXEPIN 3 MG/1
4.5 TABLET, FILM COATED ORAL NIGHTLY PRN
Status: ON HOLD | COMMUNITY
End: 2021-10-16 | Stop reason: HOSPADM

## 2021-03-26 RX ORDER — PANTOPRAZOLE SODIUM 40 MG/1
40 TABLET, DELAYED RELEASE ORAL DAILY
Status: DISCONTINUED | OUTPATIENT
Start: 2021-03-27 | End: 2021-03-28 | Stop reason: HOSPADM

## 2021-03-26 RX ORDER — FUROSEMIDE 10 MG/ML
40 INJECTION INTRAMUSCULAR; INTRAVENOUS
Status: COMPLETED | OUTPATIENT
Start: 2021-03-27 | End: 2021-03-27

## 2021-03-26 RX ORDER — ASPIRIN 81 MG/1
81 TABLET ORAL DAILY
Status: DISCONTINUED | OUTPATIENT
Start: 2021-03-27 | End: 2021-03-28 | Stop reason: HOSPADM

## 2021-03-26 RX ORDER — IPRATROPIUM BROMIDE AND ALBUTEROL SULFATE 2.5; .5 MG/3ML; MG/3ML
3 SOLUTION RESPIRATORY (INHALATION) EVERY 4 HOURS PRN
Status: DISCONTINUED | OUTPATIENT
Start: 2021-03-26 | End: 2021-03-28 | Stop reason: HOSPADM

## 2021-03-26 RX ORDER — SPIRONOLACTONE 25 MG/1
25 TABLET ORAL DAILY
Status: DISCONTINUED | OUTPATIENT
Start: 2021-03-27 | End: 2021-03-28 | Stop reason: HOSPADM

## 2021-03-26 RX ADMIN — CARVEDILOL 6.25 MG: 6.25 TABLET, FILM COATED ORAL at 07:03

## 2021-03-26 RX ADMIN — BUSPIRONE HYDROCHLORIDE 15 MG: 10 TABLET ORAL at 09:03

## 2021-03-26 RX ADMIN — FUROSEMIDE 40 MG: 10 INJECTION, SOLUTION INTRAMUSCULAR; INTRAVENOUS at 07:03

## 2021-03-26 RX ADMIN — ENOXAPARIN SODIUM 40 MG: 40 INJECTION SUBCUTANEOUS at 07:03

## 2021-03-26 RX ADMIN — DONEPEZIL HYDROCHLORIDE 10 MG: 5 TABLET, FILM COATED ORAL at 09:03

## 2021-03-26 RX ADMIN — IOHEXOL 100 ML: 350 INJECTION, SOLUTION INTRAVENOUS at 03:03

## 2021-03-27 PROBLEM — D64.9 ANEMIA: Status: ACTIVE | Noted: 2021-03-27

## 2021-03-27 LAB
ANION GAP SERPL CALC-SCNC: 8 MMOL/L (ref 8–16)
BASOPHILS # BLD AUTO: 0.03 K/UL (ref 0–0.2)
BASOPHILS NFR BLD: 0.5 % (ref 0–1.9)
BUN SERPL-MCNC: 17 MG/DL (ref 8–23)
CALCIUM SERPL-MCNC: 8.1 MG/DL (ref 8.7–10.5)
CHLORIDE SERPL-SCNC: 102 MMOL/L (ref 95–110)
CO2 SERPL-SCNC: 31 MMOL/L (ref 23–29)
CREAT SERPL-MCNC: 0.9 MG/DL (ref 0.5–1.4)
DIFFERENTIAL METHOD: ABNORMAL
EOSINOPHIL # BLD AUTO: 0.1 K/UL (ref 0–0.5)
EOSINOPHIL NFR BLD: 2 % (ref 0–8)
ERYTHROCYTE [DISTWIDTH] IN BLOOD BY AUTOMATED COUNT: 25.1 % (ref 11.5–14.5)
EST. GFR  (AFRICAN AMERICAN): >60 ML/MIN/1.73 M^2
EST. GFR  (NON AFRICAN AMERICAN): 58 ML/MIN/1.73 M^2
GLUCOSE SERPL-MCNC: 93 MG/DL (ref 70–110)
HCT VFR BLD AUTO: 31.6 % (ref 37–48.5)
HGB BLD-MCNC: 10.1 G/DL (ref 12–16)
IMM GRANULOCYTES # BLD AUTO: 0.03 K/UL (ref 0–0.04)
IMM GRANULOCYTES NFR BLD AUTO: 0.5 % (ref 0–0.5)
LYMPHOCYTES # BLD AUTO: 1.7 K/UL (ref 1–4.8)
LYMPHOCYTES NFR BLD: 30.8 % (ref 18–48)
MAGNESIUM SERPL-MCNC: 2 MG/DL (ref 1.6–2.6)
MCH RBC QN AUTO: 32.4 PG (ref 27–31)
MCHC RBC AUTO-ENTMCNC: 32 G/DL (ref 32–36)
MCV RBC AUTO: 101 FL (ref 82–98)
MONOCYTES # BLD AUTO: 0.5 K/UL (ref 0.3–1)
MONOCYTES NFR BLD: 9.4 % (ref 4–15)
NEUTROPHILS # BLD AUTO: 3.1 K/UL (ref 1.8–7.7)
NEUTROPHILS NFR BLD: 56.8 % (ref 38–73)
NRBC BLD-RTO: 0 /100 WBC
PLATELET # BLD AUTO: 325 K/UL (ref 150–350)
PMV BLD AUTO: 11.3 FL (ref 9.2–12.9)
POTASSIUM SERPL-SCNC: 3.8 MMOL/L (ref 3.5–5.1)
RBC # BLD AUTO: 3.12 M/UL (ref 4–5.4)
SODIUM SERPL-SCNC: 141 MMOL/L (ref 136–145)
TROPONIN I SERPL DL<=0.01 NG/ML-MCNC: 0.02 NG/ML (ref 0–0.03)
WBC # BLD AUTO: 5.52 K/UL (ref 3.9–12.7)

## 2021-03-27 PROCEDURE — 85025 COMPLETE CBC W/AUTO DIFF WBC: CPT | Performed by: NURSE PRACTITIONER

## 2021-03-27 PROCEDURE — 96374 THER/PROPH/DIAG INJ IV PUSH: CPT | Performed by: EMERGENCY MEDICINE

## 2021-03-27 PROCEDURE — 25000003 PHARM REV CODE 250: Performed by: NURSE PRACTITIONER

## 2021-03-27 PROCEDURE — 27000221 HC OXYGEN, UP TO 24 HOURS

## 2021-03-27 PROCEDURE — 25000003 PHARM REV CODE 250: Performed by: INTERNAL MEDICINE

## 2021-03-27 PROCEDURE — 36415 COLL VENOUS BLD VENIPUNCTURE: CPT | Performed by: NURSE PRACTITIONER

## 2021-03-27 PROCEDURE — 63600175 PHARM REV CODE 636 W HCPCS: Performed by: NURSE PRACTITIONER

## 2021-03-27 PROCEDURE — 94761 N-INVAS EAR/PLS OXIMETRY MLT: CPT

## 2021-03-27 PROCEDURE — 99900035 HC TECH TIME PER 15 MIN (STAT)

## 2021-03-27 PROCEDURE — 99223 PR INITIAL HOSPITAL CARE,LEVL III: ICD-10-PCS | Mod: GW,,, | Performed by: INTERNAL MEDICINE

## 2021-03-27 PROCEDURE — 21400001 HC TELEMETRY ROOM

## 2021-03-27 PROCEDURE — 84484 ASSAY OF TROPONIN QUANT: CPT | Performed by: NURSE PRACTITIONER

## 2021-03-27 PROCEDURE — 80048 BASIC METABOLIC PNL TOTAL CA: CPT | Performed by: NURSE PRACTITIONER

## 2021-03-27 PROCEDURE — 83735 ASSAY OF MAGNESIUM: CPT | Performed by: NURSE PRACTITIONER

## 2021-03-27 PROCEDURE — 99223 1ST HOSP IP/OBS HIGH 75: CPT | Mod: GW,,, | Performed by: INTERNAL MEDICINE

## 2021-03-27 RX ADMIN — DONEPEZIL HYDROCHLORIDE 10 MG: 5 TABLET, FILM COATED ORAL at 09:03

## 2021-03-27 RX ADMIN — BUSPIRONE HYDROCHLORIDE 15 MG: 10 TABLET ORAL at 09:03

## 2021-03-27 RX ADMIN — CLOPIDOGREL 75 MG: 75 TABLET, FILM COATED ORAL at 08:03

## 2021-03-27 RX ADMIN — THERA TABS 1 TABLET: TAB at 08:03

## 2021-03-27 RX ADMIN — AMIODARONE HYDROCHLORIDE 200 MG: 200 TABLET ORAL at 08:03

## 2021-03-27 RX ADMIN — SPIRONOLACTONE 25 MG: 25 TABLET ORAL at 08:03

## 2021-03-27 RX ADMIN — DULOXETINE HYDROCHLORIDE 30 MG: 30 CAPSULE, DELAYED RELEASE ORAL at 08:03

## 2021-03-27 RX ADMIN — FUROSEMIDE 40 MG: 10 INJECTION, SOLUTION INTRAMUSCULAR; INTRAVENOUS at 05:03

## 2021-03-27 RX ADMIN — BUSPIRONE HYDROCHLORIDE 15 MG: 10 TABLET ORAL at 08:03

## 2021-03-27 RX ADMIN — LEVOTHYROXINE SODIUM 50 MCG: 50 TABLET ORAL at 05:03

## 2021-03-27 RX ADMIN — VITAM B12 100 MCG: 100 TAB at 08:03

## 2021-03-27 RX ADMIN — SACUBITRIL AND VALSARTAN 1 TABLET: 24; 26 TABLET, FILM COATED ORAL at 01:03

## 2021-03-27 RX ADMIN — SACUBITRIL AND VALSARTAN 1 TABLET: 24; 26 TABLET, FILM COATED ORAL at 09:03

## 2021-03-27 RX ADMIN — FERROUS SULFATE TAB EC 325 MG (65 MG FE EQUIVALENT) 325 MG: 325 (65 FE) TABLET DELAYED RESPONSE at 08:03

## 2021-03-27 RX ADMIN — ASPIRIN 81 MG: 81 TABLET, COATED ORAL at 08:03

## 2021-03-27 RX ADMIN — AMLODIPINE BESYLATE 10 MG: 10 TABLET ORAL at 08:03

## 2021-03-27 RX ADMIN — CARVEDILOL 6.25 MG: 6.25 TABLET, FILM COATED ORAL at 08:03

## 2021-03-27 RX ADMIN — ENOXAPARIN SODIUM 40 MG: 40 INJECTION SUBCUTANEOUS at 05:03

## 2021-03-27 RX ADMIN — PANTOPRAZOLE SODIUM 40 MG: 40 TABLET, DELAYED RELEASE ORAL at 08:03

## 2021-03-27 RX ADMIN — ARIPIPRAZOLE 5 MG: 5 TABLET ORAL at 08:03

## 2021-03-27 RX ADMIN — ATORVASTATIN CALCIUM 20 MG: 10 TABLET, FILM COATED ORAL at 08:03

## 2021-03-28 VITALS
TEMPERATURE: 98 F | HEART RATE: 55 BPM | RESPIRATION RATE: 18 BRPM | WEIGHT: 130.94 LBS | OXYGEN SATURATION: 94 % | BODY MASS INDEX: 24.72 KG/M2 | HEIGHT: 61 IN | DIASTOLIC BLOOD PRESSURE: 58 MMHG | SYSTOLIC BLOOD PRESSURE: 118 MMHG

## 2021-03-28 PROBLEM — I50.43 ACUTE ON CHRONIC COMBINED SYSTOLIC AND DIASTOLIC HEART FAILURE: Status: RESOLVED | Noted: 2020-01-04 | Resolved: 2021-03-28

## 2021-03-28 LAB
ANION GAP SERPL CALC-SCNC: 12 MMOL/L (ref 8–16)
BUN SERPL-MCNC: 15 MG/DL (ref 8–23)
CALCIUM SERPL-MCNC: 8.7 MG/DL (ref 8.7–10.5)
CHLORIDE SERPL-SCNC: 103 MMOL/L (ref 95–110)
CO2 SERPL-SCNC: 28 MMOL/L (ref 23–29)
CREAT SERPL-MCNC: 0.8 MG/DL (ref 0.5–1.4)
EST. GFR  (AFRICAN AMERICAN): >60 ML/MIN/1.73 M^2
EST. GFR  (NON AFRICAN AMERICAN): >60 ML/MIN/1.73 M^2
GLUCOSE SERPL-MCNC: 105 MG/DL (ref 70–110)
POTASSIUM SERPL-SCNC: 3.5 MMOL/L (ref 3.5–5.1)
SODIUM SERPL-SCNC: 143 MMOL/L (ref 136–145)

## 2021-03-28 PROCEDURE — 27000221 HC OXYGEN, UP TO 24 HOURS

## 2021-03-28 PROCEDURE — 25000003 PHARM REV CODE 250: Performed by: INTERNAL MEDICINE

## 2021-03-28 PROCEDURE — 25000003 PHARM REV CODE 250: Performed by: NURSE PRACTITIONER

## 2021-03-28 PROCEDURE — 36415 COLL VENOUS BLD VENIPUNCTURE: CPT | Performed by: INTERNAL MEDICINE

## 2021-03-28 PROCEDURE — 80048 BASIC METABOLIC PNL TOTAL CA: CPT | Performed by: INTERNAL MEDICINE

## 2021-03-28 PROCEDURE — 96376 TX/PRO/DX INJ SAME DRUG ADON: CPT | Performed by: EMERGENCY MEDICINE

## 2021-03-28 PROCEDURE — 99233 SBSQ HOSP IP/OBS HIGH 50: CPT | Mod: GW,,, | Performed by: INTERNAL MEDICINE

## 2021-03-28 PROCEDURE — 99233 PR SUBSEQUENT HOSPITAL CARE,LEVL III: ICD-10-PCS | Mod: GW,,, | Performed by: INTERNAL MEDICINE

## 2021-03-28 PROCEDURE — 94761 N-INVAS EAR/PLS OXIMETRY MLT: CPT

## 2021-03-28 RX ADMIN — FERROUS SULFATE TAB EC 325 MG (65 MG FE EQUIVALENT) 325 MG: 325 (65 FE) TABLET DELAYED RESPONSE at 08:03

## 2021-03-28 RX ADMIN — AMLODIPINE BESYLATE 10 MG: 10 TABLET ORAL at 08:03

## 2021-03-28 RX ADMIN — ATORVASTATIN CALCIUM 20 MG: 10 TABLET, FILM COATED ORAL at 08:03

## 2021-03-28 RX ADMIN — CLOPIDOGREL 75 MG: 75 TABLET, FILM COATED ORAL at 08:03

## 2021-03-28 RX ADMIN — SPIRONOLACTONE 25 MG: 25 TABLET ORAL at 08:03

## 2021-03-28 RX ADMIN — ASPIRIN 81 MG: 81 TABLET, COATED ORAL at 08:03

## 2021-03-28 RX ADMIN — PANTOPRAZOLE SODIUM 40 MG: 40 TABLET, DELAYED RELEASE ORAL at 08:03

## 2021-03-28 RX ADMIN — LEVOTHYROXINE SODIUM 50 MCG: 50 TABLET ORAL at 06:03

## 2021-03-28 RX ADMIN — AMIODARONE HYDROCHLORIDE 200 MG: 200 TABLET ORAL at 08:03

## 2021-03-28 RX ADMIN — SACUBITRIL AND VALSARTAN 1 TABLET: 24; 26 TABLET, FILM COATED ORAL at 08:03

## 2021-03-28 RX ADMIN — VITAM B12 100 MCG: 100 TAB at 08:03

## 2021-03-28 RX ADMIN — THERA TABS 1 TABLET: TAB at 08:03

## 2021-03-28 RX ADMIN — DULOXETINE HYDROCHLORIDE 30 MG: 30 CAPSULE, DELAYED RELEASE ORAL at 08:03

## 2021-03-28 RX ADMIN — ARIPIPRAZOLE 5 MG: 5 TABLET ORAL at 08:03

## 2021-03-28 RX ADMIN — BUSPIRONE HYDROCHLORIDE 15 MG: 10 TABLET ORAL at 08:03

## 2021-03-28 RX ADMIN — CARVEDILOL 6.25 MG: 6.25 TABLET, FILM COATED ORAL at 08:03

## 2021-04-24 ENCOUNTER — HOSPITAL ENCOUNTER (EMERGENCY)
Facility: HOSPITAL | Age: 86
Discharge: HOME | End: 2021-04-24
Attending: EMERGENCY MEDICINE
Payer: MEDICARE

## 2021-04-24 VITALS
TEMPERATURE: 97 F | HEART RATE: 62 BPM | RESPIRATION RATE: 20 BRPM | SYSTOLIC BLOOD PRESSURE: 141 MMHG | DIASTOLIC BLOOD PRESSURE: 65 MMHG | OXYGEN SATURATION: 95 %

## 2021-04-24 DIAGNOSIS — R53.83 FATIGUE: ICD-10-CM

## 2021-04-24 DIAGNOSIS — D64.9 ANEMIA, UNSPECIFIED TYPE: Primary | ICD-10-CM

## 2021-04-24 LAB
ABO + RH BLD: NORMAL
ALBUMIN SERPL BCP-MCNC: 3.3 G/DL (ref 3.5–5.2)
ALP SERPL-CCNC: 77 U/L (ref 55–135)
ALT SERPL W/O P-5'-P-CCNC: 21 U/L (ref 10–44)
ANION GAP SERPL CALC-SCNC: 9 MMOL/L (ref 8–16)
AST SERPL-CCNC: 26 U/L (ref 10–40)
BASOPHILS # BLD AUTO: 0.01 K/UL (ref 0–0.2)
BASOPHILS NFR BLD: 0.2 % (ref 0–1.9)
BILIRUB SERPL-MCNC: 0.3 MG/DL (ref 0.1–1)
BLD GP AB SCN CELLS X3 SERPL QL: NORMAL
BNP SERPL-MCNC: 715 PG/ML (ref 0–99)
BUN SERPL-MCNC: 12 MG/DL (ref 8–23)
CALCIUM SERPL-MCNC: 8.2 MG/DL (ref 8.7–10.5)
CHLORIDE SERPL-SCNC: 105 MMOL/L (ref 95–110)
CO2 SERPL-SCNC: 29 MMOL/L (ref 23–29)
CREAT SERPL-MCNC: 0.9 MG/DL (ref 0.5–1.4)
DIFFERENTIAL METHOD: ABNORMAL
EOSINOPHIL # BLD AUTO: 0.1 K/UL (ref 0–0.5)
EOSINOPHIL NFR BLD: 1.7 % (ref 0–8)
ERYTHROCYTE [DISTWIDTH] IN BLOOD BY AUTOMATED COUNT: 25.1 % (ref 11.5–14.5)
EST. GFR  (AFRICAN AMERICAN): >60 ML/MIN/1.73 M^2
EST. GFR  (NON AFRICAN AMERICAN): 58 ML/MIN/1.73 M^2
GLUCOSE SERPL-MCNC: 107 MG/DL (ref 70–110)
HCT VFR BLD AUTO: 26.9 % (ref 37–48.5)
HGB BLD-MCNC: 8.4 G/DL (ref 12–16)
IMM GRANULOCYTES # BLD AUTO: 0.02 K/UL (ref 0–0.04)
IMM GRANULOCYTES NFR BLD AUTO: 0.5 % (ref 0–0.5)
LYMPHOCYTES # BLD AUTO: 1.1 K/UL (ref 1–4.8)
LYMPHOCYTES NFR BLD: 26.5 % (ref 18–48)
MCH RBC QN AUTO: 33.5 PG (ref 27–31)
MCHC RBC AUTO-ENTMCNC: 31.2 G/DL (ref 32–36)
MCV RBC AUTO: 107 FL (ref 82–98)
MONOCYTES # BLD AUTO: 0.3 K/UL (ref 0.3–1)
MONOCYTES NFR BLD: 7 % (ref 4–15)
NEUTROPHILS # BLD AUTO: 2.6 K/UL (ref 1.8–7.7)
NEUTROPHILS NFR BLD: 64.1 % (ref 38–73)
NRBC BLD-RTO: 0 /100 WBC
PLATELET # BLD AUTO: 228 K/UL (ref 150–450)
PMV BLD AUTO: 12.3 FL (ref 9.2–12.9)
POTASSIUM SERPL-SCNC: 3.9 MMOL/L (ref 3.5–5.1)
PROT SERPL-MCNC: 5.9 G/DL (ref 6–8.4)
RBC # BLD AUTO: 2.51 M/UL (ref 4–5.4)
SODIUM SERPL-SCNC: 143 MMOL/L (ref 136–145)
TROPONIN I SERPL DL<=0.01 NG/ML-MCNC: 0.04 NG/ML (ref 0–0.03)
TROPONIN I SERPL DL<=0.01 NG/ML-MCNC: 0.04 NG/ML (ref 0–0.03)
WBC # BLD AUTO: 4.12 K/UL (ref 3.9–12.7)

## 2021-04-24 PROCEDURE — 85025 COMPLETE CBC W/AUTO DIFF WBC: CPT | Performed by: EMERGENCY MEDICINE

## 2021-04-24 PROCEDURE — 93010 ELECTROCARDIOGRAM REPORT: CPT | Mod: GW,,, | Performed by: INTERNAL MEDICINE

## 2021-04-24 PROCEDURE — 93010 EKG 12-LEAD: ICD-10-PCS | Mod: GW,,, | Performed by: INTERNAL MEDICINE

## 2021-04-24 PROCEDURE — 80053 COMPREHEN METABOLIC PANEL: CPT | Performed by: EMERGENCY MEDICINE

## 2021-04-24 PROCEDURE — 83880 ASSAY OF NATRIURETIC PEPTIDE: CPT | Performed by: EMERGENCY MEDICINE

## 2021-04-24 PROCEDURE — 96360 HYDRATION IV INFUSION INIT: CPT

## 2021-04-24 PROCEDURE — 96361 HYDRATE IV INFUSION ADD-ON: CPT

## 2021-04-24 PROCEDURE — 99285 EMERGENCY DEPT VISIT HI MDM: CPT | Mod: 25

## 2021-04-24 PROCEDURE — 25000003 PHARM REV CODE 250: Performed by: EMERGENCY MEDICINE

## 2021-04-24 PROCEDURE — 93005 ELECTROCARDIOGRAM TRACING: CPT

## 2021-04-24 PROCEDURE — 86900 BLOOD TYPING SEROLOGIC ABO: CPT | Performed by: EMERGENCY MEDICINE

## 2021-04-24 PROCEDURE — 84484 ASSAY OF TROPONIN QUANT: CPT | Performed by: EMERGENCY MEDICINE

## 2021-04-24 RX ADMIN — SODIUM CHLORIDE 500 ML: 0.9 INJECTION, SOLUTION INTRAVENOUS at 10:04

## 2021-06-26 ENCOUNTER — HOSPITAL ENCOUNTER (EMERGENCY)
Facility: HOSPITAL | Age: 86
Discharge: HOME | End: 2021-06-26
Attending: EMERGENCY MEDICINE
Payer: MEDICARE

## 2021-06-26 VITALS
DIASTOLIC BLOOD PRESSURE: 66 MMHG | TEMPERATURE: 98 F | HEART RATE: 60 BPM | OXYGEN SATURATION: 99 % | BODY MASS INDEX: 30.26 KG/M2 | HEIGHT: 61 IN | RESPIRATION RATE: 17 BRPM | WEIGHT: 160.25 LBS | SYSTOLIC BLOOD PRESSURE: 134 MMHG

## 2021-06-26 DIAGNOSIS — I50.20 SYSTOLIC CONGESTIVE HEART FAILURE, UNSPECIFIED HF CHRONICITY: ICD-10-CM

## 2021-06-26 DIAGNOSIS — R06.02 SOB (SHORTNESS OF BREATH): ICD-10-CM

## 2021-06-26 DIAGNOSIS — D64.9 SEVERE ANEMIA: Primary | ICD-10-CM

## 2021-06-26 LAB
ABO + RH BLD: NORMAL
ALBUMIN SERPL BCP-MCNC: 3.3 G/DL (ref 3.5–5.2)
ALP SERPL-CCNC: 96 U/L (ref 55–135)
ALT SERPL W/O P-5'-P-CCNC: 12 U/L (ref 10–44)
ANION GAP SERPL CALC-SCNC: 7 MMOL/L (ref 8–16)
ANISOCYTOSIS BLD QL SMEAR: SLIGHT
AST SERPL-CCNC: 16 U/L (ref 10–40)
BASOPHILS # BLD AUTO: 0.02 K/UL (ref 0–0.2)
BASOPHILS NFR BLD: 0.4 % (ref 0–1.9)
BILIRUB SERPL-MCNC: 0.3 MG/DL (ref 0.1–1)
BILIRUB UR QL STRIP: NEGATIVE
BLD GP AB SCN CELLS X3 SERPL QL: NORMAL
BLD PROD TYP BPU: NORMAL
BLD PROD TYP BPU: NORMAL
BLOOD UNIT EXPIRATION DATE: NORMAL
BLOOD UNIT EXPIRATION DATE: NORMAL
BLOOD UNIT TYPE CODE: 5100
BLOOD UNIT TYPE CODE: 5100
BLOOD UNIT TYPE: NORMAL
BLOOD UNIT TYPE: NORMAL
BNP SERPL-MCNC: 1753 PG/ML (ref 0–99)
BUN SERPL-MCNC: 14 MG/DL (ref 8–23)
CALCIUM SERPL-MCNC: 8.8 MG/DL (ref 8.7–10.5)
CHLORIDE SERPL-SCNC: 105 MMOL/L (ref 95–110)
CLARITY UR: CLEAR
CO2 SERPL-SCNC: 31 MMOL/L (ref 23–29)
CODING SYSTEM: NORMAL
CODING SYSTEM: NORMAL
COLOR UR: YELLOW
CREAT SERPL-MCNC: 0.9 MG/DL (ref 0.5–1.4)
DIFFERENTIAL METHOD: ABNORMAL
DISPENSE STATUS: NORMAL
DISPENSE STATUS: NORMAL
EOSINOPHIL # BLD AUTO: 0.1 K/UL (ref 0–0.5)
EOSINOPHIL NFR BLD: 1.6 % (ref 0–8)
ERYTHROCYTE [DISTWIDTH] IN BLOOD BY AUTOMATED COUNT: ABNORMAL % (ref 11.5–14.5)
EST. GFR  (AFRICAN AMERICAN): >60 ML/MIN/1.73 M^2
EST. GFR  (NON AFRICAN AMERICAN): 58 ML/MIN/1.73 M^2
GLUCOSE SERPL-MCNC: 123 MG/DL (ref 70–110)
GLUCOSE UR QL STRIP: NEGATIVE
HCT VFR BLD AUTO: 21 % (ref 37–48.5)
HGB BLD-MCNC: 6.5 G/DL (ref 12–16)
HGB UR QL STRIP: NEGATIVE
HYPOCHROMIA BLD QL SMEAR: ABNORMAL
IMM GRANULOCYTES # BLD AUTO: 0.02 K/UL (ref 0–0.04)
IMM GRANULOCYTES NFR BLD AUTO: 0.4 % (ref 0–0.5)
KETONES UR QL STRIP: NEGATIVE
LEUKOCYTE ESTERASE UR QL STRIP: NEGATIVE
LYMPHOCYTES # BLD AUTO: 1.2 K/UL (ref 1–4.8)
LYMPHOCYTES NFR BLD: 27.7 % (ref 18–48)
MAGNESIUM SERPL-MCNC: 2.1 MG/DL (ref 1.6–2.6)
MCH RBC QN AUTO: 34.9 PG (ref 27–31)
MCHC RBC AUTO-ENTMCNC: 31 G/DL (ref 32–36)
MCV RBC AUTO: 113 FL (ref 82–98)
MONOCYTES # BLD AUTO: 0.4 K/UL (ref 0.3–1)
MONOCYTES NFR BLD: 8.1 % (ref 4–15)
NEUTROPHILS # BLD AUTO: 2.8 K/UL (ref 1.8–7.7)
NEUTROPHILS NFR BLD: 61.8 % (ref 38–73)
NITRITE UR QL STRIP: NEGATIVE
NRBC BLD-RTO: 0 /100 WBC
NUM UNITS TRANS PACKED RBC: NORMAL
NUM UNITS TRANS PACKED RBC: NORMAL
OVALOCYTES BLD QL SMEAR: ABNORMAL
PH UR STRIP: 8 [PH] (ref 5–8)
PHOSPHATE SERPL-MCNC: 3.9 MG/DL (ref 2.7–4.5)
PLATELET # BLD AUTO: 385 K/UL (ref 150–450)
PLATELET BLD QL SMEAR: ABNORMAL
PMV BLD AUTO: 12 FL (ref 9.2–12.9)
POIKILOCYTOSIS BLD QL SMEAR: SLIGHT
POTASSIUM SERPL-SCNC: 3.4 MMOL/L (ref 3.5–5.1)
PROT SERPL-MCNC: 5.9 G/DL (ref 6–8.4)
PROT UR QL STRIP: NEGATIVE
RBC # BLD AUTO: 1.86 M/UL (ref 4–5.4)
SCHISTOCYTES BLD QL SMEAR: PRESENT
SODIUM SERPL-SCNC: 143 MMOL/L (ref 136–145)
SP GR UR STRIP: 1.01 (ref 1–1.03)
T4 FREE SERPL-MCNC: 1.08 NG/DL (ref 0.71–1.51)
TROPONIN I SERPL DL<=0.01 NG/ML-MCNC: 0.03 NG/ML (ref 0–0.03)
TROPONIN I SERPL DL<=0.01 NG/ML-MCNC: 0.03 NG/ML (ref 0–0.03)
TSH SERPL DL<=0.005 MIU/L-ACNC: 5.25 UIU/ML (ref 0.4–4)
URN SPEC COLLECT METH UR: NORMAL
UROBILINOGEN UR STRIP-ACNC: NEGATIVE EU/DL
WBC # BLD AUTO: 4.47 K/UL (ref 3.9–12.7)

## 2021-06-26 PROCEDURE — 25000003 PHARM REV CODE 250: Performed by: EMERGENCY MEDICINE

## 2021-06-26 PROCEDURE — 81003 URINALYSIS AUTO W/O SCOPE: CPT | Performed by: EMERGENCY MEDICINE

## 2021-06-26 PROCEDURE — 83880 ASSAY OF NATRIURETIC PEPTIDE: CPT | Performed by: EMERGENCY MEDICINE

## 2021-06-26 PROCEDURE — 84484 ASSAY OF TROPONIN QUANT: CPT | Mod: 91 | Performed by: EMERGENCY MEDICINE

## 2021-06-26 PROCEDURE — 83735 ASSAY OF MAGNESIUM: CPT | Performed by: EMERGENCY MEDICINE

## 2021-06-26 PROCEDURE — P9016 RBC LEUKOCYTES REDUCED: HCPCS | Performed by: EMERGENCY MEDICINE

## 2021-06-26 PROCEDURE — 63600175 PHARM REV CODE 636 W HCPCS: Performed by: EMERGENCY MEDICINE

## 2021-06-26 PROCEDURE — 96374 THER/PROPH/DIAG INJ IV PUSH: CPT

## 2021-06-26 PROCEDURE — 36430 TRANSFUSION BLD/BLD COMPNT: CPT

## 2021-06-26 PROCEDURE — 85025 COMPLETE CBC W/AUTO DIFF WBC: CPT | Performed by: EMERGENCY MEDICINE

## 2021-06-26 PROCEDURE — 84443 ASSAY THYROID STIM HORMONE: CPT | Performed by: EMERGENCY MEDICINE

## 2021-06-26 PROCEDURE — 93010 ELECTROCARDIOGRAM REPORT: CPT | Mod: GW,,, | Performed by: INTERNAL MEDICINE

## 2021-06-26 PROCEDURE — 99285 EMERGENCY DEPT VISIT HI MDM: CPT | Mod: 25

## 2021-06-26 PROCEDURE — 93005 ELECTROCARDIOGRAM TRACING: CPT

## 2021-06-26 PROCEDURE — 80053 COMPREHEN METABOLIC PANEL: CPT | Performed by: EMERGENCY MEDICINE

## 2021-06-26 PROCEDURE — 36415 COLL VENOUS BLD VENIPUNCTURE: CPT | Performed by: EMERGENCY MEDICINE

## 2021-06-26 PROCEDURE — 86900 BLOOD TYPING SEROLOGIC ABO: CPT | Performed by: EMERGENCY MEDICINE

## 2021-06-26 PROCEDURE — 93010 EKG 12-LEAD: ICD-10-PCS | Mod: GW,,, | Performed by: INTERNAL MEDICINE

## 2021-06-26 PROCEDURE — 84439 ASSAY OF FREE THYROXINE: CPT | Performed by: EMERGENCY MEDICINE

## 2021-06-26 PROCEDURE — 84100 ASSAY OF PHOSPHORUS: CPT | Performed by: EMERGENCY MEDICINE

## 2021-06-26 PROCEDURE — 86920 COMPATIBILITY TEST SPIN: CPT | Mod: 59 | Performed by: EMERGENCY MEDICINE

## 2021-06-26 RX ORDER — DIPHENHYDRAMINE HCL 25 MG
25 CAPSULE ORAL
Status: COMPLETED | OUTPATIENT
Start: 2021-06-26 | End: 2021-06-26

## 2021-06-26 RX ORDER — FUROSEMIDE 10 MG/ML
40 INJECTION INTRAMUSCULAR; INTRAVENOUS
Status: COMPLETED | OUTPATIENT
Start: 2021-06-26 | End: 2021-06-26

## 2021-06-26 RX ORDER — HYDROCODONE BITARTRATE AND ACETAMINOPHEN 500; 5 MG/1; MG/1
TABLET ORAL
Status: DISCONTINUED | OUTPATIENT
Start: 2021-06-26 | End: 2021-06-26 | Stop reason: HOSPADM

## 2021-06-26 RX ORDER — ACETAMINOPHEN 325 MG/1
650 TABLET ORAL
Status: COMPLETED | OUTPATIENT
Start: 2021-06-26 | End: 2021-06-26

## 2021-06-26 RX ADMIN — DIPHENHYDRAMINE HYDROCHLORIDE 25 MG: 25 CAPSULE ORAL at 01:06

## 2021-06-26 RX ADMIN — ACETAMINOPHEN 650 MG: 325 TABLET ORAL at 01:06

## 2021-06-26 RX ADMIN — FUROSEMIDE 40 MG: 10 INJECTION, SOLUTION INTRAMUSCULAR; INTRAVENOUS at 09:06

## 2021-07-01 ENCOUNTER — HOSPITAL ENCOUNTER (OUTPATIENT)
Facility: HOSPITAL | Age: 86
Discharge: HOSPICE | End: 2021-07-02
Attending: EMERGENCY MEDICINE | Admitting: INTERNAL MEDICINE
Payer: MEDICARE

## 2021-07-01 DIAGNOSIS — R41.0 CONFUSION: ICD-10-CM

## 2021-07-01 DIAGNOSIS — E87.6 HYPOKALEMIA: ICD-10-CM

## 2021-07-01 DIAGNOSIS — E87.79 VOLUME OVERLOAD STATE OF HEART: ICD-10-CM

## 2021-07-01 DIAGNOSIS — R53.1 WEAKNESS: ICD-10-CM

## 2021-07-01 DIAGNOSIS — R06.00 DYSPNEA: ICD-10-CM

## 2021-07-01 DIAGNOSIS — D64.9 ANEMIA, UNSPECIFIED TYPE: Primary | ICD-10-CM

## 2021-07-01 PROBLEM — J96.21 ACUTE ON CHRONIC RESPIRATORY FAILURE WITH HYPOXIA: Status: ACTIVE | Noted: 2021-07-01

## 2021-07-01 PROBLEM — J81.0 ACUTE PULMONARY EDEMA: Status: ACTIVE | Noted: 2021-07-01

## 2021-07-01 LAB
ABO + RH BLD: NORMAL
ACANTHOCYTES BLD QL SMEAR: PRESENT
ALBUMIN SERPL BCP-MCNC: 3.1 G/DL (ref 3.5–5.2)
ALP SERPL-CCNC: 85 U/L (ref 55–135)
ALT SERPL W/O P-5'-P-CCNC: 13 U/L (ref 10–44)
ANION GAP SERPL CALC-SCNC: 10 MMOL/L (ref 8–16)
ANISOCYTOSIS BLD QL SMEAR: SLIGHT
APTT BLDCRRT: 26.3 SEC (ref 21–32)
AST SERPL-CCNC: 20 U/L (ref 10–40)
BASOPHILS # BLD AUTO: 0.04 K/UL (ref 0–0.2)
BASOPHILS NFR BLD: 1 % (ref 0–1.9)
BILIRUB SERPL-MCNC: 0.2 MG/DL (ref 0.1–1)
BILIRUB UR QL STRIP: NEGATIVE
BLD GP AB SCN CELLS X3 SERPL QL: NORMAL
BNP SERPL-MCNC: 2083 PG/ML (ref 0–99)
BUN SERPL-MCNC: 13 MG/DL (ref 8–23)
CALCIUM SERPL-MCNC: 8.7 MG/DL (ref 8.7–10.5)
CHLORIDE SERPL-SCNC: 102 MMOL/L (ref 95–110)
CLARITY UR: CLEAR
CO2 SERPL-SCNC: 30 MMOL/L (ref 23–29)
COLOR UR: YELLOW
CREAT SERPL-MCNC: 0.8 MG/DL (ref 0.5–1.4)
CTP QC/QA: YES
DIFFERENTIAL METHOD: ABNORMAL
EOSINOPHIL # BLD AUTO: 0.1 K/UL (ref 0–0.5)
EOSINOPHIL NFR BLD: 1.2 % (ref 0–8)
ERYTHROCYTE [DISTWIDTH] IN BLOOD BY AUTOMATED COUNT: 23.6 % (ref 11.5–14.5)
EST. GFR  (AFRICAN AMERICAN): >60 ML/MIN/1.73 M^2
EST. GFR  (NON AFRICAN AMERICAN): >60 ML/MIN/1.73 M^2
GLUCOSE SERPL-MCNC: 115 MG/DL (ref 70–110)
GLUCOSE UR QL STRIP: NEGATIVE
HCT VFR BLD AUTO: 30.9 % (ref 37–48.5)
HGB BLD-MCNC: 9.8 G/DL (ref 12–16)
HGB UR QL STRIP: NEGATIVE
HYPOCHROMIA BLD QL SMEAR: ABNORMAL
IMM GRANULOCYTES # BLD AUTO: 0.03 K/UL (ref 0–0.04)
IMM GRANULOCYTES NFR BLD AUTO: 0.7 % (ref 0–0.5)
INR PPP: 0.9 (ref 0.8–1.2)
KETONES UR QL STRIP: NEGATIVE
LEUKOCYTE ESTERASE UR QL STRIP: NEGATIVE
LYMPHOCYTES # BLD AUTO: 0.8 K/UL (ref 1–4.8)
LYMPHOCYTES NFR BLD: 19.2 % (ref 18–48)
MCH RBC QN AUTO: 33.3 PG (ref 27–31)
MCHC RBC AUTO-ENTMCNC: 31.7 G/DL (ref 32–36)
MCV RBC AUTO: 105 FL (ref 82–98)
MONOCYTES # BLD AUTO: 0.4 K/UL (ref 0.3–1)
MONOCYTES NFR BLD: 8.3 % (ref 4–15)
NEUTROPHILS # BLD AUTO: 2.9 K/UL (ref 1.8–7.7)
NEUTROPHILS NFR BLD: 69.6 % (ref 38–73)
NITRITE UR QL STRIP: NEGATIVE
NRBC BLD-RTO: 0 /100 WBC
OVALOCYTES BLD QL SMEAR: ABNORMAL
PH UR STRIP: 8 [PH] (ref 5–8)
PLATELET # BLD AUTO: 262 K/UL (ref 150–450)
PLATELET BLD QL SMEAR: ABNORMAL
PMV BLD AUTO: 12.3 FL (ref 9.2–12.9)
POIKILOCYTOSIS BLD QL SMEAR: SLIGHT
POTASSIUM SERPL-SCNC: 3.3 MMOL/L (ref 3.5–5.1)
PROT SERPL-MCNC: 5.9 G/DL (ref 6–8.4)
PROT UR QL STRIP: NEGATIVE
PROTHROMBIN TIME: 10.2 SEC (ref 9–12.5)
RBC # BLD AUTO: 2.94 M/UL (ref 4–5.4)
SARS-COV-2 RDRP RESP QL NAA+PROBE: NEGATIVE
SODIUM SERPL-SCNC: 142 MMOL/L (ref 136–145)
SP GR UR STRIP: 1.01 (ref 1–1.03)
TROPONIN I SERPL DL<=0.01 NG/ML-MCNC: 0.04 NG/ML (ref 0–0.03)
URN SPEC COLLECT METH UR: NORMAL
UROBILINOGEN UR STRIP-ACNC: NEGATIVE EU/DL
WBC # BLD AUTO: 4.21 K/UL (ref 3.9–12.7)

## 2021-07-01 PROCEDURE — U0002 COVID-19 LAB TEST NON-CDC: HCPCS | Performed by: EMERGENCY MEDICINE

## 2021-07-01 PROCEDURE — 84484 ASSAY OF TROPONIN QUANT: CPT | Mod: 91 | Performed by: EMERGENCY MEDICINE

## 2021-07-01 PROCEDURE — 25000003 PHARM REV CODE 250: Performed by: INTERNAL MEDICINE

## 2021-07-01 PROCEDURE — 85025 COMPLETE CBC W/AUTO DIFF WBC: CPT | Performed by: EMERGENCY MEDICINE

## 2021-07-01 PROCEDURE — 84484 ASSAY OF TROPONIN QUANT: CPT | Performed by: INTERNAL MEDICINE

## 2021-07-01 PROCEDURE — 36415 COLL VENOUS BLD VENIPUNCTURE: CPT | Performed by: INTERNAL MEDICINE

## 2021-07-01 PROCEDURE — 63600175 PHARM REV CODE 636 W HCPCS: Performed by: EMERGENCY MEDICINE

## 2021-07-01 PROCEDURE — 80053 COMPREHEN METABOLIC PANEL: CPT | Performed by: EMERGENCY MEDICINE

## 2021-07-01 PROCEDURE — 85730 THROMBOPLASTIN TIME PARTIAL: CPT | Performed by: EMERGENCY MEDICINE

## 2021-07-01 PROCEDURE — G0378 HOSPITAL OBSERVATION PER HR: HCPCS

## 2021-07-01 PROCEDURE — 25000003 PHARM REV CODE 250: Performed by: EMERGENCY MEDICINE

## 2021-07-01 PROCEDURE — 81003 URINALYSIS AUTO W/O SCOPE: CPT | Performed by: EMERGENCY MEDICINE

## 2021-07-01 PROCEDURE — 85610 PROTHROMBIN TIME: CPT | Performed by: EMERGENCY MEDICINE

## 2021-07-01 PROCEDURE — 96372 THER/PROPH/DIAG INJ SC/IM: CPT | Mod: 59

## 2021-07-01 PROCEDURE — 96374 THER/PROPH/DIAG INJ IV PUSH: CPT

## 2021-07-01 PROCEDURE — 86900 BLOOD TYPING SEROLOGIC ABO: CPT | Performed by: EMERGENCY MEDICINE

## 2021-07-01 PROCEDURE — 93010 EKG 12-LEAD: ICD-10-PCS | Mod: GW,,, | Performed by: INTERNAL MEDICINE

## 2021-07-01 PROCEDURE — 83880 ASSAY OF NATRIURETIC PEPTIDE: CPT | Performed by: EMERGENCY MEDICINE

## 2021-07-01 PROCEDURE — 93005 ELECTROCARDIOGRAM TRACING: CPT

## 2021-07-01 PROCEDURE — 63600175 PHARM REV CODE 636 W HCPCS: Performed by: INTERNAL MEDICINE

## 2021-07-01 PROCEDURE — 93010 ELECTROCARDIOGRAM REPORT: CPT | Mod: GW,,, | Performed by: INTERNAL MEDICINE

## 2021-07-01 PROCEDURE — 99285 EMERGENCY DEPT VISIT HI MDM: CPT | Mod: 25

## 2021-07-01 RX ORDER — AMIODARONE HYDROCHLORIDE 200 MG/1
200 TABLET ORAL DAILY
Status: DISCONTINUED | OUTPATIENT
Start: 2021-07-02 | End: 2021-07-02 | Stop reason: HOSPADM

## 2021-07-01 RX ORDER — TALC
6 POWDER (GRAM) TOPICAL NIGHTLY PRN
Status: DISCONTINUED | OUTPATIENT
Start: 2021-07-01 | End: 2021-07-02 | Stop reason: HOSPADM

## 2021-07-01 RX ORDER — IPRATROPIUM BROMIDE AND ALBUTEROL SULFATE 2.5; .5 MG/3ML; MG/3ML
3 SOLUTION RESPIRATORY (INHALATION) EVERY 4 HOURS PRN
Status: DISCONTINUED | OUTPATIENT
Start: 2021-07-01 | End: 2021-07-02 | Stop reason: HOSPADM

## 2021-07-01 RX ORDER — ARIPIPRAZOLE 5 MG/1
5 TABLET ORAL DAILY
Status: DISCONTINUED | OUTPATIENT
Start: 2021-07-02 | End: 2021-07-02 | Stop reason: HOSPADM

## 2021-07-01 RX ORDER — GUAIFENESIN 100 MG/5ML
200 SOLUTION ORAL EVERY 4 HOURS PRN
Status: DISCONTINUED | OUTPATIENT
Start: 2021-07-01 | End: 2021-07-02 | Stop reason: HOSPADM

## 2021-07-01 RX ORDER — FERROUS SULFATE 325(65) MG
324 TABLET, DELAYED RELEASE (ENTERIC COATED) ORAL DAILY
Status: DISCONTINUED | OUTPATIENT
Start: 2021-07-02 | End: 2021-07-02

## 2021-07-01 RX ORDER — ATORVASTATIN CALCIUM 10 MG/1
20 TABLET, FILM COATED ORAL DAILY
Status: DISCONTINUED | OUTPATIENT
Start: 2021-07-02 | End: 2021-07-02 | Stop reason: HOSPADM

## 2021-07-01 RX ORDER — FUROSEMIDE 10 MG/ML
80 INJECTION INTRAMUSCULAR; INTRAVENOUS
Status: COMPLETED | OUTPATIENT
Start: 2021-07-01 | End: 2021-07-01

## 2021-07-01 RX ORDER — CLOPIDOGREL BISULFATE 75 MG/1
75 TABLET ORAL DAILY
Status: DISCONTINUED | OUTPATIENT
Start: 2021-07-02 | End: 2021-07-02 | Stop reason: HOSPADM

## 2021-07-01 RX ORDER — DIPHENHYDRAMINE HCL 25 MG
25 CAPSULE ORAL EVERY 6 HOURS PRN
Status: DISCONTINUED | OUTPATIENT
Start: 2021-07-01 | End: 2021-07-02 | Stop reason: HOSPADM

## 2021-07-01 RX ORDER — FLUTICASONE PROPIONATE 50 MCG
1 SPRAY, SUSPENSION (ML) NASAL DAILY
Status: DISCONTINUED | OUTPATIENT
Start: 2021-07-02 | End: 2021-07-02 | Stop reason: HOSPADM

## 2021-07-01 RX ORDER — ASPIRIN 81 MG/1
81 TABLET ORAL DAILY
Status: DISCONTINUED | OUTPATIENT
Start: 2021-07-02 | End: 2021-07-02 | Stop reason: HOSPADM

## 2021-07-01 RX ORDER — ALPRAZOLAM 0.25 MG/1
0.25 TABLET ORAL 2 TIMES DAILY PRN
Status: DISCONTINUED | OUTPATIENT
Start: 2021-07-01 | End: 2021-07-02 | Stop reason: HOSPADM

## 2021-07-01 RX ORDER — POTASSIUM CHLORIDE 20 MEQ/1
20 TABLET, EXTENDED RELEASE ORAL 2 TIMES DAILY
Status: DISCONTINUED | OUTPATIENT
Start: 2021-07-01 | End: 2021-07-02 | Stop reason: HOSPADM

## 2021-07-01 RX ORDER — POTASSIUM CHLORIDE 20 MEQ/1
40 TABLET, EXTENDED RELEASE ORAL ONCE
Status: COMPLETED | OUTPATIENT
Start: 2021-07-02 | End: 2021-07-02

## 2021-07-01 RX ORDER — FUROSEMIDE 10 MG/ML
40 INJECTION INTRAMUSCULAR; INTRAVENOUS 2 TIMES DAILY
Status: DISCONTINUED | OUTPATIENT
Start: 2021-07-02 | End: 2021-07-02 | Stop reason: HOSPADM

## 2021-07-01 RX ORDER — OXYBUTYNIN CHLORIDE 5 MG/1
5 TABLET, EXTENDED RELEASE ORAL DAILY
Status: DISCONTINUED | OUTPATIENT
Start: 2021-07-02 | End: 2021-07-02 | Stop reason: HOSPADM

## 2021-07-01 RX ORDER — DONEPEZIL HYDROCHLORIDE 5 MG/1
10 TABLET, FILM COATED ORAL NIGHTLY
Status: DISCONTINUED | OUTPATIENT
Start: 2021-07-01 | End: 2021-07-02 | Stop reason: HOSPADM

## 2021-07-01 RX ORDER — ENOXAPARIN SODIUM 100 MG/ML
40 INJECTION SUBCUTANEOUS EVERY 24 HOURS
Status: DISCONTINUED | OUTPATIENT
Start: 2021-07-01 | End: 2021-07-02 | Stop reason: HOSPADM

## 2021-07-01 RX ORDER — ACETAMINOPHEN 325 MG/1
650 TABLET ORAL EVERY 6 HOURS PRN
Status: DISCONTINUED | OUTPATIENT
Start: 2021-07-01 | End: 2021-07-02 | Stop reason: HOSPADM

## 2021-07-01 RX ORDER — ALPRAZOLAM 0.5 MG/1
0.5 TABLET ORAL
Status: COMPLETED | OUTPATIENT
Start: 2021-07-01 | End: 2021-07-01

## 2021-07-01 RX ORDER — VENLAFAXINE HYDROCHLORIDE 75 MG/1
75 CAPSULE, EXTENDED RELEASE ORAL DAILY
Status: DISCONTINUED | OUTPATIENT
Start: 2021-07-02 | End: 2021-07-02 | Stop reason: HOSPADM

## 2021-07-01 RX ORDER — POTASSIUM CHLORIDE 20 MEQ/1
20 TABLET, EXTENDED RELEASE ORAL
Status: COMPLETED | OUTPATIENT
Start: 2021-07-01 | End: 2021-07-01

## 2021-07-01 RX ORDER — LEVOTHYROXINE SODIUM 50 UG/1
50 TABLET ORAL
Status: DISCONTINUED | OUTPATIENT
Start: 2021-07-02 | End: 2021-07-02 | Stop reason: HOSPADM

## 2021-07-01 RX ORDER — ONDANSETRON 2 MG/ML
4 INJECTION INTRAMUSCULAR; INTRAVENOUS EVERY 8 HOURS PRN
Status: DISCONTINUED | OUTPATIENT
Start: 2021-07-01 | End: 2021-07-02 | Stop reason: HOSPADM

## 2021-07-01 RX ORDER — MAG HYDROX/ALUMINUM HYD/SIMETH 200-200-20
30 SUSPENSION, ORAL (FINAL DOSE FORM) ORAL EVERY 6 HOURS PRN
Status: DISCONTINUED | OUTPATIENT
Start: 2021-07-01 | End: 2021-07-02 | Stop reason: HOSPADM

## 2021-07-01 RX ADMIN — POTASSIUM CHLORIDE 20 MEQ: 1500 TABLET, EXTENDED RELEASE ORAL at 09:07

## 2021-07-01 RX ADMIN — ACETAMINOPHEN 650 MG: 325 TABLET ORAL at 09:07

## 2021-07-01 RX ADMIN — DONEPEZIL HYDROCHLORIDE 10 MG: 5 TABLET, FILM COATED ORAL at 09:07

## 2021-07-01 RX ADMIN — ENOXAPARIN SODIUM 40 MG: 40 INJECTION SUBCUTANEOUS at 09:07

## 2021-07-01 RX ADMIN — FUROSEMIDE 80 MG: 10 INJECTION, SOLUTION INTRAMUSCULAR; INTRAVENOUS at 05:07

## 2021-07-01 RX ADMIN — ALPRAZOLAM 0.5 MG: 0.5 TABLET ORAL at 07:07

## 2021-07-01 RX ADMIN — POTASSIUM CHLORIDE 20 MEQ: 1500 TABLET, EXTENDED RELEASE ORAL at 08:07

## 2021-07-02 VITALS
DIASTOLIC BLOOD PRESSURE: 58 MMHG | RESPIRATION RATE: 18 BRPM | WEIGHT: 138.88 LBS | BODY MASS INDEX: 26.22 KG/M2 | OXYGEN SATURATION: 98 % | HEIGHT: 61 IN | TEMPERATURE: 97 F | HEART RATE: 68 BPM | SYSTOLIC BLOOD PRESSURE: 142 MMHG

## 2021-07-02 PROBLEM — J96.21 ACUTE ON CHRONIC RESPIRATORY FAILURE WITH HYPOXIA: Status: RESOLVED | Noted: 2021-07-01 | Resolved: 2021-07-02

## 2021-07-02 PROBLEM — R53.1 WEAKNESS: Status: RESOLVED | Noted: 2021-07-01 | Resolved: 2021-07-02

## 2021-07-02 LAB
ALBUMIN SERPL BCP-MCNC: 3.1 G/DL (ref 3.5–5.2)
ALP SERPL-CCNC: 77 U/L (ref 55–135)
ALT SERPL W/O P-5'-P-CCNC: 14 U/L (ref 10–44)
ANION GAP SERPL CALC-SCNC: 11 MMOL/L (ref 8–16)
AST SERPL-CCNC: 23 U/L (ref 10–40)
BASOPHILS # BLD AUTO: 0.02 K/UL (ref 0–0.2)
BASOPHILS NFR BLD: 0.5 % (ref 0–1.9)
BILIRUB SERPL-MCNC: 0.3 MG/DL (ref 0.1–1)
BUN SERPL-MCNC: 13 MG/DL (ref 8–23)
CALCIUM SERPL-MCNC: 8.5 MG/DL (ref 8.7–10.5)
CHLORIDE SERPL-SCNC: 102 MMOL/L (ref 95–110)
CO2 SERPL-SCNC: 29 MMOL/L (ref 23–29)
CREAT SERPL-MCNC: 0.8 MG/DL (ref 0.5–1.4)
DIFFERENTIAL METHOD: ABNORMAL
EOSINOPHIL # BLD AUTO: 0.1 K/UL (ref 0–0.5)
EOSINOPHIL NFR BLD: 1.5 % (ref 0–8)
ERYTHROCYTE [DISTWIDTH] IN BLOOD BY AUTOMATED COUNT: 23.9 % (ref 11.5–14.5)
EST. GFR  (AFRICAN AMERICAN): >60 ML/MIN/1.73 M^2
EST. GFR  (NON AFRICAN AMERICAN): >60 ML/MIN/1.73 M^2
GLUCOSE SERPL-MCNC: 88 MG/DL (ref 70–110)
HCT VFR BLD AUTO: 31.4 % (ref 37–48.5)
HGB BLD-MCNC: 9.7 G/DL (ref 12–16)
IMM GRANULOCYTES # BLD AUTO: 0.02 K/UL (ref 0–0.04)
IMM GRANULOCYTES NFR BLD AUTO: 0.5 % (ref 0–0.5)
LYMPHOCYTES # BLD AUTO: 1.2 K/UL (ref 1–4.8)
LYMPHOCYTES NFR BLD: 29.8 % (ref 18–48)
MCH RBC QN AUTO: 33.3 PG (ref 27–31)
MCHC RBC AUTO-ENTMCNC: 30.9 G/DL (ref 32–36)
MCV RBC AUTO: 108 FL (ref 82–98)
MONOCYTES # BLD AUTO: 0.4 K/UL (ref 0.3–1)
MONOCYTES NFR BLD: 8.5 % (ref 4–15)
NEUTROPHILS # BLD AUTO: 2.4 K/UL (ref 1.8–7.7)
NEUTROPHILS NFR BLD: 59.2 % (ref 38–73)
NRBC BLD-RTO: 0 /100 WBC
PLATELET # BLD AUTO: 221 K/UL (ref 150–450)
PMV BLD AUTO: 12.6 FL (ref 9.2–12.9)
POTASSIUM SERPL-SCNC: 3.8 MMOL/L (ref 3.5–5.1)
PROT SERPL-MCNC: 5.9 G/DL (ref 6–8.4)
RBC # BLD AUTO: 2.91 M/UL (ref 4–5.4)
SODIUM SERPL-SCNC: 142 MMOL/L (ref 136–145)
TROPONIN I SERPL DL<=0.01 NG/ML-MCNC: 0.04 NG/ML (ref 0–0.03)
TROPONIN I SERPL DL<=0.01 NG/ML-MCNC: 0.04 NG/ML (ref 0–0.03)
WBC # BLD AUTO: 4.1 K/UL (ref 3.9–12.7)

## 2021-07-02 PROCEDURE — 80053 COMPREHEN METABOLIC PANEL: CPT | Performed by: INTERNAL MEDICINE

## 2021-07-02 PROCEDURE — 96376 TX/PRO/DX INJ SAME DRUG ADON: CPT

## 2021-07-02 PROCEDURE — 97166 OT EVAL MOD COMPLEX 45 MIN: CPT | Performed by: PHYSICAL THERAPIST

## 2021-07-02 PROCEDURE — 25000003 PHARM REV CODE 250: Performed by: FAMILY MEDICINE

## 2021-07-02 PROCEDURE — 25000003 PHARM REV CODE 250: Performed by: INTERNAL MEDICINE

## 2021-07-02 PROCEDURE — 85025 COMPLETE CBC W/AUTO DIFF WBC: CPT | Performed by: INTERNAL MEDICINE

## 2021-07-02 PROCEDURE — 25000242 PHARM REV CODE 250 ALT 637 W/ HCPCS: Performed by: INTERNAL MEDICINE

## 2021-07-02 PROCEDURE — G0378 HOSPITAL OBSERVATION PER HR: HCPCS

## 2021-07-02 PROCEDURE — 84484 ASSAY OF TROPONIN QUANT: CPT | Performed by: INTERNAL MEDICINE

## 2021-07-02 PROCEDURE — 63600175 PHARM REV CODE 636 W HCPCS: Performed by: INTERNAL MEDICINE

## 2021-07-02 PROCEDURE — 36415 COLL VENOUS BLD VENIPUNCTURE: CPT | Performed by: INTERNAL MEDICINE

## 2021-07-02 PROCEDURE — 97162 PT EVAL MOD COMPLEX 30 MIN: CPT | Performed by: PHYSICAL THERAPIST

## 2021-07-02 RX ORDER — LEVOTHYROXINE SODIUM 50 UG/1
50 TABLET ORAL
Qty: 30 TABLET | Refills: 11
Start: 2021-07-03 | End: 2022-07-03

## 2021-07-02 RX ORDER — FERROUS SULFATE 325(65) MG
325 TABLET, DELAYED RELEASE (ENTERIC COATED) ORAL DAILY
Status: DISCONTINUED | OUTPATIENT
Start: 2021-07-02 | End: 2021-07-02 | Stop reason: HOSPADM

## 2021-07-02 RX ADMIN — THERA TABS 1 TABLET: TAB at 09:07

## 2021-07-02 RX ADMIN — FLUTICASONE PROPIONATE 50 MCG: 50 SPRAY, METERED NASAL at 09:07

## 2021-07-02 RX ADMIN — ATORVASTATIN CALCIUM 20 MG: 10 TABLET, FILM COATED ORAL at 09:07

## 2021-07-02 RX ADMIN — VENLAFAXINE HYDROCHLORIDE 75 MG: 75 CAPSULE, EXTENDED RELEASE ORAL at 09:07

## 2021-07-02 RX ADMIN — POTASSIUM CHLORIDE 20 MEQ: 1500 TABLET, EXTENDED RELEASE ORAL at 09:07

## 2021-07-02 RX ADMIN — FERROUS SULFATE TAB EC 325 MG (65 MG FE EQUIVALENT) 325 MG: 325 (65 FE) TABLET DELAYED RESPONSE at 10:07

## 2021-07-02 RX ADMIN — AMIODARONE HYDROCHLORIDE 200 MG: 200 TABLET ORAL at 09:07

## 2021-07-02 RX ADMIN — LEVOTHYROXINE SODIUM 50 MCG: 50 TABLET ORAL at 05:07

## 2021-07-02 RX ADMIN — POTASSIUM CHLORIDE 40 MEQ: 1500 TABLET, EXTENDED RELEASE ORAL at 01:07

## 2021-07-02 RX ADMIN — CLOPIDOGREL 75 MG: 75 TABLET, FILM COATED ORAL at 09:07

## 2021-07-02 RX ADMIN — FUROSEMIDE 40 MG: 10 INJECTION, SOLUTION INTRAMUSCULAR; INTRAVENOUS at 09:07

## 2021-07-02 RX ADMIN — ARIPIPRAZOLE 5 MG: 5 TABLET ORAL at 09:07

## 2021-07-02 RX ADMIN — ASPIRIN 81 MG: 81 TABLET, COATED ORAL at 09:07

## 2021-07-02 RX ADMIN — OXYBUTYNIN CHLORIDE 5 MG: 5 TABLET, EXTENDED RELEASE ORAL at 09:07

## 2021-07-06 ENCOUNTER — TELEPHONE (OUTPATIENT)
Dept: TRANSPLANT | Facility: CLINIC | Age: 86
End: 2021-07-06

## 2021-08-02 ENCOUNTER — HOSPITAL ENCOUNTER (EMERGENCY)
Facility: HOSPITAL | Age: 86
Discharge: HOME | End: 2021-08-02
Attending: EMERGENCY MEDICINE
Payer: MEDICARE

## 2021-08-02 VITALS
RESPIRATION RATE: 18 BRPM | TEMPERATURE: 98 F | WEIGHT: 127 LBS | HEART RATE: 61 BPM | SYSTOLIC BLOOD PRESSURE: 128 MMHG | OXYGEN SATURATION: 97 % | HEIGHT: 61 IN | DIASTOLIC BLOOD PRESSURE: 60 MMHG | BODY MASS INDEX: 23.98 KG/M2

## 2021-08-02 DIAGNOSIS — L02.411 ABSCESS OF RIGHT AXILLA: Primary | ICD-10-CM

## 2021-08-02 PROCEDURE — 99283 EMERGENCY DEPT VISIT LOW MDM: CPT

## 2021-08-02 RX ORDER — LIDOCAINE HYDROCHLORIDE 10 MG/ML
1 INJECTION, SOLUTION EPIDURAL; INFILTRATION; INTRACAUDAL; PERINEURAL
Status: DISCONTINUED | OUTPATIENT
Start: 2021-08-02 | End: 2021-08-02 | Stop reason: HOSPADM

## 2021-08-02 RX ORDER — SULFAMETHOXAZOLE AND TRIMETHOPRIM 800; 160 MG/1; MG/1
1 TABLET ORAL 2 TIMES DAILY
Qty: 14 TABLET | Refills: 0 | Status: SHIPPED | OUTPATIENT
Start: 2021-08-02 | End: 2021-08-09

## 2021-08-18 ENCOUNTER — HOSPITAL ENCOUNTER (INPATIENT)
Facility: HOSPITAL | Age: 86
LOS: 1 days | Discharge: HOSPICE | DRG: 177 | End: 2021-08-19
Attending: EMERGENCY MEDICINE | Admitting: FAMILY MEDICINE
Payer: MEDICARE

## 2021-08-18 DIAGNOSIS — I47.20 VENTRICULAR TACHYCARDIA: ICD-10-CM

## 2021-08-18 DIAGNOSIS — U07.1 ACUTE HYPOXEMIC RESPIRATORY FAILURE DUE TO COVID-19: ICD-10-CM

## 2021-08-18 DIAGNOSIS — R07.9 CHEST PAIN: ICD-10-CM

## 2021-08-18 DIAGNOSIS — U07.1 PNEUMONIA DUE TO COVID-19 VIRUS: ICD-10-CM

## 2021-08-18 DIAGNOSIS — I47.20 V TACH: ICD-10-CM

## 2021-08-18 DIAGNOSIS — J96.01 ACUTE HYPOXEMIC RESPIRATORY FAILURE DUE TO COVID-19: ICD-10-CM

## 2021-08-18 DIAGNOSIS — J96.90 RESPIRATORY FAILURE: ICD-10-CM

## 2021-08-18 DIAGNOSIS — J12.82 PNEUMONIA DUE TO COVID-19 VIRUS: ICD-10-CM

## 2021-08-18 DIAGNOSIS — D64.9 ANEMIA, UNSPECIFIED TYPE: ICD-10-CM

## 2021-08-18 DIAGNOSIS — I47.20 SUSTAINED VT (VENTRICULAR TACHYCARDIA): ICD-10-CM

## 2021-08-18 DIAGNOSIS — J96.01 ACUTE RESPIRATORY FAILURE WITH HYPOXIA: Primary | ICD-10-CM

## 2021-08-18 DIAGNOSIS — I47.29 PAROXYSMAL VT: Chronic | ICD-10-CM

## 2021-08-18 LAB
ABO + RH BLD: NORMAL
ALBUMIN SERPL BCP-MCNC: 3 G/DL (ref 3.5–5.2)
ALLENS TEST: ABNORMAL
ALP SERPL-CCNC: 91 U/L (ref 55–135)
ALT SERPL W/O P-5'-P-CCNC: 34 U/L (ref 10–44)
ANION GAP SERPL CALC-SCNC: 8 MMOL/L (ref 8–16)
AORTIC ROOT ANNULUS: 3.66 CM
ASCENDING AORTA: 3.16 CM
AST SERPL-CCNC: 33 U/L (ref 10–40)
AV INDEX (PROSTH): 0.46
AV MEAN GRADIENT: 10 MMHG
AV PEAK GRADIENT: 18 MMHG
AV VALVE AREA: 1.53 CM2
AV VELOCITY RATIO: 0.42
BASOPHILS # BLD AUTO: 0 K/UL (ref 0–0.2)
BASOPHILS NFR BLD: 0 % (ref 0–1.9)
BILIRUB SERPL-MCNC: 0.4 MG/DL (ref 0.1–1)
BILIRUB UR QL STRIP: NEGATIVE
BLD GP AB SCN CELLS X3 SERPL QL: NORMAL
BLD PROD TYP BPU: NORMAL
BLD PROD TYP BPU: NORMAL
BLOOD UNIT EXPIRATION DATE: NORMAL
BLOOD UNIT EXPIRATION DATE: NORMAL
BLOOD UNIT TYPE CODE: 5100
BLOOD UNIT TYPE CODE: 5100
BLOOD UNIT TYPE: NORMAL
BLOOD UNIT TYPE: NORMAL
BNP SERPL-MCNC: 3300 PG/ML (ref 0–99)
BSA FOR ECHO PROCEDURE: 1.59 M2
BUN SERPL-MCNC: 14 MG/DL (ref 8–23)
CALCIUM SERPL-MCNC: 8.8 MG/DL (ref 8.7–10.5)
CHLORIDE SERPL-SCNC: 104 MMOL/L (ref 95–110)
CLARITY UR: CLEAR
CO2 SERPL-SCNC: 25 MMOL/L (ref 23–29)
CODING SYSTEM: NORMAL
CODING SYSTEM: NORMAL
COLOR UR: YELLOW
CREAT SERPL-MCNC: 1 MG/DL (ref 0.5–1.4)
CTP QC/QA: YES
CV ECHO LV RWT: 0.3 CM
D DIMER PPP IA.FEU-MCNC: 0.72 MG/L FEU
DACRYOCYTES BLD QL SMEAR: ABNORMAL
DELSYS: ABNORMAL
DIFFERENTIAL METHOD: ABNORMAL
DISPENSE STATUS: NORMAL
DISPENSE STATUS: NORMAL
DOP CALC AO PEAK VEL: 2.1 M/S
DOP CALC AO VTI: 46.66 CM
DOP CALC LVOT AREA: 3.3 CM2
DOP CALC LVOT DIAMETER: 2.06 CM
DOP CALC LVOT PEAK VEL: 0.89 M/S
DOP CALC LVOT STROKE VOLUME: 71.59 CM3
DOP CALC RVOT PEAK VEL: 0.57 M/S
DOP CALC RVOT VTI: 10.86 CM
DOP CALCLVOT PEAK VEL VTI: 21.49 CM
E WAVE DECELERATION TIME: 179.83 MSEC
E/A RATIO: 2.53
E/E' RATIO: 12.67 M/S
ECHO LV POSTERIOR WALL: 0.82 CM (ref 0.6–1.1)
EJECTION FRACTION: 30 %
EOSINOPHIL # BLD AUTO: 0 K/UL (ref 0–0.5)
EOSINOPHIL NFR BLD: 0 % (ref 0–8)
EP: 8
ERYTHROCYTE [DISTWIDTH] IN BLOOD BY AUTOMATED COUNT: ABNORMAL % (ref 11.5–14.5)
ERYTHROCYTE [SEDIMENTATION RATE] IN BLOOD BY WESTERGREN METHOD: 115 MM/HR (ref 0–20)
ERYTHROCYTE [SEDIMENTATION RATE] IN BLOOD BY WESTERGREN METHOD: 20 MM/H
EST. GFR  (AFRICAN AMERICAN): 59 ML/MIN/1.73 M^2
EST. GFR  (NON AFRICAN AMERICAN): 51 ML/MIN/1.73 M^2
FERRITIN SERPL-MCNC: 4109 NG/ML (ref 20–300)
FIO2: 100
FRACTIONAL SHORTENING: 12 % (ref 28–44)
GLUCOSE SERPL-MCNC: 230 MG/DL (ref 70–110)
GLUCOSE UR QL STRIP: NEGATIVE
HCO3 UR-SCNC: 26.1 MMOL/L (ref 24–28)
HCT VFR BLD AUTO: 17.9 % (ref 37–48.5)
HGB BLD-MCNC: 5.6 G/DL (ref 12–16)
HGB UR QL STRIP: NEGATIVE
IMM GRANULOCYTES # BLD AUTO: 0.06 K/UL (ref 0–0.04)
IMM GRANULOCYTES NFR BLD AUTO: 1 % (ref 0–0.5)
INR PPP: 0.9 (ref 0.8–1.2)
INTERVENTRICULAR SEPTUM: 0.73 CM (ref 0.6–1.1)
IP: 14
IVRT: 54.23 MSEC
KETONES UR QL STRIP: NEGATIVE
LA MAJOR: 5.83 CM
LA MINOR: 3.01 CM
LA WIDTH: 3.73 CM
LEFT ATRIUM SIZE: 3.06 CM
LEFT ATRIUM VOLUME INDEX: 24.5 ML/M2
LEFT ATRIUM VOLUME: 38.52 CM3
LEFT INTERNAL DIMENSION IN SYSTOLE: 4.75 CM (ref 2.1–4)
LEFT VENTRICLE DIASTOLIC VOLUME INDEX: 89.17 ML/M2
LEFT VENTRICLE DIASTOLIC VOLUME: 140 ML
LEFT VENTRICLE MASS INDEX: 94 G/M2
LEFT VENTRICLE SYSTOLIC VOLUME INDEX: 66.7 ML/M2
LEFT VENTRICLE SYSTOLIC VOLUME: 104.75 ML
LEFT VENTRICULAR INTERNAL DIMENSION IN DIASTOLE: 5.38 CM (ref 3.5–6)
LEFT VENTRICULAR MASS: 147.94 G
LEUKOCYTE ESTERASE UR QL STRIP: NEGATIVE
LV LATERAL E/E' RATIO: 11.4 M/S
LV SEPTAL E/E' RATIO: 14.25 M/S
LYMPHOCYTES # BLD AUTO: 1.1 K/UL (ref 1–4.8)
LYMPHOCYTES NFR BLD: 18.8 % (ref 18–48)
MAGNESIUM SERPL-MCNC: 2 MG/DL (ref 1.6–2.6)
MCH RBC QN AUTO: 36.4 PG (ref 27–31)
MCHC RBC AUTO-ENTMCNC: 31.3 G/DL (ref 32–36)
MCV RBC AUTO: 116 FL (ref 82–98)
MODE: ABNORMAL
MONOCYTES # BLD AUTO: 0.3 K/UL (ref 0.3–1)
MONOCYTES NFR BLD: 5.7 % (ref 4–15)
MV PEAK A VEL: 0.45 M/S
MV PEAK E VEL: 1.14 M/S
MV STENOSIS PRESSURE HALF TIME: 52.15 MS
MV VALVE AREA P 1/2 METHOD: 4.22 CM2
NEUTROPHILS # BLD AUTO: 4.3 K/UL (ref 1.8–7.7)
NEUTROPHILS NFR BLD: 74.5 % (ref 38–73)
NITRITE UR QL STRIP: NEGATIVE
NRBC BLD-RTO: 0 /100 WBC
NUM UNITS TRANS PACKED RBC: NORMAL
NUM UNITS TRANS PACKED RBC: NORMAL
OB PNL STL: NEGATIVE
PCO2 BLDA: 40.1 MMHG (ref 35–45)
PH SMN: 7.42 [PH] (ref 7.35–7.45)
PH UR STRIP: 6 [PH] (ref 5–8)
PISA MRMAX VEL: 0.04 M/S
PISA TR MAX VEL: 3.65 M/S
PLATELET # BLD AUTO: 200 K/UL (ref 150–450)
PMV BLD AUTO: 13.5 FL (ref 9.2–12.9)
PO2 BLDA: 496 MMHG (ref 80–100)
POC BE: 2 MMOL/L
POC SATURATED O2: 100 % (ref 95–100)
POTASSIUM SERPL-SCNC: 4 MMOL/L (ref 3.5–5.1)
PROCALCITONIN SERPL IA-MCNC: 0.43 NG/ML
PROT SERPL-MCNC: 6 G/DL (ref 6–8.4)
PROT UR QL STRIP: ABNORMAL
PROTHROMBIN TIME: 10.6 SEC (ref 9–12.5)
PV MEAN GRADIENT: 1 MMHG
RA MAJOR: 4.57 CM
RA PRESSURE: 15 MMHG
RBC # BLD AUTO: 1.54 M/UL (ref 4–5.4)
SAMPLE: ABNORMAL
SARS-COV-2 RDRP RESP QL NAA+PROBE: POSITIVE
SINUS: 3.81 CM
SITE: ABNORMAL
SODIUM SERPL-SCNC: 137 MMOL/L (ref 136–145)
SP GR UR STRIP: 1.02 (ref 1–1.03)
STJ: 2.93 CM
TARGETS BLD QL SMEAR: ABNORMAL
TDI LATERAL: 0.1 M/S
TDI SEPTAL: 0.08 M/S
TDI: 0.09 M/S
TR MAX PG: 53 MMHG
TRICUSPID ANNULAR PLANE SYSTOLIC EXCURSION: 2.43 CM
TROPONIN I SERPL DL<=0.01 NG/ML-MCNC: 0.09 NG/ML (ref 0–0.03)
TV REST PULMONARY ARTERY PRESSURE: 68 MMHG
URN SPEC COLLECT METH UR: ABNORMAL
UROBILINOGEN UR STRIP-ACNC: NEGATIVE EU/DL
WBC # BLD AUTO: 5.79 K/UL (ref 3.9–12.7)

## 2021-08-18 PROCEDURE — 86900 BLOOD TYPING SEROLOGIC ABO: CPT | Performed by: EMERGENCY MEDICINE

## 2021-08-18 PROCEDURE — 25000003 PHARM REV CODE 250

## 2021-08-18 PROCEDURE — 82272 OCCULT BLD FECES 1-3 TESTS: CPT | Performed by: EMERGENCY MEDICINE

## 2021-08-18 PROCEDURE — 93010 EKG 12-LEAD: ICD-10-PCS | Mod: ,,, | Performed by: INTERNAL MEDICINE

## 2021-08-18 PROCEDURE — 99900035 HC TECH TIME PER 15 MIN (STAT)

## 2021-08-18 PROCEDURE — 99291 PR CRITICAL CARE, E/M 30-74 MINUTES: ICD-10-PCS | Mod: CR,,, | Performed by: NURSE PRACTITIONER

## 2021-08-18 PROCEDURE — 93005 ELECTROCARDIOGRAM TRACING: CPT

## 2021-08-18 PROCEDURE — 83735 ASSAY OF MAGNESIUM: CPT | Performed by: EMERGENCY MEDICINE

## 2021-08-18 PROCEDURE — 82728 ASSAY OF FERRITIN: CPT | Performed by: NURSE PRACTITIONER

## 2021-08-18 PROCEDURE — 85379 FIBRIN DEGRADATION QUANT: CPT | Performed by: NURSE PRACTITIONER

## 2021-08-18 PROCEDURE — 86920 COMPATIBILITY TEST SPIN: CPT | Performed by: EMERGENCY MEDICINE

## 2021-08-18 PROCEDURE — 80053 COMPREHEN METABOLIC PANEL: CPT | Performed by: EMERGENCY MEDICINE

## 2021-08-18 PROCEDURE — 85651 RBC SED RATE NONAUTOMATED: CPT | Performed by: NURSE PRACTITIONER

## 2021-08-18 PROCEDURE — 63600175 PHARM REV CODE 636 W HCPCS: Performed by: EMERGENCY MEDICINE

## 2021-08-18 PROCEDURE — 87186 SC STD MICRODIL/AGAR DIL: CPT | Performed by: NURSE PRACTITIONER

## 2021-08-18 PROCEDURE — 87040 BLOOD CULTURE FOR BACTERIA: CPT | Performed by: NURSE PRACTITIONER

## 2021-08-18 PROCEDURE — 25000003 PHARM REV CODE 250: Performed by: NURSE PRACTITIONER

## 2021-08-18 PROCEDURE — 36556 INSERT NON-TUNNEL CV CATH: CPT | Mod: ,,, | Performed by: ANESTHESIOLOGY

## 2021-08-18 PROCEDURE — 36600 WITHDRAWAL OF ARTERIAL BLOOD: CPT

## 2021-08-18 PROCEDURE — 94640 AIRWAY INHALATION TREATMENT: CPT

## 2021-08-18 PROCEDURE — 25000242 PHARM REV CODE 250 ALT 637 W/ HCPCS: Performed by: NURSE PRACTITIONER

## 2021-08-18 PROCEDURE — 27000190 HC CPAP FULL FACE MASK W/VALVE

## 2021-08-18 PROCEDURE — 81003 URINALYSIS AUTO W/O SCOPE: CPT | Performed by: NURSE PRACTITIONER

## 2021-08-18 PROCEDURE — 99291 CRITICAL CARE FIRST HOUR: CPT | Mod: CR,,, | Performed by: NURSE PRACTITIONER

## 2021-08-18 PROCEDURE — 27000221 HC OXYGEN, UP TO 24 HOURS

## 2021-08-18 PROCEDURE — 63600175 PHARM REV CODE 636 W HCPCS: Performed by: NURSE PRACTITIONER

## 2021-08-18 PROCEDURE — 99223 PR INITIAL HOSPITAL CARE,LEVL III: ICD-10-PCS | Mod: CR,25,, | Performed by: INTERNAL MEDICINE

## 2021-08-18 PROCEDURE — 83880 ASSAY OF NATRIURETIC PEPTIDE: CPT | Performed by: EMERGENCY MEDICINE

## 2021-08-18 PROCEDURE — 84484 ASSAY OF TROPONIN QUANT: CPT | Performed by: EMERGENCY MEDICINE

## 2021-08-18 PROCEDURE — 36430 TRANSFUSION BLD/BLD COMPNT: CPT

## 2021-08-18 PROCEDURE — 25000242 PHARM REV CODE 250 ALT 637 W/ HCPCS: Performed by: FAMILY MEDICINE

## 2021-08-18 PROCEDURE — 63600175 PHARM REV CODE 636 W HCPCS

## 2021-08-18 PROCEDURE — 87077 CULTURE AEROBIC IDENTIFY: CPT | Performed by: NURSE PRACTITIONER

## 2021-08-18 PROCEDURE — 84145 PROCALCITONIN (PCT): CPT | Performed by: NURSE PRACTITIONER

## 2021-08-18 PROCEDURE — 85025 COMPLETE CBC W/AUTO DIFF WBC: CPT | Performed by: EMERGENCY MEDICINE

## 2021-08-18 PROCEDURE — 93010 ELECTROCARDIOGRAM REPORT: CPT | Mod: ,,, | Performed by: INTERNAL MEDICINE

## 2021-08-18 PROCEDURE — 85610 PROTHROMBIN TIME: CPT | Performed by: EMERGENCY MEDICINE

## 2021-08-18 PROCEDURE — 25000003 PHARM REV CODE 250: Performed by: EMERGENCY MEDICINE

## 2021-08-18 PROCEDURE — 94761 N-INVAS EAR/PLS OXIMETRY MLT: CPT

## 2021-08-18 PROCEDURE — 36556 PR INSERT NON-TUNNEL CV CATH 5+ YRS OLD: ICD-10-PCS | Mod: ,,, | Performed by: ANESTHESIOLOGY

## 2021-08-18 PROCEDURE — 99291 CRITICAL CARE FIRST HOUR: CPT | Mod: 25

## 2021-08-18 PROCEDURE — U0002 COVID-19 LAB TEST NON-CDC: HCPCS | Performed by: EMERGENCY MEDICINE

## 2021-08-18 PROCEDURE — 82803 BLOOD GASES ANY COMBINATION: CPT

## 2021-08-18 PROCEDURE — 99223 1ST HOSP IP/OBS HIGH 75: CPT | Mod: CR,25,, | Performed by: INTERNAL MEDICINE

## 2021-08-18 PROCEDURE — P9016 RBC LEUKOCYTES REDUCED: HCPCS | Performed by: EMERGENCY MEDICINE

## 2021-08-18 PROCEDURE — 96366 THER/PROPH/DIAG IV INF ADDON: CPT

## 2021-08-18 PROCEDURE — 96376 TX/PRO/DX INJ SAME DRUG ADON: CPT

## 2021-08-18 PROCEDURE — 21400001 HC TELEMETRY ROOM

## 2021-08-18 PROCEDURE — 27000207 HC ISOLATION

## 2021-08-18 PROCEDURE — 96365 THER/PROPH/DIAG IV INF INIT: CPT

## 2021-08-18 RX ORDER — ATORVASTATIN CALCIUM 10 MG/1
20 TABLET, FILM COATED ORAL NIGHTLY
Status: DISCONTINUED | OUTPATIENT
Start: 2021-08-18 | End: 2021-08-19 | Stop reason: HOSPADM

## 2021-08-18 RX ORDER — ALBUTEROL SULFATE 90 UG/1
2 AEROSOL, METERED RESPIRATORY (INHALATION) 4 TIMES DAILY
Status: DISCONTINUED | OUTPATIENT
Start: 2021-08-18 | End: 2021-08-19 | Stop reason: HOSPADM

## 2021-08-18 RX ORDER — ONDANSETRON 2 MG/ML
4 INJECTION INTRAMUSCULAR; INTRAVENOUS EVERY 8 HOURS PRN
Status: DISCONTINUED | OUTPATIENT
Start: 2021-08-18 | End: 2021-08-19 | Stop reason: HOSPADM

## 2021-08-18 RX ORDER — HYDROCODONE BITARTRATE AND ACETAMINOPHEN 500; 5 MG/1; MG/1
TABLET ORAL
Status: DISCONTINUED | OUTPATIENT
Start: 2021-08-18 | End: 2021-08-19 | Stop reason: HOSPADM

## 2021-08-18 RX ORDER — AMIODARONE HYDROCHLORIDE 150 MG/3ML
INJECTION, SOLUTION INTRAVENOUS
Status: COMPLETED
Start: 2021-08-18 | End: 2021-08-18

## 2021-08-18 RX ORDER — FAMOTIDINE 10 MG/ML
20 INJECTION INTRAVENOUS DAILY
Status: DISCONTINUED | OUTPATIENT
Start: 2021-08-18 | End: 2021-08-19 | Stop reason: CLARIF

## 2021-08-18 RX ORDER — ASCORBIC ACID 500 MG
500 TABLET ORAL 2 TIMES DAILY
Status: DISCONTINUED | OUTPATIENT
Start: 2021-08-18 | End: 2021-08-19 | Stop reason: HOSPADM

## 2021-08-18 RX ORDER — SODIUM CHLORIDE 0.9 % (FLUSH) 0.9 %
10 SYRINGE (ML) INJECTION
Status: DISCONTINUED | OUTPATIENT
Start: 2021-08-18 | End: 2021-08-19 | Stop reason: HOSPADM

## 2021-08-18 RX ORDER — FUROSEMIDE 10 MG/ML
40 INJECTION INTRAMUSCULAR; INTRAVENOUS
Status: DISCONTINUED | OUTPATIENT
Start: 2021-08-18 | End: 2021-08-19 | Stop reason: HOSPADM

## 2021-08-18 RX ORDER — TALC
6 POWDER (GRAM) TOPICAL NIGHTLY
Status: DISCONTINUED | OUTPATIENT
Start: 2021-08-18 | End: 2021-08-19 | Stop reason: HOSPADM

## 2021-08-18 RX ORDER — ALBUTEROL SULFATE 90 UG/1
2 AEROSOL, METERED RESPIRATORY (INHALATION) 4 TIMES DAILY
Status: DISCONTINUED | OUTPATIENT
Start: 2021-08-18 | End: 2021-08-18

## 2021-08-18 RX ORDER — FAMOTIDINE 10 MG/ML
20 INJECTION INTRAVENOUS EVERY 12 HOURS
Status: DISCONTINUED | OUTPATIENT
Start: 2021-08-18 | End: 2021-08-18 | Stop reason: DRUGHIGH

## 2021-08-18 RX ORDER — DEXAMETHASONE SODIUM PHOSPHATE 4 MG/ML
6 INJECTION, SOLUTION INTRA-ARTICULAR; INTRALESIONAL; INTRAMUSCULAR; INTRAVENOUS; SOFT TISSUE DAILY
Status: DISCONTINUED | OUTPATIENT
Start: 2021-08-18 | End: 2021-08-19 | Stop reason: HOSPADM

## 2021-08-18 RX ORDER — ZINC SULFATE 50(220)MG
220 CAPSULE ORAL DAILY
Status: DISCONTINUED | OUTPATIENT
Start: 2021-08-18 | End: 2021-08-19 | Stop reason: HOSPADM

## 2021-08-18 RX ORDER — ACETAMINOPHEN 500 MG
5000 TABLET ORAL DAILY
Status: DISCONTINUED | OUTPATIENT
Start: 2021-08-18 | End: 2021-08-19 | Stop reason: HOSPADM

## 2021-08-18 RX ORDER — ATORVASTATIN CALCIUM 10 MG/1
20 TABLET, FILM COATED ORAL DAILY
Status: DISCONTINUED | OUTPATIENT
Start: 2021-08-18 | End: 2021-08-18

## 2021-08-18 RX ORDER — AMIODARONE HYDROCHLORIDE 150 MG/3ML
300 INJECTION, SOLUTION INTRAVENOUS
Status: COMPLETED | OUTPATIENT
Start: 2021-08-18 | End: 2021-08-18

## 2021-08-18 RX ORDER — LIDOCAINE HYDROCHLORIDE 10 MG/ML
INJECTION, SOLUTION EPIDURAL; INFILTRATION; INTRACAUDAL; PERINEURAL
Status: COMPLETED
Start: 2021-08-18 | End: 2021-08-18

## 2021-08-18 RX ADMIN — REMDESIVIR 200 MG: 100 INJECTION, POWDER, LYOPHILIZED, FOR SOLUTION INTRAVENOUS at 01:08

## 2021-08-18 RX ADMIN — ATORVASTATIN CALCIUM 20 MG: 10 TABLET, FILM COATED ORAL at 09:08

## 2021-08-18 RX ADMIN — DEXAMETHASONE SODIUM PHOSPHATE 6 MG: 4 INJECTION INTRA-ARTICULAR; INTRALESIONAL; INTRAMUSCULAR; INTRAVENOUS; SOFT TISSUE at 02:08

## 2021-08-18 RX ADMIN — AMIODARONE HYDROCHLORIDE 0.5 MG/MIN: 1.8 INJECTION, SOLUTION INTRAVENOUS at 01:08

## 2021-08-18 RX ADMIN — FUROSEMIDE 40 MG: 10 INJECTION, SOLUTION INTRAMUSCULAR; INTRAVENOUS at 01:08

## 2021-08-18 RX ADMIN — AMIODARONE HYDROCHLORIDE 300 MG: 50 INJECTION, SOLUTION INTRAVENOUS at 07:08

## 2021-08-18 RX ADMIN — LIDOCAINE HYDROCHLORIDE: 10 INJECTION, SOLUTION EPIDURAL; INFILTRATION; INTRACAUDAL; PERINEURAL at 01:08

## 2021-08-18 RX ADMIN — ZINC SULFATE 220 MG (50 MG) CAPSULE 220 MG: CAPSULE at 02:08

## 2021-08-18 RX ADMIN — ALBUTEROL SULFATE 2 PUFF: 90 AEROSOL, METERED RESPIRATORY (INHALATION) at 07:08

## 2021-08-18 RX ADMIN — FAMOTIDINE 20 MG: 10 INJECTION INTRAVENOUS at 02:08

## 2021-08-18 RX ADMIN — AMIODARONE HYDROCHLORIDE 300 MG: 150 INJECTION, SOLUTION INTRAVENOUS at 07:08

## 2021-08-18 RX ADMIN — AMIODARONE HYDROCHLORIDE 1 MG/MIN: 1.8 INJECTION, SOLUTION INTRAVENOUS at 07:08

## 2021-08-18 RX ADMIN — OXYCODONE HYDROCHLORIDE AND ACETAMINOPHEN 500 MG: 500 TABLET ORAL at 09:08

## 2021-08-18 RX ADMIN — MELATONIN TAB 3 MG 6 MG: 3 TAB at 09:08

## 2021-08-19 VITALS
HEIGHT: 61 IN | WEIGHT: 130 LBS | OXYGEN SATURATION: 97 % | RESPIRATION RATE: 18 BRPM | TEMPERATURE: 98 F | DIASTOLIC BLOOD PRESSURE: 63 MMHG | HEART RATE: 66 BPM | BODY MASS INDEX: 24.55 KG/M2 | SYSTOLIC BLOOD PRESSURE: 147 MMHG

## 2021-08-19 DIAGNOSIS — U07.1 COVID-19 VIRUS DETECTED: ICD-10-CM

## 2021-08-19 PROBLEM — J96.01 ACUTE HYPOXEMIC RESPIRATORY FAILURE DUE TO COVID-19: Status: RESOLVED | Noted: 2021-08-18 | Resolved: 2021-08-19

## 2021-08-19 PROBLEM — R79.89 ELEVATED TROPONIN: Status: RESOLVED | Noted: 2020-08-28 | Resolved: 2021-08-19

## 2021-08-19 PROBLEM — D64.9 SYMPTOMATIC ANEMIA: Status: RESOLVED | Noted: 2020-11-01 | Resolved: 2021-08-19

## 2021-08-19 LAB
ALBUMIN SERPL BCP-MCNC: 2.5 G/DL (ref 3.5–5.2)
ALP SERPL-CCNC: 70 U/L (ref 55–135)
ALT SERPL W/O P-5'-P-CCNC: 27 U/L (ref 10–44)
ANION GAP SERPL CALC-SCNC: 13 MMOL/L (ref 8–16)
ANION GAP SERPL CALC-SCNC: 9 MMOL/L (ref 8–16)
AST SERPL-CCNC: 29 U/L (ref 10–40)
BASOPHILS # BLD AUTO: 0 K/UL (ref 0–0.2)
BASOPHILS NFR BLD: 0 % (ref 0–1.9)
BILIRUB SERPL-MCNC: 0.7 MG/DL (ref 0.1–1)
BUN SERPL-MCNC: 21 MG/DL (ref 8–23)
BUN SERPL-MCNC: 24 MG/DL (ref 8–23)
CALCIUM SERPL-MCNC: 8.4 MG/DL (ref 8.7–10.5)
CALCIUM SERPL-MCNC: 8.6 MG/DL (ref 8.7–10.5)
CHLORIDE SERPL-SCNC: 104 MMOL/L (ref 95–110)
CHLORIDE SERPL-SCNC: 105 MMOL/L (ref 95–110)
CO2 SERPL-SCNC: 25 MMOL/L (ref 23–29)
CO2 SERPL-SCNC: 27 MMOL/L (ref 23–29)
CREAT SERPL-MCNC: 0.8 MG/DL (ref 0.5–1.4)
CREAT SERPL-MCNC: 0.8 MG/DL (ref 0.5–1.4)
CRP SERPL-MCNC: 204.2 MG/L (ref 0–8.2)
DIFFERENTIAL METHOD: ABNORMAL
EOSINOPHIL # BLD AUTO: 0 K/UL (ref 0–0.5)
EOSINOPHIL NFR BLD: 0 % (ref 0–8)
ERYTHROCYTE [DISTWIDTH] IN BLOOD BY AUTOMATED COUNT: 24.4 % (ref 11.5–14.5)
EST. GFR  (AFRICAN AMERICAN): >60 ML/MIN/1.73 M^2
EST. GFR  (AFRICAN AMERICAN): >60 ML/MIN/1.73 M^2
EST. GFR  (NON AFRICAN AMERICAN): >60 ML/MIN/1.73 M^2
EST. GFR  (NON AFRICAN AMERICAN): >60 ML/MIN/1.73 M^2
GLUCOSE SERPL-MCNC: 133 MG/DL (ref 70–110)
GLUCOSE SERPL-MCNC: 140 MG/DL (ref 70–110)
HCT VFR BLD AUTO: 26.1 % (ref 37–48.5)
HGB BLD-MCNC: 8.7 G/DL (ref 12–16)
IMM GRANULOCYTES # BLD AUTO: 0.02 K/UL (ref 0–0.04)
IMM GRANULOCYTES NFR BLD AUTO: 0.4 % (ref 0–0.5)
LYMPHOCYTES # BLD AUTO: 0.8 K/UL (ref 1–4.8)
LYMPHOCYTES NFR BLD: 16.6 % (ref 18–48)
MAGNESIUM SERPL-MCNC: 1.8 MG/DL (ref 1.6–2.6)
MAGNESIUM SERPL-MCNC: 1.8 MG/DL (ref 1.6–2.6)
MCH RBC QN AUTO: 32.2 PG (ref 27–31)
MCHC RBC AUTO-ENTMCNC: 33.3 G/DL (ref 32–36)
MCV RBC AUTO: 97 FL (ref 82–98)
MONOCYTES # BLD AUTO: 0.3 K/UL (ref 0.3–1)
MONOCYTES NFR BLD: 6.5 % (ref 4–15)
NEUTROPHILS # BLD AUTO: 3.5 K/UL (ref 1.8–7.7)
NEUTROPHILS NFR BLD: 76.5 % (ref 38–73)
NRBC BLD-RTO: 0 /100 WBC
PHOSPHATE SERPL-MCNC: 4 MG/DL (ref 2.7–4.5)
PLATELET # BLD AUTO: 144 K/UL (ref 150–450)
PMV BLD AUTO: 12.6 FL (ref 9.2–12.9)
POTASSIUM SERPL-SCNC: 3.4 MMOL/L (ref 3.5–5.1)
POTASSIUM SERPL-SCNC: 4.3 MMOL/L (ref 3.5–5.1)
PROT SERPL-MCNC: 5.5 G/DL (ref 6–8.4)
RBC # BLD AUTO: 2.7 M/UL (ref 4–5.4)
SODIUM SERPL-SCNC: 141 MMOL/L (ref 136–145)
SODIUM SERPL-SCNC: 142 MMOL/L (ref 136–145)
WBC # BLD AUTO: 4.63 K/UL (ref 3.9–12.7)

## 2021-08-19 PROCEDURE — 25000003 PHARM REV CODE 250: Performed by: PHYSICIAN ASSISTANT

## 2021-08-19 PROCEDURE — 25000003 PHARM REV CODE 250: Performed by: NURSE PRACTITIONER

## 2021-08-19 PROCEDURE — 84100 ASSAY OF PHOSPHORUS: CPT | Performed by: NURSE PRACTITIONER

## 2021-08-19 PROCEDURE — 80053 COMPREHEN METABOLIC PANEL: CPT | Performed by: NURSE PRACTITIONER

## 2021-08-19 PROCEDURE — 94640 AIRWAY INHALATION TREATMENT: CPT

## 2021-08-19 PROCEDURE — 25000003 PHARM REV CODE 250: Performed by: INTERNAL MEDICINE

## 2021-08-19 PROCEDURE — 80048 BASIC METABOLIC PNL TOTAL CA: CPT | Performed by: INTERNAL MEDICINE

## 2021-08-19 PROCEDURE — 85025 COMPLETE CBC W/AUTO DIFF WBC: CPT | Performed by: NURSE PRACTITIONER

## 2021-08-19 PROCEDURE — 25000003 PHARM REV CODE 250: Performed by: FAMILY MEDICINE

## 2021-08-19 PROCEDURE — 99232 PR SUBSEQUENT HOSPITAL CARE,LEVL II: ICD-10-PCS | Mod: CR,,, | Performed by: INTERNAL MEDICINE

## 2021-08-19 PROCEDURE — S0028 INJECTION, FAMOTIDINE, 20 MG: HCPCS | Performed by: EMERGENCY MEDICINE

## 2021-08-19 PROCEDURE — 63600175 PHARM REV CODE 636 W HCPCS: Performed by: EMERGENCY MEDICINE

## 2021-08-19 PROCEDURE — 86140 C-REACTIVE PROTEIN: CPT | Performed by: NURSE PRACTITIONER

## 2021-08-19 PROCEDURE — 63600175 PHARM REV CODE 636 W HCPCS: Performed by: NURSE PRACTITIONER

## 2021-08-19 PROCEDURE — 25000003 PHARM REV CODE 250: Performed by: EMERGENCY MEDICINE

## 2021-08-19 PROCEDURE — 99900035 HC TECH TIME PER 15 MIN (STAT)

## 2021-08-19 PROCEDURE — 83735 ASSAY OF MAGNESIUM: CPT | Performed by: NURSE PRACTITIONER

## 2021-08-19 PROCEDURE — 94761 N-INVAS EAR/PLS OXIMETRY MLT: CPT

## 2021-08-19 PROCEDURE — 99232 SBSQ HOSP IP/OBS MODERATE 35: CPT | Mod: CR,,, | Performed by: INTERNAL MEDICINE

## 2021-08-19 PROCEDURE — 83735 ASSAY OF MAGNESIUM: CPT | Mod: 91 | Performed by: INTERNAL MEDICINE

## 2021-08-19 PROCEDURE — 27000221 HC OXYGEN, UP TO 24 HOURS

## 2021-08-19 RX ORDER — MUPIROCIN 20 MG/G
OINTMENT TOPICAL 2 TIMES DAILY
Status: DISCONTINUED | OUTPATIENT
Start: 2021-08-19 | End: 2021-08-19 | Stop reason: HOSPADM

## 2021-08-19 RX ORDER — TRAMADOL HYDROCHLORIDE 50 MG/1
50 TABLET ORAL 3 TIMES DAILY PRN
Status: DISCONTINUED | OUTPATIENT
Start: 2021-08-19 | End: 2021-08-19 | Stop reason: HOSPADM

## 2021-08-19 RX ORDER — LANOLIN ALCOHOL/MO/W.PET/CERES
400 CREAM (GRAM) TOPICAL ONCE
Status: COMPLETED | OUTPATIENT
Start: 2021-08-19 | End: 2021-08-19

## 2021-08-19 RX ORDER — FAMOTIDINE 20 MG/50ML
20 INJECTION, SOLUTION INTRAVENOUS DAILY
Status: DISCONTINUED | OUTPATIENT
Start: 2021-08-19 | End: 2021-08-19 | Stop reason: HOSPADM

## 2021-08-19 RX ORDER — AMIODARONE HYDROCHLORIDE 400 MG/1
400 TABLET ORAL 2 TIMES DAILY
Qty: 60 TABLET | Refills: 0 | Status: ON HOLD | OUTPATIENT
Start: 2021-08-19 | End: 2021-10-16 | Stop reason: SDUPTHER

## 2021-08-19 RX ORDER — POTASSIUM CHLORIDE 20 MEQ/1
60 TABLET, EXTENDED RELEASE ORAL ONCE
Status: COMPLETED | OUTPATIENT
Start: 2021-08-19 | End: 2021-08-19

## 2021-08-19 RX ORDER — AMIODARONE HYDROCHLORIDE 200 MG/1
400 TABLET ORAL 2 TIMES DAILY
Status: DISCONTINUED | OUTPATIENT
Start: 2021-08-19 | End: 2021-08-19 | Stop reason: HOSPADM

## 2021-08-19 RX ORDER — METOPROLOL SUCCINATE 25 MG/1
25 TABLET, EXTENDED RELEASE ORAL DAILY
Status: DISCONTINUED | OUTPATIENT
Start: 2021-08-19 | End: 2021-08-19 | Stop reason: HOSPADM

## 2021-08-19 RX ORDER — GABAPENTIN 300 MG/1
300 CAPSULE ORAL 2 TIMES DAILY
Status: DISCONTINUED | OUTPATIENT
Start: 2021-08-19 | End: 2021-08-19 | Stop reason: HOSPADM

## 2021-08-19 RX ADMIN — TRAMADOL HYDROCHLORIDE 50 MG: 50 TABLET, FILM COATED ORAL at 02:08

## 2021-08-19 RX ADMIN — MUPIROCIN: 20 OINTMENT TOPICAL at 11:08

## 2021-08-19 RX ADMIN — Medication 400 MG: at 05:08

## 2021-08-19 RX ADMIN — AMIODARONE HYDROCHLORIDE 400 MG: 200 TABLET ORAL at 11:08

## 2021-08-19 RX ADMIN — METOPROLOL SUCCINATE 25 MG: 25 TABLET, EXTENDED RELEASE ORAL at 11:08

## 2021-08-19 RX ADMIN — ZINC SULFATE 220 MG (50 MG) CAPSULE 220 MG: CAPSULE at 09:08

## 2021-08-19 RX ADMIN — OXYCODONE HYDROCHLORIDE AND ACETAMINOPHEN 500 MG: 500 TABLET ORAL at 09:08

## 2021-08-19 RX ADMIN — ALBUTEROL SULFATE 2 PUFF: 90 AEROSOL, METERED RESPIRATORY (INHALATION) at 07:08

## 2021-08-19 RX ADMIN — FUROSEMIDE 40 MG: 10 INJECTION, SOLUTION INTRAMUSCULAR; INTRAVENOUS at 11:08

## 2021-08-19 RX ADMIN — ALBUTEROL SULFATE 2 PUFF: 90 AEROSOL, METERED RESPIRATORY (INHALATION) at 12:08

## 2021-08-19 RX ADMIN — ONDANSETRON 4 MG: 2 INJECTION INTRAMUSCULAR; INTRAVENOUS at 11:08

## 2021-08-19 RX ADMIN — Medication 5000 UNITS: at 09:08

## 2021-08-19 RX ADMIN — FUROSEMIDE 40 MG: 10 INJECTION, SOLUTION INTRAMUSCULAR; INTRAVENOUS at 12:08

## 2021-08-19 RX ADMIN — GABAPENTIN 300 MG: 300 CAPSULE ORAL at 02:08

## 2021-08-19 RX ADMIN — AMIODARONE HYDROCHLORIDE 0.5 MG/MIN: 1.8 INJECTION, SOLUTION INTRAVENOUS at 12:08

## 2021-08-19 RX ADMIN — POTASSIUM CHLORIDE 60 MEQ: 1500 TABLET, EXTENDED RELEASE ORAL at 05:08

## 2021-08-19 RX ADMIN — DEXAMETHASONE SODIUM PHOSPHATE 6 MG: 4 INJECTION INTRA-ARTICULAR; INTRALESIONAL; INTRAMUSCULAR; INTRAVENOUS; SOFT TISSUE at 09:08

## 2021-08-19 RX ADMIN — REMDESIVIR 100 MG: 100 INJECTION, POWDER, LYOPHILIZED, FOR SOLUTION INTRAVENOUS at 09:08

## 2021-08-19 RX ADMIN — FAMOTIDINE 20 MG: 20 INJECTION, SOLUTION INTRAVENOUS at 09:08

## 2021-08-22 LAB
BACTERIA BLD CULT: ABNORMAL

## 2021-09-11 ENCOUNTER — DOCUMENTATION ONLY (OUTPATIENT)
Dept: HEPATOLOGY | Facility: HOSPITAL | Age: 86
End: 2021-09-11

## 2021-10-04 PROBLEM — J81.0 ACUTE PULMONARY EDEMA: Status: RESOLVED | Noted: 2021-07-01 | Resolved: 2021-10-04

## 2021-10-15 ENCOUNTER — HOSPITAL ENCOUNTER (INPATIENT)
Facility: HOSPITAL | Age: 86
LOS: 1 days | Discharge: HOSPICE | DRG: 812 | End: 2021-10-16
Attending: EMERGENCY MEDICINE | Admitting: FAMILY MEDICINE
Payer: MEDICARE

## 2021-10-15 DIAGNOSIS — R53.1 WEAKNESS: ICD-10-CM

## 2021-10-15 DIAGNOSIS — D64.9 SYMPTOMATIC ANEMIA: Primary | ICD-10-CM

## 2021-10-15 DIAGNOSIS — D64.9 ANEMIA, UNSPECIFIED TYPE: ICD-10-CM

## 2021-10-15 DIAGNOSIS — R07.9 CHEST PAIN: ICD-10-CM

## 2021-10-15 PROBLEM — R19.7 DIARRHEA: Status: ACTIVE | Noted: 2021-10-15

## 2021-10-15 LAB
ABO + RH BLD: NORMAL
ALBUMIN SERPL BCP-MCNC: 2.6 G/DL (ref 3.5–5.2)
ALP SERPL-CCNC: 62 U/L (ref 55–135)
ALT SERPL W/O P-5'-P-CCNC: 52 U/L (ref 10–44)
ANION GAP SERPL CALC-SCNC: 11 MMOL/L (ref 8–16)
ANISOCYTOSIS BLD QL SMEAR: SLIGHT
AST SERPL-CCNC: 57 U/L (ref 10–40)
BASOPHILS # BLD AUTO: 0 K/UL (ref 0–0.2)
BASOPHILS NFR BLD: 0 % (ref 0–1.9)
BILIRUB SERPL-MCNC: 0.3 MG/DL (ref 0.1–1)
BLD GP AB SCN CELLS X3 SERPL QL: NORMAL
BLD PROD TYP BPU: NORMAL
BLD PROD TYP BPU: NORMAL
BLOOD UNIT EXPIRATION DATE: NORMAL
BLOOD UNIT EXPIRATION DATE: NORMAL
BLOOD UNIT TYPE CODE: 5100
BLOOD UNIT TYPE CODE: 5100
BLOOD UNIT TYPE: NORMAL
BLOOD UNIT TYPE: NORMAL
BUN SERPL-MCNC: 15 MG/DL (ref 8–23)
CALCIUM SERPL-MCNC: 8.5 MG/DL (ref 8.7–10.5)
CHLORIDE SERPL-SCNC: 103 MMOL/L (ref 95–110)
CO2 SERPL-SCNC: 27 MMOL/L (ref 23–29)
CODING SYSTEM: NORMAL
CODING SYSTEM: NORMAL
CREAT SERPL-MCNC: 1 MG/DL (ref 0.5–1.4)
DIFFERENTIAL METHOD: ABNORMAL
DISPENSE STATUS: NORMAL
DISPENSE STATUS: NORMAL
EOSINOPHIL # BLD AUTO: 0.1 K/UL (ref 0–0.5)
EOSINOPHIL NFR BLD: 1.6 % (ref 0–8)
ERYTHROCYTE [DISTWIDTH] IN BLOOD BY AUTOMATED COUNT: ABNORMAL % (ref 11.5–14.5)
EST. GFR  (AFRICAN AMERICAN): 59 ML/MIN/1.73 M^2
EST. GFR  (NON AFRICAN AMERICAN): 51 ML/MIN/1.73 M^2
GLUCOSE SERPL-MCNC: 104 MG/DL (ref 70–110)
HCT VFR BLD AUTO: 11.9 % (ref 37–48.5)
HGB BLD-MCNC: 3.7 G/DL (ref 12–16)
HYPOCHROMIA BLD QL SMEAR: ABNORMAL
IMM GRANULOCYTES # BLD AUTO: 0.02 K/UL (ref 0–0.04)
IMM GRANULOCYTES NFR BLD AUTO: 0.5 % (ref 0–0.5)
LACTATE SERPL-SCNC: 1.2 MMOL/L (ref 0.5–2.2)
LYMPHOCYTES # BLD AUTO: 2 K/UL (ref 1–4.8)
LYMPHOCYTES NFR BLD: 46.2 % (ref 18–48)
MCH RBC QN AUTO: 35.2 PG (ref 27–31)
MCHC RBC AUTO-ENTMCNC: 31.1 G/DL (ref 32–36)
MCV RBC AUTO: 113 FL (ref 82–98)
MONOCYTES # BLD AUTO: 0.4 K/UL (ref 0.3–1)
MONOCYTES NFR BLD: 9 % (ref 4–15)
NEUTROPHILS # BLD AUTO: 1.8 K/UL (ref 1.8–7.7)
NEUTROPHILS NFR BLD: 42.7 % (ref 38–73)
NRBC BLD-RTO: 0 /100 WBC
NUM UNITS TRANS PACKED RBC: NORMAL
NUM UNITS TRANS PACKED RBC: NORMAL
OB PNL STL: NEGATIVE
OVALOCYTES BLD QL SMEAR: ABNORMAL
PLATELET # BLD AUTO: 281 K/UL (ref 150–450)
PLATELET BLD QL SMEAR: ABNORMAL
PMV BLD AUTO: 12.3 FL (ref 9.2–12.9)
POIKILOCYTOSIS BLD QL SMEAR: SLIGHT
POLYCHROMASIA BLD QL SMEAR: ABNORMAL
POTASSIUM SERPL-SCNC: 4.1 MMOL/L (ref 3.5–5.1)
PROT SERPL-MCNC: 5.6 G/DL (ref 6–8.4)
RBC # BLD AUTO: 1.05 M/UL (ref 4–5.4)
SODIUM SERPL-SCNC: 141 MMOL/L (ref 136–145)
WBC # BLD AUTO: 4.31 K/UL (ref 3.9–12.7)

## 2021-10-15 PROCEDURE — 99291 CRITICAL CARE FIRST HOUR: CPT | Mod: 25

## 2021-10-15 PROCEDURE — 21400001 HC TELEMETRY ROOM

## 2021-10-15 PROCEDURE — P9016 RBC LEUKOCYTES REDUCED: HCPCS | Performed by: EMERGENCY MEDICINE

## 2021-10-15 PROCEDURE — 85025 COMPLETE CBC W/AUTO DIFF WBC: CPT | Performed by: EMERGENCY MEDICINE

## 2021-10-15 PROCEDURE — 36430 TRANSFUSION BLD/BLD COMPNT: CPT

## 2021-10-15 PROCEDURE — 25000003 PHARM REV CODE 250: Performed by: NURSE PRACTITIONER

## 2021-10-15 PROCEDURE — 83605 ASSAY OF LACTIC ACID: CPT | Performed by: EMERGENCY MEDICINE

## 2021-10-15 PROCEDURE — 96361 HYDRATE IV INFUSION ADD-ON: CPT

## 2021-10-15 PROCEDURE — 25000003 PHARM REV CODE 250: Performed by: EMERGENCY MEDICINE

## 2021-10-15 PROCEDURE — 80053 COMPREHEN METABOLIC PANEL: CPT | Performed by: EMERGENCY MEDICINE

## 2021-10-15 PROCEDURE — 86900 BLOOD TYPING SEROLOGIC ABO: CPT | Performed by: EMERGENCY MEDICINE

## 2021-10-15 PROCEDURE — 82272 OCCULT BLD FECES 1-3 TESTS: CPT | Performed by: EMERGENCY MEDICINE

## 2021-10-15 PROCEDURE — 86920 COMPATIBILITY TEST SPIN: CPT | Mod: 59 | Performed by: EMERGENCY MEDICINE

## 2021-10-15 PROCEDURE — 96360 HYDRATION IV INFUSION INIT: CPT

## 2021-10-15 RX ORDER — ALBUTEROL SULFATE 90 UG/1
2 AEROSOL, METERED RESPIRATORY (INHALATION) EVERY 6 HOURS PRN
Status: DISCONTINUED | OUTPATIENT
Start: 2021-10-15 | End: 2021-10-17 | Stop reason: HOSPADM

## 2021-10-15 RX ORDER — SACUBITRIL AND VALSARTAN 24; 26 MG/1; MG/1
1 TABLET, FILM COATED ORAL 2 TIMES DAILY
COMMUNITY

## 2021-10-15 RX ORDER — DULOXETIN HYDROCHLORIDE 30 MG/1
30 CAPSULE, DELAYED RELEASE ORAL DAILY
Status: DISCONTINUED | OUTPATIENT
Start: 2021-10-16 | End: 2021-10-17 | Stop reason: HOSPADM

## 2021-10-15 RX ORDER — CARVEDILOL 3.12 MG/1
3.12 TABLET ORAL 2 TIMES DAILY WITH MEALS
COMMUNITY

## 2021-10-15 RX ORDER — AMIODARONE HYDROCHLORIDE 200 MG/1
200 TABLET ORAL DAILY
Status: DISCONTINUED | OUTPATIENT
Start: 2021-10-16 | End: 2021-10-17 | Stop reason: HOSPADM

## 2021-10-15 RX ORDER — GABAPENTIN 300 MG/1
300 CAPSULE ORAL 2 TIMES DAILY
COMMUNITY

## 2021-10-15 RX ORDER — DONEPEZIL HYDROCHLORIDE 5 MG/1
10 TABLET, FILM COATED ORAL NIGHTLY
Status: DISCONTINUED | OUTPATIENT
Start: 2021-10-15 | End: 2021-10-17 | Stop reason: HOSPADM

## 2021-10-15 RX ORDER — HYDROCODONE BITARTRATE AND ACETAMINOPHEN 500; 5 MG/1; MG/1
TABLET ORAL
Status: DISCONTINUED | OUTPATIENT
Start: 2021-10-15 | End: 2021-10-17 | Stop reason: HOSPADM

## 2021-10-15 RX ORDER — SODIUM CHLORIDE 0.9 % (FLUSH) 0.9 %
10 SYRINGE (ML) INJECTION EVERY 12 HOURS PRN
Status: DISCONTINUED | OUTPATIENT
Start: 2021-10-15 | End: 2021-10-17 | Stop reason: HOSPADM

## 2021-10-15 RX ORDER — GLUCAGON 1 MG
1 KIT INJECTION
Status: DISCONTINUED | OUTPATIENT
Start: 2021-10-15 | End: 2021-10-17 | Stop reason: HOSPADM

## 2021-10-15 RX ORDER — AMIODARONE HYDROCHLORIDE 200 MG/1
400 TABLET ORAL 2 TIMES DAILY
Status: DISCONTINUED | OUTPATIENT
Start: 2021-10-15 | End: 2021-10-15

## 2021-10-15 RX ORDER — IBUPROFEN 200 MG
24 TABLET ORAL
Status: DISCONTINUED | OUTPATIENT
Start: 2021-10-15 | End: 2021-10-17 | Stop reason: HOSPADM

## 2021-10-15 RX ORDER — PNV NO.95/FERROUS FUM/FOLIC AC 28MG-0.8MG
100 TABLET ORAL DAILY
Status: DISCONTINUED | OUTPATIENT
Start: 2021-10-16 | End: 2021-10-17 | Stop reason: HOSPADM

## 2021-10-15 RX ORDER — ASPIRIN 81 MG/1
81 TABLET ORAL DAILY
Status: DISCONTINUED | OUTPATIENT
Start: 2021-10-16 | End: 2021-10-17 | Stop reason: HOSPADM

## 2021-10-15 RX ORDER — IBUPROFEN 200 MG
16 TABLET ORAL
Status: DISCONTINUED | OUTPATIENT
Start: 2021-10-15 | End: 2021-10-17 | Stop reason: HOSPADM

## 2021-10-15 RX ORDER — ATORVASTATIN CALCIUM 10 MG/1
20 TABLET, FILM COATED ORAL DAILY
Status: DISCONTINUED | OUTPATIENT
Start: 2021-10-16 | End: 2021-10-17 | Stop reason: HOSPADM

## 2021-10-15 RX ORDER — ARIPIPRAZOLE 5 MG/1
5 TABLET ORAL DAILY
Status: DISCONTINUED | OUTPATIENT
Start: 2021-10-16 | End: 2021-10-15

## 2021-10-15 RX ORDER — ALPRAZOLAM 0.25 MG/1
0.25 TABLET ORAL 2 TIMES DAILY PRN
Status: DISCONTINUED | OUTPATIENT
Start: 2021-10-15 | End: 2021-10-17 | Stop reason: HOSPADM

## 2021-10-15 RX ORDER — TALC
6 POWDER (GRAM) TOPICAL NIGHTLY PRN
Status: DISCONTINUED | OUTPATIENT
Start: 2021-10-15 | End: 2021-10-17 | Stop reason: HOSPADM

## 2021-10-15 RX ORDER — NALOXONE HCL 0.4 MG/ML
0.02 VIAL (ML) INJECTION
Status: DISCONTINUED | OUTPATIENT
Start: 2021-10-15 | End: 2021-10-17 | Stop reason: HOSPADM

## 2021-10-15 RX ORDER — VENLAFAXINE HYDROCHLORIDE 75 MG/1
75 CAPSULE, EXTENDED RELEASE ORAL DAILY
Status: DISCONTINUED | OUTPATIENT
Start: 2021-10-16 | End: 2021-10-17 | Stop reason: HOSPADM

## 2021-10-15 RX ORDER — LANOLIN ALCOHOL/MO/W.PET/CERES
1 CREAM (GRAM) TOPICAL DAILY
Status: DISCONTINUED | OUTPATIENT
Start: 2021-10-16 | End: 2021-10-17 | Stop reason: HOSPADM

## 2021-10-15 RX ADMIN — DONEPEZIL HYDROCHLORIDE 10 MG: 5 TABLET, FILM COATED ORAL at 08:10

## 2021-10-15 RX ADMIN — ALPRAZOLAM 0.25 MG: 0.25 TABLET ORAL at 09:10

## 2021-10-15 RX ADMIN — BUSPIRONE HYDROCHLORIDE 15 MG: 10 TABLET ORAL at 08:10

## 2021-10-15 RX ADMIN — SODIUM CHLORIDE 1000 ML: 0.9 INJECTION, SOLUTION INTRAVENOUS at 01:10

## 2021-10-16 VITALS
TEMPERATURE: 97 F | WEIGHT: 136 LBS | BODY MASS INDEX: 25.68 KG/M2 | HEIGHT: 61 IN | OXYGEN SATURATION: 95 % | RESPIRATION RATE: 18 BRPM | HEART RATE: 60 BPM | SYSTOLIC BLOOD PRESSURE: 120 MMHG | DIASTOLIC BLOOD PRESSURE: 60 MMHG

## 2021-10-16 PROBLEM — D64.9 SYMPTOMATIC ANEMIA: Status: RESOLVED | Noted: 2020-11-01 | Resolved: 2021-10-16

## 2021-10-16 PROBLEM — R19.7 DIARRHEA: Status: RESOLVED | Noted: 2021-10-15 | Resolved: 2021-10-16

## 2021-10-16 LAB
ALBUMIN SERPL BCP-MCNC: 2.7 G/DL (ref 3.5–5.2)
ALP SERPL-CCNC: 72 U/L (ref 55–135)
ALT SERPL W/O P-5'-P-CCNC: 52 U/L (ref 10–44)
ANION GAP SERPL CALC-SCNC: 9 MMOL/L (ref 8–16)
AST SERPL-CCNC: 53 U/L (ref 10–40)
BASOPHILS # BLD AUTO: 0.03 K/UL (ref 0–0.2)
BASOPHILS NFR BLD: 0.6 % (ref 0–1.9)
BILIRUB SERPL-MCNC: 1 MG/DL (ref 0.1–1)
BUN SERPL-MCNC: 14 MG/DL (ref 8–23)
CALCIUM SERPL-MCNC: 9 MG/DL (ref 8.7–10.5)
CHLORIDE SERPL-SCNC: 106 MMOL/L (ref 95–110)
CO2 SERPL-SCNC: 29 MMOL/L (ref 23–29)
CREAT SERPL-MCNC: 0.9 MG/DL (ref 0.5–1.4)
DIFFERENTIAL METHOD: ABNORMAL
EOSINOPHIL # BLD AUTO: 0.1 K/UL (ref 0–0.5)
EOSINOPHIL NFR BLD: 1.2 % (ref 0–8)
ERYTHROCYTE [DISTWIDTH] IN BLOOD BY AUTOMATED COUNT: 23.6 % (ref 11.5–14.5)
EST. GFR  (AFRICAN AMERICAN): >60 ML/MIN/1.73 M^2
EST. GFR  (NON AFRICAN AMERICAN): 58 ML/MIN/1.73 M^2
GLUCOSE SERPL-MCNC: 102 MG/DL (ref 70–110)
HCT VFR BLD AUTO: 27.9 % (ref 37–48.5)
HGB BLD-MCNC: 8.9 G/DL (ref 12–16)
IMM GRANULOCYTES # BLD AUTO: 0.03 K/UL (ref 0–0.04)
IMM GRANULOCYTES NFR BLD AUTO: 0.6 % (ref 0–0.5)
LYMPHOCYTES # BLD AUTO: 1.1 K/UL (ref 1–4.8)
LYMPHOCYTES NFR BLD: 21.7 % (ref 18–48)
MCH RBC QN AUTO: 32.4 PG (ref 27–31)
MCHC RBC AUTO-ENTMCNC: 31.9 G/DL (ref 32–36)
MCV RBC AUTO: 102 FL (ref 82–98)
MONOCYTES # BLD AUTO: 0.4 K/UL (ref 0.3–1)
MONOCYTES NFR BLD: 8 % (ref 4–15)
NEUTROPHILS # BLD AUTO: 3.4 K/UL (ref 1.8–7.7)
NEUTROPHILS NFR BLD: 67.9 % (ref 38–73)
NRBC BLD-RTO: 0 /100 WBC
PLATELET # BLD AUTO: 286 K/UL (ref 150–450)
PMV BLD AUTO: 11.7 FL (ref 9.2–12.9)
POTASSIUM SERPL-SCNC: 3.5 MMOL/L (ref 3.5–5.1)
PROT SERPL-MCNC: 5.5 G/DL (ref 6–8.4)
RBC # BLD AUTO: 2.75 M/UL (ref 4–5.4)
SODIUM SERPL-SCNC: 144 MMOL/L (ref 136–145)
WBC # BLD AUTO: 5.02 K/UL (ref 3.9–12.7)

## 2021-10-16 PROCEDURE — 85025 COMPLETE CBC W/AUTO DIFF WBC: CPT | Performed by: NURSE PRACTITIONER

## 2021-10-16 PROCEDURE — 25000003 PHARM REV CODE 250: Performed by: NURSE PRACTITIONER

## 2021-10-16 PROCEDURE — 80053 COMPREHEN METABOLIC PANEL: CPT | Performed by: NURSE PRACTITIONER

## 2021-10-16 PROCEDURE — 36415 COLL VENOUS BLD VENIPUNCTURE: CPT | Performed by: NURSE PRACTITIONER

## 2021-10-16 RX ORDER — AMIODARONE HYDROCHLORIDE 400 MG/1
200 TABLET ORAL DAILY
Start: 2021-10-16 | End: 2022-10-16

## 2021-10-16 RX ORDER — DULOXETIN HYDROCHLORIDE 30 MG/1
30 CAPSULE, DELAYED RELEASE ORAL NIGHTLY
Start: 2021-10-16

## 2021-10-16 RX ORDER — FUROSEMIDE 40 MG/1
40 TABLET ORAL DAILY
Start: 2021-10-16

## 2021-10-16 RX ADMIN — BUSPIRONE HYDROCHLORIDE 15 MG: 10 TABLET ORAL at 08:10

## 2021-10-16 RX ADMIN — ASPIRIN 81 MG: 81 TABLET, COATED ORAL at 08:10

## 2021-10-16 RX ADMIN — VENLAFAXINE HYDROCHLORIDE 75 MG: 75 CAPSULE, EXTENDED RELEASE ORAL at 08:10

## 2021-10-16 RX ADMIN — ATORVASTATIN CALCIUM 20 MG: 10 TABLET, FILM COATED ORAL at 08:10

## 2021-10-16 RX ADMIN — FERROUS SULFATE TAB EC 325 MG (65 MG FE EQUIVALENT) 1 EACH: 325 (65 FE) TABLET DELAYED RESPONSE at 08:10

## 2021-10-16 RX ADMIN — DONEPEZIL HYDROCHLORIDE 10 MG: 5 TABLET, FILM COATED ORAL at 08:10

## 2021-10-16 RX ADMIN — AMIODARONE HYDROCHLORIDE 200 MG: 200 TABLET ORAL at 08:10

## 2021-10-16 RX ADMIN — VITAM B12 100 MCG: 100 TAB at 08:10

## 2021-10-16 RX ADMIN — DULOXETINE HYDROCHLORIDE 30 MG: 30 CAPSULE, DELAYED RELEASE ORAL at 08:10

## 2022-05-25 NOTE — ASSESSMENT & PLAN NOTE
- Duonebs q4hr PRN shortness of breath  - Oxygen PRN     Problem: Hypertensive Disorders in Pregnancy  Goal: Maternal-Fetal Stabilization  Outcome: Ongoing, Progressing   Two doses of nifedipine given this evening per protocol. Oxycodone and hydroxyzine given for headache. Will continue to monitor bps.

## 2022-06-09 NOTE — ASSESSMENT & PLAN NOTE
On IV ABX  Mgmt per primary team   Pt denies psych symptoms  Pt has no complaints or concerns  Pt is visible in the milieu and social with peers  Medication and meal compliant  Will monitor

## 2022-06-14 NOTE — ED PROVIDER NOTES
Behavioral Health IP Nursing Progress Note    Suicidal Ideation:  None     Current C-SSRS score: Negative Screen - White (06/14/22 0900)      Protective Factors / Reason for Living: Ability to cope with frustration, Ability to cope with stress, Compliance with medications, Effective and appropriate clinical care for mental, physical, and substance abuse disorders, Social supports, Responsibility to children    Interventions:   · Implemented    Other Interventions Implemented:  · 1:1 nurse assessment  · Q15 min checks    Subjective: Patient reported feeling anxious, depressed and tired. Patient denied SI, HI, visual hallucinations and pain. Patient did report auditory hallucinations that are not command in nature. Patient stated he has been hearing voices his entire life. Patient stated he has been feeling frustrated because he is not getting better as fast as he would like to. Patient stated he did not go to group because of his fatigue. Patient stated his goal is to try and get as much sleep as possible.    Objective:   Mental Health: Patient behavior observed to be anxious and depressed. Patient was isolative to his room for the entire shift excluding medication administration. Patient was pleasant and cooperative with assessments and medication administration. Dressing on right foot changed with antibiotic ointment applied.     Medical:   • [place] Protocol: Progressing     Assessment / Actions:   PRN Medications given?   Yes. Patient Response: Trazodone given at 2142 for insomnia.    Plan:   Treatment Plan reviewed with patient.               SCRIBE #1 NOTE: I, Sadia Salgado, am scribing for, and in the presence of, Ramon Peace MD. I have scribed the entire note.       History     Chief Complaint   Patient presents with    Shortness of Breath     Review of patient's allergies indicates:   Allergen Reactions    Bacitracin-polymyxin b Itching and Swelling    Merthiolate (thimerosal) Rash and Swelling    Diazepam      Hallucinations    Levofloxacin Itching    Metronidazole     Oxycodone Itching    Tizanidine      Hallucinations    Tramadol Itching    Cefdinir Itching     Pt not positive about allergy         History of Present Illness     HPI    12/14/2020, 11:27 PM  History obtained from the patient      History of Present Illness: Love Davey is a 87 y.o. female patient with a PMHx of HTN, stroke, CHF, and cardiomyopathy who presents to the Emergency Department for evaluation of cough and worsening SOB which onset gradually x4 days. Pt was also recently diagnosed with UTI, and has been waiting x1 week for abx to be delivered. Symptoms are constant and moderate in severity. No mitigating or exacerbating factors reported. Associated sxs include dysuria. Patient denies any fever, chills, CP, leg pain/swelling, palpitations, abd pain, n/v/d, hematuria, HA, dizziness, extremity weakness/numbness, sore throat, rhinorrhea, congestion, and all other sxs at this time. No further complaints or concerns at this time.       Arrival mode: Personal vehicle     PCP: Kaley Law MD        Past Medical History:  Past Medical History:   Diagnosis Date    Anemia     Anxiety     Arthritis     Asthma     Back pain     Cardiomyopathy     CHF (congestive heart failure)     Dementia     Depression     Dizziness     Hypertension     Hypothyroidism     Insomnia     OAB (overactive bladder)     Stroke     Use of cane as ambulatory aid        Past Surgical History:  Past Surgical History:   Procedure Laterality Date    APPENDECTOMY       arthritis      BLADDER REPAIR      BUNIONECTOMY      CARDIAC DEFIBRILLATOR PLACEMENT      CATARACT EXTRACTION      HYSTERECTOMY      metal implant Bilateral     placed in the bladder.         Family History:  Family History   Problem Relation Age of Onset    Hypertension Unknown     Cancer Mother     Heart disease Father        Social History:  Social History     Tobacco Use    Smoking status: Never Smoker    Smokeless tobacco: Never Used   Substance and Sexual Activity    Alcohol use: No    Drug use: No    Sexual activity: Not Currently        Review of Systems     Review of Systems   Constitutional: Negative for chills and fever.   HENT: Negative for congestion, rhinorrhea and sore throat.         (-) loss of taste/smell   Respiratory: Positive for cough and shortness of breath (worsening).    Cardiovascular: Negative for chest pain, palpitations and leg swelling.   Gastrointestinal: Negative for abdominal pain, diarrhea, nausea and vomiting.   Genitourinary: Positive for dysuria. Negative for hematuria.   Musculoskeletal: Negative for back pain.   Skin: Negative for rash.   Neurological: Negative for dizziness, weakness, numbness and headaches.   Hematological: Does not bruise/bleed easily.   All other systems reviewed and are negative.       Physical Exam     Initial Vitals [12/14/20 2155]   BP Pulse Resp Temp SpO2   (!) 170/92 74 20 (!) 101.4 °F (38.6 °C) 99 %      MAP       --          Physical Exam  Nursing Notes and Vital Signs Reviewed.  Constitutional: Patient is in mild distress. Well-developed and well-nourished.  Head: Atraumatic. Normocephalic.  Eyes: PERRL. EOM intact. Conjunctivae are not pale. No scleral icterus.  ENT: Mucous membranes are moist.   Neck: Supple. Full ROM.   Cardiovascular: Borderline tachycardic. Regular rhythm. No murmurs, rubs, or gallops. Distal pulses are 2+ and symmetric.  Pulmonary/Chest: Tachypneic. Crackles in R upper lobe and bilateral lower lobes  appreciated.   Abdominal: Soft and non-distended.  Genitourinary: No CVA tenderness  Musculoskeletal: Moves all extremities. No obvious deformities. No edema. No calf tenderness.  Skin: Warm and dry. Bilateral forearm ecchymosis noted.  Neurological:  Alert, awake, and appropriate.  Normal speech.  No acute focal neurological deficits are appreciated.  Psychiatric: Normal affect. Good eye contact. Appropriate in content.     ED Course   Critical Care    Date/Time: 12/15/2020 1:02 AM  Performed by: Ramon Peace MD  Authorized by: Ramon Peace MD   Direct patient critical care time: 12 minutes  Additional history critical care time: 10 minutes  Ordering / reviewing critical care time: 8 minutes  Documentation critical care time: 5 minutes  Total critical care time (exclusive of procedural time) : 35 minutes  Critical care time was exclusive of separately billable procedures and treating other patients and teaching time.  Critical care was necessary to treat or prevent imminent or life-threatening deterioration of the following conditions: sepsis.  Critical care was time spent personally by me on the following activities: blood draw for specimens, development of treatment plan with patient or surrogate, interpretation of cardiac output measurements, evaluation of patient's response to treatment, examination of patient, obtaining history from patient or surrogate, ordering and performing treatments and interventions, ordering and review of laboratory studies, ordering and review of radiographic studies, pulse oximetry, re-evaluation of patient's condition and review of old charts.        ED Vital Signs:  Vitals:    12/15/20 1255 12/15/20 1703 12/15/20 1824 12/15/20 1840   BP:  (!) 112/57 (!) 131/58 (!) 114/57   Pulse: (!) 57 60 (!) 57 61   Resp: 18 18 18 18   Temp:  96.8 °F (36 °C) 98.2 °F (36.8 °C) 98 °F (36.7 °C)   TempSrc:  Oral Oral Oral   SpO2: 98% 99% 98% 99%   Weight:       Height:        12/15/20  1918 12/15/20 1932 12/15/20 2111 12/15/20 2210   BP: 122/60   (!) 143/66   Pulse: 61 65 66 (!) 54   Resp: 16 18  15   Temp: 98.2 °F (36.8 °C)   98.2 °F (36.8 °C)   TempSrc: Oral   Oral   SpO2: 99% 98%  99%   Weight:       Height:        12/15/20 2332 12/16/20 0005 12/16/20 0019 12/16/20 0119   BP:  (!) 149/67     Pulse: 62 73 77 79   Resp:  18 17    Temp:  96.9 °F (36.1 °C)     TempSrc:  Oral     SpO2:  97% 97%    Weight:       Height:        12/16/20 0407 12/16/20 0512 12/16/20 0517   BP: (!) 180/78 (!) 148/65    Pulse: 78 90    Resp: 18     Temp: 99.3 °F (37.4 °C)     TempSrc: Oral     SpO2: 96%     Weight:   61.3 kg (135 lb 2.3 oz)   Height:          Abnormal Lab Results:  Labs Reviewed   CBC W/ AUTO DIFFERENTIAL - Abnormal; Notable for the following components:       Result Value    RBC 2.38 (*)     Hemoglobin 8.7 (*)     Hematocrit 27.4 (*)      (*)     MCH 36.6 (*)     MCHC 31.8 (*)     Platelets 401 (*)     Immature Grans (Abs) 0.05 (*)     Gran % 73.7 (*)     Lymph % 15.8 (*)     All other components within normal limits   COMPREHENSIVE METABOLIC PANEL - Abnormal; Notable for the following components:    Glucose 125 (*)     Albumin 3.3 (*)     eGFR if  59 (*)     eGFR if non  51 (*)     All other components within normal limits   TROPONIN I - Abnormal; Notable for the following components:    Troponin I 0.332 (*)     All other components within normal limits   B-TYPE NATRIURETIC PEPTIDE - Abnormal; Notable for the following components:    BNP 1,662 (*)     All other components within normal limits   LACTIC ACID, PLASMA   SARS-COV-2 RNA AMPLIFICATION, QUAL        All Lab Results:  Results for orders placed or performed during the hospital encounter of 12/14/20   Blood culture #1 **CANNOT BE ORDERED STAT**    Specimen: Peripheral, Forearm, Left; Blood   Result Value Ref Range    Blood Culture, Routine No Growth to date    Blood culture #2 **CANNOT BE ORDERED STAT**     Specimen: Peripheral, Hand, Left; Blood   Result Value Ref Range    Blood Culture, Routine No Growth to date    CBC auto differential   Result Value Ref Range    WBC 9.41 3.90 - 12.70 K/uL    RBC 2.38 (L) 4.00 - 5.40 M/uL    Hemoglobin 8.7 (L) 12.0 - 16.0 g/dL    Hematocrit 27.4 (L) 37.0 - 48.5 %     (H) 82 - 98 fL    MCH 36.6 (H) 27.0 - 31.0 pg    MCHC 31.8 (L) 32.0 - 36.0 g/dL    RDW SEE COMMENT 11.5 - 14.5 %    Platelets 401 (H) 150 - 350 K/uL    MPV 12.1 9.2 - 12.9 fL    Immature Granulocytes 0.5 0.0 - 0.5 %    Gran # (ANC) 6.9 1.8 - 7.7 K/uL    Immature Grans (Abs) 0.05 (H) 0.00 - 0.04 K/uL    Lymph # 1.5 1.0 - 4.8 K/uL    Mono # 0.9 0.3 - 1.0 K/uL    Eos # 0.1 0.0 - 0.5 K/uL    Baso # 0.04 0.00 - 0.20 K/uL    nRBC 0 0 /100 WBC    Gran % 73.7 (H) 38.0 - 73.0 %    Lymph % 15.8 (L) 18.0 - 48.0 %    Mono % 9.1 4.0 - 15.0 %    Eosinophil % 0.5 0.0 - 8.0 %    Basophil % 0.4 0.0 - 1.9 %    Platelet Estimate Appears normal     Aniso Slight     Poik Slight     Ovalocytes Occasional     Target Cells Occasional     Differential Method Automated    Comprehensive metabolic panel   Result Value Ref Range    Sodium 140 136 - 145 mmol/L    Potassium 4.3 3.5 - 5.1 mmol/L    Chloride 102 95 - 110 mmol/L    CO2 29 23 - 29 mmol/L    Glucose 125 (H) 70 - 110 mg/dL    BUN 10 8 - 23 mg/dL    Creatinine 1.0 0.5 - 1.4 mg/dL    Calcium 8.9 8.7 - 10.5 mg/dL    Total Protein 7.0 6.0 - 8.4 g/dL    Albumin 3.3 (L) 3.5 - 5.2 g/dL    Total Bilirubin 0.4 0.1 - 1.0 mg/dL    Alkaline Phosphatase 96 55 - 135 U/L    AST 17 10 - 40 U/L    ALT 11 10 - 44 U/L    Anion Gap 9 8 - 16 mmol/L    eGFR if African American 59 (A) >60 mL/min/1.73 m^2    eGFR if non African American 51 (A) >60 mL/min/1.73 m^2   Lactic acid, plasma   Result Value Ref Range    Lactate (Lactic Acid) 1.0 0.5 - 2.2 mmol/L   Troponin I   Result Value Ref Range    Troponin I 0.332 (H) 0.000 - 0.026 ng/mL   Brain natriuretic peptide   Result Value Ref Range    BNP 1,662 (H) 0  - 99 pg/mL   COVID-19 Rapid Screening   Result Value Ref Range    SARS-CoV-2 RNA, Amplification, Qual Negative Negative   Urinalysis, Reflex to Urine Culture Urine, Clean Catch    Specimen: Urine   Result Value Ref Range    Specimen UA Urine, Unspecified     Color, UA Yellow Yellow, Straw, Bridgette    Appearance, UA Clear Clear    pH, UA 7.0 5.0 - 8.0    Specific Gravity, UA 1.015 1.005 - 1.030    Protein, UA 1+ (A) Negative    Glucose, UA Negative Negative    Ketones, UA Negative Negative    Bilirubin (UA) Negative Negative    Occult Blood UA 1+ (A) Negative    Nitrite, UA Positive (A) Negative    Urobilinogen, UA Negative <2.0 EU/dL    Leukocytes, UA 3+ (A) Negative   CBC Auto Differential   Result Value Ref Range    WBC 6.31 3.90 - 12.70 K/uL    RBC 2.18 (L) 4.00 - 5.40 M/uL    Hemoglobin 7.7 (L) 12.0 - 16.0 g/dL    Hematocrit 25.3 (L) 37.0 - 48.5 %     (H) 82 - 98 fL    MCH 35.3 (H) 27.0 - 31.0 pg    MCHC 30.4 (L) 32.0 - 36.0 g/dL    RDW SEE COMMENT 11.5 - 14.5 %    Platelets 328 150 - 350 K/uL    MPV 11.9 9.2 - 12.9 fL    Immature Granulocytes 0.5 0.0 - 0.5 %    Gran # (ANC) 4.1 1.8 - 7.7 K/uL    Immature Grans (Abs) 0.03 0.00 - 0.04 K/uL    Lymph # 1.4 1.0 - 4.8 K/uL    Mono # 0.8 0.3 - 1.0 K/uL    Eos # 0.1 0.0 - 0.5 K/uL    Baso # 0.02 0.00 - 0.20 K/uL    nRBC 0 0 /100 WBC    Gran % 64.2 38.0 - 73.0 %    Lymph % 21.6 18.0 - 48.0 %    Mono % 12.4 4.0 - 15.0 %    Eosinophil % 1.0 0.0 - 8.0 %    Basophil % 0.3 0.0 - 1.9 %    Platelet Estimate Appears normal     Aniso Slight     Poik Slight     Poly Occasional     Hypo Occasional     Ovalocytes Occasional     Target Cells Occasional     Differential Method Automated    Basic Metabolic Panel   Result Value Ref Range    Sodium 139 136 - 145 mmol/L    Potassium 3.9 3.5 - 5.1 mmol/L    Chloride 102 95 - 110 mmol/L    CO2 30 (H) 23 - 29 mmol/L    Glucose 113 (H) 70 - 110 mg/dL    BUN 10 8 - 23 mg/dL    Creatinine 1.1 0.5 - 1.4 mg/dL    Calcium 8.5 (L) 8.7 - 10.5  mg/dL    Anion Gap 7 (L) 8 - 16 mmol/L    eGFR if African American 52 (A) >60 mL/min/1.73 m^2    eGFR if non African American 45 (A) >60 mL/min/1.73 m^2   Magnesium   Result Value Ref Range    Magnesium 2.4 1.6 - 2.6 mg/dL   Troponin I   Result Value Ref Range    Troponin I 0.259 (H) 0.000 - 0.026 ng/mL   Urinalysis Microscopic   Result Value Ref Range    RBC, UA 5 (H) 0 - 4 /hpf    WBC, UA 25 (H) 0 - 5 /hpf    WBC Clumps, UA Occasional (A) None-Rare    Bacteria Many (A) None-Occ /hpf    Hyaline Casts, UA 2 (A) 0-1/lpf /lpf    Microscopic Comment SEE COMMENT    Procalcitonin   Result Value Ref Range    Procalcitonin 0.12 <0.25 ng/mL   Troponin I   Result Value Ref Range    Troponin I 0.202 (H) 0.000 - 0.026 ng/mL   Echo   Result Value Ref Range    BSA 1.66 m2    TDI SEPTAL 0.07 m/s    LV LATERAL E/E' RATIO 12.57 m/s    LV SEPTAL E/E' RATIO 12.57 m/s    LA WIDTH 3.11 cm    TDI LATERAL 0.07 m/s    LVIDd 4.56 3.5 - 6.0 cm    IVS 1.14 (A) 0.6 - 1.1 cm    Posterior Wall 1.36 (A) 0.6 - 1.1 cm    Ao root annulus 3.66 cm    LVIDs 4.19 (A) 2.1 - 4.0 cm    FS 8 28 - 44 %    LA volume 29.48 cm3    Sinus 3.81 cm    STJ 3.24 cm    Ascending aorta 3.50 cm    LV mass 214.49 g    LA size 3.15 cm    TAPSE 1.71 cm    Left Ventricle Relative Wall Thickness 0.60 cm    AV mean gradient 11 mmHg    AV valve area 1.82 cm2    AV Velocity Ratio 0.41     AV index (prosthetic) 0.49     E/A ratio 1.29     Mean e' 0.07 m/s    E wave decelartion time 172.41 msec    IVRT 91.34 msec    LVOT diameter 2.17 cm    LVOT area 3.7 cm2    LVOT peak jason 0.91 m/s    LVOT peak VTI 25.11 cm    Ao peak jason 2.21 m/s    Ao VTI 50.96 cm    RVOT peak jason 0.63 m/s    RVOT peak VTI 14.80 cm    LVOT stroke volume 92.82 cm3    AV peak gradient 20 mmHg    PV mean gradient 1 mmHg    E/E' ratio 12.57 m/s    MV Peak E Jason 0.88 m/s    TR Max Jason 3.41 m/s    MV Peak A Jason 0.68 m/s    LV Systolic Volume 78.22 mL    LV Systolic Volume Index 48.0 mL/m2    LV Diastolic Volume  95.30 mL    LV Diastolic Volume Index 58.53 mL/m2    LA Volume Index 18.1 mL/m2    LV Mass Index 132 g/m2    RA Major Axis 3.69 cm    Left Atrium Minor Axis 3.15 cm    Left Atrium Major Axis 4.04 cm    Triscuspid Valve Regurgitation Peak Gradient 47 mmHg    Right Atrial Pressure (from IVC) 3 mmHg    TV rest pulmonary artery pressure 50 mmHg   Type & Screen   Result Value Ref Range    Group & Rh O POS     Indirect Paula NEG    Prepare RBC 1 Unit   Result Value Ref Range    UNIT NUMBER [id number]     Product Code H1606M06     DISPENSE STATUS TRANSFUSED     CODING SYSTEM [id number]     Unit Blood Type Code 5100     Unit Blood Type O POS     Unit Expiration [id number]        Imaging Results:  Imaging Results          X-Ray Chest AP Portable (Final result)  Result time 12/15/20 07:48:43    Final result by Dlegado Akers MD (12/15/20 07:48:43)                 Impression:      Stable small left pleural effusion with some left lung base atelectasis and/or consolidation.      Electronically signed by: Delgado Akers MD  Date:    12/15/2020  Time:    07:48             Narrative:    EXAMINATION:  XR CHEST AP PORTABLE    CLINICAL HISTORY:  Shortness of breath    TECHNIQUE:  Single frontal view of the chest was performed.    COMPARISON:  11/20/2020    FINDINGS:  Stable mild cardiomegaly.  Left chest AICD.  Aortic atherosclerosis. Stable small left pleural effusion with some left lung base atelectasis and/or consolidation. Right lung clear of consolidation.  Punctate radiopacities at the base of the neck of unknown clinical significance.    In comparison to the prior study, there is no adverse interval changes.                               12:42 AM: Per ED physician, pt's CXR results: Bilateral faint patchy infiltrates, L lower lobe effusion.      The EKG was ordered, reviewed, and independently interpreted by the ED provider.  Interpretation time: 0053  Rate: 79 BPM  Rhythm: Sinus rhythm with 1st degree AV block with  premature atrial complexes with aberrant conduction  Interpretation: Left axis deviation. Nonspecific intraventricular block. Abnormal QRS-T angle, consider primary T wave abnormality. No STEMI.             The Emergency Provider reviewed the vital signs and test results, which are outlined above.     ED Discussion     2:03 AM: Discussed case with Christelle Acevedo NP ([facility]). Dr. Atwood agrees with current care and management of pt and accepts admission.   Admitting Service: Hospital Medicine  Admitting Physician: Dr. Atwood  Admit to: Obs, tele    Re-evaluated pt. I have discussed test results, shared treatment plan, and the need for admission with patient and family at bedside. Pt and family express understanding at this time and agree with all information. All questions answered. Pt and family have no further questions or concerns at this time. Pt is ready for admit.       Medical Decision Making:   Clinical Tests:   Lab Tests: Ordered and Reviewed  Radiological Study: Ordered and Reviewed  Medical Tests: Ordered and Reviewed           ED Medication(s):  Medications   amiodarone tablet 200 mg (200 mg Oral Given 12/15/20 0904)   amLODIPine tablet 10 mg (10 mg Oral Given 12/15/20 0903)   ARIPiprazole tablet 5 mg (5 mg Oral Given 12/15/20 0903)   aspirin EC tablet 81 mg (81 mg Oral Given 12/15/20 0904)   atorvastatin tablet 20 mg (20 mg Oral Given 12/15/20 0903)   busPIRone tablet 15 mg (15 mg Oral Given 12/15/20 2019)   carvediloL tablet 6.25 mg (6.25 mg Oral Given 12/15/20 1800)   clopidogreL tablet 75 mg (75 mg Oral Given 12/15/20 0903)   cyanocobalamin tablet 1,000 mcg (1,000 mcg Oral Given 12/15/20 0903)   donepeziL tablet 10 mg (10 mg Oral Given 12/15/20 2019)   DULoxetine DR capsule 30 mg (30 mg Oral Given 12/15/20 0903)   famotidine tablet 20 mg (20 mg Oral Given 12/15/20 0903)   ferrous sulfate EC tablet 325 mg (325 mg Oral Given 12/15/20 0904)   fluticasone propionate 50 mcg/actuation  nasal spray 50 mcg (50 mcg Each Nostril Given 12/15/20 0913)   Lactobacillus acidoph-L.bulgar 1 million cell tablet 1 tablet (1 tablet Oral Given 12/15/20 0903)   levothyroxine tablet 50 mcg (50 mcg Oral Given 12/16/20 0514)   losartan tablet 25 mg (25 mg Oral Given 12/15/20 0904)   melatonin tablet 3 mg (3 mg Oral Not Given 12/15/20 2019)   oxybutynin 24 hr tablet 10 mg (10 mg Oral Given 12/15/20 0903)   mirtazapine tablet 7.5 mg (7.5 mg Oral Given 12/15/20 2019)   multivitamin tablet (1 tablet Oral Given 12/15/20 0903)   venlafaxine 24 hr capsule 75 mg (75 mg Oral Given 12/15/20 0903)   hydrALAZINE injection 10 mg (10 mg Intravenous Given 12/16/20 0419)   albuterol-ipratropium 2.5 mg-0.5 mg/3 mL nebulizer solution 3 mL (3 mLs Nebulization Given 12/16/20 0019)   albuterol-ipratropium 2.5 mg-0.5 mg/3 mL nebulizer solution 3 mL (has no administration in time range)   acetaminophen tablet 650 mg (650 mg Oral Given 12/15/20 1436)   ondansetron injection 4 mg (has no administration in time range)   guaifenesin 100 mg/5 ml syrup 200 mg (has no administration in time range)   docusate sodium capsule 100 mg (100 mg Oral Given 12/15/20 0904)   polyethylene glycol packet 17 g (has no administration in time range)   furosemide injection 40 mg (40 mg Intravenous Given 12/15/20 2119)   gabapentin capsule 300 mg (300 mg Oral Given 12/15/20 2019)   cefTRIAXone (ROCEPHIN) 1 g/50 mL D5W IVPB (1 g Intravenous New Bag 12/15/20 1528)   doxycycline tablet 100 mg (100 mg Oral Given 12/15/20 2019)   0.9%  NaCl infusion (for blood administration) (has no administration in time range)   piperacillin-tazobactam 4.5 g in dextrose 5 % 100 mL IVPB (ready to mix system) (0 g Intravenous Stopped 12/15/20 0043)   acetaminophen tablet 1,000 mg (1,000 mg Oral Given 12/14/20 2342)   ibuprofen tablet 400 mg (400 mg Oral Given 12/14/20 2343)   aspirin tablet 325 mg (325 mg Oral Given 12/15/20 0153)       Current Discharge Medication List                   Scribe Attestation:   Scribe #1: I performed the above scribed service and the documentation accurately describes the services I performed. I attest to the accuracy of the note.     Attending:   Physician Attestation Statement for Scribe #1: I, Ramon Peace MD, personally performed the services described in this documentation, as scribed by Sadia Salgado, in my presence, and it is both accurate and complete.           Clinical Impression       ICD-10-CM ICD-9-CM   1. Sepsis, due to unspecified organism, unspecified whether acute organ dysfunction present  A41.9 038.9     995.91   2. SOB (shortness of breath)  R06.02 786.05   3. Atypical pneumonia  J18.9 486   4. Elevated troponin I level  R77.8 790.6   5. Elevated troponin  R77.8 790.6       Disposition:   Disposition: Placed in Observation  Condition: Fair         Ramon Peace MD  12/16/20 0601

## 2023-03-13 NOTE — ASSESSMENT & PLAN NOTE
May need to hold ARB given unstable VT episode this AM and hypotension  Continue Coreg as BP permits   show

## 2023-06-26 NOTE — PROGRESS NOTES
C3 nurse attempted to contact patient. No answer.  C3 nurse attempted to contact Love Davey  for a TCC post hospital discharge follow up call. No answer at phone number listed and no voicemail available. The patient does not have a scheduled HOSFU appointment within 7-14 days post hospital discharge date 12/18/2020 Message sent to Physician staff to assist with HOSFU appointment scheduling.           Evaluated patient at bedside  Son present and updated    Patient complains of vision changes/loss, blurriness left eye  Associated with headache     No acute distress  No respiratory distress  Negative pronator drift  Left eye deviating laterally  AO3  Negative facial drooping  Negative dysarthria     Gent peak 3.7  CT head stat

## 2023-07-03 NOTE — ED NOTES
Spoke with charge nurse, Maryslo LEE, who states ok to wait on [place]'s transportation. Notified [place] that pt does need transportation. Will continue to monitor until transport arrival.   Spoke with patient, next available for her and daughter is 08/15/2023.  Sent message to provider and cath lab nurse for rescheduling.  Advised patient we will call her back.  Patient verbalized understanding.

## 2025-02-20 NOTE — PLAN OF CARE
Patient informed SW she received HH services with [facility].  Referrals sent informing provider that the patient was in the hospital.       03/11/20 1003   Post-Acute Status   Post-Acute Authorization Home Health/Hospice   Home Health/Hospice Status Referrals Sent   Patient choice form signed by patient/caregiver List with quality metrics by geographic area provided   Discharge Plan   Discharge Plan A Home;Home with family      Initiate Treatment: ketoconazole 2 % topical cream BID\\nSig: Apply topically to rash area twice daily for 3 weeks Detail Level: Zone Render In Strict Bullet Format?: No